# Patient Record
Sex: MALE | Race: WHITE | Employment: OTHER | URBAN - METROPOLITAN AREA
[De-identification: names, ages, dates, MRNs, and addresses within clinical notes are randomized per-mention and may not be internally consistent; named-entity substitution may affect disease eponyms.]

---

## 2017-04-26 ENCOUNTER — ALLIED HEALTH/NURSE VISIT (OUTPATIENT)
Dept: CARDIOLOGY | Facility: CLINIC | Age: 77
End: 2017-04-26
Payer: COMMERCIAL

## 2017-04-26 DIAGNOSIS — Z95.0 CARDIAC PACEMAKER IN SITU: ICD-10-CM

## 2017-04-26 PROCEDURE — 93294 REM INTERROG EVL PM/LDLS PM: CPT | Performed by: INTERNAL MEDICINE

## 2017-04-26 PROCEDURE — 93296 REM INTERROG EVL PM/IDS: CPT | Performed by: INTERNAL MEDICINE

## 2017-04-26 NOTE — PROGRESS NOTES
Medtronic Adapta (D) Remote PPM Device Check  AP: 25 % : <1 %  Mode: DDDR        Presenting Rhythm: AS/VS, rates 68-74bpm  Heart Rate: Adequate rates per histogram  Sensing: Stable    Pacing Threshold: Stable    Impedance: Stable  Battery Status: 10-14 years  Atrial Arrhythmia: None  Ventricular Arrhythmia: None     Care Plan: F/u PPM Carelink q 3 months. Gave pt results over the phone. Porfirio,CVT

## 2017-04-26 NOTE — MR AVS SNAPSHOT
After Visit Summary   4/26/2017    Charles Nogueira    MRN: [id number]           Patient Information     Date Of Birth          1940        Visit Information        Provider Department      4/26/2017 11:15 AM ADAM OCHOA [facility]        Today's Diagnoses     Cardiac pacemaker in situ           Follow-ups after your visit        Your next 10 appointments already scheduled     Apr 26, 2017 11:15 AM CDT   Remote PPM Check with MEDINA TECH1   [facility] ([facility])    [address]  [facility] [facility] [address] [phone]           This appointment is for a remote check of your pacemaker.  This is not an appointment at the office.            May 19, 2017  9:15 AM CDT   Return Visit with Michele Witt MD   [facility] ([facility])    [address]  [place] [address] [phone]              Who to contact     If you have questions or need follow up information about today's clinic visit or your schedule please contact [facility] directly at [phone].  Normal or non-critical lab and imaging results will be communicated to you by [employer], letter or phone within 4 business days after the clinic has received the results. If you do not hear from us within 7 days, please contact the clinic through [employer] or phone. If you have a critical or abnormal lab result, we will notify you by phone as soon as possible.  Submit refill requests through [employer] or call your pharmacy and they will forward the refill request to us. Please allow 3 business days for your refill to be completed.          Additional Information About Your Visit        MyChart Information     [employer] gives you secure access to your electronic health record. If you see a primary care provider, you can also send messages to  your care team and make appointments. If you have questions, please call your primary care clinic.  If you do not have a primary care provider, please call [phone] and they will assist you.        Care EveryWhere ID     This is your Care EveryWhere ID. This could be used by other organizations to access your [place] medical records  [id number]         Blood Pressure from Last 3 Encounters:   11/11/16 108/60   05/03/16 126/70   11/20/15 106/58    Weight from Last 3 Encounters:   11/11/16 70.8 kg (156 lb)   05/03/16 69.7 kg (153 lb 9.6 oz)   11/10/15 66.7 kg (147 lb)              We Performed the Following     Follow-Up with Device Clinic     INTERROGATION DEVICE EVAL REMOTE, PACER/ICD (79859)     PM DEVICE INTERROGATE REMOTE (06723)        Primary Care Provider Office Phone # Fax #    Stanley Vikas Saldaña [phone]       [address] [address]        Thank you!     Thank you for choosing [facility] [place]  for your care. Our goal is always to provide you with excellent care. Hearing back from our patients is one way we can continue to improve our services. Please take a few minutes to complete the written survey that you may receive in the mail after your visit with us. Thank you!             Your Updated Medication List - Protect others around you: Learn how to safely use, store and throw away your medicines at www.disposemymeds.org.          This list is accurate as of: 4/26/17 10:54 AM.  Always use your most recent med list.                   Brand Name Dispense Instructions for use    losartan 50 MG tablet    COZAAR    90 tablet    Take 1 tablet (50 mg) by mouth daily       metoprolol 50 MG 24 hr tablet    TOPROL-XL    90 tablet    Take 1 tablet (50 mg) by mouth daily       vitamin D 1000 UNITS capsule      Take 1,000 Units by mouth daily

## 2017-05-17 ENCOUNTER — PRE VISIT (OUTPATIENT)
Dept: CARDIOLOGY | Facility: CLINIC | Age: 77
End: 2017-05-17

## 2017-05-19 ENCOUNTER — OFFICE VISIT (OUTPATIENT)
Dept: CARDIOLOGY | Facility: CLINIC | Age: 77
End: 2017-05-19
Payer: COMMERCIAL

## 2017-05-19 VITALS
WEIGHT: 154.8 LBS | HEART RATE: 62 BPM | SYSTOLIC BLOOD PRESSURE: 132 MMHG | BODY MASS INDEX: 23.46 KG/M2 | HEIGHT: 68 IN | DIASTOLIC BLOOD PRESSURE: 70 MMHG

## 2017-05-19 DIAGNOSIS — I10 ESSENTIAL HYPERTENSION: ICD-10-CM

## 2017-05-19 PROCEDURE — 99214 OFFICE O/P EST MOD 30 MIN: CPT | Performed by: NURSE PRACTITIONER

## 2017-05-19 RX ORDER — METOPROLOL SUCCINATE 50 MG/1
50 TABLET, EXTENDED RELEASE ORAL DAILY
Qty: 90 TABLET | Refills: 3 | Status: SHIPPED | OUTPATIENT
Start: 2017-05-19 | End: 2018-06-19

## 2017-05-19 RX ORDER — LOSARTAN POTASSIUM 50 MG/1
50 TABLET ORAL DAILY
Qty: 90 TABLET | Refills: 3 | Status: SHIPPED | OUTPATIENT
Start: 2017-05-19 | End: 2018-06-19

## 2017-05-19 NOTE — PROGRESS NOTES
HPI and Plan:   See dictation  [id number]    Orders Placed This Encounter   Procedures     Follow-Up with Cardiologist       Orders Placed This Encounter   Medications     losartan (COZAAR) 50 MG tablet     Sig: Take 1 tablet (50 mg) by mouth daily     Dispense:  90 tablet     Refill:  3     metoprolol (TOPROL-XL) 50 MG 24 hr tablet     Sig: Take 1 tablet (50 mg) by mouth daily     Dispense:  90 tablet     Refill:  3       Medications Discontinued During This Encounter   Medication Reason     Cholecalciferol (VITAMIN D) 1000 UNITS capsule Stopped by Patient     losartan (COZAAR) 50 MG tablet Reorder     metoprolol (TOPROL-XL) 50 MG 24 hr tablet Reorder         Encounter Diagnosis   Name Primary?     Essential hypertension        CURRENT MEDICATIONS:  Current Outpatient Prescriptions   Medication Sig Dispense Refill     losartan (COZAAR) 50 MG tablet Take 1 tablet (50 mg) by mouth daily 90 tablet 3     metoprolol (TOPROL-XL) 50 MG 24 hr tablet Take 1 tablet (50 mg) by mouth daily 90 tablet 3     [DISCONTINUED] losartan (COZAAR) 50 MG tablet Take 1 tablet (50 mg) by mouth daily 90 tablet 3     [DISCONTINUED] metoprolol (TOPROL-XL) 50 MG 24 hr tablet Take 1 tablet (50 mg) by mouth daily 90 tablet 3       ALLERGIES     Allergies   Allergen Reactions     Sulfa Drugs      rash       PAST MEDICAL HISTORY:  Past Medical History:   Diagnosis Date     Allergic rhinitis, cause unspecified      HTN (hypertension)      Melanoma (H)      Mitral valve disease      Other forms of migraine, without mention of intractable migraine without mention of status migrainosus      Other premature beats      Pacemaker 7/2004     Premature ventricular contraction      Squamous cell carcinoma (H)      Syncope        PAST SURGICAL HISTORY:  Past Surgical History:   Procedure Laterality Date     APPENDECTOMY OPEN       BIOPSY OF SKIN LESION       COLONOSCOPY N/A 11/20/2015    Procedure: COLONOSCOPY;  Surgeon: David Us MD;  Location:  GI  "    IMPLANT PACEMAKER  7/2004     MOHS MICROGRAPHIC PROCEDURE         FAMILY HISTORY:  Family History   Problem Relation Age of Onset     C.A.D. Brother      Just had angioplasty in 2006     HEART DISEASE Mother      MI     HEART DISEASE Father      MI     Colon Cancer No family hx of        SOCIAL HISTORY:  Social History     Social History     Marital status:      Spouse name: Paula     Number of children: 3     Years of education: N/A     Occupational History      Retired     Social History Main Topics     Smoking status: Former Smoker     Packs/day: 1.00     Years: 4.00     Types: Cigarettes     Quit date: 1/1/1967     Smokeless tobacco: Never Used     Alcohol use No     Drug use: No     Sexual activity: Yes     Partners: Female     Other Topics Concern      Service Yes     Blood Transfusions No     Caffeine Concern Yes     decaff coffee     Occupational Exposure No     Hobby Hazards No     Sleep Concern No     Stress Concern No     Weight Concern No     Special Diet No     Back Care No     Exercise Yes     Excercises about 4-5 days per week     Bike Helmet Yes     Seat Belt Yes     Self-Exams No     Social History Narrative       Review of Systems:  Skin:  Positive for   thin skin, bruise easily   Eyes:  Positive for glasses    ENT:  Negative      Respiratory:  Negative       Cardiovascular:  Negative for;palpitations;chest pain;lightheadedness;dizziness;fatigue;edema      Gastroenterology: Negative      Genitourinary:  Negative      Musculoskeletal:  Positive for arthritis finger and toes pain, shoulders, neck stiffness  Neurologic:  Positive for migraine headaches;numbness or tingling of hands    Psychiatric:  Negative      Heme/Lymph/Imm:  Positive for allergies seasonal allergies  Endocrine:  Negative        Physical Exam:  Vitals: /70 (BP Location: Right arm, Patient Position: Chair, Cuff Size: Adult Regular)  Pulse 62  Ht 1.727 m (5' 8\")  Wt 70.2 kg (154 lb 12.8 oz)  BMI 23.54 " kg/m2    Constitutional:  cooperative, alert and oriented, well developed, well nourished, in no acute distress        Skin:  warm and dry to the touch, no apparent skin lesions or masses noted surgical scars well-healed      Head:  normocephalic, no masses or lesions        Eyes:  pupils equal and round, conjunctivae and lids unremarkable, sclera white, no xanthalasma, EOMS intact, no nystagmus        ENT:  no pallor or cyanosis, dentition good        Neck:  carotid pulses are full and equal bilaterally, JVP normal, no carotid bruit, no thyromegaly        Chest:  normal breath sounds, clear to auscultation, normal A-P diameter, normal symmetry, normal respiratory excursion, no use of accessory muscles          Cardiac: normal S1 and S2;no S3 or S4;apical impulse not displaced;regular rhythm       holosystolic murmur;LLSB;radiation to the axilla          Abdomen:  abdomen soft, non-tender, BS normoactive, no mass, no HSM, no bruits        Vascular: pulses full and equal, no bruits auscultated                                        Extremities and Back:  no deformities, clubbing, cyanosis, erythema observed;no edema              Neurological:  affect appropriate, oriented to time, person and place;no gross motor deficits              CC  No referring provider defined for this encounter.

## 2017-05-19 NOTE — LETTER
5/19/2017    Stanley Saldaña MD  [address] [address]    RE: Charles Salazaranson       Dear Colleague,    I had the pleasure of seeing Charles Nogueira in the [facility].    Charles Nogueira is a 77-year-old gentleman followed here by Dr. Michele Witt.  There was a mixup in scheduling today, and he is seeing me in lieu of his visit with Dr. Witt.  He has a history of mitral regurgitation which appeared moderate by echocardiogram in August of last year.  Recommendation is for echo again this year in August.      He has hypertension with PVCs.  He had a pacemaker implanted at [facility] in 2004 for bradycardia with asystole.  His battery life is intact.  He has had no significant mode switches on his last few checks and continues to have multiple PVCs which are asymptomatic in the setting of preserved LV function and no evidence of ischemic disease.  He has mild left atrial enlargement.  On transesophageal echocardiogram previously, the jet was directed anteriorly consistent with posterior leaflet pathology.      He has no progression of shortness of breath.  He has no edema.  His activity tolerance remains intact.  In fact, he walks 40 miles a week with his wife.  He lives in [place], for 4 months out of the year.      He has no cardiac symptoms today.  I did refill his medications.  His blood pressure is reasonably controlled.  He had lipids done at Dr. Matthew Saldaña's office in November showing an LDL of 116, HDL 33.  Triglycerides were normal.     Outpatient Encounter Prescriptions as of 5/19/2017   Medication Sig Dispense Refill     losartan (COZAAR) 50 MG tablet Take 1 tablet (50 mg) by mouth daily 90 tablet 3     metoprolol (TOPROL-XL) 50 MG 24 hr tablet Take 1 tablet (50 mg) by mouth daily 90 tablet 3     [DISCONTINUED] losartan (COZAAR) 50 MG tablet Take 1 tablet (50 mg) by mouth daily 90 tablet 3     [DISCONTINUED] metoprolol (TOPROL-XL) 50  MG 24 hr tablet Take 1 tablet (50 mg) by mouth daily 90 tablet 3     [DISCONTINUED] Cholecalciferol (VITAMIN D) 1000 UNITS capsule Take 1,000 Units by mouth daily       No facility-administered encounter medications on file as of 5/19/2017.       IMPRESSION AND PLAN:   1.  Sick sinus syndrome with pacemaker in place.  All settings are stable.   2.  Multiple PVCs with intact ejection fraction and no ischemic disease.  These are asymptomatic.   3.  Mitral regurgitation which is moderate.  This appears to be stable.  He appears to have prolapse of his posterior leaflet.  He will have an echo in August which will be reviewed by Dr. Witt.  We have offered him an office visit at that time, which he has accepted and we will do a full interrogation of his pacemaker at the same time.      It has been a pleasure meeting him today.     Sincerely,    CORBIN Heller CNP     [facility]

## 2017-05-19 NOTE — MR AVS SNAPSHOT
After Visit Summary   5/19/2017    Charles Nogueira    MRN: [id number]           Patient Information     Date Of Birth          1940        Visit Information        Provider Department      5/19/2017 9:30 AM Geraldo Burger APRN CNP [facility] AT [place]        Today's Diagnoses     Essential hypertension          Care Instructions    Echocardiogram and pacer check in August    The echo will be reviewed and if stable, see Eduar in one year        Follow-ups after your visit        Additional Services     Follow-Up with Cardiologist                 Your next 10 appointments already scheduled     Aug 02, 2017 11:00 AM CDT   Ech Complete with [email or web address]   [facility] ([facility])    [address]  [place] [address] [phone]           1.  Please bring or wear a comfortable two-piece outfit. 2.  You may eat, drink and take your normal medicines. 3.  For any questions that cannot be answered, please contact the ordering physician ***Please check-in at the [place] Registration Office located in [address] in the [facility] building. When you are finished registering, please go to [address] and have a seat. The technician will call your name for the test.            Aug 02, 2017  1:30 PM CDT   Remote PPM Check with MEDINA TECH1   [facility])    [address] [place] [address] [address]  [facility] [address] [phone]           This appointment is for a remote check of your pacemaker.  This is not an appointment at the office.            Aug 02, 2017  2:15 PM CDT   Return Visit with Michele Witt MD   [facility] AT [place] ([facility])    [address]  [facility] [address] [phone]              Future tests that were ordered for you today     Open Future Orders   "      Priority Expected Expires Ordered    Follow-Up with Cardiologist Routine 5/19/2018 10/1/2018 5/19/2017            Who to contact     If you have questions or need follow up information about today's clinic visit or your schedule please contact [facility] AT [place] directly at [phone].  Normal or non-critical lab and imaging results will be communicated to you by MyChart, letter or phone within 4 business days after the clinic has received the results. If you do not hear from us within 7 days, please contact the clinic through [employer] or phone. If you have a critical or abnormal lab result, we will notify you by phone as soon as possible.  Submit refill requests through [employer] or call your pharmacy and they will forward the refill request to us. Please allow 3 business days for your refill to be completed.          Additional Information About Your Visit        MyChart Information     [employer] gives you secure access to your electronic health record. If you see a primary care provider, you can also send messages to your care team and make appointments. If you have questions, please call your primary care clinic.  If you do not have a primary care provider, please call [phone] and they will assist you.        Care EveryWhere ID     This is your Care EveryWhere ID. This could be used by other organizations to access your [place] medical records  [id number]        Your Vitals Were     Pulse Height BMI (Body Mass Index)             62 1.727 m (5' 8\") 23.54 kg/m2          Blood Pressure from Last 3 Encounters:   05/19/17 132/70   11/11/16 108/60   05/03/16 126/70    Weight from Last 3 Encounters:   05/19/17 70.2 kg (154 lb 12.8 oz)   11/11/16 70.8 kg (156 lb)   05/03/16 69.7 kg (153 lb 9.6 oz)                 Where to get your medicines      These medications were sent to [facility] - [place], [place] - [address]  [address], [place] " [place] [address]     Phone:  [phone]     losartan 50 MG tablet    metoprolol 50 MG 24 hr tablet          Primary Care Provider Office Phone # Fax #    Stanley Saldaña [phone]       [address] [address]        Thank you!     Thank you for choosing [facility] PHYSICI[facility] HEART AT [place]  for your care. Our goal is always to provide you with excellent care. Hearing back from our patients is one way we can continue to improve our services. Please take a few minutes to complete the written survey that you may receive in the mail after your visit with us. Thank you!             Your Updated Medication List - Protect others around you: Learn how to safely use, store and throw away your medicines at www.disposemymeds.org.          This list is accurate as of: 5/19/17 10:09 AM.  Always use your most recent med list.                   Brand Name Dispense Instructions for use    losartan 50 MG tablet    COZAAR    90 tablet    Take 1 tablet (50 mg) by mouth daily       metoprolol 50 MG 24 hr tablet    TOPROL-XL    90 tablet    Take 1 tablet (50 mg) by mouth daily

## 2017-05-19 NOTE — PATIENT INSTRUCTIONS
Echocardiogram and pacer check in August    The echo will be reviewed and if stable, see Eduar in one year

## 2017-05-20 NOTE — PROGRESS NOTES
HISTORY OF PRESENT ILLNESS:  Charles Noguiera is a 77-year-old gentleman followed here by Dr. Michele Witt.  There was a mixup in scheduling today, and he is seeing me in lieu of his visit with Dr. Witt.  He has a history of mitral regurgitation which appeared moderate by echocardiogram in August of last year.  Recommendation is for echo again this year in August.      He has hypertension with PVCs.  He had a pacemaker implanted at [facility] in 2004 for bradycardia with asystole.  His battery life is intact.  He has had no significant mode switches on his last few checks and continues to have multiple PVCs which are asymptomatic in the setting of preserved LV function and no evidence of ischemic disease.  He has mild left atrial enlargement.  On transesophageal echocardiogram previously, the jet was directed anteriorly consistent with posterior leaflet pathology.      He has no progression of shortness of breath.  He has no edema.  His activity tolerance remains intact.  In fact, he walks 40 miles a week with his wife.  He lives in [place], for 4 months out of the year.      He has no cardiac symptoms today.  I did refill his medications.  His blood pressure is reasonably controlled.  He had lipids done at Dr. Matthew Saldaña's office in November showing an LDL of 116, HDL 33.  Triglycerides were normal.      IMPRESSION AND PLAN:   1.  Sick sinus syndrome with pacemaker in place.  All settings are stable.   2.  Multiple PVCs with intact ejection fraction and no ischemic disease.  These are asymptomatic.   3.  Mitral regurgitation which is moderate.  This appears to be stable.  He appears to have prolapse of his posterior leaflet.  He will have an echo in August which will be reviewed by Dr. Witt.  We have offered him an office visit at that time, which he has accepted and we will do a full interrogation of his pacemaker at the same time.      It has been a pleasure meeting him today.         YUDELKA  SUSHILA HERR NP             D: 2017 10:16   T: 2017 20:50   MT: RODRICK      Name:     WALI CHAMORRO   MRN:      [id number]        Account:      [id number]   :      1940           Service Date: 2017      Document: [id number]

## 2017-06-27 ENCOUNTER — TELEPHONE (OUTPATIENT)
Dept: FAMILY MEDICINE | Facility: CLINIC | Age: 77
End: 2017-06-27

## 2017-08-02 ENCOUNTER — HOSPITAL ENCOUNTER (OUTPATIENT)
Dept: CARDIOLOGY | Facility: CLINIC | Age: 77
Discharge: HOME | End: 2017-08-02
Attending: INTERNAL MEDICINE | Admitting: INTERNAL MEDICINE
Payer: MEDICARE

## 2017-08-02 ENCOUNTER — ALLIED HEALTH/NURSE VISIT (OUTPATIENT)
Dept: CARDIOLOGY | Facility: CLINIC | Age: 77
End: 2017-08-02
Payer: COMMERCIAL

## 2017-08-02 DIAGNOSIS — I05.9 MITRAL VALVE DISEASE: ICD-10-CM

## 2017-08-02 DIAGNOSIS — Z95.0 CARDIAC PACEMAKER IN SITU: Primary | ICD-10-CM

## 2017-08-02 PROCEDURE — 93294 REM INTERROG EVL PM/LDLS PM: CPT | Performed by: INTERNAL MEDICINE

## 2017-08-02 PROCEDURE — 93306 TTE W/DOPPLER COMPLETE: CPT

## 2017-08-02 PROCEDURE — 93306 TTE W/DOPPLER COMPLETE: CPT | Mod: 26 | Performed by: INTERNAL MEDICINE

## 2017-08-02 PROCEDURE — 93296 REM INTERROG EVL PM/IDS: CPT | Performed by: INTERNAL MEDICINE

## 2017-08-02 NOTE — PROGRESS NOTES
Medtronic Adapta ADDRL1 (D) Remote PPM Device Check  AP: 25% : 0%  Mode: DDDR        Presenting Rhythm: AP/VS  Heart Rate: adequate heart rates per histogram  Sensing: stable    Pacing Threshold: stable    Impedance: stable  Battery Status: 10 - 14 years remaining  Atrial Arrhythmia: 1 brief mode switch episode, no EGM available  Ventricular Arrhythmia: 1 vent high rate. EGM shows a 1 sec burst of PAT. Reviewed finding with Linda YADAV.      Care Plan: Order placed for annual threshold to be scheduled in November. No answer, left voice message with result. Kristian WITT

## 2017-08-02 NOTE — MR AVS SNAPSHOT
After Visit Summary   8/2/2017    Charles Nogueira    MRN: [id number]           Patient Information     Date Of Birth          1940        Visit Information        Provider Department      8/2/2017 1:30 PM ADAM OCHOA [facility]        Today's Diagnoses     Cardiac pacemaker in situ    -  1       Follow-ups after your visit        Additional Services     Follow-Up with Device Clinic                 Your next 10 appointments already scheduled     Aug 02, 2017  1:30 PM CDT   Remote PPM Check with ADAM OCHOA   [facility] ([facility])    [address]  [facility] [id number] [phone]           This appointment is for a remote check of your pacemaker.  This is not an appointment at the office.            Aug 14, 2017  7:45 AM CDT   Return Visit with Michele Witt MD   [facility] ([facility])    [address]  [facility] [id number] [phone]              Future tests that were ordered for you today     Open Future Orders        Priority Expected Expires Ordered    Follow-Up with Device Clinic Routine 11/2/2017 9/6/2018 8/2/2017            Who to contact     If you have questions or need follow up information about today's clinic visit or your schedule please contact [facility] directly at [phone].  Normal or non-critical lab and imaging results will be communicated to you by MyChart, letter or phone within 4 business days after the clinic has received the results. If you do not hear from us within 7 days, please contact the clinic through MyChart or phone. If you have a critical or abnormal lab result, we will notify you by phone as soon as possible.  Submit refill requests through [employer] or call your pharmacy and they will forward the refill request to us. Please allow 3  business days for your refill to be completed.          Additional Information About Your Visit        MyChart Information     [employer] gives you secure access to your electronic health record. If you see a primary care provider, you can also send messages to your care team and make appointments. If you have questions, please call your primary care clinic.  If you do not have a primary care provider, please call [phone] and they will assist you.        Care EveryWhere ID     This is your Care EveryWhere ID. This could be used by other organizations to access your [facility] medical records  [id number]         Blood Pressure from Last 3 Encounters:   05/19/17 132/70   11/11/16 108/60   05/03/16 126/70    Weight from Last 3 Encounters:   05/19/17 70.2 kg (154 lb 12.8 oz)   11/11/16 70.8 kg (156 lb)   05/03/16 69.7 kg (153 lb 9.6 oz)              We Performed the Following     INTERROGATION DEVICE EVAL REMOTE, PACER/ICD (60334)     PM DEVICE INTERROGATE REMOTE (86172)        Primary Care Provider Office Phone # Fax #    Stanley Vikas Saldaña [phone]       [address] [address]        Equal Access to Services     ROLANDO MATHIAS : Hadii aad ku hadasho Soomaali, waaxda luqadaha, qaybta kaalmada adeegyada, waxay idiin haypiercen ruben velazco. So [facility] [phone].    ATENCIÓN: Si habla español, tiene a dowling disposición servicios gratuitos de asistencia lingüística. Llame al [phone].    We comply with applicable federal civil rights laws and [place] laws. We do not discriminate on the basis of race, color, national origin, age, disability sex, sexual orientation or gender identity.            Thank you!     Thank you for choosing [facility] [place]  for your care. Our goal is always to provide you with excellent care. Hearing back from our patients is one way we can continue to improve our services. Please  take a few minutes to complete the written survey that you may receive in the mail after your visit with us. Thank you!             Your Updated Medication List - Protect others around you: Learn how to safely use, store and throw away your medicines at www.disposemymeds.org.          This list is accurate as of: 8/2/17  1:11 PM.  Always use your most recent med list.                   Brand Name Dispense Instructions for use Diagnosis    losartan 50 MG tablet    COZAAR    90 tablet    Take 1 tablet (50 mg) by mouth daily    Essential hypertension       metoprolol 50 MG 24 hr tablet    TOPROL-XL    90 tablet    Take 1 tablet (50 mg) by mouth daily    Essential hypertension

## 2017-08-14 ENCOUNTER — OFFICE VISIT (OUTPATIENT)
Dept: CARDIOLOGY | Facility: CLINIC | Age: 77
End: 2017-08-14
Attending: INTERNAL MEDICINE
Payer: COMMERCIAL

## 2017-08-14 VITALS
SYSTOLIC BLOOD PRESSURE: 132 MMHG | HEIGHT: 68 IN | WEIGHT: 152 LBS | DIASTOLIC BLOOD PRESSURE: 74 MMHG | BODY MASS INDEX: 23.04 KG/M2 | HEART RATE: 66 BPM

## 2017-08-14 DIAGNOSIS — I10 ESSENTIAL HYPERTENSION: ICD-10-CM

## 2017-08-14 DIAGNOSIS — I49.3 PVC (PREMATURE VENTRICULAR CONTRACTION): ICD-10-CM

## 2017-08-14 DIAGNOSIS — I49.5 SICK SINUS SYNDROME (H): ICD-10-CM

## 2017-08-14 DIAGNOSIS — I05.9 MITRAL VALVE DISEASE: Primary | ICD-10-CM

## 2017-08-14 DIAGNOSIS — I45.9 STOKES-ADAMS SYNCOPE: ICD-10-CM

## 2017-08-14 DIAGNOSIS — Z95.0 CARDIAC PACEMAKER IN SITU: ICD-10-CM

## 2017-08-14 PROCEDURE — 99214 OFFICE O/P EST MOD 30 MIN: CPT | Performed by: INTERNAL MEDICINE

## 2017-08-14 NOTE — LETTER
8/14/2017    Stanley Saldaña MD  [address] [address] [facility] [address]    RE: Charles Nogueira       Dear Colleague,    PRIMARY CARE PHYSICIAN:  Stanley Saldaña MD      I again had the pleasure of seeing your patient, Charels Nogueira, at [facility] for evaluation of mitral regurgitation, hypertension and PVCs.  The patient is status post dual chamber pacemaker implantation at [facility] 07/07/2004 for bradycardia and asystole.  He has not had any further episodes of syncope.  Pacemaker interrogation on 08/02 shows atrial pacing of 25% and no ventricular pacing.  He has 10-14 years of battery life left.  He had 1 brief mode switch and 1-second burst of PAT.  He has a history of significant premature ventricular contractions which have been less and less over the last few years.  He is able to exercise by walking generally 10,000 steps per day including up to 45 minutes to 2 hours of walking free of chest discomfort or shortness of breath.  We are following his echocardiography for moderate mitral regurgitation and ejection fraction.  This was performed on 08/02 and demonstrated mild left atrial enlargement, ejection fraction of 55%-60%, possibly due to his paced rhythm.  Left ventricular end systolic and end-diastolic dimensions are normal.  He has evidence of a torn P1/P2 cord with prolapse of the mitral valve and the annulus has been dilated in the past at 38 mm x 39 mm.  The mitral regurgitation jet on OTTO was anteriorly directed consistent with posterior leaflet pathology.  We have discussed the need for mitral valve repair should his left ventricular show an increase in size or decrease in function.  Pulmonary pressures have been normal.  He denies fevers, chills, palpitations, syncope or presyncope.  He has not been hospitalized or had any surgery since I last saw him.      PHYSICAL EXAMINATION:  As listed below.     Outpatient Encounter Prescriptions as of 8/14/2017    Medication Sig Dispense Refill     losartan (COZAAR) 50 MG tablet Take 1 tablet (50 mg) by mouth daily 90 tablet 3     metoprolol (TOPROL-XL) 50 MG 24 hr tablet Take 1 tablet (50 mg) by mouth daily 90 tablet 3     No facility-administered encounter medications on file as of 8/14/2017.       ASSESSMENT:   1.  Charles Nogueira is a delightful 77-year-old male with sick sinus syndrome and episodes of severe bradycardia and syncope.  His dual chamber pacemaker implanted in 2004 and replaced 07/02/2014 is functioning normally.  We will continue to monitor his rhythms including PVCs through the device clinic.   2.  Mitral valve prolapse and mitral regurgitation.  This has been stable and we will repeat an echocardiogram in 1 year.   3.  We are using losartan along with metoprolol for PVCs and blood pressure.  He seems to be stable at this time.   4.  The patient's HDL cholesterol is low at 33 and LDL possibly normal at 116 last November.  I have offered to perform a CT calcium score on this patient given a family history of heart disease as well as the patient's low HDL.  He and his wife have been informed of the purpose of this test, how it is done, the low-dose radiation and what the results would indicate.  He will decide whether he wishes to proceed sometime in the near future.  Should this be abnormal, a statin would be added and an LDL cholesterol level below 100 would be suggested.      It is my pleasure to assist in the care of Charles Nogueira.  All his questions were answered to his satisfaction.     Sincerely,    Michele Witt MD     [facility]

## 2017-08-14 NOTE — PROGRESS NOTES
PRIMARY CARE PHYSICIAN:  Stanley Saldaña MD      HISTORY OF PRESENT ILLNESS:  I again had the pleasure of seeing your patient, Charles Nogueira, at [facility] for evaluation of mitral regurgitation, hypertension and PVCs.  The patient is status post dual chamber pacemaker implantation at [facility] 07/07/2004 for bradycardia and asystole.  He has not had any further episodes of syncope.  Pacemaker interrogation on 08/02 shows atrial pacing of 25% and no ventricular pacing.  He has 10-14 years of battery life left.  He had 1 brief mode switch and 1-second burst of PAT.  He has a history of significant premature ventricular contractions which have been less and less over the last few years.  He is able to exercise by walking generally 10,000 steps per day including up to 45 minutes to 2 hours of walking free of chest discomfort or shortness of breath.  We are following his echocardiography for moderate mitral regurgitation and ejection fraction.  This was performed on 08/02 and demonstrated mild left atrial enlargement, ejection fraction of 55%-60%, possibly due to his paced rhythm.  Left ventricular end systolic and end-diastolic dimensions are normal.  He has evidence of a torn P1/P2 cord with prolapse of the mitral valve and the annulus has been dilated in the past at 38 mm x 39 mm.  The mitral regurgitation jet on OTTO was anteriorly directed consistent with posterior leaflet pathology.  We have discussed the need for mitral valve repair should his left ventricular show an increase in size or decrease in function.  Pulmonary pressures have been normal.  He denies fevers, chills, palpitations, syncope or presyncope.  He has not been hospitalized or had any surgery since I last saw him.      PHYSICAL EXAMINATION:  As listed below.      ASSESSMENT:   1.  Charles Nogueira is a delightful 77-year-old male with sick sinus syndrome and episodes of severe bradycardia and syncope.  His dual  chamber pacemaker implanted in  and replaced 2014 is functioning normally.  We will continue to monitor his rhythms including PVCs through the device clinic.   2.  Mitral valve prolapse and mitral regurgitation.  This has been stable and we will repeat an echocardiogram in 1 year.   3.  We are using losartan along with metoprolol for PVCs and blood pressure.  He seems to be stable at this time.   4.  The patient's HDL cholesterol is low at 33 and LDL possibly normal at 116 last November.  I have offered to perform a CT calcium score on this patient given a family history of heart disease as well as the patient's low HDL.  He and his wife have been informed of the purpose of this test, how it is done, the low-dose radiation and what the results would indicate.  He will decide whether he wishes to proceed sometime in the near future.  Should this be abnormal, a statin would be added and an LDL cholesterol level below 100 would be suggested.      It is my pleasure to assist in the care of Charles Nogueira.  All his questions were answered to his satisfaction.      Anette Oliver MD      cc:   Stanley Saldaña MD   [facility]   [id number] [place] [address]         ANETTE OLIVER MD, [facility]             D: 2017 08:25   T: 2017 10:00   MT: al      Name:     CHARLES NOGUEIRA   MRN:      [id number]        Account:      [id number]   :      1940           Service Date: 2017      Document: [id number]

## 2017-08-14 NOTE — PROGRESS NOTES
HPI and Plan:   See dictation:[id number]    Orders Placed This Encounter   Procedures     Follow-Up with Cardiologist     Echocardiogram       No orders of the defined types were placed in this encounter.      There are no discontinued medications.      Encounter Diagnoses   Name Primary?     Mitral valve disease      Damico-Anne syncope      Pacemaker      PVC (premature ventricular contraction)      Essential hypertension        CURRENT MEDICATIONS:  Current Outpatient Prescriptions   Medication Sig Dispense Refill     losartan (COZAAR) 50 MG tablet Take 1 tablet (50 mg) by mouth daily 90 tablet 3     metoprolol (TOPROL-XL) 50 MG 24 hr tablet Take 1 tablet (50 mg) by mouth daily 90 tablet 3       ALLERGIES     Allergies   Allergen Reactions     Sulfa Drugs      rash       PAST MEDICAL HISTORY:  Past Medical History:   Diagnosis Date     Allergic rhinitis, cause unspecified      HTN (hypertension)      Melanoma (H)      Mitral valve disease      Other forms of migraine, without mention of intractable migraine without mention of status migrainosus      Other premature beats      Pacemaker 7/2004     Premature ventricular contraction      Squamous cell carcinoma      Syncope        PAST SURGICAL HISTORY:  Past Surgical History:   Procedure Laterality Date     APPENDECTOMY OPEN       BIOPSY OF SKIN LESION       COLONOSCOPY N/A 11/20/2015    Procedure: COLONOSCOPY;  Surgeon: David Us MD;  Location: [facility]     IMPLANT PACEMAKER  7/2004     MOHS MICROGRAPHIC PROCEDURE         FAMILY HISTORY:  Family History   Problem Relation Age of Onset     HEART DISEASE Mother      MI     HEART DISEASE Father      MI     C.A.D. Brother      Just had angioplasty in 2006     Colon Cancer No family hx of        SOCIAL HISTORY:  Social History     Social History     Marital status:      Spouse name: Paula     Number of children: 3     Years of education: N/A     Occupational History      Retired     Social History Main  "Topics     Smoking status: Former Smoker     Packs/day: 1.00     Years: 4.00     Types: Cigarettes     Quit date: 1/1/1967     Smokeless tobacco: Never Used     Alcohol use No     Drug use: No     Sexual activity: Yes     Partners: Female     Other Topics Concern      Service Yes     Blood Transfusions No     Caffeine Concern Yes     decaff coffee     Occupational Exposure No     Hobby Hazards No     Sleep Concern No     Stress Concern No     Weight Concern No     Special Diet No     Back Care No     Exercise Yes     Excercises about 4-5 days per week     Bike Helmet Yes     Seat Belt Yes     Self-Exams No     Social History Narrative       Review of Systems:  Skin:  Positive for bruising thin skin, bruise easily   Eyes:  Positive for glasses    ENT:  Positive for   runny nose - maily when eating   Respiratory:  Negative       Cardiovascular:  Negative      Gastroenterology: Negative      Genitourinary:  Negative      Musculoskeletal:  Positive for arthritis;joint pain finger and toes pain, shoulders, neck stiffness  Neurologic:  Positive for migraine headaches;numbness or tingling of hands ocular migraines - no ache - affects speech   Psychiatric:  Negative      Heme/Lymph/Imm:  Positive for allergies seasonal allergies, Sulfa drugs   Endocrine:  Negative        Physical Exam:  Vitals: /74 (BP Location: Right arm, Patient Position: Sitting, Cuff Size: Adult Regular)  Pulse 66  Ht 1.727 m (5' 8\")  Wt 68.9 kg (152 lb)  BMI 23.11 kg/m2    Constitutional:  cooperative, alert and oriented, well developed, well nourished, in no acute distress        Skin:  warm and dry to the touch, no apparent skin lesions or masses noted surgical scars well-healed      Head:  normocephalic, no masses or lesions        Eyes:  pupils equal and round, conjunctivae and lids unremarkable, sclera white, no xanthalasma, EOMS intact, no nystagmus        ENT:  no pallor or cyanosis, dentition good        Neck:  carotid pulses " are full and equal bilaterally, JVP normal, no carotid bruit, no thyromegaly        Chest:  normal breath sounds, clear to auscultation, normal A-P diameter, normal symmetry, normal respiratory excursion, no use of accessory muscles          Cardiac: normal S1 and S2;no S3 or S4;apical impulse not displaced;regular rhythm       holosystolic murmur;LLSB;radiation to the axilla          Abdomen:  abdomen soft, non-tender, BS normoactive, no mass, no HSM, no bruits        Vascular: pulses full and equal, no bruits auscultated                                        Extremities and Back:  no deformities, clubbing, cyanosis, erythema observed;no edema              Neurological:  affect appropriate, oriented to time, person and place;no gross motor deficits              CC  Michele Witt MD  [address]  [place] [id number]

## 2017-08-14 NOTE — MR AVS SNAPSHOT
After Visit Summary   8/14/2017    Charles Nogueira    MRN: [id number]           Patient Information     Date Of Birth          1940        Visit Information        Provider Department      8/14/2017 7:45 AM Michele Witt MD [facility] [place]        Today's Diagnoses     Mitral valve disease        Damico-Anne syncope        Pacemaker        PVC (premature ventricular contraction)        Essential hypertension           Follow-ups after your visit        Additional Services     Follow-Up with Cardiologist                 Future tests that were ordered for you today     Open Future Orders        Priority Expected Expires Ordered    Follow-Up with Cardiologist Routine 8/14/2018 8/15/2018 8/14/2017    Echocardiogram Routine 8/14/2018 8/15/2018 8/14/2017            Who to contact     If you have questions or need follow up information about today's clinic visit or your schedule please contact [facility] directly at [phone].  Normal or non-critical lab and imaging results will be communicated to you by [employer], letter or phone within 4 business days after the clinic has received the results. If you do not hear from us within 7 days, please contact the clinic through [employer] or phone. If you have a critical or abnormal lab result, we will notify you by phone as soon as possible.  Submit refill requests through [employer] or call your pharmacy and they will forward the refill request to us. Please allow 3 business days for your refill to be completed.          Additional Information About Your Visit        MyChart Information     [employer] gives you secure access to your electronic health record. If you see a primary care provider, you can also send messages to your care team and make appointments. If you have questions, please call your primary care clinic.  If you do not have a primary care provider, please call  "[phone] and they will assist you.        Care EveryWhere ID     This is your Care EveryWhere ID. This could be used by other organizations to access your [facility] medical records  [id number]        Your Vitals Were     Pulse Height BMI (Body Mass Index)             66 1.727 m (5' 8\") 23.11 kg/m2          Blood Pressure from Last 3 Encounters:   08/14/17 132/74   05/19/17 132/70   11/11/16 108/60    Weight from Last 3 Encounters:   08/14/17 68.9 kg (152 lb)   05/19/17 70.2 kg (154 lb 12.8 oz)   11/11/16 70.8 kg (156 lb)              We Performed the Following     Follow-Up with Cardiologist        Primary Care Provider Office Phone # Fax #    Stanley Saldaña [phone] [address] [facility] [address]        Equal Access to Services     ROLANDO MATHIAS : Hadii aad ku hadasho Soomaali, waaxda luqadaha, qaybta kaalmada adeegyada, jodi barron . So [facility] [phone].    ATENCIÓN: Si jamilahla espdiego, tiene a dowling disposición servicios gratuitos de asistencia lingüística. Llame al [phone].    We comply with applicable federal civil rights laws and [place] laws. We do not discriminate on the basis of race, color, national origin, age, disability sex, sexual orientation or gender identity.            Thank you!     Thank you for choosing [facility] [place]  for your care. Our goal is always to provide you with excellent care. Hearing back from our patients is one way we can continue to improve our services. Please take a few minutes to complete the written survey that you may receive in the mail after your visit with us. Thank you!             Your Updated Medication List - Protect others around you: Learn how to safely use, store and throw away your medicines at www.disposemymeds.org.          This list is accurate as of: 8/14/17  8:16 AM.  Always use your most recent med list.                   Brand Name Dispense " Instructions for use Diagnosis    losartan 50 MG tablet    COZAAR    90 tablet    Take 1 tablet (50 mg) by mouth daily    Essential hypertension       metoprolol 50 MG 24 hr tablet    TOPROL-XL    90 tablet    Take 1 tablet (50 mg) by mouth daily    Essential hypertension

## 2017-11-13 ENCOUNTER — RADIANT APPOINTMENT (OUTPATIENT)
Dept: GENERAL RADIOLOGY | Facility: CLINIC | Age: 77
End: 2017-11-13
Attending: FAMILY MEDICINE
Payer: COMMERCIAL

## 2017-11-13 ENCOUNTER — OFFICE VISIT (OUTPATIENT)
Dept: FAMILY MEDICINE | Facility: CLINIC | Age: 77
End: 2017-11-13
Payer: COMMERCIAL

## 2017-11-13 VITALS
WEIGHT: 152.9 LBS | DIASTOLIC BLOOD PRESSURE: 84 MMHG | RESPIRATION RATE: 14 BRPM | BODY MASS INDEX: 23.17 KG/M2 | TEMPERATURE: 97.9 F | SYSTOLIC BLOOD PRESSURE: 142 MMHG | HEIGHT: 68 IN | HEART RATE: 63 BPM | OXYGEN SATURATION: 99 %

## 2017-11-13 DIAGNOSIS — I10 ESSENTIAL HYPERTENSION, BENIGN: ICD-10-CM

## 2017-11-13 DIAGNOSIS — Z23 NEED FOR PNEUMOCOCCAL VACCINE: ICD-10-CM

## 2017-11-13 DIAGNOSIS — Z95.0 CARDIAC PACEMAKER IN SITU: ICD-10-CM

## 2017-11-13 DIAGNOSIS — I05.9 MITRAL VALVE DISEASE: ICD-10-CM

## 2017-11-13 DIAGNOSIS — Z23 NEED FOR PROPHYLACTIC VACCINATION AND INOCULATION AGAINST INFLUENZA: ICD-10-CM

## 2017-11-13 DIAGNOSIS — Z00.00 ENCOUNTER FOR MEDICARE ANNUAL WELLNESS EXAM: Primary | ICD-10-CM

## 2017-11-13 DIAGNOSIS — M65.90 SYNOVITIS AND TENOSYNOVITIS: ICD-10-CM

## 2017-11-13 DIAGNOSIS — Z12.5 SCREENING FOR PROSTATE CANCER: ICD-10-CM

## 2017-11-13 DIAGNOSIS — M25.562 ACUTE PAIN OF LEFT KNEE: ICD-10-CM

## 2017-11-13 DIAGNOSIS — I24.9 ACUTE ISCHEMIC HEART DISEASE (H): ICD-10-CM

## 2017-11-13 PROCEDURE — 36415 COLL VENOUS BLD VENIPUNCTURE: CPT | Performed by: FAMILY MEDICINE

## 2017-11-13 PROCEDURE — 84550 ASSAY OF BLOOD/URIC ACID: CPT | Performed by: FAMILY MEDICINE

## 2017-11-13 PROCEDURE — G0103 PSA SCREENING: HCPCS | Performed by: FAMILY MEDICINE

## 2017-11-13 PROCEDURE — 90662 IIV NO PRSV INCREASED AG IM: CPT | Performed by: FAMILY MEDICINE

## 2017-11-13 PROCEDURE — 80061 LIPID PANEL: CPT | Performed by: FAMILY MEDICINE

## 2017-11-13 PROCEDURE — G0008 ADMIN INFLUENZA VIRUS VAC: HCPCS | Performed by: FAMILY MEDICINE

## 2017-11-13 PROCEDURE — 80053 COMPREHEN METABOLIC PANEL: CPT | Performed by: FAMILY MEDICINE

## 2017-11-13 PROCEDURE — 73560 X-RAY EXAM OF KNEE 1 OR 2: CPT | Mod: LT

## 2017-11-13 PROCEDURE — 99397 PER PM REEVAL EST PAT 65+ YR: CPT | Mod: 25 | Performed by: FAMILY MEDICINE

## 2017-11-13 PROCEDURE — 90670 PCV13 VACCINE IM: CPT | Performed by: FAMILY MEDICINE

## 2017-11-13 PROCEDURE — 99213 OFFICE O/P EST LOW 20 MIN: CPT | Mod: 25 | Performed by: FAMILY MEDICINE

## 2017-11-13 PROCEDURE — G0009 ADMIN PNEUMOCOCCAL VACCINE: HCPCS | Performed by: FAMILY MEDICINE

## 2017-11-13 NOTE — PROGRESS NOTES

## 2017-11-13 NOTE — PROGRESS NOTES
SUBJECTIVE:   Charles Nogueira is a 77 year old male who presents for Preventive Visit.    Are you in the first 12 months of your Medicare coverage?      Physical   Annual:     Getting at least 3 servings of Calcium per day::  Yes    Bi-annual eye exam::  Yes    Dental care twice a year::  Yes    Sleep apnea or symptoms of sleep apnea::  None    Diet::  Regular (no restrictions)    Frequency of exercise::  4-5 days/week    Duration of exercise::  45-60 minutes    Taking medications regularly::  Yes    Medication side effects::  None    Additional concerns today::  YES      COGNITIVE SCREEN  1) Repeat 3 items (Banana, Sunrise, Chair)    2) Clock draw: NORMAL  3) 3 item recall: Recalls 2 objects   Results: NORMAL clock, 1-2 items recalled: COGNITIVE IMPAIRMENT LESS LIKELY    Mini-CogTM Copyright S Little. Licensed by the author for use in [facility] [employer]; reprinted with permission (zaid@[facility]). All rights reserved.      Goes to [place] for the winter     Reviewed and updated as needed this visit by clinical staff       Reviewed and updated as needed this visit by Provider        Social History   Substance Use Topics     Smoking status: Former Smoker     Packs/day: 1.00     Years: 4.00     Types: Cigarettes     Quit date: 1/1/1967     Smokeless tobacco: Never Used     Alcohol use No                     Today's PHQ-2 Score: PHQ-2 ( 1999 Pfizer) 11/13/2017   Q1: Little interest or pleasure in doing things 0   Q2: Feeling down, depressed or hopeless 0   PHQ-2 Score 0   Q1: Little interest or pleasure in doing things Not at all   Q2: Feeling down, depressed or hopeless Not at all   PHQ-2 Score 0       Do you feel safe in your environment - Yes    Do you have a Health Care Directive?: No: Advance care planning was reviewed with patient; patient declined at this time.      Current providers sharing in care for this patient include: Patient Care Team:  Stanley Saldaña MD as PCP - General      [facility]  impairment: No    Ability to successfully perform activities of daily living: Yes, no assistance needed     Fall risk:  Fallen 2 or more times in the past year?: No  Any fall with injury in the past year?: No      Home safety:  none identified      The following health maintenance items are reviewed in Epic and correct as of today:Health Maintenance   Topic Date Due     MIGRAINE ACTION PLAN  02/05/1959     PNEUMOCOCCAL (2 of 2 - PCV13) 10/01/2007     ADVANCE DIRECTIVE PLANNING Q5 YRS  08/04/2016     FALL RISK ASSESSMENT  11/10/2016     INFLUENZA VACCINE (SYSTEM ASSIGNED)  09/01/2017     CREATININE Q1 YEAR  11/11/2017     TETANUS IMMUNIZATION (SYSTEM ASSIGNED)  09/30/2019     LIPID SCREEN Q5 YR MALE (SYSTEM ASSIGNED)  11/11/2021     COLONOSCOPY Q10 YR  11/20/2025     BP Readings from Last 3 Encounters:   11/13/17 142/84   08/14/17 132/74   05/19/17 132/70    Wt Readings from Last 3 Encounters:   11/13/17 152 lb 14.4 oz (69.4 kg)   08/14/17 152 lb (68.9 kg)   05/19/17 154 lb 12.8 oz (70.2 kg)             Knee Subjective:     acute knee pain.    Pain is in the left knee(s), and began 2 week(s) prior to presentation, and is unchanged since onset.  Pain is characterized as aching, and at worst is a 4 on a scale of 1-10.      Pain location: patellar.  Associated sx: able to bear weight and swelling: onset 2 weeks.  Mechanism of injury: none.  Pain exacerbated by: walking.    Meds used for pain relief: Tylenol 325 mg 1-3 tabs po every 4 hours as needed for pain or fever.  Patient denies prior hx of knee pain/injury.       Focused knee examination: full range of motion, no effusion, no laxity with Lachman's, varus or valgus stress, no joint line tenderness and negative Miranda's.    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    Diagnosisi is patellofemoral pain  and Orthopedic evaluation is offered with alternatives including expectant managmenrt           Pneumonia Vaccine:Adults age 65+ who received  "Pneumovax (PPSV23) at 65 years or older: Should be given PCV13 > 1 year after their most recent PPSV23  Review of Systems  Constitutional, HEENT, cardiovascular, pulmonary, GI, , musculoskeletal, neuro, skin, endocrine and psych systems are negative, except as otherwise noted.      OBJECTIVE:   There were no vitals taken for this visit. Estimated body mass index is 23.11 kg/(m^2) as calculated from the following:    Height as of 8/14/17: 5' 8\" (1.727 m).    Weight as of 8/14/17: 152 lb (68.9 kg).  Physical Exam  GENERAL: healthy, alert and no distress  EYES: Eyes grossly normal to inspection, PERRL and conjunctivae and sclerae normal  HENT: ear canals and TM's normal, nose and mouth without ulcers or lesions  NECK: no adenopathy, no asymmetry, masses, or scars and thyroid normal to palpation  RESP: lungs clear to auscultation - no rales, rhonchi or wheezes  CV: regular rate and rhythm, normal S1 S2, no S3 or S4, no murmur, click or rub, no peripheral edema and peripheral pulses strong  ABDOMEN: soft, nontender, no hepatosplenomegaly, no masses and bowel sounds normal   (male): normal male genitalia without lesions or urethral discharge, no hernia  MS: no gross musculoskeletal defects noted, no edema  SKIN: no suspicious lesions or rashes  NEURO: Normal strength and tone, mentation intact and speech normal  PSYCH: mentation appears normal, affect normal/bright    ASSESSMENT / PLAN:   (Z00.00) Encounter for Medicare annual wellness exam  (primary encounter diagnosis)  Comment:   Plan:     (I05.9) Mitral valve disease  Comment:   Plan: Lipid panel reflex to direct LDL Fasting,         Comprehensive metabolic panel (BMP + Alb, Alk         Phos, ALT, AST, Total. Bili, TP)            (M25.562) Acute pain of left knee  Comment:   Plan: patello femoral, xray minimal arthropathy     (Z95.0) Cardiac pacemaker in situ  Comment:   Plan:     (I10) Essential hypertension, benign  Comment:   Plan: Comprehensive metabolic panel " "(BMP + Alb, Alk         Phos, ALT, AST, Total. Bili, TP)            (Z12.5) Screening for prostate cancer  Comment:   Plan: PSA, screen                (Z23) Need for pneumococcal vaccine  Comment:   Plan: PNEUMOCOCCAL CONJ VACCINE 13 VALENT IM            (Z23) Need for prophylactic vaccination and inoculation against influenza  Comment:   Plan: FLU VACCINE, INCREASED ANTIGEN, PRESV FREE, AGE        65+ [73003], Vaccine Administration, Initial         [94069]                End of Life Planning:  Patient currently has an advanced directive: Yes.  Practitioner is supportive of decision.    COUNSELING:  Reviewed preventive health counseling, as reflected in patient instructions       Regular exercise       Healthy diet/nutrition       Vision screening        Estimated body mass index is 23.11 kg/(m^2) as calculated from the following:    Height as of 8/14/17: 5' 8\" (1.727 m).    Weight as of 8/14/17: 152 lb (68.9 kg).     reports that he quit smoking about 50 years ago. His smoking use included Cigarettes. He has a 4.00 pack-year smoking history. He has never used smokeless tobacco.        Appropriate preventive services were discussed with this patient, including applicable screening as appropriate for cardiovascular disease, diabetes, osteopenia/osteoporosis, and glaucoma.  As appropriate for age/gender, discussed screening for colorectal cancer, prostate cancer, breast cancer, and cervical cancer. Checklist reviewing preventive services available has been given to the patient.    Reviewed patients plan of care and provided an AVS. The Basic Care Plan (routine screening as documented in Health Maintenance) for Charles meets the Care Plan requirement. This Care Plan has been established and reviewed with the Patient.    Counseling Resources:  ATP IV Guidelines  Pooled Cohorts Equation Calculator  Breast Cancer Risk Calculator  FRAX Risk Assessment  ICSI Preventive Guidelines  Dietary Guidelines for Americans, " 2010  [employer]'s MyPlate  ASA Prophylaxis  Lung CA Screening    Stanley Saldaña MD  [facility] for HPI/ROS submitted by the patient on 11/13/2017   PHQ-2 Score: 0

## 2017-11-13 NOTE — MR AVS SNAPSHOT
After Visit Summary   11/13/2017    Charles Nogueira    MRN: [id number]           Patient Information     Date Of Birth          1940        Visit Information        Provider Department      11/13/2017 10:00 AM Stanley Saldaña MD [facility]        Today's Diagnoses     Encounter for Medicare annual wellness exam    -  1    Mitral valve disease        Acute pain of left knee        Cardiac pacemaker in situ        Essential hypertension, benign        Screening for prostate cancer        Acute ischemic heart disease (H)         Synovitis and tenosynovitis           Follow-ups after your visit        Your next 10 appointments already scheduled     Nov 15, 2017  8:50 AM CST   Pacemaker Check with RU DCR2   [facility] ([facility])    [address]  [place] [address] [phone]              Who to contact     If you have questions or need follow up information about today's clinic visit or your schedule please contact [facility] directly at [phone].  Normal or non-critical lab and imaging results will be communicated to you by SiSafhart, letter or phone within 4 business days after the clinic has received the results. If you do not hear from us within 7 days, please contact the clinic through enavut or phone. If you have a critical or abnormal lab result, we will notify you by phone as soon as possible.  Submit refill requests through [employer] or call your pharmacy and they will forward the refill request to us. Please allow 3 business days for your refill to be completed.          Additional Information About Your Visit        MyChart Information     [employer] gives you secure access to your electronic health record. If you see a primary care provider, you can also send messages to your care team and make appointments. If you have questions, please call your primary care clinic.   "If you do not have a primary care provider, please call [phone] and they will assist you.        Care EveryWhere ID     This is your Care EveryWhere ID. This could be used by other organizations to access your [facility] medical records  [id number]        Your Vitals Were     Pulse Temperature Respirations Height Pulse Oximetry BMI (Body Mass Index)    63 97.9  F (36.6  C) (Oral) 14 5' 7.5\" (1.715 m) 99% 23.59 kg/m2       Blood Pressure from Last 3 Encounters:   11/13/17 142/84   08/14/17 132/74   05/19/17 132/70    Weight from Last 3 Encounters:   11/13/17 152 lb 14.4 oz (69.4 kg)   08/14/17 152 lb (68.9 kg)   05/19/17 154 lb 12.8 oz (70.2 kg)              We Performed the Following     Comprehensive metabolic panel (BMP + Alb, Alk Phos, ALT, AST, Total. Bili, TP)     Lipid panel reflex to direct LDL Fasting     PSA, screen     Uric acid     XR Knee Left 1/2 Views        Primary Care Provider Office Phone # Fax #    Stanley Vikas Saldaña [phone] [address] [address]        Equal Access to Services     ROLANDO MATHIAS : Hadii aad ku hadasho Soomaali, waaxda luqadaha, qaybta kaalmada adeegyada, waxay idiin haypiercen ruben huitron lamonique velazco. So [facility] [phone].    ATENCIÓN: Si habla español, tiene a dowling disposición servicios gratuitos de asistencia lingüística. Llame al [phone].    We comply with applicable federal civil rights laws and [place] laws. We do not discriminate on the basis of race, color, national origin, age, disability, sex, sexual orientation, or gender identity.            Thank you!     Thank you for choosing [facility]  for your care. Our goal is always to provide you with excellent care. Hearing back from our patients is one way we can continue to improve our services. Please take a few minutes to complete the written survey that you may receive in the mail after your visit with us. Thank you!             Your Updated Medication " List - Protect others around you: Learn how to safely use, store and throw away your medicines at www.disposemymeds.org.          This list is accurate as of: 11/13/17 11:26 AM.  Always use your most recent med list.                   Brand Name Dispense Instructions for use Diagnosis    losartan 50 MG tablet    COZAAR    90 tablet    Take 1 tablet (50 mg) by mouth daily    Essential hypertension       metoprolol 50 MG 24 hr tablet    TOPROL-XL    90 tablet    Take 1 tablet (50 mg) by mouth daily    Essential hypertension

## 2017-11-14 LAB
ALBUMIN SERPL-MCNC: 4 G/DL (ref 3.4–5)
ALP SERPL-CCNC: 101 U/L (ref 40–150)
ALT SERPL W P-5'-P-CCNC: 25 U/L (ref 0–70)
ANION GAP SERPL CALCULATED.3IONS-SCNC: 7 MMOL/L (ref 3–14)
AST SERPL W P-5'-P-CCNC: 25 U/L (ref 0–45)
BILIRUB SERPL-MCNC: 0.9 MG/DL (ref 0.2–1.3)
BUN SERPL-MCNC: 19 MG/DL (ref 7–30)
CALCIUM SERPL-MCNC: 9.3 MG/DL (ref 8.5–10.1)
CHLORIDE SERPL-SCNC: 105 MMOL/L (ref 94–109)
CHOLEST SERPL-MCNC: 154 MG/DL
CO2 SERPL-SCNC: 28 MMOL/L (ref 20–32)
CREAT SERPL-MCNC: 1 MG/DL (ref 0.66–1.25)
GFR SERPL CREATININE-BSD FRML MDRD: 73 ML/MIN/1.7M2
GLUCOSE SERPL-MCNC: 97 MG/DL (ref 70–99)
HDLC SERPL-MCNC: 40 MG/DL
LDLC SERPL CALC-MCNC: 93 MG/DL
NONHDLC SERPL-MCNC: 114 MG/DL
POTASSIUM SERPL-SCNC: 4.4 MMOL/L (ref 3.4–5.3)
PROT SERPL-MCNC: 7.6 G/DL (ref 6.8–8.8)
PSA SERPL-ACNC: 2.63 UG/L (ref 0–4)
SODIUM SERPL-SCNC: 140 MMOL/L (ref 133–144)
TRIGL SERPL-MCNC: 103 MG/DL
URATE SERPL-MCNC: 4.7 MG/DL (ref 3.5–7.2)

## 2017-11-15 ENCOUNTER — ALLIED HEALTH/NURSE VISIT (OUTPATIENT)
Dept: CARDIOLOGY | Facility: CLINIC | Age: 77
End: 2017-11-15
Payer: COMMERCIAL

## 2017-11-15 DIAGNOSIS — Z95.0 CARDIAC PACEMAKER IN SITU: ICD-10-CM

## 2017-11-15 PROCEDURE — 93280 PM DEVICE PROGR EVAL DUAL: CPT | Performed by: INTERNAL MEDICINE

## 2017-11-15 NOTE — MR AVS SNAPSHOT
After Visit Summary   11/15/2017    Charles Nogueira    MRN: [id number]           Patient Information     Date Of Birth          1940        Visit Information        Provider Department      11/15/2017 8:50 AM RU DCR2 [facility]        Today's Diagnoses     Cardiac pacemaker in situ           Follow-ups after your visit        Additional Services     Follow-Up with Device Clinic                 Future tests that were ordered for you today     Open Future Orders        Priority Expected Expires Ordered    Follow-Up with Device Clinic Routine 4/15/2018 12/20/2018 11/15/2017            Who to contact     If you have questions or need follow up information about today's clinic visit or your schedule please contact [facility] directly at [phone].  Normal or non-critical lab and imaging results will be communicated to you by ZEEF.comhart, letter or phone within 4 business days after the clinic has received the results. If you do not hear from us within 7 days, please contact the clinic through [email or web address] or phone. If you have a critical or abnormal lab result, we will notify you by phone as soon as possible.  Submit refill requests through [employer] or call your pharmacy and they will forward the refill request to us. Please allow 3 business days for your refill to be completed.          Additional Information About Your Visit        MyChart Information     [employer] gives you secure access to your electronic health record. If you see a primary care provider, you can also send messages to your care team and make appointments. If you have questions, please call your primary care clinic.  If you do not have a primary care provider, please call [phone] and they will assist you.        Care EveryWhere ID     This is your Care EveryWhere ID. This could be used by other organizations to access your [facility]  records  [id number]         Blood Pressure from Last 3 Encounters:   11/13/17 142/84   08/14/17 132/74   05/19/17 132/70    Weight from Last 3 Encounters:   11/13/17 69.4 kg (152 lb 14.4 oz)   08/14/17 68.9 kg (152 lb)   05/19/17 70.2 kg (154 lb 12.8 oz)              We Performed the Following     Follow-Up with Device Clinic     PM DEVICE PROGRAMMING EVAL, DUAL LEAD PACER ([id number])        Primary Care Provider Office Phone # Fax #    Stanley Vikas Saldaña [phone] [address] [address]        Equal Access to Services     ROLANDO MATHIAS : Hadii yecenia fostero Natali, waaxda luqadaha, qaybta kaalmada adeegyada, jodi velazco. So [facility] [phone].    ATENCIÓN: Si habla español, tiene a dowling disposición servicios gratuitos de asistencia lingüística. Ll[facility] [phone].    We comply with applicable federal civil rights laws and [place] laws. We do not discriminate on the basis of race, color, national origin, age, disability, sex, sexual orientation, or gender identity.            Thank you!     Thank you for choosing [facility]  for your care. Our goal is always to provide you with excellent care. Hearing back from our patients is one way we can continue to improve our services. Please take a few minutes to complete the written survey that you may receive in the mail after your visit with us. Thank you!             Your Updated Medication List - Protect others around you: Learn how to safely use, store and throw away your medicines at www.disposemymeds.org.          This list is accurate as of: 11/15/17  9:32 AM.  Always use your most recent med list.                   Brand Name Dispense Instructions for use Diagnosis    losartan 50 MG tablet    COZAAR    90 tablet    Take 1 tablet (50 mg) by mouth daily    Essential hypertension       metoprolol 50 MG 24 hr tablet    TOPROL-XL    90 tablet    Take 1  tablet (50 mg) by mouth daily    Essential hypertension

## 2017-11-15 NOTE — PROGRESS NOTES
Medtronic Adapta Pacemaker Device Check  AP: 27 % : 0 %  Mode: DDDR - 60        Underlying Rhythm: SR  Heart Rate: Adequate variation per histogram  Sensing: stable    Pacing Threshold: stable   Impedance: stable  Battery Status: 12 years  Atrial Arrhythmia: none  Ventricular Arrhythmia: none  Setting Change: none     Care Plan: f/u remote PPM check in April. He will be in [place] from 12/17 - 4/18. He will call to schedule remote. Carlene

## 2018-04-11 ENCOUNTER — ALLIED HEALTH/NURSE VISIT (OUTPATIENT)
Dept: CARDIOLOGY | Facility: CLINIC | Age: 78
End: 2018-04-11
Payer: COMMERCIAL

## 2018-04-11 ENCOUNTER — TELEPHONE (OUTPATIENT)
Dept: CARDIOLOGY | Facility: CLINIC | Age: 78
End: 2018-04-11

## 2018-04-11 DIAGNOSIS — Z95.0 CARDIAC PACEMAKER IN SITU: ICD-10-CM

## 2018-04-11 PROCEDURE — 93294 REM INTERROG EVL PM/LDLS PM: CPT | Performed by: INTERNAL MEDICINE

## 2018-04-11 PROCEDURE — 93296 REM INTERROG EVL PM/IDS: CPT | Performed by: INTERNAL MEDICINE

## 2018-04-11 NOTE — TELEPHONE ENCOUNTER
Dr. Witt,    Your patient had an episode of Atrial Fibrillation on today's remote transmission. Episode lasted 2 hours 34 minutes, ventricular rates 80-200bpm. Not currently taking AC. Any changes?    Medtronic Adapta (D) Remote PPM Device Check  AP: 42 % : <1 %  Mode: DDDR        Presenting Rhythm: AP/VS  Heart Rate: Adequate rates per histogram  Sensing: Stable    Pacing Threshold: Stable    Impedance: Stable  Battery Status: 9.5-13.5 years  Atrial Arrhythmia: 3 mode switch episodes comprising <1% of the time. 1 EGM available shows PAF. Longest episode lasted 2 hours 34 minutes, ventricular rates 80-200bpm during mode switch. Not currently taking AC. Will message Dr. Witt with findings.   Ventricular Arrhythmia: None   Care Plan: F/u PPM Carelink q 3 months. Gave results over the phone.  MIRYAM Monroy

## 2018-04-11 NOTE — MR AVS SNAPSHOT
After Visit Summary   4/11/2018    Charles Nogueira    MRN: [id number]           Patient Information     Date Of Birth          1940        Visit Information        Provider Department      4/11/2018 11:00 AM MEDINA TECH1 [facility]   [facility]        Today's Diagnoses     Cardiac pacemaker in situ           Follow-ups after your visit        Who to contact     If you have questions or need follow up information about today's clinic visit or your schedule please contact [facility]   [facility] directly at [phone].  Normal or non-critical lab and imaging results will be communicated to you by XenoOnehart, letter or phone within 4 business days after the clinic has received the results. If you do not hear from us within 7 days, please contact the clinic through SCREEMOt or phone. If you have a critical or abnormal lab result, we will notify you by phone as soon as possible.  Submit refill requests through SaludFÃCIL or call your pharmacy and they will forward the refill request to us. Please allow 3 business days for your refill to be completed.          Additional Information About Your Visit        MyChart Information     Sal[employer]FÃ[employer] gives you secure access to your electronic health record. If you see a primary care provider, you can also send messages to your care team and make appointments. If you have questions, please call your primary care clinic.  If you do not have a primary care provider, please call [phone] and they will assist you.        Care EveryWhere ID     This is your Care EveryWhere ID. This could be used by other organizations to access your [place] medical records  [id number]         Blood Pressure from Last 3 Encounters:   11/13/17 142/84   08/14/17 132/74   05/19/17 132/70    Weight from Last 3 Encounters:   11/13/17 69.4 kg (152 lb 14.4 oz)   08/14/17 68.9 kg (152 lb)   05/19/17 70.2 kg (154 lb 12.8 oz)              We Performed  the Following     Follow-Up with Device Clinic     INTERROGATION DEVICE EVAL REMOTE, PACER/ICD (78458)     PM DEVICE INTERROGATE REMOTE (13280)        Primary Care Provider Office Phone # Fax #    Stanley Vikas Saldaña [phone] [address] [address]        Equal Access to Services     ÁNGEL [facility] HospitalWILIAN : Hadii aad ku hadasho Soomaali, waaxda luqadaha, qaybta kaalmada adeegyada, waxay idiin hayaan adeeg kharash la'aan . So [facility] [phone].    ATENCIÓN: Si habla español, tiene a dowling disposición servicios gratuitos de asistencia lingüística. Llame al [phone].    We comply with applicable federal civil rights laws and [place] laws. We do not discriminate on the basis of race, color, national origin, age, disability, sex, sexual orientation, or gender identity.            Thank you!     Thank you for choosing [facility]  for your care. Our goal is always to provide you with excellent care. Hearing back from our patients is one way we can continue to improve our services. Please take a few minutes to complete the written survey that you may receive in the mail after your visit with us. Thank you!             Your Updated Medication List - Protect others around you: Learn how to safely use, store and throw away your medicines at www.disposemymeds.org.          This list is accurate as of 4/11/18  1:33 PM.  Always use your most recent med list.                   Brand Name Dispense Instructions for use Diagnosis    losartan 50 MG tablet    COZAAR    90 tablet    Take 1 tablet (50 mg) by mouth daily    Essential hypertension       metoprolol succinate 50 MG 24 hr tablet    TOPROL-XL    90 tablet    Take 1 tablet (50 mg) by mouth daily    Essential hypertension

## 2018-04-12 NOTE — TELEPHONE ENCOUNTER
Spoke to patients wife regarding starting warfarin as the device check showed PAF. Pts wife very worried about patient bleeding as she feels he already bleeds easily. Pts wife would like patient to come in to discuss with Dr. Witt prior to starting Warfarin. OV made for 4/16/18. Aware of [place] location.

## 2018-04-12 NOTE — TELEPHONE ENCOUNTER
Paroxysmal atrial fibrillation with [id number] of >2.  Patient asymptomatic.  Suggest initiating warfarin on this patient with INR 2-2.5.  Michele Witt MD, FACC  April 12, 2018 12:26 AM

## 2018-04-16 ENCOUNTER — OFFICE VISIT (OUTPATIENT)
Dept: CARDIOLOGY | Facility: CLINIC | Age: 78
End: 2018-04-16
Payer: COMMERCIAL

## 2018-04-16 VITALS
SYSTOLIC BLOOD PRESSURE: 130 MMHG | DIASTOLIC BLOOD PRESSURE: 82 MMHG | HEART RATE: 64 BPM | BODY MASS INDEX: 22.73 KG/M2 | WEIGHT: 150 LBS | HEIGHT: 68 IN

## 2018-04-16 DIAGNOSIS — I45.9 STOKES-ADAMS SYNCOPE: ICD-10-CM

## 2018-04-16 DIAGNOSIS — I10 ESSENTIAL HYPERTENSION: ICD-10-CM

## 2018-04-16 DIAGNOSIS — I49.3 PVC (PREMATURE VENTRICULAR CONTRACTION): ICD-10-CM

## 2018-04-16 DIAGNOSIS — Z95.0 CARDIAC PACEMAKER IN SITU: ICD-10-CM

## 2018-04-16 DIAGNOSIS — I05.9 MITRAL VALVE DISEASE: Primary | ICD-10-CM

## 2018-04-16 DIAGNOSIS — I49.5 SICK SINUS SYNDROME (H): ICD-10-CM

## 2018-04-16 PROCEDURE — 99214 OFFICE O/P EST MOD 30 MIN: CPT | Performed by: INTERNAL MEDICINE

## 2018-04-16 NOTE — MR AVS SNAPSHOT
After Visit Summary   4/16/2018    Charles Nogueira    MRN: [id number]           Patient Information     Date Of Birth          1940        Visit Information        Provider Department      4/16/2018 3:45 PM Michele Witt MD [facility]        Today's Diagnoses     Mitral valve disease    -  1    Sick sinus syndrome (H)        Damico-Anne syncope        PVC (premature ventricular contraction)        Cardiac pacemaker in situ        Essential hypertension           Follow-ups after your visit        Additional Services     Follow-Up with Cardiologist                 Your next 10 appointments already scheduled     Jul 18, 2018  1:45 PM CDT   Remote PPM Check with [employer] TECH1   [facility] ([facility])    [facility]  [facility] [address] [phone] OPT 2           This appointment is for a remote check of your pacemaker.  This is not an appointment at the office.              Future tests that were ordered for you today     Open Future Orders        Priority Expected Expires Ordered    Echocardiogram Routine 8/14/2018 4/16/2019 4/16/2018    Follow-Up with Cardiologist Routine 8/14/2018 4/16/2019 4/16/2018            Who to contact     If you have questions or need follow up information about today's clinic visit or your schedule please contact [facility] directly at [phone].  Normal or non-critical lab and imaging results will be communicated to you by MyChart, letter or phone within 4 business days after the clinic has received the results. If you do not hear from us within 7 days, please contact the clinic through MyChart or phone. If you have a critical or abnormal lab result, we will notify you by phone as soon as possible.  Submit refill requests through [employer] or call your pharmacy and they will forward the refill request to us. Please allow  "3 business days for your refill to be completed.          Additional Information About Your Visit        MyChart Information     [employer] gives you secure access to your electronic health record. If you see a primary care provider, you can also send messages to your care team and make appointments. If you have questions, please call your primary care clinic.  If you do not have a primary care provider, please call [phone] and they will assist you.        Care EveryWhere ID     This is your Care EveryWhere ID. This could be used by other organizations to access your [facility] medical records  [id number]        Your Vitals Were     Pulse Height BMI (Body Mass Index)             64 1.715 m (5' 7.5\") 23.15 kg/m2          Blood Pressure from Last 3 Encounters:   04/16/18 130/82   11/13/17 142/84   08/14/17 132/74    Weight from Last 3 Encounters:   04/16/18 68 kg (150 lb)   11/13/17 69.4 kg (152 lb 14.4 oz)   08/14/17 68.9 kg (152 lb)               Primary Care Provider Office Phone # Fax #    Stanley Vikas Saldaña [phone] [address] [facility] [address]        Equal Access to Services     ROLANDO MATHIAS AH: Hadii aad ku hadasho Soomaali, waaxda luqadaha, qaybta kaalmada adeegyada, jodi dormanin haypiercen ruben velazco. So [facility] [phone].    ATENCIÓN: Si habla español, tiene a dowling disposición servicios gratuitos de asistencia lingüística. Llame al [phone].    We comply with applicable federal civil rights laws and [place] laws. We do not discriminate on the basis of race, color, national origin, age, disability, sex, sexual orientation, or gender identity.            Thank you!     Thank you for choosing [facility]  for your care. Our goal is always to provide you with excellent care. Hearing back from our patients is one way we can continue to improve our services. Please take a few minutes to complete the written survey that you may " receive in the mail after your visit with us. Thank you!             Your Updated Medication List - Protect others around you: Learn how to safely use, store and throw away your medicines at www.disposemymeds.org.          This list is accurate as of 4/16/18  4:26 PM.  Always use your most recent med list.                   Brand Name Dispense Instructions for use Diagnosis    losartan 50 MG tablet    COZAAR    90 tablet    Take 1 tablet (50 mg) by mouth daily    Essential hypertension       metoprolol succinate 50 MG 24 hr tablet    TOPROL-XL    90 tablet    Take 1 tablet (50 mg) by mouth daily    Essential hypertension

## 2018-04-16 NOTE — PROGRESS NOTES
HPI and Plan:   See dictation:[id number]    Orders Placed This Encounter   Procedures     Follow-Up with Cardiologist     Echocardiogram       No orders of the defined types were placed in this encounter.      There are no discontinued medications.      Encounter Diagnoses   Name Primary?     Mitral valve disease Yes     Sick sinus syndrome (H)      Admico-Anne syncope      PVC (premature ventricular contraction)      Cardiac pacemaker in situ      Essential hypertension        CURRENT MEDICATIONS:  Current Outpatient Prescriptions   Medication Sig Dispense Refill     losartan (COZAAR) 50 MG tablet Take 1 tablet (50 mg) by mouth daily 90 tablet 3     metoprolol (TOPROL-XL) 50 MG 24 hr tablet Take 1 tablet (50 mg) by mouth daily 90 tablet 3       ALLERGIES     Allergies   Allergen Reactions     Sulfa Drugs      rash       PAST MEDICAL HISTORY:  Past Medical History:   Diagnosis Date     Allergic rhinitis, cause unspecified      HTN (hypertension)      Melanoma (H)      Mitral valve disease      Other forms of migraine, without mention of intractable migraine without mention of status migrainosus      Other premature beats      Pacemaker 7/2004     Premature ventricular contraction      Squamous cell carcinoma      Syncope        PAST SURGICAL HISTORY:  Past Surgical History:   Procedure Laterality Date     APPENDECTOMY OPEN       BIOPSY OF SKIN LESION       COLONOSCOPY N/A 11/20/2015    Procedure: COLONOSCOPY;  Surgeon: David Us MD;  Location: [facility]     IMPLANT PACEMAKER  7/2004     MOHS MICROGRAPHIC PROCEDURE         FAMILY HISTORY:  Family History   Problem Relation Age of Onset     HEART DISEASE Mother      MI     HEART DISEASE Father      MI     C.A.D. Brother      Just had angioplasty in 2006     Colon Cancer No family hx of        SOCIAL HISTORY:  Social History     Social History     Marital status:      Spouse name: Paula     Number of children: 3     Years of education: N/A     Occupational  "History      Retired     Social History Main Topics     Smoking status: Former Smoker     Packs/day: 1.00     Years: 4.00     Types: Cigarettes     Quit date: 1/1/1967     Smokeless tobacco: Never Used     Alcohol use No     Drug use: No     Sexual activity: Yes     Partners: Female     Other Topics Concern      Service Yes     Blood Transfusions No     Caffeine Concern Yes     decaff coffee     Occupational Exposure No     Hobby Hazards No     Sleep Concern No     Stress Concern No     Weight Concern No     Special Diet No     Back Care No     Exercise Yes     Excercises about 4-5 days per week     Bike Helmet Yes     Seat Belt Yes     Self-Exams No     Social History Narrative       Review of Systems:  Skin:  Positive for bruising thin skin, bruise easily   Eyes:  Positive for glasses    ENT:  Negative      Respiratory:  Negative       Cardiovascular:  Negative      Gastroenterology: Negative      Genitourinary:  not assessed      Musculoskeletal:  Positive for arthritis;joint pain    Neurologic:  Negative      Psychiatric:  Negative      Heme/Lymph/Imm:  Positive for allergies    Endocrine:  Negative        Physical Exam:  Vitals: /82  Pulse 64  Ht 1.715 m (5' 7.5\")  Wt 68 kg (150 lb)  BMI 23.15 kg/m2    Constitutional:  cooperative, alert and oriented, well developed, well nourished, in no acute distress        Skin:  warm and dry to the touch, no apparent skin lesions or masses noted surgical scars well-healed        Head:  normocephalic, no masses or lesions        Eyes:  pupils equal and round, conjunctivae and lids unremarkable, sclera white, no xanthalasma, EOMS intact, no nystagmus        Lymph:      ENT:  no pallor or cyanosis, dentition good        Neck:  carotid pulses are full and equal bilaterally, JVP normal, no carotid bruit        Respiratory:  normal breath sounds, clear to auscultation, normal A-P diameter, normal symmetry, normal respiratory excursion, no use of accessory " muscles         Cardiac: normal S1 and S2;no S3 or S4;apical impulse not displaced;regular rhythm       holosystolic murmur;LLSB;radiation to the axilla        pulses full and equal, no bruits auscultated                                        GI:  abdomen soft, non-tender, BS normoactive, no mass, no HSM, no bruits        Extremities and Muscular Skeletal:  no deformities, clubbing, cyanosis, erythema observed;no edema              Neurological:  no gross motor deficits;affect appropriate        Psych:  Alert and Oriented x 3        CC  No referring provider defined for this encounter.

## 2018-04-16 NOTE — LETTER
4/16/2018      Stanley Saldaña MD  [address] [facility] [address]      RE: Charles Nogueira       Dear Colleague,    I had the pleasure of seeing Charles Nogueira in the [facility].    Service Date: 04/16/2018      PRIMARY CARE PHYSICIAN:  Dr. Stanley Saldaña.      HISTORY OF PRESENT ILLNESS:  I again had the pleasure of seeing your patient, Charles Nogueira, at [facility] for evaluation of mitral regurgitation, hypertension, PVCs and paroxysmal atrial fibrillation.  The patient is status post dual-chamber pacemaker implantation at [facility] 07/07/2004 for bradycardia and asystole.  He has not had any further episodes of syncope.  Pacemaker interrogation on 04/11 showed 42% atrial pacing, less than 1% ventricular pacing.  Three mode switches comprising less than 1% of the time and demonstrating paroxysmal atrial fibrillation.  The longest was 2 hours and 34 minutes at heart rates of  beats per minute.  Ten to 14 years of battery life were left.  Patient was asymptomatic with this.  He has a history of premature ventricular contractions.  The patient is exercising, walking 4-6 miles per week over the last 4 months while in [place].  We are following his echocardiograms for moderate mitral regurgitation and normal ejection fraction.  He has had normal heart size and evidence of a torn P1/P2 cord with prolapse of the mitral valve and the annulus and annular dilatation.  The mitral regurgitation jet on OTTO was anteriorly directed consistent with posterior leaflet pathology.  We have discussed the need for mitral valve repair should his left ventricle show an increase in size or decrease in function.  Pulmonary pressures have been normal.  The patient notes that he has not been hospitalized or had any surgery since I saw him last August.      PHYSICAL EXAMINATION:  As listed below.      ASSESSMENT:   1.  Charles Nogueira is a delightful  78-year-old male with sick sinus syndrome and episodes of severe bradycardia and syncope.  His dual-chamber pacemaker implanted in  and replaced 2014 is functioning normally.  We will continue to monitor his rhythms including PVCs through the [facility].   2.  Paroxysmal atrial fibrillation probably on the basis of mitral regurgitation.  I suggested this patient start anticoagulation but he would prefer to take his pulse every day and call for any atrial fibrillation lasting 12 hours or longer.  If his pacemaker shows increasing frequency of mode switches anticoagulation will again be suggested.  The patient notes that he has not tolerated platelet inhibitors in the past due to bruising.   3.  Mitral valve prolapse and mitral regurgitation.  We will assess with an echocardiogram in August.   4.  We are using losartan and metoprolol for PVCs and blood pressure.  He appears stable at this time.   5.  Lipids are stable and reasonably well controlled.      It is my pleasure to assist in the care of Charles Nogueira.  All his questions were answered to his satisfaction.      Anette Oliver MD       cc:   Stanley [place]ña [place] [address]         ANETTE OLIVER MD, [facility]             D: 2018   T: 2018   MT: RODRICK      Name:     CHARLES NOGUEIRA   MRN:      [id number]        Account:      [id number]   :      1940           Service Date: 2018      Document: [id number]         Outpatient Encounter Prescriptions as of 2018   Medication Sig Dispense Refill     losartan (COZAAR) 50 MG tablet Take 1 tablet (50 mg) by mouth daily 90 tablet 3     metoprolol (TOPROL-XL) 50 MG 24 hr tablet Take 1 tablet (50 mg) by mouth daily 90 tablet 3     No facility-administered encounter medications on file as of 2018.      Again, thank you for allowing me to participate in the care of your patient.      Sincerely,    Anette BERNABE  MD Eduar     [facility]

## 2018-04-16 NOTE — LETTER
4/16/2018    Stanley Saldaña MD  [address]  [facility] [address]    RE: Charles Nogueira       Dear Colleague,    I had the pleasure of seeing Charles Nogueira in the [facility].    HPI and Plan:   See dictation:[id number]    Orders Placed This Encounter   Procedures     Follow-Up with Cardiologist     Echocardiogram       No orders of the defined types were placed in this encounter.      There are no discontinued medications.      Encounter Diagnoses   Name Primary?     Mitral valve disease Yes     Sick sinus syndrome (H)      Damico-Anne syncope      PVC (premature ventricular contraction)      Cardiac pacemaker in situ      Essential hypertension        CURRENT MEDICATIONS:  Current Outpatient Prescriptions   Medication Sig Dispense Refill     losartan (COZAAR) 50 MG tablet Take 1 tablet (50 mg) by mouth daily 90 tablet 3     metoprolol (TOPROL-XL) 50 MG 24 hr tablet Take 1 tablet (50 mg) by mouth daily 90 tablet 3       ALLERGIES     Allergies   Allergen Reactions     Sulfa Drugs      rash       PAST MEDICAL HISTORY:  Past Medical History:   Diagnosis Date     Allergic rhinitis, cause unspecified      HTN (hypertension)      Melanoma (H)      Mitral valve disease      Other forms of migraine, without mention of intractable migraine without mention of status migrainosus      Other premature beats      Pacemaker 7/2004     Premature ventricular contraction      Squamous cell carcinoma      Syncope        PAST SURGICAL HISTORY:  Past Surgical History:   Procedure Laterality Date     APPENDECTOMY OPEN       BIOPSY OF SKIN LESION       COLONOSCOPY N/A 11/20/2015    Procedure: COLONOSCOPY;  Surgeon: David Us MD;  Location: [facility]     IMPLANT PACEMAKER  7/2004     MOHS MICROGRAPHIC PROCEDURE         FAMILY HISTORY:  Family History   Problem Relation Age of Onset     HEART DISEASE Mother      MI     HEART DISEASE Father      MI     C.A.D. Brother      Just had angioplasty  "in 2006     Colon Cancer No family hx of        SOCIAL HISTORY:  Social History     Social History     Marital status:      Spouse name: Paula     Number of children: 3     Years of education: N/A     Occupational History      Retired     Social History Main Topics     Smoking status: Former Smoker     Packs/day: 1.00     Years: 4.00     Types: Cigarettes     Quit date: 1/1/1967     Smokeless tobacco: Never Used     Alcohol use No     Drug use: No     Sexual activity: Yes     Partners: Female     Other Topics Concern      Service Yes     Blood Transfusions No     Caffeine Concern Yes     decaff coffee     Occupational Exposure No     Hobby Hazards No     Sleep Concern No     Stress Concern No     Weight Concern No     Special Diet No     Back Care No     Exercise Yes     Excercises about 4-5 days per week     Bike Helmet Yes     Seat Belt Yes     Self-Exams No     Social History Narrative       Review of Systems:  Skin:  Positive for bruising thin skin, bruise easily   Eyes:  Positive for glasses    ENT:  Negative      Respiratory:  Negative       Cardiovascular:  Negative      Gastroenterology: Negative      Genitourinary:  not assessed      Musculoskeletal:  Positive for arthritis;joint pain    Neurologic:  Negative      Psychiatric:  Negative      Heme/Lymph/Imm:  Positive for allergies    Endocrine:  Negative        Physical Exam:  Vitals: /82  Pulse 64  Ht 1.715 m (5' 7.5\")  Wt 68 kg (150 lb)  BMI 23.15 kg/m2    Constitutional:  cooperative, alert and oriented, well developed, well nourished, in no acute distress        Skin:  warm and dry to the touch, no apparent skin lesions or masses noted surgical scars well-healed        Head:  normocephalic, no masses or lesions        Eyes:  pupils equal and round, conjunctivae and lids unremarkable, sclera white, no xanthalasma, EOMS intact, no nystagmus        Lymph:      ENT:  no pallor or cyanosis, dentition good        Neck:  carotid " pulses are full and equal bilaterally, JVP normal, no carotid bruit        Respiratory:  normal breath sounds, clear to auscultation, normal A-P diameter, normal symmetry, normal respiratory excursion, no use of accessory muscles         Cardiac: normal S1 and S2;no S3 or S4;apical impulse not displaced;regular rhythm       holosystolic murmur;LLSB;radiation to the axilla        pulses full and equal, no bruits auscultated                                        GI:  abdomen soft, non-tender, BS normoactive, no mass, no HSM, no bruits        Extremities and Muscular Skeletal:  no deformities, clubbing, cyanosis, erythema observed;no edema              Neurological:  no gross motor deficits;affect appropriate        Psych:  Alert and Oriented x 3        CC  No referring provider defined for this encounter.                Thank you for allowing me to participate in the care of your patient.      Sincerely,     Michele Witt MD     [facility]    cc:   No referring provider defined for this encounter.

## 2018-04-17 NOTE — PROGRESS NOTES
Service Date: 04/16/2018      PRIMARY CARE PHYSICIAN:  Dr. Stanley Saldaña.      HISTORY OF PRESENT ILLNESS:  I again had the pleasure of seeing your patient, Charles Nogueira, at [facility] for evaluation of mitral regurgitation, hypertension, PVCs and paroxysmal atrial fibrillation.  The patient is status post dual-chamber pacemaker implantation at [facility] 07/07/2004 for bradycardia and asystole.  He has not had any further episodes of syncope.  Pacemaker interrogation on 04/11 showed 42% atrial pacing, less than 1% ventricular pacing.  Three mode switches comprising less than 1% of the time and demonstrating paroxysmal atrial fibrillation.  The longest was 2 hours and 34 minutes at heart rates of  beats per minute.  Ten to 14 years of battery life were left.  Patient was asymptomatic with this.  He has a history of premature ventricular contractions.  The patient is exercising, walking 4-6 miles per week over the last 4 months while in [place].  We are following his echocardiograms for moderate mitral regurgitation and normal ejection fraction.  He has had normal heart size and evidence of a torn P1/P2 cord with prolapse of the mitral valve and the annulus and annular dilatation.  The mitral regurgitation jet on OTTO was anteriorly directed consistent with posterior leaflet pathology.  We have discussed the need for mitral valve repair should his left ventricle show an increase in size or decrease in function.  Pulmonary pressures have been normal.  The patient notes that he has not been hospitalized or had any surgery since I saw him last August.      PHYSICAL EXAMINATION:  As listed below.      ASSESSMENT:   1.  Charles Nogueira is a delightful 78-year-old male with sick sinus syndrome and episodes of severe bradycardia and syncope.  His dual-chamber pacemaker implanted in 2004 and replaced 07/02/2014 is functioning normally.  We will continue to monitor his rhythms  including PVCs through the [facility].   2.  Paroxysmal atrial fibrillation probably on the basis of mitral regurgitation.  I suggested this patient start anticoagulation but he would prefer to take his pulse every day and call for any atrial fibrillation lasting 12 hours or longer.  If his pacemaker shows increasing frequency of mode switches anticoagulation will again be suggested.  The patient notes that he has not tolerated platelet inhibitors in the past due to bruising.   3.  Mitral valve prolapse and mitral regurgitation.  We will assess with an echocardiogram in August.   4.  We are using losartan and metoprolol for PVCs and blood pressure.  He appears stable at this time.   5.  Lipids are stable and reasonably well controlled.      It is my pleasure to assist in the care of Charles Nogueira.  All his questions were answered to his satisfaction.      Anette Oliver MD       cc:   Stanley [place] [address]         [facility]IVER MD, [facility]             D: 2018   T: 2018   MT: RODRICK      Name:     CHARLES NOGUEIRA   MRN:      [id number]        Account:      [id number]   :      1940           Service Date: 2018      Document: [id number]

## 2018-04-26 ENCOUNTER — RADIANT APPOINTMENT (OUTPATIENT)
Dept: GENERAL RADIOLOGY | Facility: CLINIC | Age: 78
End: 2018-04-26
Attending: FAMILY MEDICINE
Payer: COMMERCIAL

## 2018-04-26 ENCOUNTER — OFFICE VISIT (OUTPATIENT)
Dept: FAMILY MEDICINE | Facility: CLINIC | Age: 78
End: 2018-04-26
Payer: COMMERCIAL

## 2018-04-26 VITALS
HEART RATE: 67 BPM | SYSTOLIC BLOOD PRESSURE: 144 MMHG | RESPIRATION RATE: 14 BRPM | DIASTOLIC BLOOD PRESSURE: 88 MMHG | TEMPERATURE: 97.6 F | OXYGEN SATURATION: 99 % | WEIGHT: 155.1 LBS | BODY MASS INDEX: 23.93 KG/M2

## 2018-04-26 DIAGNOSIS — R49.0 HOARSENESS: ICD-10-CM

## 2018-04-26 DIAGNOSIS — R05.3 CHRONIC COUGH: Primary | ICD-10-CM

## 2018-04-26 PROCEDURE — 71046 X-RAY EXAM CHEST 2 VIEWS: CPT

## 2018-04-26 PROCEDURE — 99213 OFFICE O/P EST LOW 20 MIN: CPT | Performed by: FAMILY MEDICINE

## 2018-04-26 NOTE — MR AVS SNAPSHOT
After Visit Summary   4/26/2018    Charles Nogueira    MRN: [id number]           Patient Information     Date Of Birth          1940        Visit Information        Provider Department      4/26/2018 10:45 AM Stanley Saldaña MD [facility]        Today's Diagnoses     Chronic cough    -  1    Hoarseness           Follow-ups after your visit        Additional Services     OTOLARYNGOLOGY REFERRAL       Your provider has referred you to: N: [facility] [phone]   [email or web address]    Please be aware that coverage of these services is subject to the terms and limitations of your health insurance plan.  Call member services at your health plan with any benefit or coverage questions.      Please bring the following with you to your appointment:    (1) Any X-Rays, CTs or MRIs which have been performed.  Contact the facility where they were done to arrange for  prior to your scheduled appointment.   (2) List of current medications  (3) This referral request   (4) Any documents/labs given to you for this referral                  Your next 10 appointments already scheduled     Jul 18, 2018  1:45 PM CDT   Remote PPM Check with MEDINA TECH1   [facility] ([facility])    [address]  [place] [facility] [id number] [phone] OPT 2           This appointment is for a remote check of your pacemaker.  This is not an appointment at the office.            Aug 08, 2018  8:30 AM CDT   Ech Complete with RSCCECHO1   [facility] ([facility])    [address]  [place] [address] [phone]           1. Please bring or wear a comfortable two-piece outfit. 2. You may eat, drink and take your normal medicines. 3. For any questions that cannot be answered, please contact the ordering physician ***Please check-in  at the [place] Registration Office located in [address] in the [facility] building. When you are finished registering, please go to [address] and have a seat. The technician will call your name for the test.            Aug 09, 2018  9:45 AM CDT   Return Visit with Michele Witt MD   [facility] ([facility])    [address]  [facility] [address] [phone]              Who to contact     If you have questions or need follow up information about today's clinic visit or your schedule please contact [facility] directly at [phone].  Normal or non-critical lab and imaging results will be communicated to you by MyChart, letter or phone within 4 business days after the clinic has received the results. If you do not hear from us within 7 days, please contact the clinic through Amic[employer] or phone. If you have a critical or abnormal lab result, we will notify you by phone as soon as possible.  Submit refill requests through Intense or call your pharmacy and they will forward the refill request to us. Please allow 3 business days for your refill to be completed.          Additional Information About Your Visit        MyChart Information     Intense gives you secure access to your electronic health record. If you see a primary care provider, you can also send messages to your care team and make appointments. If you have questions, please call your primary care clinic.  If you do not have a primary care provider, please call [phone] and they will assist you.        Care EveryWhere ID     This is your Care EveryWhere ID. This could be used by other organizations to access your [place] medical records  [id number]        Your Vitals Were     Pulse Temperature Respirations Pulse Oximetry BMI (Body Mass Index)       67 97.6  F (36.4  C) (Oral) 14 99% 23.93 kg/m2        Blood Pressure from Last 3 Encounters:    04/26/18 144/88   04/16/18 130/82   11/13/17 142/84    Weight from Last 3 Encounters:   04/26/18 155 lb 1.6 oz (70.4 kg)   04/16/18 150 lb (68 kg)   11/13/17 152 lb 14.4 oz (69.4 kg)              We Performed the Following     OTOLARYNGOLOGY REFERRAL     XR Chest 2 Views        Primary Care Provider Office Phone # Fax #    Stanley Vikas Saldaña [phone] [address] [address] [address]        Equal Access to Services     [facility]: Hadii aad ku hadasho Soomaali, waaxda luqadaha, qaybta kaalmada adeshashayada, jodi barron . So [facility] [phone].    ATENCIÓN: Si habla español, tiene a dowling disposición servicios gratuitos de asistencia lingüística. [facility] [phone].    We comply with applicable federal civil rights laws and [place] laws. We do not discriminate on the basis of race, color, national origin, age, disability, sex, sexual orientation, or gender identity.            Thank you!     Thank you for choosing [facility]  for your care. Our goal is always to provide you with excellent care. Hearing back from our patients is one way we can continue to improve our services. Please take a few minutes to complete the written survey that you may receive in the mail after your visit with us. Thank you!             Your Updated Medication List - Protect others around you: Learn how to safely use, store and throw away your medicines at www.disposemymeds.org.          This list is accurate as of 4/26/18 11:06 AM.  Always use your most recent med list.                   Brand Name Dispense Instructions for use Diagnosis    losartan 50 MG tablet    COZAAR    90 tablet    Take 1 tablet (50 mg) by mouth daily    Essential hypertension       metoprolol succinate 50 MG 24 hr tablet    TOPROL-XL    90 tablet    Take 1 tablet (50 mg) by mouth daily    Essential hypertension

## 2018-04-26 NOTE — PROGRESS NOTES
SUBJECTIVE:   Charles Nogueira is a 78 year old male who presents to clinic today for the following health issues:      RESPIRATORY SYMPTOMS  /88 (BP Location: Left arm, Patient Position: Chair, Cuff Size: Adult Regular)  Pulse 67  Temp 97.6  F (36.4  C) (Oral)  Resp 14  Wt 155 lb 1.6 oz (70.4 kg)  SpO2 99%  BMI 23.93 kg/m2      Duration: 6 months to 1 year    Description  cough    Severity: none    Accompanying signs and symptoms: None    History (predisposing factors):  none    Precipitating or alleviating factors: None    Therapies tried and outcome:  Cough drops  Patient complains of congestion with sore throat, nasal congestion and sinus pain. Some mucopurulant discharge but no upper dental pain and no sustained fever. Duration less than months of paroxysms.  Has history of airborn allergy or asthma.   No chest pain, diaphoresis, or sob.  nonproductive cough.  TMs dull not *red, sinus tenderness none   lungs clear, s1s2,soft abd,no distress, cyanosis or stridor.  A:URI with paroxysms of cough, no issue with phelgm, fever, dyspnea or sinus pain   P:fluids, antipyretics,rest and meds as directed.  Recheck PRN worsening or non-improvement over 5 days.  Discussed signs of systemic or constutional illness.      Get ENT eval, he's worried about persistent tickle cough was wintering in [place] when it started

## 2018-05-10 ENCOUNTER — TRANSFERRED RECORDS (OUTPATIENT)
Dept: HEALTH INFORMATION MANAGEMENT | Facility: CLINIC | Age: 78
End: 2018-05-10

## 2018-06-14 ENCOUNTER — HOSPITAL ENCOUNTER (OUTPATIENT)
Dept: GENERAL RADIOLOGY | Facility: CLINIC | Age: 78
Discharge: HOME | End: 2018-06-14
Attending: OTOLARYNGOLOGY | Admitting: OTOLARYNGOLOGY
Payer: MEDICARE

## 2018-06-14 ENCOUNTER — HOSPITAL ENCOUNTER (OUTPATIENT)
Dept: SPEECH THERAPY | Facility: CLINIC | Age: 78
End: 2018-06-14
Attending: OTOLARYNGOLOGY
Payer: MEDICARE

## 2018-06-14 DIAGNOSIS — R13.10 DYSPHAGIA: ICD-10-CM

## 2018-06-14 PROCEDURE — 92610 EVALUATE SWALLOWING FUNCTION: CPT | Mod: GN

## 2018-06-14 PROCEDURE — 92526 ORAL FUNCTION THERAPY: CPT | Mod: GN

## 2018-06-14 PROCEDURE — 74230 X-RAY XM SWLNG FUNCJ C+: CPT

## 2018-06-14 PROCEDURE — 40000211 ZZHC STATISTIC SLP  DEPARTMENT VISIT

## 2018-06-14 PROCEDURE — 92611 MOTION FLUOROSCOPY/SWALLOW: CPT | Mod: GN

## 2018-06-14 PROCEDURE — G8997 SWALLOW GOAL STATUS: HCPCS | Mod: GN,CJ

## 2018-06-14 PROCEDURE — G8996 SWALLOW CURRENT STATUS: HCPCS | Mod: GN,CJ

## 2018-06-14 RX ORDER — BARIUM SULFATE 400 MG/ML
SUSPENSION ORAL ONCE
Status: COMPLETED | OUTPATIENT
Start: 2018-06-14 | End: 2018-06-14

## 2018-06-14 RX ADMIN — BARIUM SULFATE: 400 SUSPENSION ORAL at 14:46

## 2018-06-14 NOTE — PROGRESS NOTES
" [facility] OP video swallow study  06/14/18 1400       Present No   General Information   Type Of Visit Initial   Start Of Care Date 06/14/18   Referring Physician Ruy Ryan MD   Orders Evaluate And Treat   Medical Diagnosis dysphagia   Onset Of Illness/injury Or Date Of Surgery 05/10/18  (per MD order date)   Precautions/limitations No Known Precautions/limitations   Hearing WFL   Pertinent History of Current Problem/OT: Additional Occupational Profile Info Pt is a 78 year old male with c/o dysphagia. Pt states for the past 1-2 years he has noticed increased choking episodes on a weekly basis. Pt states he sometimes notices this occur more with spicy foods, otherwise it can be \"completely random.\" Pt reports this as a \"tickle\" sensation in his throat prior to the coughing events. Pt denies any hx of GERD or frequent PNA. Pt recently went to ENT and reports \"everything was normal\" when they scoped him. Pt currently eats regular textures and thin liquids. Video swallow study completed per MD orders to further assess oropharyngeal swallow function.    Respiratory Status Room air   Prior Level Of Function Swallowing   Prior Level Of Function Comment Pt currently eats regular textures and thin liquids   Patient Role/employment History Retired   [facility]   Patient/family Goals Pt would like to know why he gets a tickle in his throat   Pain Assessment   Pain Reported No   Fall Risk Screen   Fall screen comments Please see radiology report for further details   Clinical Swallow Evaluation   Oral Musculature generally intact   Structural Abnormalities none present   Dentition present and adequate   Mucosal Quality adequate   Mandibular Strength and Mobility intact   Oral Labial Strength and Mobility WFL   Lingual Strength and Mobility WFL   Velar Elevation intact   Buccal Strength and Mobility intact   Laryngeal Function Cough;Throat clear;Swallow;Voicing initiated   VFSS " Evaluation   VFSS Additional Documentation Yes   VFSS Eval: Radiology   Radiologist Dr. Monge   Views Taken left lateral   Physical Location of Procedure [facility]   VFSS Eval: Thin Liquid Texture Trial   Mode of Presentation, Thin Liquid cup;self-fed   Order of Presentation 1, 2, 3, 5, 7, 8   Preparatory Phase Poor bolus control   Oral Phase, Thin Liquid Premature pharyngeal entry   Pharyngeal Phase, Thin Liquid Delayed swallow reflex;Residue in valleculae;Residue in pyriform sinus   Rosenbek's Penetration Aspiration Scale: Thin Liquid Trial Results 4 - contrast contacts vocal cords, no residue remains (penetration)   Diagnostic Statement Thin liquids via cup resulted in deep penetration to the chords x1, however pt was able to effectively expell contrast. No other penetration or aspiration. Mild residuals in valleculae and pyriforms which pt was able to clear with second swallow   VFSS Eval: Puree Solid Texture Trial   Mode of Presentation, Puree spoon;self-fed   Order of Presentation 4   Preparatory Phase Poor bolus control   Oral Phase, Puree Premature pharyngeal entry   Pharyngeal Phase, Puree Delayed swallow reflex;Residue in valleculae;Residue in pyriform sinus   Rosenbek's Penetration Aspiration Scale: Puree Food Trial Results 1 - no aspiration, contrast does not enter airway   Diagnostic Statement No aspiration or penetration. Mild pharyngeal residuals in valleculae and pyriforms which pt was able to partially clear with double swallow and liquid wash   VFSS Eval: Solid Food Texture Trial   Mode of Presentation, Solid spoon;self-fed   Order of Presentation 6   Preparatory Phase Poor bolus control   Oral Phase, Solid Premature pharyngeal entry   Pharyngeal Phase, Solid Delayed swallow reflex;Residue in valleculae;Residue in pyriform sinus   Rosenbek's Penetration Aspiration Scale: Solid Food Trial Results 1 - no aspiration, contrast does not enter airway   Diagnostic Statement No aspiration or penetration. Pt  with mild-moderate pharyngeal residuals in valleculae and pyriforms which pt was unable to fully clear despite double swallows and liquid wash   FEES Evaluation   Additional Documentation No   Swallow Compensations   Swallow Compensations Alternate viscosity of consistencies;Effortful swallow;Pacing;Reduce amounts;Multiple swallow   Results Other (see comments)  (deep penetration x1)   Educational Assessment   Barriers to Learning No barriers   Preferred Learning Style Listening;Demonstration;Pictures/video;Reading   Esophageal Phase of Swallow   Patient reports or presents with symptoms of esophageal dysphagia No   General Therapy Interventions   Planned Therapy Interventions Dysphagia Treatment   Dysphagia treatment Modified diet education;Oropharyngeal exercise training;Instruction of safe swallow strategies;Compensatory strategies for swallowing   Swallow Eval: Clinical Impressions   Skilled Criteria for Therapy Intervention Skilled criteria met.  Treatment indicated.   Dysphagia Outcome Severity Scale (DARIEN) Level 5 - DARIEN   Treatment Diagnosis Mild oropharyngeal dysphagia   Diet texture recommendations Regular diet;Thin liquids  (avoid dry textures)   Recommended Feeding/Eating Techniques alternate between small bites and sips of food/liquid;check mouth frequently for oral residue/pocketing;hard swallow w/ each bite or sip;maintain upright posture during/after eating for 30 mins;small sips/bites   Rehab Potential good, to achieve stated therapy goals   Demonstrates Need for Referral to Another Service other (see comments)  (OP ST)   Therapy Frequency other (see comments)  (x1 week for x3 weeks)   Predicted Duration of Therapy Intervention (days/wks) x1/week for 3 weeks   (modify per treating therapist discretion )   Anticipated Discharge Disposition home w/ outpatient services   Risks and Benefits of Treatment have been explained. Yes   Patient, family and/or staff in agreement with Plan of Care Yes   Clinical  Impression Comments SLP: Video swallow study completed per MD orders. Pt presents with mild oropharyngeal dysphagia. Pts swallow function charaterized by premature pharyngeal entry, reduced BOT retraction, and reduced hyolaryngeal elevation. Thin liquids via cup resulted in deep penetration to the chords x1 on large bolus, however pt was able to expell contrast effectively. No aspiration or additional penetration noted. All PO trials resulted in pharyngeal residuals in valleculae and pyriforms. However, pt with increased residuals following pureed and regular solid trials which pt was unable to fully clear despite double swallows and liquid wash. Recommend pt continue regular (avoid dry textures) and thin liquids. Pt should be fully upright for all PO, take small single sips/bites, slow pacing, double swallow, and alternate between consistencies. Recommend 1-2 ST follow up sessions to assess diet tolerance and complete oropharyngeal exercises. Educated pt and spouse on safe swallow strategies and provided instruciton and handout re: oropharyngeal strengthening exercises. Pt and spouse demonstrated good understanding of recommendations and rationale.    Swallow Goals   SLP Swallow Goals 1   Swallow Goal 1   Goal Identifier LTG   Goal Description Pt will verbalize understanding and agreement with VFSS results and recommendations   Target Date 08/14/18   Swallow Goal 2   Goal Identifier LTG   Goal Description Pt will complete 5-10 reps of oropharyngeal exercises with 90% accuracy and minimal clinician cues   Target Date 08/14/18   Total Session Time   Total Session Time 45   Total Evaluation Time 25   Therapy Certification   Certification date from 06/14/18   Certification date to 08/14/18   Medical Diagnosis mild oropharyngeal dysphagia   Certification I certify the need for these services furnished under this plan of treatment and while under my care.  (Physician co-signature of this document indicates review and  certification of the therapy plan).

## 2018-06-14 NOTE — PROGRESS NOTES
[facility]          OUTPATIENT SWALLOW  EVALUATION  PLAN OF TREATMENT FOR OUTPATIENT REHABILITATION  (COMPLETE FOR INITIAL CLAIMS ONLY)  Patient's Last Name, First Name, M.I.  YOB: 1940  Charles Nogueira     Provider's Name   [facility]   Medical Record No.  [id number]     Start of Care Date:  06/14/18   Onset Date:  05/10/18 (per MD order date)   Type:     ___PT   ____OT  ___X_SLP Medical Diagnosis:  mild oropharyngeal dysphagia     Treatment Diagnosis:  Mild oropharyngeal dysphagia Visits from SOC:  1     _________________________________________________________________________________  Plan of Treatment/Functional Goals:  Planned Therapy Interventions: Dysphagia Treatment  Dysphagia treatment: Modified diet education, Oropharyngeal exercise training, Instruction of safe swallow strategies, Compensatory strategies for swallowing                     Goals   1. Goal Identifier: LTG       Goal Description: Pt will verbalize understanding and agreement with VFSS results and recommendations       Target Date: 08/14/18           2. Goal Identifier: LTG       Goal Description: Pt will complete 5-10 reps of oropharyngeal exercises with 90% accuracy and minimal clinician cues       Target Date: 08/14/18                                            Therapy Frequency: other (see comments) (x1 week for x3 weeks)  Predicted Duration of Therapy Intervention (days/wks): x1/week for 3 weeks  (modify per treating therapist discretion )    Allison Otero, SLP       I CERTIFY THE NEED FOR THESE SERVICES FURNISHED UNDER        THIS PLAN OF TREATMENT AND WHILE UNDER MY CARE     (Physician co-signature of this document indicates review and certification of the therapy plan).                  Certification date from: 06/14/18 Certification date to: 08/14/18          Referring Physician: Ruy Ryan MD    Initial  Assessment        See Epic Evaluation Start Of Care Date: 06/14/18

## 2018-06-19 DIAGNOSIS — I10 ESSENTIAL HYPERTENSION: ICD-10-CM

## 2018-06-19 RX ORDER — LOSARTAN POTASSIUM 50 MG/1
50 TABLET ORAL DAILY
Qty: 90 TABLET | Refills: 3 | Status: SHIPPED | OUTPATIENT
Start: 2018-06-19 | End: 2018-10-01

## 2018-06-19 RX ORDER — METOPROLOL SUCCINATE 50 MG/1
50 TABLET, EXTENDED RELEASE ORAL DAILY
Qty: 90 TABLET | Refills: 3 | Status: SHIPPED | OUTPATIENT
Start: 2018-06-19 | End: 2019-07-09

## 2018-07-18 ENCOUNTER — ALLIED HEALTH/NURSE VISIT (OUTPATIENT)
Dept: CARDIOLOGY | Facility: CLINIC | Age: 78
End: 2018-07-18
Payer: COMMERCIAL

## 2018-07-18 DIAGNOSIS — Z95.0 CARDIAC PACEMAKER IN SITU: Primary | ICD-10-CM

## 2018-07-18 PROCEDURE — 93294 REM INTERROG EVL PM/LDLS PM: CPT | Performed by: INTERNAL MEDICINE

## 2018-07-18 PROCEDURE — 93296 REM INTERROG EVL PM/IDS: CPT | Performed by: INTERNAL MEDICINE

## 2018-07-18 NOTE — MR AVS SNAPSHOT
After Visit Summary   7/18/2018    Charles Nogueira    MRN: [id number]           Patient Information     Date Of Birth          1940        Visit Information        Provider Department      7/18/2018 1:45 PM MEDINA TECH1 [facility]   [place]        Today's Diagnoses     Cardiac pacemaker in situ    -  1       Follow-ups after your visit        Your next 10 appointments already scheduled     Aug 08, 2018  8:30 AM CDT   Ech Complete with RSCCECHO1   [facility] ([facility])    [address]  [place] [id number] [phone]           1.  Please bring or wear a comfortable two-piece outfit. 2.  You may eat, drink and take your normal medicines. 3.  For any questions that cannot be answered, please contact the ordering physician 4.  Please do not wear perfumes or scented lotions on the day of your exam. ***Please check-in at the [place] Registration Office located in [address] in the [facility] building. When you are finished registering, please go to [address] and have a seat. The technician will call your name for the test.            Aug 09, 2018  9:45 AM CDT   Return Visit with Michele Witt MD   [facility] ([facility])    [address]  [place] [address] [phone]            Oct 24, 2018  9:30 AM CDT   Pacemaker Check with MIA DCR2   [facility] ([facility])    [address] [place] [address] [address]  [facility] [address] [phone]              Who to contact     If you have questions or need follow up information about today's clinic visit or your schedule please contact [facility] directly at [phone].  Normal or non-critical lab and imaging results will be communicated to you by MyChart, letter or phone within 4 business  days after the clinic has received the results. If you do not hear from us within 7 days, please contact the clinic through [employer] or phone. If you have a critical or abnormal lab result, we will notify you by phone as soon as possible.  Submit refill requests through [employer] or call your pharmacy and they will forward the refill request to us. Please allow 3 business days for your refill to be completed.          Additional Information About Your Visit        Care EveryWhere ID     This is your Care EveryWhere ID. This could be used by other organizations to access your [place] medical records  [id number]         Blood Pressure from Last 3 Encounters:   04/26/18 144/88   04/16/18 130/82   11/13/17 142/84    Weight from Last 3 Encounters:   04/26/18 70.4 kg (155 lb 1.6 oz)   04/16/18 68 kg (150 lb)   11/13/17 69.4 kg (152 lb 14.4 oz)              We Performed the Following     INTERROGATION DEVICE EVAL REMOTE, PACER/ICD (97763)     PM DEVICE INTERROGATE REMOTE (98789)        Primary Care Provider Office Phone # Fax #    Stanley Vikas Saldaña [phone] [address] [address] [address]        Equal Access to Services     [facility]: Hadii aad ku hadasho Soomaali, waaxda luqadaha, qaybta kaalmada adeegyada, waxay idiin haypiercen ruben velazco. So [facility] [phone].    ATENCIÓN: Si habla español, tiene a dowilng disposición servicios gratuitos de asistencia lingüística. Llame al [phone].    We comply with applicable federal civil rights laws and [place] laws. We do not discriminate on the basis of race, color, national origin, age, disability, sex, sexual orientation, or gender identity.            Thank you!     Thank you for choosing [facility]  for your care. Our goal is always to provide you with excellent care. Hearing back from our patients is one way we can continue to improve our services. Please take a few minutes to complete  the written survey that you may receive in the mail after your visit with us. Thank you!             Your Updated Medication List - Protect others around you: Learn how to safely use, store and throw away your medicines at www.disposemymeds.org.          This list is accurate as of 7/18/18  4:07 PM.  Always use your most recent med list.                   Brand Name Dispense Instructions for use Diagnosis    losartan 50 MG tablet    COZAAR    90 tablet    Take 1 tablet (50 mg) by mouth daily    Essential hypertension       metoprolol succinate 50 MG 24 hr tablet    TOPROL-XL    90 tablet    Take 1 tablet (50 mg) by mouth daily    Essential hypertension

## 2018-07-18 NOTE — PROGRESS NOTES
Medtronic Adapta (D) Remote PPM Device Check  AP: 35 % : <1 %  Mode: DDDR        Presenting Rhythm: AS/VS, rates 67-70bpm  Heart Rate: Adequate rates per histogram  Sensing: Stable    Pacing Threshold: Stable    Impedance: Stable  Battery Status: 10-14 years  Atrial Arrhythmia: None  Ventricular Arrhythmia: 1 ventricular high rate. Marker only EGM shows slightly irregular VS events lasting 6 beats, rates 180-210bpm. EF 55-60% (2017).      Care Plan: F/u annual threshold in 3 months. Gave patient results over the phone. Porfirio,CVT

## 2018-08-08 ENCOUNTER — HOSPITAL ENCOUNTER (OUTPATIENT)
Dept: CARDIOLOGY | Facility: CLINIC | Age: 78
Discharge: HOME | End: 2018-08-08
Attending: INTERNAL MEDICINE | Admitting: INTERNAL MEDICINE
Payer: MEDICARE

## 2018-08-08 DIAGNOSIS — I05.9 MITRAL VALVE DISEASE: ICD-10-CM

## 2018-08-08 PROCEDURE — 93306 TTE W/DOPPLER COMPLETE: CPT | Mod: 26 | Performed by: INTERNAL MEDICINE

## 2018-08-08 PROCEDURE — 93306 TTE W/DOPPLER COMPLETE: CPT

## 2018-08-09 ENCOUNTER — OFFICE VISIT (OUTPATIENT)
Dept: CARDIOLOGY | Facility: CLINIC | Age: 78
End: 2018-08-09
Attending: INTERNAL MEDICINE
Payer: COMMERCIAL

## 2018-08-09 VITALS
WEIGHT: 153.1 LBS | DIASTOLIC BLOOD PRESSURE: 70 MMHG | BODY MASS INDEX: 23.2 KG/M2 | SYSTOLIC BLOOD PRESSURE: 122 MMHG | HEIGHT: 68 IN | HEART RATE: 68 BPM

## 2018-08-09 DIAGNOSIS — I49.5 SICK SINUS SYNDROME (H): ICD-10-CM

## 2018-08-09 DIAGNOSIS — I05.9 MITRAL VALVE DISEASE: Primary | ICD-10-CM

## 2018-08-09 DIAGNOSIS — I45.9 STOKES-ADAMS SYNCOPE: ICD-10-CM

## 2018-08-09 DIAGNOSIS — I34.0 NON-RHEUMATIC MITRAL REGURGITATION: ICD-10-CM

## 2018-08-09 DIAGNOSIS — I10 ESSENTIAL HYPERTENSION: ICD-10-CM

## 2018-08-09 DIAGNOSIS — I49.3 PVC (PREMATURE VENTRICULAR CONTRACTION): ICD-10-CM

## 2018-08-09 DIAGNOSIS — Z95.0 CARDIAC PACEMAKER IN SITU: ICD-10-CM

## 2018-08-09 PROCEDURE — 99214 OFFICE O/P EST MOD 30 MIN: CPT | Performed by: INTERNAL MEDICINE

## 2018-08-09 NOTE — PROGRESS NOTES
HPI and Plan:   See dictation:[id number]    Orders Placed This Encounter   Procedures     Follow-Up with Cardiologist     Echocardiogram       No orders of the defined types were placed in this encounter.      There are no discontinued medications.      Encounter Diagnoses   Name Primary?     Mitral valve disease      Non-rheumatic mitral regurgitation Yes     Sick sinus syndrome (H)      Damico-Anne syncope      PVC (premature ventricular contraction)      Cardiac pacemaker in situ      Essential hypertension        CURRENT MEDICATIONS:  Current Outpatient Prescriptions   Medication Sig Dispense Refill     losartan (COZAAR) 50 MG tablet Take 1 tablet (50 mg) by mouth daily 90 tablet 3     metoprolol succinate (TOPROL-XL) 50 MG 24 hr tablet Take 1 tablet (50 mg) by mouth daily 90 tablet 3       ALLERGIES     Allergies   Allergen Reactions     Sulfa Drugs      rash       PAST MEDICAL HISTORY:  Past Medical History:   Diagnosis Date     Allergic rhinitis, cause unspecified      HTN (hypertension)      Melanoma (H)      Mitral valve disease      Other forms of migraine, without mention of intractable migraine without mention of status migrainosus      Other premature beats      Pacemaker 7/2004     Premature ventricular contraction      Squamous cell carcinoma      Syncope        PAST SURGICAL HISTORY:  Past Surgical History:   Procedure Laterality Date     APPENDECTOMY OPEN       BIOPSY OF SKIN LESION       COLONOSCOPY N/A 11/20/2015    Procedure: COLONOSCOPY;  Surgeon: David Us MD;  Location: [facility]     IMPLANT PACEMAKER  7/2004     MOHS MICROGRAPHIC PROCEDURE         FAMILY HISTORY:  Family History   Problem Relation Age of Onset     HEART DISEASE Mother      MI     HEART DISEASE Father      MI     C.A.D. Brother      Just had angioplasty in 2006     Colon Cancer No family hx of        SOCIAL HISTORY:  Social History     Social History     Marital status:      Spouse name: Paula     Number of children:  "3     Years of education: N/A     Occupational History      Retired     Social History Main Topics     Smoking status: Former Smoker     Packs/day: 1.00     Years: 4.00     Types: Cigarettes     Quit date: 1/1/1967     Smokeless tobacco: Never Used     Alcohol use No     Drug use: No     Sexual activity: Yes     Partners: Female     Other Topics Concern      Service Yes     Blood Transfusions No     Caffeine Concern Yes     decaff coffee     Occupational Exposure No     Hobby Hazards No     Sleep Concern No     Stress Concern No     Weight Concern No     Special Diet No     Back Care No     Exercise Yes     Excercises about 4-5 days per week     Bike Helmet Yes     Seat Belt Yes     Self-Exams No     Social History Narrative       Review of Systems:  Skin:  Positive for bruising thin skin, bruise easily   Eyes:  Positive for glasses    ENT:  Negative      Respiratory:  Negative       Cardiovascular:  Negative      Gastroenterology: Negative      Genitourinary:  not assessed      Musculoskeletal:  Positive for arthritis;joint pain    Neurologic:  Negative      Psychiatric:  Negative      Heme/Lymph/Imm:  Positive for allergies seasonal allergies, Sulfa drugs   Endocrine:  Negative        Physical Exam:  Vitals: /70 (BP Location: Right arm, Patient Position: Sitting, Cuff Size: Adult Large)  Pulse 68  Ht 1.715 m (5' 7.5\")  Wt 69.4 kg (153 lb 1.6 oz)  BMI 23.62 kg/m2    Constitutional:  cooperative, alert and oriented, well developed, well nourished, in no acute distress        Skin:  warm and dry to the touch, no apparent skin lesions or masses noted surgical scars well-healed        Head:  normocephalic, no masses or lesions        Eyes:  pupils equal and round;conjunctivae and lids unremarkable;sclera white;no xanthalasma;EOMS intact        Lymph:      ENT:  no pallor or cyanosis, dentition good        Neck:  carotid pulses are full and equal bilaterally, JVP normal, no carotid bruit    "     Respiratory:  normal breath sounds, clear to auscultation, normal A-P diameter, normal symmetry, normal respiratory excursion, no use of accessory muscles         Cardiac: normal S1 and S2;no S3 or S4;apical impulse not displaced;regular rhythm       holosystolic murmur;LLSB;radiation to the axilla        pulses full and equal, no bruits auscultated                                        GI:  abdomen soft, non-tender, BS normoactive, no mass, no HSM, no bruits        Extremities and Muscular Skeletal:  no deformities, clubbing, cyanosis, erythema observed;no edema              Neurological:  no gross motor deficits;affect appropriate        Psych:  Alert and Oriented x 3        CC  Michele Witt MD  [address]  [place] [id number]

## 2018-08-09 NOTE — MR AVS SNAPSHOT
After Visit Summary   8/9/2018    Charles Nogueira    MRN: [id number]           Patient Information     Date Of Birth          1940        Visit Information        Provider Department      8/9/2018 9:45 AM Michele Witt MD [facility]        Today's Diagnoses     Non-rheumatic mitral regurgitation    -  1    Mitral valve disease        Sick sinus syndrome (H)        Damico-Anne syncope        PVC (premature ventricular contraction)        Cardiac pacemaker in situ        Essential hypertension           Follow-ups after your visit        Additional Services     Follow-Up with Cardiologist                 Your next 10 appointments already scheduled     Oct 24, 2018  9:30 AM CDT   Pacemaker Check with RU DCR2   [facility] ([facility])    [address]  [facility] [address] [phone]              Future tests that were ordered for you today     Open Future Orders        Priority Expected Expires Ordered    Follow-Up with Cardiologist Routine 8/9/2019 8/10/2019 8/9/2018    Echocardiogram Routine 8/9/2019 8/10/2019 8/9/2018            Who to contact     If you have questions or need follow up information about today's clinic visit or your schedule please contact [facility] directly at [phone].  Normal or non-critical lab and imaging results will be communicated to you by MyChart, letter or phone within 4 business days after the clinic has received the results. If you do not hear from us within 7 days, please contact the clinic through MyChart or phone. If you have a critical or abnormal lab result, we will notify you by phone as soon as possible.  Submit refill requests through [facility] or call your pharmacy and they will forward the refill request to us. Please allow 3 business days for your refill to be completed.           "Additional Information About Your Visit        Care EveryWhere ID     This is your Care EveryWhere ID. This could be used by other organizations to access your [place] medical records  [id number]        Your Vitals Were     Pulse Height BMI (Body Mass Index)             68 1.715 m (5' 7.5\") 23.62 kg/m2          Blood Pressure from Last 3 Encounters:   08/09/18 122/70   04/26/18 144/88   04/16/18 130/82    Weight from Last 3 Encounters:   08/09/18 69.4 kg (153 lb 1.6 oz)   04/26/18 70.4 kg (155 lb 1.6 oz)   04/16/18 68 kg (150 lb)              We Performed the Following     Follow-Up with Cardiologist        Primary Care Provider Office Phone # Fax #    Stanley Saldaña [phone] [address] [address]        Equal Access to Services     ÁNGEL [facility] : Hadii aad ku hadasho Sonatyali, waaxda luqadaha, qaybta kaalmada adeegyada, waxay lakiain haypiercen ruben barron . So [facility] [phone].    ATENCIÓN: Si habla español, tiene a dowling disposición servicios gratuitos de asistencia lingüística. Sandra al [phone].    We comply with applicable federal civil rights laws and [place] laws. We do not discriminate on the basis of race, color, national origin, age, disability, sex, sexual orientation, or gender identity.            Thank you!     Thank you for choosing [facility]  for your care. Our goal is always to provide you with excellent care. Hearing back from our patients is one way we can continue to improve our services. Please take a few minutes to complete the written survey that you may receive in the mail after your visit with us. Thank you!             Your Updated Medication List - Protect others around you: Learn how to safely use, store and throw away your medicines at www.disposemymeds.org.          This list is accurate as of 8/9/18 10:24 AM.  Always use your most recent med list.                   Brand Name " Dispense Instructions for use Diagnosis    losartan 50 MG tablet    COZAAR    90 tablet    Take 1 tablet (50 mg) by mouth daily    Essential hypertension       metoprolol succinate 50 MG 24 hr tablet    TOPROL-XL    90 tablet    Take 1 tablet (50 mg) by mouth daily    Essential hypertension

## 2018-08-09 NOTE — LETTER
8/9/2018      Stanley Saldaña MD  [address] [address] [facility] [address]      RE: Charles Nogueira       Dear Colleague,    I had the pleasure of seeing Charles Nogueira in the [facility].    Service Date: 08/09/2018      PRIMARY CARE PHYSICIAN:  Dr. Stanley Saldaña.      HISTORY OF PRESENT ILLNESS:  I again had the pleasure of seeing your patient, Charles Nogueira, at [facility] for evaluation of mitral regurgitation, hypertension, PVCs and paroxysmal atrial fibrillation.  The patient is status post dual-chamber pacemaker implantation at [facility] 07/07/2004 for bradycardia and asystole.  He has not had any further episodes of syncope.  His last pacemaker interrogation on 07/18 demonstrated 35% atrial pacing and less than 1% ventricular pacing.  No further atrial fibrillation was seen.  One ventricular heart rate was seen lasting 6 beats.  He has 10-14 years of battery longevity.  The patient is exercising by walking 4 times a week for 45 minutes and then 10-15 minutes using a universal gym.  While wintering in [place] he actually walks 46 miles per week.  We are following his echocardiograms for moderate mitral regurgitation with a normal ejection fraction.  An echocardiogram performed yesterday continued to show mild to moderate mitral regurgitation and ejection fraction of 55%-60%.  Left ventricular chamber size is increased compared to last year but relatively unchanged from 2 years ago.  He has evidence of a torn P1/P2 cord with prolapse of the mitral valve and the annulus with annular dilatation.  The mitral jet on TE was anteriorly directed consistent with posterior leaflet pathology.  We again discussed the need for mitral valve repair should his left ventricle show an increase in size or decrease in function.  Pulmonary pressures remain normal.  He has moderate left atrial enlargement.  The patient has not been hospitalized or had any  surgery since I saw him 1 year ago.  He is concerned about his decreasing energy yet he is able to exercise as above.      PHYSICAL EXAMINATION:  As listed below.      ASSESSMENT:   1.  Charles Nogueira is a delightful 78-year-old male with sick sinus syndrome and episodes of severe bradycardia and syncope.  His dual-chamber pacemaker implanted in  and replaced 2014 is functioning normally.  We continue to monitor his rhythms including PVCs through the [facility].   2.  Paroxysmal atrial fibrillation probably on the basis of mitral regurgitation.  In the past I had suggested that this patient start anticoagulation but he prefers to take his pulse every day and call for any atrial fibrillation lasting 12 hours or longer.  If his pacemaker shows increased frequency of mode switches anticoagulation will again be suggested.  The patient notes that he has not tolerated platelet inhibitors in the past due to bruising.   3.  Mitral valve prolapse and mitral regurgitation.  This is relatively stable and we will repeat an echocardiogram in 1 year.   4.  We are using losartan and metoprolol for PVCs and blood pressure control.  He appears stable at this time.   5.  The patient's lipids are actually now normal and well controlled.      It is my pleasure to assist in the care of Charles Nogueira.  All his questions were answered to his satisfaction.  His wife was in attendance today.      Anette Oliver MD      cc:   [place] [address]         ANETTE OLIVER MD, [facility]             D: 2018   T: 2018   MT: RODRICK      Name:     CHARLES NOGUEIRA   MRN:      [id number]        Account:      [id number]   :      1940           Service Date: 2018      Document: [id number]         Outpatient Encounter Prescriptions as of 2018   Medication Sig Dispense Refill     losartan (COZAAR) 50 MG tablet Take 1 tablet (50 mg) by  mouth daily 90 tablet 3     metoprolol succinate (TOPROL-XL) 50 MG 24 hr tablet Take 1 tablet (50 mg) by mouth daily 90 tablet 3     No facility-administered encounter medications on file as of 8/9/2018.          Again, thank you for allowing me to participate in the care of your patient.      Sincerely,    Michele Witt MD     [facility]

## 2018-08-09 NOTE — PROGRESS NOTES
Service Date: 08/09/2018      PRIMARY CARE PHYSICIAN:  Dr. Stanley Saldaña.      HISTORY OF PRESENT ILLNESS:  I again had the pleasure of seeing your patient, Charles Nogueira, at [facility] for evaluation of mitral regurgitation, hypertension, PVCs and paroxysmal atrial fibrillation.  The patient is status post dual-chamber pacemaker implantation at [facility] 07/07/2004 for bradycardia and asystole.  He has not had any further episodes of syncope.  His last pacemaker interrogation on 07/18 demonstrated 35% atrial pacing and less than 1% ventricular pacing.  No further atrial fibrillation was seen.  One ventricular heart rate was seen lasting 6 beats.  He has 10-14 years of battery longevity.  The patient is exercising by walking 4 times a week for 45 minutes and then 10-15 minutes using a universal gym.  While wintering in [place] he actually walks 46 miles per week.  We are following his echocardiograms for moderate mitral regurgitation with a normal ejection fraction.  An echocardiogram performed yesterday continued to show mild to moderate mitral regurgitation and ejection fraction of 55%-60%.  Left ventricular chamber size is increased compared to last year but relatively unchanged from 2 years ago.  He has evidence of a torn P1/P2 cord with prolapse of the mitral valve and the annulus with annular dilatation.  The mitral jet on TE was anteriorly directed consistent with posterior leaflet pathology.  We again discussed the need for mitral valve repair should his left ventricle show an increase in size or decrease in function.  Pulmonary pressures remain normal.  He has moderate left atrial enlargement.  The patient has not been hospitalized or had any surgery since I saw him 1 year ago.  He is concerned about his decreasing energy yet he is able to exercise as above.      PHYSICAL EXAMINATION:  As listed below.      ASSESSMENT:   1.  Charles Nogueira is a delightful 78-year-old  male with sick sinus syndrome and episodes of severe bradycardia and syncope.  His dual-chamber pacemaker implanted in  and replaced 2014 is functioning normally.  We continue to monitor his rhythms including PVCs through the [facility].   2.  Paroxysmal atrial fibrillation probably on the basis of mitral regurgitation.  In the past I had suggested that this patient start anticoagulation but he prefers to take his pulse every day and call for any atrial fibrillation lasting 12 hours or longer.  If his pacemaker shows increased frequency of mode switches anticoagulation will again be suggested.  The patient notes that he has not tolerated platelet inhibitors in the past due to bruising.   3.  Mitral valve prolapse and mitral regurgitation.  This is relatively stable and we will repeat an echocardiogram in 1 year.   4.  We are using losartan and metoprolol for PVCs and blood pressure control.  He appears stable at this time.   5.  The patient's lipids are actually now normal and well controlled.      It is my pleasure to assist in the care of Charles Nogueira.  All his questions were answered to his satisfaction.  His wife was in attendance today.      Anette Oliver MD      cc:   Stanley [place] [address]         ANETTE OLIVER MD, FACC             D: 2018   T: 2018   MT: RODRICK      Name:     CHARLES NOGUEIRA   MRN:      [id number]        Account:      [id number]   :      1940           Service Date: 2018      Document: [id number]

## 2018-08-09 NOTE — LETTER
8/9/2018    Stanley Saldaña [facility]  [address] [facility] [facility] [address]    RE: Charles Nogueira       Dear Colleague,    I had the pleasure of seeing Charles Nogueira in the [facility].    HPI and Plan:   See dictation:[id number]    Orders Placed This Encounter   Procedures     Follow-Up with Cardiologist     Echocardiogram       No orders of the defined types were placed in this encounter.      There are no discontinued medications.      Encounter Diagnoses   Name Primary?     Mitral valve disease      Non-rheumatic mitral regurgitation Yes     Sick sinus syndrome (H)      Damico-Anne syncope      PVC (premature ventricular contraction)      Cardiac pacemaker in situ      Essential hypertension        CURRENT MEDICATIONS:  Current Outpatient Prescriptions   Medication Sig Dispense Refill     losartan (COZAAR) 50 MG tablet Take 1 tablet (50 mg) by mouth daily 90 tablet 3     metoprolol succinate (TOPROL-XL) 50 MG 24 hr tablet Take 1 tablet (50 mg) by mouth daily 90 tablet 3       ALLERGIES     Allergies   Allergen Reactions     Sulfa Drugs      rash       PAST MEDICAL HISTORY:  Past Medical History:   Diagnosis Date     Allergic rhinitis, cause unspecified      HTN (hypertension)      Melanoma (H)      Mitral valve disease      Other forms of migraine, without mention of intractable migraine without mention of status migrainosus      Other premature beats      Pacemaker 7/2004     Premature ventricular contraction      Squamous cell carcinoma      Syncope        PAST SURGICAL HISTORY:  Past Surgical History:   Procedure Laterality Date     APPENDECTOMY OPEN       BIOPSY OF SKIN LESION       COLONOSCOPY N/A 11/20/2015    Procedure: COLONOSCOPY;  Surgeon: David Us MD;  Location: [facility]     IMPLANT PACEMAKER  7/2004     MOHS MICROGRAPHIC PROCEDURE         FAMILY HISTORY:  Family History   Problem Relation Age of Onset     HEART DISEASE Mother      MI     HEART DISEASE Father      " ROSA ELENA BLAKE. Brother      Just had angioplasty in 2006     Colon Cancer No family hx of        SOCIAL HISTORY:  Social History     Social History     Marital status:      Spouse name: Paula     Number of children: 3     Years of education: N/A     Occupational History      Retired     Social History Main Topics     Smoking status: Former Smoker     Packs/day: 1.00     Years: 4.00     Types: Cigarettes     Quit date: 1/1/1967     Smokeless tobacco: Never Used     Alcohol use No     Drug use: No     Sexual activity: Yes     Partners: Female     Other Topics Concern      Service Yes     Blood Transfusions No     Caffeine Concern Yes     decaff coffee     Occupational Exposure No     Hobby Hazards No     Sleep Concern No     Stress Concern No     Weight Concern No     Special Diet No     Back Care No     Exercise Yes     Excercises about 4-5 days per week     Bike Helmet Yes     Seat Belt Yes     Self-Exams No     Social History Narrative       Review of Systems:  Skin:  Positive for bruising thin skin, bruise easily   Eyes:  Positive for glasses    ENT:  Negative      Respiratory:  Negative       Cardiovascular:  Negative      Gastroenterology: Negative      Genitourinary:  not assessed      Musculoskeletal:  Positive for arthritis;joint pain    Neurologic:  Negative      Psychiatric:  Negative      Heme/Lymph/Imm:  Positive for allergies seasonal allergies, Sulfa drugs   Endocrine:  Negative        Physical Exam:  Vitals: /70 (BP Location: Right arm, Patient Position: Sitting, Cuff Size: Adult Large)  Pulse 68  Ht 1.715 m (5' 7.5\")  Wt 69.4 kg (153 lb 1.6 oz)  BMI 23.62 kg/m2    Constitutional:  cooperative, alert and oriented, well developed, well nourished, in no acute distress        Skin:  warm and dry to the touch, no apparent skin lesions or masses noted surgical scars well-healed        Head:  normocephalic, no masses or lesions        Eyes:  pupils equal and round;conjunctivae and " lids unremarkable;sclera white;no xanthalasma;EOMS intact        Lymph:      ENT:  no pallor or cyanosis, dentition good        Neck:  carotid pulses are full and equal bilaterally, JVP normal, no carotid bruit        Respiratory:  normal breath sounds, clear to auscultation, normal A-P diameter, normal symmetry, normal respiratory excursion, no use of accessory muscles         Cardiac: normal S1 and S2;no S3 or S4;apical impulse not displaced;regular rhythm       holosystolic murmur;LLSB;radiation to the axilla        pulses full and equal, no bruits auscultated                                        GI:  abdomen soft, non-tender, BS normoactive, no mass, no HSM, no bruits        Extremities and Muscular Skeletal:  no deformities, clubbing, cyanosis, erythema observed;no edema              Neurological:  no gross motor deficits;affect appropriate        Psych:  Alert and Oriented x 3        CC  Michele Witt MD  [address]  [place] [address]                Thank you for allowing me to participate in the care of your patient.      Sincerely,     Michele Witt MD     [facility]    cc:   Michele Witt MD  [address]  [place] [address]

## 2018-08-24 PROBLEM — I24.9 ACUTE ISCHEMIC HEART DISEASE (H): Status: ACTIVE | Noted: 2018-08-24

## 2018-09-10 ENCOUNTER — ALLIED HEALTH/NURSE VISIT (OUTPATIENT)
Dept: NURSING | Facility: CLINIC | Age: 78
End: 2018-09-10
Payer: COMMERCIAL

## 2018-09-10 DIAGNOSIS — Z23 NEED FOR PROPHYLACTIC VACCINATION AND INOCULATION AGAINST INFLUENZA: Primary | ICD-10-CM

## 2018-09-10 PROCEDURE — 90662 IIV NO PRSV INCREASED AG IM: CPT

## 2018-09-10 PROCEDURE — G0008 ADMIN INFLUENZA VIRUS VAC: HCPCS

## 2018-09-10 NOTE — PROGRESS NOTES

## 2018-09-10 NOTE — MR AVS SNAPSHOT
After Visit Summary   9/10/2018    Charles Nogueira    MRN: [id number]           Patient Information     Date Of Birth          1940        Visit Information        Provider Department      9/10/2018 10:30 AM ROD RUSSO/LPN [facility]        Today's Diagnoses     Need for prophylactic vaccination and inoculation against influenza    -  1       Follow-ups after your visit        Your next 10 appointments already scheduled     Oct 24, 2018  9:30 AM CDT   Pacemaker Check with RU DCR2   [facility] ([facility])    [address]  [facility] [address] [phone]              Who to contact     If you have questions or need follow up information about today's clinic visit or your schedule please contact [facility] directly at [phone].  Normal or non-critical lab and imaging results will be communicated to you by MyChart, letter or phone within 4 business days after the clinic has received the results. If you do not hear from us within 7 days, please contact the clinic through MyChart or phone. If you have a critical or abnormal lab result, we will notify you by phone as soon as possible.  Submit refill requests through FeedBurner or call your pharmacy and they will forward the refill request to us. Please allow 3 business days for your refill to be completed.          Additional Information About Your Visit        Care EveryWhere ID     This is your Care EveryWhere ID. This could be used by other organizations to access your [place] medical records  [id number]         Blood Pressure from Last 3 Encounters:   08/09/18 122/70   04/26/18 144/88   04/16/18 130/82    Weight from Last 3 Encounters:   08/09/18 153 lb 1.6 oz (69.4 kg)   04/26/18 155 lb 1.6 oz (70.4 kg)   04/16/18 150 lb (68 kg)              We Performed the Following     FLU VACCINE, INCREASED ANTIGEN, PRESV FREE, AGE 65+ [74205]      Vaccine Administration, Initial [[id number]]        Primary Care Provider Office Phone # Fax #    Stanley Saldaña [phone] [address]  [facility] [address]        Equal Access to Services     ROLANDO MATHIAS : Hadii yecenia ku hadchristoo Soomaali, waaxda luqadaha, qaybta kaalmada adeegyada, waxjonathan bassettn adeshasha huitron laGarlanditz velazco. So [facility] [phone].    ATENCIÓN: Si habla español, tiene a dowling disposición servicios gratuitos de asistencia lingüística. Llame al [phone].    We comply with applicable federal civil rights laws and [place] laws. We do not discriminate on the basis of race, color, national origin, age, disability, sex, sexual orientation, or gender identity.            Thank you!     Thank you for choosing [facility]  for your care. Our goal is always to provide you with excellent care. Hearing back from our patients is one way we can continue to improve our services. Please take a few minutes to complete the written survey that you may receive in the mail after your visit with us. Thank you!             Your Updated Medication List - Protect others around you: Learn how to safely use, store and throw away your medicines at www.disposemymeds.org.          This list is accurate as of 9/10/18 10:46 AM.  Always use your most recent med list.                   Brand Name Dispense Instructions for use Diagnosis    losartan 50 MG tablet    COZAAR    90 tablet    Take 1 tablet (50 mg) by mouth daily    Essential hypertension       metoprolol succinate 50 MG 24 hr tablet    TOPROL-XL    90 tablet    Take 1 tablet (50 mg) by mouth daily    Essential hypertension

## 2018-09-21 ENCOUNTER — HOSPITAL ENCOUNTER (EMERGENCY)
Facility: CLINIC | Age: 78
Discharge: HOME | End: 2018-09-21
Attending: EMERGENCY MEDICINE | Admitting: EMERGENCY MEDICINE
Payer: MEDICARE

## 2018-09-21 ENCOUNTER — ALLIED HEALTH/NURSE VISIT (OUTPATIENT)
Dept: CARDIOLOGY | Facility: CLINIC | Age: 78
End: 2018-09-21
Payer: COMMERCIAL

## 2018-09-21 ENCOUNTER — DOCUMENTATION ONLY (OUTPATIENT)
Dept: CARDIOLOGY | Facility: CLINIC | Age: 78
End: 2018-09-21

## 2018-09-21 VITALS
DIASTOLIC BLOOD PRESSURE: 80 MMHG | BODY MASS INDEX: 22.99 KG/M2 | RESPIRATION RATE: 18 BRPM | OXYGEN SATURATION: 100 % | HEART RATE: 140 BPM | WEIGHT: 149 LBS | TEMPERATURE: 97.7 F | SYSTOLIC BLOOD PRESSURE: 105 MMHG

## 2018-09-21 DIAGNOSIS — Z95.0 CARDIAC PACEMAKER IN SITU: Primary | ICD-10-CM

## 2018-09-21 DIAGNOSIS — I48.0 PAROXYSMAL ATRIAL FIBRILLATION (H): ICD-10-CM

## 2018-09-21 LAB
ANION GAP SERPL CALCULATED.3IONS-SCNC: 6 MMOL/L (ref 3–14)
BASOPHILS # BLD AUTO: 0 10E9/L (ref 0–0.2)
BASOPHILS NFR BLD AUTO: 0.4 %
BUN SERPL-MCNC: 24 MG/DL (ref 7–30)
CALCIUM SERPL-MCNC: 8.7 MG/DL (ref 8.5–10.1)
CHLORIDE SERPL-SCNC: 108 MMOL/L (ref 94–109)
CO2 SERPL-SCNC: 28 MMOL/L (ref 20–32)
CREAT SERPL-MCNC: 1.12 MG/DL (ref 0.66–1.25)
DIFFERENTIAL METHOD BLD: NORMAL
EOSINOPHIL # BLD AUTO: 0.3 10E9/L (ref 0–0.7)
EOSINOPHIL NFR BLD AUTO: 3 %
ERYTHROCYTE [DISTWIDTH] IN BLOOD BY AUTOMATED COUNT: 13.5 % (ref 10–15)
GFR SERPL CREATININE-BSD FRML MDRD: 63 ML/MIN/1.7M2
GLUCOSE SERPL-MCNC: 110 MG/DL (ref 70–99)
HCT VFR BLD AUTO: 47.7 % (ref 40–53)
HGB BLD-MCNC: 15.4 G/DL (ref 13.3–17.7)
IMM GRANULOCYTES # BLD: 0 10E9/L (ref 0–0.4)
IMM GRANULOCYTES NFR BLD: 0.3 %
INTERPRETATION ECG - MUSE: NORMAL
INTERPRETATION ECG - MUSE: NORMAL
LYMPHOCYTES # BLD AUTO: 2.1 10E9/L (ref 0.8–5.3)
LYMPHOCYTES NFR BLD AUTO: 21.5 %
MCH RBC QN AUTO: 29.7 PG (ref 26.5–33)
MCHC RBC AUTO-ENTMCNC: 32.3 G/DL (ref 31.5–36.5)
MCV RBC AUTO: 92 FL (ref 78–100)
MONOCYTES # BLD AUTO: 1.3 10E9/L (ref 0–1.3)
MONOCYTES NFR BLD AUTO: 13.2 %
NEUTROPHILS # BLD AUTO: 6 10E9/L (ref 1.6–8.3)
NEUTROPHILS NFR BLD AUTO: 61.6 %
NRBC # BLD AUTO: 0 10*3/UL
NRBC BLD AUTO-RTO: 0 /100
PLATELET # BLD AUTO: 193 10E9/L (ref 150–450)
POTASSIUM SERPL-SCNC: 4 MMOL/L (ref 3.4–5.3)
RBC # BLD AUTO: 5.19 10E12/L (ref 4.4–5.9)
SODIUM SERPL-SCNC: 142 MMOL/L (ref 133–144)
WBC # BLD AUTO: 9.7 10E9/L (ref 4–11)

## 2018-09-21 PROCEDURE — 80048 BASIC METABOLIC PNL TOTAL CA: CPT | Performed by: EMERGENCY MEDICINE

## 2018-09-21 PROCEDURE — 85025 COMPLETE CBC W/AUTO DIFF WBC: CPT | Performed by: EMERGENCY MEDICINE

## 2018-09-21 PROCEDURE — 93005 ELECTROCARDIOGRAM TRACING: CPT

## 2018-09-21 PROCEDURE — 99285 EMERGENCY DEPT VISIT HI MDM: CPT

## 2018-09-21 PROCEDURE — 93005 ELECTROCARDIOGRAM TRACING: CPT | Mod: 76

## 2018-09-21 RX ORDER — SODIUM PHOSPHATE,MONO-DIBASIC 19G-7G/118
1 ENEMA (ML) RECTAL DAILY
COMMUNITY
End: 2019-12-06

## 2018-09-21 ASSESSMENT — ENCOUNTER SYMPTOMS
SHORTNESS OF BREATH: 0
PALPITATIONS: 1

## 2018-09-21 NOTE — PROGRESS NOTES
Mindi Vaughn DR Remote PPM Device Check/ transmission following ED visit earlier today  AP: 35.6 % : 0.3 %  Mode: DDDR        Presenting Rhythm: NSR  Heart Rate: histogram is stable  Sensing: Stable    Pacing Threshold: NA    Impedance: WNL  Battery Status: 9.5-13.5 years estimated longevity  Atrial Arrhythmia: episode of AF this AM from 7:42 to 9:45, approximately 3 hours of AF. -180 bpm.   Ventricular Arrhythmia: NONE     Care Plan: Pt is scheduled for a remote transmission on 10-24-18. Dr Witt has discussed anticoagulation with pt in the past and pt  was not interested  .  Will route this note to Dr Witt. Josefina

## 2018-09-21 NOTE — ED TRIAGE NOTES
Pt states he walked at [facility] today and after vacuuming an hour ago felt heart racing. Denies chest pain. ABC's intact, alert and oriented X3. Has pacer.

## 2018-09-21 NOTE — ED PROVIDER NOTES
History     Chief Complaint:  Tachycardia    HPI   Charles Nogueira is a 78 year old male with a pacemaker and a history of premature ventricular contraction and mitral valve disease who presents with his wife to the Emergency Department today for evaluation of tachycardia. The patient reports that he walked for 50 to 55 minutes at the [facility] this morning. He came back home and felt normal until he started vacuuming when he reported he felt weak and tired. He checked his pulse and noticed it was fast and irregular. He checked his blood pressure and it was lower than normal. He denies feeling any pain. No shortness of breath. He is in a-fib. The patient reports his cardiologist told him that he had an a-fib attack in winter that lasted two hours though he reports he had no idea he was in a-fib at this time. He is not on blood thinners.     Allergies:  Sulfa drugs: rash     Medications:    Glucosamine-chondroitin  Cozaar  Toprol-XL     Past Medical History:    Allergic rhinitis  HTN  Melanoma  Mitral valve disease  Migraine  Premature beats  Pacemaker  Premature ventricular contraction  Squamous cell carcinoma  Syncope    Past Surgical History:    Appendectomy  Biopsy of skin lesion  Implant pacemaker  MOHS procedure    Family History:    Myocardial infarction  CAD    Social History:  Smoking status: Former smoker quit date 1/1/1967  Alcohol use: No  Marital Status:   [2]     Review of Systems   Respiratory: Negative for shortness of breath.    Cardiovascular: Positive for palpitations. Negative for chest pain.   All other systems reviewed and are negative.      Physical Exam     Patient Vitals for the past 24 hrs:   BP Temp Temp src Pulse Heart Rate Resp SpO2 Weight   09/21/18 1042 105/80 97.7  F (36.5  C) Oral 140 140 18 100 % 67.6 kg (149 lb)       Physical Exam  Nursing note and vitals reviewed.  Constitutional: Cooperative.   HENT:   Mouth/Throat: Moist mucous membranes.   Eyes: EOMI, nonicteric  sclera  Cardiovascular: tachycardic, irregularly irregular rhythm, no murmurs, rubs, or gallops  Pulmonary/Chest: Effort normal and breath sounds normal. No respiratory distress. No wheezes. No rales.   Abdominal: Soft. Nontender, nondistended, no guarding or rigidity. BS present.   Musculoskeletal: Normal range of motion.   Neurological: Alert. Moves all extremities spontaneously.   Skin: Skin is warm and dry. No rash noted.   Psychiatric: Normal mood and affect.       Emergency Department Course     ECG 1 (10:40:21):  Rate 137 bpm. WI interval *. QRS duration 136. QT/QTc 336/507. P-R-T axes * 135 39. Atrial fibrillation with rapid ventricular response. Right bundle branch block. Left posterior fascicular block. Bifascicular block. Abnormal ECG. A-fib replaced paced rhythm from 6/23/14.  Interpreted at 1052 by Saravanan Olea MD.    ECG 2 (11:05:41):  Rate 94 bpm. WI interval 188. QRS duration 124. QT/QTc 366/457. P-R-T axes 75 110 41. Normal sinus rhythm. Right bundle branch block. Abnormal ECG. Normal sinus rhythm has replaced A-fib. Interpreted at 1139 by Saravanan Olea MD.    Laboratory:  CBC: WNL (WBC 9.7, HGB 15.4, )  BMP: Glucose 110 (H) o/w WNL (Creatinine 1.12)    Emergency Department Course:  Past medical records, nursing notes, and vitals reviewed.  1055: I performed an exam of the patient and obtained history, as documented above.    IV inserted and blood drawn.    1105: I rechecked the patient.     1235: I rechecked the patient. Explained findings to the patient and his wife.    Patient discharged home with instructions regarding supportive care, medications, and reasons to return. The importance of close follow-up was reviewed.     Impression & Plan      Medical Decision Making:  Patient presents with palpitations, and is found to be in atrial fibrillation with RVR.  Patient has history of this.  He also has a pacemaker.  Patient typically does not know when he is in A. fib with  RVR, therefore we attempted to interrogate his pacemaker to find out when he went into the rhythm since he is not on blood thinners to determine if we would be able to cardiovert him.  Soon after this conversation, patient spontaneously converted.  We did attempt to interrogate pacemaker, however technical difficulties were occurring making it not possible for us.  Fortunately, given his conversion, this information does not change our disposition.  Discussed with patient to return to emergency department if he goes back into A. fib and to follow-up with his cardiologist.  He is discharged in stable condition.  He and his wife had all their questions answered and they are in agreement with the plan.    Diagnosis:    ICD-10-CM   1. Paroxysmal atrial fibrillation (H) I48.0     Disposition:  discharged to home with wife    Jeny Jean  9/21/2018   [facility] EMERGENCY DEPARTMENT  Scribe Disclosure:  ANDI Jeny Miranda, am serving as a scribe at 10:55 AM on 9/21/2018 to document services personally performed by Saravanan Olea MD based on my observations and the provider's statements to me.        Saravanan Olea MD  09/22/18 2028

## 2018-09-21 NOTE — ED AVS SNAPSHOT
[facility] Emergency Department    [address]    [facility] [address]    Phone:  [phone]    Fax:  [phone]                                       Charles Nogueira   MRN: [id number]    Department:  [facility] Emergency Department   Date of Visit:  9/21/2018           Patient Information     Date Of Birth          1940        Your diagnoses for this visit were:     Paroxysmal atrial fibrillation (H)        You were seen by Saravanan Olea MD.      Follow-up Information     Follow up with Michele Witt MD. Call today.    Specialty:  Cardiology    Why:  for interrogation and to schedule follow-up     Contact information:    [address]  [place] [address] [phone]          Follow up with [facility] Emergency Department.    Specialty:  EMERGENCY MEDICINE    Why:  As needed, If symptoms worsen    Contact information:    [address] [facility] [address] [phone]        Discharge Instructions         Start baby aspirin (81mg daily) and call cardiology clinic for interrogation and follow-up. Return for return of afib.     Discharge Instructions for Atrial Fibrillation  You have been diagnosed with an abnormal heart rhythm called atrial fibrillation. With this condition, your heart s 2 upper chambers quiver rather than squeeze the blood out in a normal pattern. This leads to an irregular and sometimes rapid heartbeat. Some people will develop associated symptoms such as a flip-flopping heartbeat, chest pain, lightheadedness, or shortness of breath. Other people may have no symptoms at all. Atrial fibrillation is serious because it affects the heart s ability to fill with blood as it should. Blood clots may form. This increases the risk for stroke. Untreated atrial fibrillation can also lead to heart failure. Atrial fibrillation can be controlled. With treatment, most people with atrial fibrillation lead  normal lives.  Treatment options  Recommended treatment for atrial fibrillation depends on your age, symptoms, how long you have had atrial fibrillation, and other factors. You will have a complete evaluation to find out if you have any abnormalities that caused your heart to go into atrial fibrillation. This might be blocked heart arteries or a thyroid problem. Your doctor will assess your particular case and discuss choices with you.  Treatment choices may include:    Treating an underlying disorder that puts you at risk for atrial fibrillation. For example, correcting an abnormal thyroid or electrolyte problem, or treating a blocked heart artery.    Restoring a normal heart rhythm with an electrical shock (cardioversion) or with an antiarrhythmic medicine (chemical cardioversion)    Using medicine to control your heart rate in atrial fibrillation.    Preventing the risk for blood clot and stroke using blood-thinning medicines. Your doctor will tell you what he or she recommends. Choices may include aspirin, clopidogrel, warfarin, dabigatran, rivaroxaban, apixaban, and edoxaban.    Doing catheter ablation or a surgical maze procedure. These use different methods to destroy certain areas of heart tissue. This interrupts the electrical signals causing atrial fibrillation. One of these procedures may be a choice when medicines do not work, or as an alternative to long-term medicine.    Other treatment choices may be recommended for you by your doctor.  Managing risk factors for stroke and preventing heart failure are important parts of any treatment plan for atrial fibrillation.  Home care    Take your medicines exactly as directed. Don t skip doses.    Work with your doctor to find the right medicines and doses for you.    Learn to take your own pulse. Keep a record of your results. Ask your doctor which pulse rates mean that you need medical attention. Slowing your pulse is often the goal of treatment. Ask your  doctor if it s OK for you to use an automatic machine to check your pulse at home. Sometimes these machines don t count the pulse correctly when you have atrial fibrillation.    Limit your intake of coffee, tea, cola, and other beverages with caffeine. Talk with your doctor about whether you should eliminate caffeine.    Avoid over-the-counter medicines that have caffeine in them.    Let your doctor know what medicines you take, including prescription and over-the-counter medicines, as well as any supplements. They interfere with some medicines given for atrial fibrillation.    Ask your doctor about whether you can drink alcohol. Some people need to avoid alcohol to better treat atrial fibrillation. If you are taking blood-thinner medicines, alcohol may interfere with them by increasing their effect.    Never take stimulants such as amphetamines or cocaine. These drugs can speed up your heart rate and trigger atrial fibrillation.  Follow-up care  Follow up with your doctor, or as advised.     When should I call my healthcare provider  Call your healthcare provider right away if you have any of the following:    Weakness    Dizziness    Fainting    Fatigue    Shortness of breath    Chest pain with increased activity    A change in the usual regularity of your heartbeat, or an unusually fast heartbeat   Date Last Reviewed: 4/23/2016 2000-2017 [employer]. [address] [place] [address]. All rights reserved. This information is not intended as a substitute for professional medical care. Always follow your healthcare professional's instructions.          Your next 10 appointments already scheduled     Oct 24, 2018  9:30 AM CDT   Pacemaker Check with RU DCR2   [facility] ([facility])    [address] [address] [address] [phone]              24 Hour Appointment Hotline       To make an appointment at any [place]  clinic, call [phone] ([phone]). If you don't have a family doctor or clinic, we will help you find one. [place] clinics are conveniently located to serve the needs of you and your family.             Review of your medicines      Our records show that you are taking the medicines listed below. If these are incorrect, please call your family doctor or clinic.        Dose / Directions Last dose taken    glucosamine-chondroitin 500-400 MG Caps per capsule   Dose:  1 capsule        Take 1 capsule by mouth daily   Refills:  0        losartan 50 MG tablet   Commonly known as:  COZAAR   Dose:  50 mg   Quantity:  90 tablet        Take 1 tablet (50 mg) by mouth daily   Refills:  3        metoprolol succinate 50 MG 24 hr tablet   Commonly known as:  TOPROL-XL   Dose:  50 mg   Quantity:  90 tablet        Take 1 tablet (50 mg) by mouth daily   Refills:  3                Procedures and tests performed during your visit     Procedure/Test Number of Times Performed    Basic metabolic panel 1    CBC with platelets differential 1    EKG 12 lead 2      Orders Needing Specimen Collection     None      Pending Results     Date and Time Order Name Status Description    9/21/2018 1109 EKG 12 lead Preliminary     9/21/2018 1058 EKG 12 lead Preliminary             Pending Culture Results     No orders found from 9/19/2018 to 9/22/2018.            Pending Results Instructions     If you had any lab results that were not finalized at the time of your Discharge, you can call the ED Lab Result RN at [phone]. You will be contacted by this team for any positive Lab results or changes in treatment. The nurses are available 7 days a week from 10A to 6:30P.  You can leave a message 24 hours per day and they will return your call.        Test Results From Your Hospital Stay        9/21/2018 11:41 AM      Component Results     Component Value Ref Range & Units Status    WBC 9.7 4.0 - 11.0 10e9/L Final    RBC Count 5.19 4.4 - 5.9  10e12/L Final    Hemoglobin 15.4 13.3 - 17.7 g/dL Final    Hematocrit 47.7 40.0 - 53.0 % Final    MCV 92 78 - 100 fl Final    MCH 29.7 26.5 - 33.0 pg Final    MCHC 32.3 31.5 - 36.5 g/dL Final    RDW 13.5 10.0 - 15.0 % Final    Platelet Count 193 150 - 450 10e9/L Final    Diff Method Automated Method  Final    % Neutrophils 61.6 % Final    % Lymphocytes 21.5 % Final    % Monocytes 13.2 % Final    % Eosinophils 3.0 % Final    % Basophils 0.4 % Final    % Immature Granulocytes 0.3 % Final    Nucleated RBCs 0 0 /100 Final    Absolute Neutrophil 6.0 1.6 - 8.3 10e9/L Final    Absolute Lymphocytes 2.1 0.8 - 5.3 10e9/L Final    Absolute Monocytes 1.3 0.0 - 1.3 10e9/L Final    Absolute Eosinophils 0.3 0.0 - 0.7 10e9/L Final    Absolute Basophils 0.0 0.0 - 0.2 10e9/L Final    Abs Immature Granulocytes 0.0 0 - 0.4 10e9/L Final    Absolute Nucleated RBC 0.0  Final         9/21/2018 12:04 PM      Component Results     Component Value Ref Range & Units Status    Sodium 142 133 - 144 mmol/L Final    Potassium 4.0 3.4 - 5.3 mmol/L Final    Chloride 108 94 - 109 mmol/L Final    Carbon Dioxide 28 20 - 32 mmol/L Final    Anion Gap 6 3 - 14 mmol/L Final    Glucose 110 (H) 70 - 99 mg/dL Final    Urea Nitrogen 24 7 - 30 mg/dL Final    Creatinine 1.12 0.66 - 1.25 mg/dL Final    GFR Estimate 63 >60 mL/min/1.7m2 Final    Non  GFR Calc    GFR Estimate If Black 77 >60 mL/min/1.7m2 Final    African American GFR Calc    Calcium 8.7 8.5 - 10.1 mg/dL Final                Clinical Quality Measure: Blood Pressure Screening     Your blood pressure was checked while you were in the emergency department today. The last reading we obtained was  BP: 105/80 . Please read the guidelines below about what these numbers mean and what you should do about them.  If your systolic blood pressure (the top number) is less than 120 and your diastolic blood pressure (the bottom number) is less than 80, then your blood pressure is normal. There is  "nothing more that you need to do about it.  If your systolic blood pressure (the top number) is 120-139 or your diastolic blood pressure (the bottom number) is 80-89, your blood pressure may be higher than it should be. You should have your blood pressure rechecked within a year by a primary care provider.  If your systolic blood pressure (the top number) is 140 or greater or your diastolic blood pressure (the bottom number) is 90 or greater, you may have high blood pressure. High blood pressure is treatable, but if left untreated over time it can put you at risk for heart attack, stroke, or kidney failure. You should have your blood pressure rechecked by a primary care provider within the next 4 weeks.  If your provider in the emergency department today gave you specific instructions to follow-up with your doctor or provider even sooner than that, you should follow that instruction and not wait for up to 4 weeks for your follow-up visit.        Thank you for choosing [facility]       Thank you for choosing [facility] for your care. Our goal is always to provide you with excellent care. Hearing back from our patients is one way we can continue to improve our services. Please take a few minutes to complete the written survey that you may receive in the mail after you visit with us. Thank you!        [employer] Information     [employer] lets you send messages to your doctor, view your test results, renew your prescriptions, schedule appointments and more. To sign up, go to [email or web address] . Click on \"Log in\" on the left side of the screen, which will take you to the Welcome page. Then click on \"Sign up Now\" on the right side of the page.     You will be asked to enter the access code listed below, as well as some personal information. Please follow the directions to create your username and password.     Your access code is: [email or web address]  Expires: 2018  1:00 PM     Your access code will  in 90 days. If you " need help or a new code, please call your [facility] clinic or [phone].        Care EveryWhere ID     This is your Care EveryWhere ID. This could be used by other organizations to access your [facility] medical records  [id number]        Equal Access to Services     ROLANDO MATHIAS : Kd Hurst, ty mason, carlo kaalmichoacano niño, jodi velazco. So [facility] [phone].    ATENCIÓN: Si habla español, tiene a dowling disposición servicios gratuitos de asistencia lingüística. Llame al [phone].    We comply with applicable federal civil rights laws and [place] laws. We do not discriminate on the basis of race, color, national origin, age, disability, sex, sexual orientation, or gender identity.            After Visit Summary       This is your record. Keep this with you and show to your community pharmacist(s) and doctor(s) at your next visit.

## 2018-09-21 NOTE — DISCHARGE INSTRUCTIONS
Start baby aspirin (81mg daily) and call cardiology clinic for interrogation and follow-up. Return for return of afib.     Discharge Instructions for Atrial Fibrillation  You have been diagnosed with an abnormal heart rhythm called atrial fibrillation. With this condition, your heart s 2 upper chambers quiver rather than squeeze the blood out in a normal pattern. This leads to an irregular and sometimes rapid heartbeat. Some people will develop associated symptoms such as a flip-flopping heartbeat, chest pain, lightheadedness, or shortness of breath. Other people may have no symptoms at all. Atrial fibrillation is serious because it affects the heart s ability to fill with blood as it should. Blood clots may form. This increases the risk for stroke. Untreated atrial fibrillation can also lead to heart failure. Atrial fibrillation can be controlled. With treatment, most people with atrial fibrillation lead normal lives.  Treatment options  Recommended treatment for atrial fibrillation depends on your age, symptoms, how long you have had atrial fibrillation, and other factors. You will have a complete evaluation to find out if you have any abnormalities that caused your heart to go into atrial fibrillation. This might be blocked heart arteries or a thyroid problem. Your doctor will assess your particular case and discuss choices with you.  Treatment choices may include:    Treating an underlying disorder that puts you at risk for atrial fibrillation. For example, correcting an abnormal thyroid or electrolyte problem, or treating a blocked heart artery.    Restoring a normal heart rhythm with an electrical shock (cardioversion) or with an antiarrhythmic medicine (chemical cardioversion)    Using medicine to control your heart rate in atrial fibrillation.    Preventing the risk for blood clot and stroke using blood-thinning medicines. Your doctor will tell you what he or she recommends. Choices may include aspirin,  clopidogrel, warfarin, dabigatran, rivaroxaban, apixaban, and edoxaban.    Doing catheter ablation or a surgical maze procedure. These use different methods to destroy certain areas of heart tissue. This interrupts the electrical signals causing atrial fibrillation. One of these procedures may be a choice when medicines do not work, or as an alternative to long-term medicine.    Other treatment choices may be recommended for you by your doctor.  Managing risk factors for stroke and preventing heart failure are important parts of any treatment plan for atrial fibrillation.  Home care    Take your medicines exactly as directed. Don t skip doses.    Work with your doctor to find the right medicines and doses for you.    Learn to take your own pulse. Keep a record of your results. Ask your doctor which pulse rates mean that you need medical attention. Slowing your pulse is often the goal of treatment. Ask your doctor if it s OK for you to use an automatic machine to check your pulse at home. Sometimes these machines don t count the pulse correctly when you have atrial fibrillation.    Limit your intake of coffee, tea, cola, and other beverages with caffeine. Talk with your doctor about whether you should eliminate caffeine.    Avoid over-the-counter medicines that have caffeine in them.    Let your doctor know what medicines you take, including prescription and over-the-counter medicines, as well as any supplements. They interfere with some medicines given for atrial fibrillation.    Ask your doctor about whether you can drink alcohol. Some people need to avoid alcohol to better treat atrial fibrillation. If you are taking blood-thinner medicines, alcohol may interfere with them by increasing their effect.    Never take stimulants such as amphetamines or cocaine. These drugs can speed up your heart rate and trigger atrial fibrillation.  Follow-up care  Follow up with your doctor, or as advised.     When should I call my  healthcare provider  Call your healthcare provider right away if you have any of the following:    Weakness    Dizziness    Fainting    Fatigue    Shortness of breath    Chest pain with increased activity    A change in the usual regularity of your heartbeat, or an unusually fast heartbeat   Date Last Reviewed: 4/23/2016 2000-2017 [employer]. [address] [place] [id number]. All rights reserved. This information is not intended as a substitute for professional medical care. Always follow your healthcare professional's instructions.

## 2018-09-21 NOTE — TELEPHONE ENCOUNTER
Nothing new with paroxysmal atrial fibrillation.  Patient has preferred to avoid anticoagulation.  If he is willing, I would suggest initiating anticoagulation with NOAC or warfarin.  If not, we can continue to observe as we have.  Michele Witt MD, FACC  September 21, 2018 5:39 PM

## 2018-09-21 NOTE — ED AVS SNAPSHOT
[facility] Emergency Department    [address]    [place] [address]    Phone:  [phone]    Fax:  [phone]                                       Charles Nogueira   MRN: [id number]    Department:  [facility] Emergency Department   Date of Visit:  9/21/2018           After Visit Summary Signature Page     I have received my discharge instructions, and my questions have been answered. I have discussed any challenges I see with this plan with the nurse or doctor.    ..........................................................................................................................................  Patient/Patient Representative Signature      ..........................................................................................................................................  Patient Representative Print Name and Relationship to Patient    ..................................................               ................................................  Date                                   Time    ..........................................................................................................................................  Reviewed by Signature/Title    ...................................................              ..............................................  Date                                               Time          22EPIC Rev 08/18

## 2018-09-24 NOTE — PROGRESS NOTES
RN called patient and left VM advising patient to call clinic to discuss Dr. Witt's recommendations below. RN left direct phone number for patient to call back.       Nothing new with paroxysmal atrial fibrillation.  Patient has preferred to avoid anticoagulation.  If he is willing, I would suggest initiating anticoagulation with NOAC or warfarin.  If not, we can continue to observe as we have.  Michele Witt MD, FACSeptember 21, 2018 5:39 PM

## 2018-09-27 ENCOUNTER — TELEPHONE (OUTPATIENT)
Dept: CARDIOLOGY | Facility: CLINIC | Age: 78
End: 2018-09-27

## 2018-09-27 NOTE — TELEPHONE ENCOUNTER
Pt called stating that on 9/21/18 started feeling what he thought may be A fib so he went to the ED and was told he was in A fib with RVR. Pt converted spontaneously into SR and was told to start ASA 81 mg once a day and follow up with cardiology. Pt has noticed that his BP is occasionally elevated as well. Pt stated that today his BP got to 158 but then rechecked it had it was 147 systolic. Pt stated that he is feeling well. Denies palpitations but believes he may need a medication adjustment for his BP.     Pt scheduled to see Gisell BENITEZ 10/1/18.

## 2018-10-01 ENCOUNTER — OFFICE VISIT (OUTPATIENT)
Dept: CARDIOLOGY | Facility: CLINIC | Age: 78
End: 2018-10-01
Payer: COMMERCIAL

## 2018-10-01 VITALS
DIASTOLIC BLOOD PRESSURE: 80 MMHG | WEIGHT: 156 LBS | BODY MASS INDEX: 23.64 KG/M2 | HEIGHT: 68 IN | HEART RATE: 63 BPM | SYSTOLIC BLOOD PRESSURE: 144 MMHG

## 2018-10-01 DIAGNOSIS — I48.0 PAROXYSMAL ATRIAL FIBRILLATION (H): ICD-10-CM

## 2018-10-01 DIAGNOSIS — I34.0 NON-RHEUMATIC MITRAL REGURGITATION: ICD-10-CM

## 2018-10-01 DIAGNOSIS — I49.3 PVC (PREMATURE VENTRICULAR CONTRACTION): Primary | ICD-10-CM

## 2018-10-01 DIAGNOSIS — I10 ESSENTIAL HYPERTENSION, BENIGN: ICD-10-CM

## 2018-10-01 DIAGNOSIS — I24.9 ACUTE ISCHEMIC HEART DISEASE (H): ICD-10-CM

## 2018-10-01 DIAGNOSIS — I10 ESSENTIAL HYPERTENSION: ICD-10-CM

## 2018-10-01 PROCEDURE — 93000 ELECTROCARDIOGRAM COMPLETE: CPT | Performed by: NURSE PRACTITIONER

## 2018-10-01 PROCEDURE — 99214 OFFICE O/P EST MOD 30 MIN: CPT | Performed by: NURSE PRACTITIONER

## 2018-10-01 RX ORDER — LOSARTAN POTASSIUM 100 MG/1
100 TABLET ORAL DAILY
Qty: 90 TABLET | Refills: 3 | Status: SHIPPED | OUTPATIENT
Start: 2018-10-01 | End: 2019-09-12

## 2018-10-01 NOTE — PROGRESS NOTES
"Cardiology Clinic Progress Note  Charles Nogueira MRN# [id number]   YOB: 1940 Age: 78 year old     Reason for visit: Follow up emergency department           Assessment and Plan:     1. Paroxysmal atrial fibrillation with RVR    Likely on the basis of mitral regurgitation    Previously declined oral anticoagulation as well as Coumadin due to being a \"bleeder\"    Recently evaluated at the ED and was found to be in atrial fibrillation with RVR and confirmed on device check    He is now agreeable to remain on 81 mg of aspirin daily    Remains in sinus rhythm on EKG today without any symptomatic recurrence of atrial fibrillation    Continue metoprolol XL 50 mg daily    2. Hypertension    Increase losartan to 100 mg daily    Continue home blood pressure monitoring and bring cuff to next clinic visit for calibration    Return to clinic in 4-6 weeks with repeat basic metabolic panel (patient prefers [place] location)    3. Mitral regurgitation and mitral valve prolapse    Repeat echocardiogram in August 2019    4. Bradycardia with sick sinus syndrome    Status post dual-chamber permanent pacemaker in 2004    Due for device check on 10/24/2018         History of Presenting Illness:    Charles Nogueira is a very pleasant 78 year old patient of Dr. Witt who presents today for a follow up from an emergency department visit. He has a past medical history mitral regurgitation, hypertension, PVCs and paroxysmal atrial fibrillation. He also has a history of bradycardia and sick sinus syndrome status post dual chamber permanent pacemaker at [facility] in 2004.     His most recent echocardiogram revealed moderate mitral regurgitation with normal LVEF estimated at 55-60%.  He did have an increase in his LV chamber size from 2 years prior.  There is also evidence of a torn P1/P2 cord with prolapse of the mitral valve and the annulus with annular dilatation with a anteriorly directed mitral jet consistent with " "posterior leaflet pathology.     On September 21, 2018 he presented to the emergency department with fatigue. He was noted to be in atrial fibrillation with RVR at 140 bpm. A device interrogation showed in was in atrial fibrillation for approximately 2 hours with a ventricular rate 150-180 bpm. While in the ED he spontaneously converted to normal sinus rhythm.  Since his discharge he has not had recurrence of his symptoms.  He previously declined any coagulation because he is a \"bleeder\" but he did agree to started 81 mg aspirin daily.    Today in clinic he presents with his wife.  He states that since his discharge from the emergency department his home blood pressure readings have been between 150-170/70-80.  His blood pressure today upon my recheck was 138/78.  He denies any chest discomfort, shortness of breath on exertion, PND, orthopnea, lower extremity edema.  His EKG today confirms he remained in normal sinus rhythm.  They will be leaving to winter in [place] on December 15th               Review of Systems:   Review of Systems:  Skin:  Positive for     Eyes:  Positive for glasses  ENT:  Negative    Respiratory:  Negative    Cardiovascular:  Negative    Gastroenterology: Negative    Genitourinary:  not assessed    Musculoskeletal:  Positive for arthritis;joint pain  Neurologic:  Negative    Psychiatric:  Negative    Heme/Lymph/Imm:  Positive for    Endocrine:  Negative                Physical Exam:     Vitals: /80  Pulse 63  Ht 1.715 m (5' 7.5\")  Wt 70.8 kg (156 lb)  BMI 24.07 kg/m2  Constitutional:  cooperative, alert and oriented, well developed, well nourished, in no acute distress        Skin:  warm and dry to the touch, no apparent skin lesions or masses noted        Head:  normocephalic, no masses or lesions        Eyes:  pupils equal and round;conjunctivae and lids unremarkable;sclera white;no xanthalasma;EOMS intact        ENT:  no pallor or cyanosis, dentition good        Neck:  carotid " pulses are full and equal bilaterally, JVP normal, no carotid bruit        Chest:  normal breath sounds, clear to auscultation, normal A-P diameter, normal symmetry, normal respiratory excursion, no use of accessory muscles        Cardiac: normal S1 and S2;no S3 or S4;apical impulse not displaced;regular rhythm       holosystolic murmur;LLSB;radiation to the axilla          Abdomen:  abdomen soft, non-tender, BS normoactive, no mass, no HSM, no bruits        Vascular: pulses full and equal, no bruits auscultated                                      Extremities and Back:  no deformities, clubbing, cyanosis, erythema observed;no edema        Neurological:  no gross motor deficits;affect appropriate                 Medications:     Current Outpatient Prescriptions   Medication Sig Dispense Refill     ASPIRIN EC PO Take 81 mg by mouth daily       glucosamine-chondroitin 500-400 MG CAPS per capsule Take 1 capsule by mouth daily       losartan (COZAAR) 100 MG tablet Take 1 tablet (100 mg) by mouth daily 90 tablet 3     metoprolol succinate (TOPROL-XL) 50 MG 24 hr tablet Take 1 tablet (50 mg) by mouth daily 90 tablet 3     [DISCONTINUED] losartan (COZAAR) 50 MG tablet Take 1 tablet (50 mg) by mouth daily 90 tablet 3           Family History   Problem Relation Age of Onset     HEART DISEASE Mother      MI     HEART DISEASE Father      MI     C.A.D. Brother      Just had angioplasty in 2006     Colon Cancer No family hx of        Social History     Social History     Marital status:      Spouse name: Paula     Number of children: 3     Years of education: N/A     Occupational History      Retired     Social History Main Topics     Smoking status: Former Smoker     Packs/day: 1.00     Years: 4.00     Types: Cigarettes     Quit date: 1/1/1967     Smokeless tobacco: Never Used     Alcohol use No     Drug use: No     Sexual activity: Yes     Partners: Female     Other Topics Concern      Service Yes     Blood  Transfusions No     Caffeine Concern Yes     decaff coffee     Occupational Exposure No     Hobby Hazards No     Sleep Concern No     Stress Concern No     Weight Concern No     Special Diet No     Back Care No     Exercise Yes     Excercises about 4-5 days per week     Bike Helmet Yes     Seat Belt Yes     Self-Exams No     Social History Narrative            Past Medical History:     Past Medical History:   Diagnosis Date     Allergic rhinitis, cause unspecified      HTN (hypertension)      Melanoma (H)      Mitral valve disease      Other forms of migraine, without mention of intractable migraine without mention of status migrainosus      Other premature beats      Pacemaker 7/2004     Premature ventricular contraction      Squamous cell carcinoma      Syncope               Past Surgical History:     Past Surgical History:   Procedure Laterality Date     APPENDECTOMY OPEN       BIOPSY OF SKIN LESION       COLONOSCOPY N/A 11/20/2015    Procedure: COLONOSCOPY;  Surgeon: David Us MD;  Location:  GI     IMPLANT PACEMAKER  7/2004     MOHS MICROGRAPHIC PROCEDURE                Allergies:   Sulfa drugs       Data:   All laboratory data reviewed:    LAST CHOLESTEROL:  Lab Results   Component Value Date    CHOL 154 11/13/2017     Lab Results   Component Value Date    HDL 40 11/13/2017     Lab Results   Component Value Date    LDL 93 11/13/2017     Lab Results   Component Value Date    TRIG 103 11/13/2017     Lab Results   Component Value Date    CHOLHDLRATIO 4.5 11/10/2015       LAST BMP:  Lab Results   Component Value Date     09/21/2018      Lab Results   Component Value Date    POTASSIUM 4.0 09/21/2018     Lab Results   Component Value Date    CHLORIDE 108 09/21/2018     Lab Results   Component Value Date    MANUELA 8.7 09/21/2018     Lab Results   Component Value Date    CO2 28 09/21/2018     Lab Results   Component Value Date    BUN 24 09/21/2018     Lab Results   Component Value Date    CR 1.12  09/21/2018     Lab Results   Component Value Date     09/21/2018       LAST CBC:  Lab Results   Component Value Date    WBC 9.7 09/21/2018     Lab Results   Component Value Date    RBC 5.19 09/21/2018     Lab Results   Component Value Date    HGB 15.4 09/21/2018     Lab Results   Component Value Date    HCT 47.7 09/21/2018     Lab Results   Component Value Date    MCV 92 09/21/2018     Lab Results   Component Value Date    MCH 29.7 09/21/2018     Lab Results   Component Value Date    MCHC 32.3 09/21/2018     Lab Results   Component Value Date    RDW 13.5 09/21/2018     Lab Results   Component Value Date     09/21/2018         VICKI HARRISON, APRN, CNP

## 2018-10-01 NOTE — LETTER
"10/1/2018    Stanley Saldaña MD  [address] [facility] [address]    RE: Charles Nogueira       Dear Colleague,    I had the pleasure of seeing Charles Nogueira in the [facility].    Cardiology Clinic Progress Note  Charles Nogueira MRN# [id number]   YOB: 1940 Age: 78 year old     Reason for visit: Follow up emergency department           Assessment and Plan:     1. Paroxysmal atrial fibrillation with RVR    Likely on the basis of mitral regurgitation    Previously declined oral anticoagulation as well as Coumadin due to being a \"bleeder\"    Recently evaluated at the ED and was found to be in atrial fibrillation with RVR and confirmed on device check    He is now agreeable to remain on 81 mg of aspirin daily    Remains in sinus rhythm on EKG today without any symptomatic recurrence of atrial fibrillation    Continue metoprolol XL 50 mg daily    2. Hypertension    Increase losartan 200 mg daily    Continue home blood pressure monitoring and bring cuff to next clinic visit for calibration    Return to clinic in 4-6 weeks with repeat basic metabolic panel (patient prefers [place] location)    3. Mitral regurgitation and mitral valve prolapse    Repeat echocardiogram in August 2019    4. Bradycardia with sick sinus syndrome    Status post dual-chamber permanent pacemaker in 2004    Due for device check on 10/24/2018         History of Presenting Illness:    Charles Nogueira is a very pleasant 78 year old patient of Dr. Witt who presents today for a follow up from an emergency department visit. He has a past medical history mitral regurgitation, hypertension, PVCs and paroxysmal atrial fibrillation. He also has a history of bradycardia and sick sinus syndrome status post dual chamber permanent pacemaker at [facility] in 2004.     His most recent echocardiogram revealed moderate mitral regurgitation with normal LVEF estimated at 55-60%.  He did have an " "increase in his LV chamber size from 2 years prior.  There is also evidence of a torn P1/P2 cord with prolapse of the mitral valve and the annulus with annular dilatation with a anteriorly directed mitral jet consistent with posterior leaflet pathology.     On September 21, 2018 he presented to the emergency department with fatigue. He was noted to be in atrial fibrillation with RVR at 140 bpm. A device interrogation showed in was in atrial fibrillation for approximately 2 hours with a ventricular rate 150-180 bpm. While in the ED he spontaneously converted to normal sinus rhythm.  Since his discharge he has not had recurrence of his symptoms.  He previously declined any coagulation because he is a \"bleeder\" but he did agree to started 81 mg aspirin daily.    Today in clinic he presents with his wife.  He states that since his discharge from the emergency department his home blood pressure readings have been between 150-170/70-80.  His blood pressure today upon my recheck was 138/78.  He denies any chest discomfort, shortness of breath on exertion, PND, orthopnea, lower extremity edema.  His EKG today confirms he remained in normal sinus rhythm.  They will be leaving to winter in [place] on December 15th               Review of Systems:   Review of Systems:  Skin:  Positive for     Eyes:  Positive for glasses  ENT:  Negative    Respiratory:  Negative    Cardiovascular:  Negative    Gastroenterology: Negative    Genitourinary:  not assessed    Musculoskeletal:  Positive for arthritis;joint pain  Neurologic:  Negative    Psychiatric:  Negative    Heme/Lymph/Imm:  Positive for    Endocrine:  Negative                Physical Exam:     Vitals: /80  Pulse 63  Ht 1.715 m (5' 7.5\")  Wt 70.8 kg (156 lb)  BMI 24.07 kg/m2  Constitutional:  cooperative, alert and oriented, well developed, well nourished, in no acute distress        Skin:  warm and dry to the touch, no apparent skin lesions or masses noted        Head:  " normocephalic, no masses or lesions        Eyes:  pupils equal and round;conjunctivae and lids unremarkable;sclera white;no xanthalasma;EOMS intact        ENT:  no pallor or cyanosis, dentition good        Neck:  carotid pulses are full and equal bilaterally, JVP normal, no carotid bruit        Chest:  normal breath sounds, clear to auscultation, normal A-P diameter, normal symmetry, normal respiratory excursion, no use of accessory muscles        Cardiac: normal S1 and S2;no S3 or S4;apical impulse not displaced;regular rhythm       holosystolic murmur;LLSB;radiation to the axilla          Abdomen:  abdomen soft, non-tender, BS normoactive, no mass, no HSM, no bruits        Vascular: pulses full and equal, no bruits auscultated                                      Extremities and Back:  no deformities, clubbing, cyanosis, erythema observed;no edema        Neurological:  no gross motor deficits;affect appropriate                 Medications:     Current Outpatient Prescriptions   Medication Sig Dispense Refill     ASPIRIN EC PO Take 81 mg by mouth daily       glucosamine-chondroitin 500-400 MG CAPS per capsule Take 1 capsule by mouth daily       losartan (COZAAR) 100 MG tablet Take 1 tablet (100 mg) by mouth daily 90 tablet 3     metoprolol succinate (TOPROL-XL) 50 MG 24 hr tablet Take 1 tablet (50 mg) by mouth daily 90 tablet 3     [DISCONTINUED] losartan (COZAAR) 50 MG tablet Take 1 tablet (50 mg) by mouth daily 90 tablet 3           Family History   Problem Relation Age of Onset     HEART DISEASE Mother      MI     HEART DISEASE Father      MI     C.A.D. Brother      Just had angioplasty in 2006     Colon Cancer No family hx of        Social History     Social History     Marital status:      Spouse name: Paula     Number of children: 3     Years of education: N/A     Occupational History      Retired     Social History Main Topics     Smoking status: Former Smoker     Packs/day: 1.00     Years: 4.00      Types: Cigarettes     Quit date: 1/1/1967     Smokeless tobacco: Never Used     Alcohol use No     Drug use: No     Sexual activity: Yes     Partners: Female     Other Topics Concern      Service Yes     Blood Transfusions No     Caffeine Concern Yes     decaff coffee     Occupational Exposure No     Hobby Hazards No     Sleep Concern No     Stress Concern No     Weight Concern No     Special Diet No     Back Care No     Exercise Yes     Excercises about 4-5 days per week     Bike Helmet Yes     Seat Belt Yes     Self-Exams No     Social History Narrative            Past Medical History:     Past Medical History:   Diagnosis Date     Allergic rhinitis, cause unspecified      HTN (hypertension)      Melanoma (H)      Mitral valve disease      Other forms of migraine, without mention of intractable migraine without mention of status migrainosus      Other premature beats      Pacemaker 7/2004     Premature ventricular contraction      Squamous cell carcinoma      Syncope               Past Surgical History:     Past Surgical History:   Procedure Laterality Date     APPENDECTOMY OPEN       BIOPSY OF SKIN LESION       COLONOSCOPY N/A 11/20/2015    Procedure: COLONOSCOPY;  Surgeon: David Us MD;  Location:  GI     IMPLANT PACEMAKER  7/2004     MOHS MICROGRAPHIC PROCEDURE                Allergies:   Sulfa drugs       Data:   All laboratory data reviewed:    LAST CHOLESTEROL:  Lab Results   Component Value Date    CHOL 154 11/13/2017     Lab Results   Component Value Date    HDL 40 11/13/2017     Lab Results   Component Value Date    LDL 93 11/13/2017     Lab Results   Component Value Date    TRIG 103 11/13/2017     Lab Results   Component Value Date    CHOLHDLRATIO 4.5 11/10/2015       LAST BMP:  Lab Results   Component Value Date     09/21/2018      Lab Results   Component Value Date    POTASSIUM 4.0 09/21/2018     Lab Results   Component Value Date    CHLORIDE 108 09/21/2018     Lab Results    Component Value Date    MANUELA 8.7 09/21/2018     Lab Results   Component Value Date    CO2 28 09/21/2018     Lab Results   Component Value Date    BUN 24 09/21/2018     Lab Results   Component Value Date    CR 1.12 09/21/2018     Lab Results   Component Value Date     09/21/2018       LAST CBC:  Lab Results   Component Value Date    WBC 9.7 09/21/2018     Lab Results   Component Value Date    RBC 5.19 09/21/2018     Lab Results   Component Value Date    HGB 15.4 09/21/2018     Lab Results   Component Value Date    HCT 47.7 09/21/2018     Lab Results   Component Value Date    MCV 92 09/21/2018     Lab Results   Component Value Date    MCH 29.7 09/21/2018     Lab Results   Component Value Date    MCHC 32.3 09/21/2018     Lab Results   Component Value Date    RDW 13.5 09/21/2018     Lab Results   Component Value Date     09/21/2018         CORBIN MARINO, CNP    Thank you for allowing me to participate in the care of your patient.      Sincerely,     CORBIN MARINO CNP     [facility]    cc:   No referring provider defined for this encounter.

## 2018-10-01 NOTE — MR AVS SNAPSHOT
After Visit Summary   10/1/2018    Charles Nogueira    MRN: [id number]           Patient Information     Date Of Birth          1940        Visit Information        Provider Department      10/1/2018 8:00 AM Gisell Knight APRN CNP [facility]        Today's Diagnoses     PVC (premature ventricular contraction)    -  1    Essential hypertension, benign        Acute ischemic heart disease (H)        Non-rheumatic mitral regurgitation        Essential hypertension        Paroxysmal atrial fibrillation (H)          Care Instructions    Today's Recommendations    1. Increase Losartan to 100 mg daily  2. Continue monitor BP  3. Bring cuff to next visit  4. Continue all other medications without changes.  5. Please follow up in [place] in 4-6 weeks.    Please send a [facility] message or call [phone] with questions or concerns.     Scheduling number [phone].            Follow-ups after your visit        Additional Services     Follow-Up with Cardiologist                 Your next 10 appointments already scheduled     Oct 24, 2018  9:30 AM CDT   Pacemaker Check with RU DCR2   [facility] ([facility])    [address]  [facility] [place] [address] [phone]              Future tests that were ordered for you today     Open Future Orders        Priority Expected Expires Ordered    Basic metabolic panel Routine 10/31/2018 10/1/2019 10/1/2018    Follow-Up with Cardiologist Routine 10/31/2018 10/1/2019 10/1/2018            Who to contact     If you have questions or need follow up information about today's clinic visit or your schedule please contact [facility] directly at [phone].  Normal or non-critical lab and imaging results will be communicated to you by MyChart, letter or phone within 4 business days after the clinic has received the results. If  "you do not hear from us within 7 days, please contact the clinic through [employer] or phone. If you have a critical or abnormal lab result, we will notify you by phone as soon as possible.  Submit refill requests through [employer] or call your pharmacy and they will forward the refill request to us. Please allow 3 business days for your refill to be completed.          Additional Information About Your Visit        [email or web address] Information     [employer] lets you send messages to your doctor, view your test results, renew your prescriptions, schedule appointments and more. To sign up, go to [email or web address] . Click on \"Log in\" on the left side of the screen, which will take you to the Welcome page. Then click on \"Sign up Now\" on the right side of the page.     You will be asked to enter the access code listed below, as well as some personal information. Please follow the directions to create your username and password.     Your access code is: [email or web address]  Expires: 2018  1:00 PM     Your access code will  in 90 days. If you need help or a new code, please call your [place] clinic or [phone].        Care EveryWhere ID     This is your Care EveryWhere ID. This could be used by other organizations to access your [place] medical records  [id number]        Your Vitals Were     Pulse Height BMI (Body Mass Index)             63 1.715 m (5' 7.5\") 24.07 kg/m2          Blood Pressure from Last 3 Encounters:   10/01/18 144/80   18 105/80   18 122/70    Weight from Last 3 Encounters:   10/01/18 70.8 kg (156 lb)   18 67.6 kg (149 lb)   18 69.4 kg (153 lb 1.6 oz)              We Performed the Following     EKG 12-lead complete w/read - Clinics (performed today)          Today's Medication Changes          These changes are accurate as of 10/1/18  8:11 AM.  If you have any questions, ask your nurse or doctor.               These medicines have changed or have updated prescriptions.        " Dose/Directions    losartan 100 MG tablet   Commonly known as:  COZAAR   This may have changed:    - medication strength  - how much to take   Used for:  Essential hypertension   Changed by:  Gisell Knight APRN CNP        Dose:  100 mg   Take 1 tablet (100 mg) by mouth daily   Quantity:  90 tablet   Refills:  3            Where to get your medicines      These medications were sent to [facility] - [place], [place] - [address]  [address], [facility] [address]     Phone:  [phone]     losartan 100 MG tablet                Primary Care Provider Office Phone # Fax #    Stanley Vikas Saldaña [phone] [address] [address]        Equal Access to Services     ÁNGEL [facility] CenterWILIAN : Hadii yecenia chandler hadasho Somaggi, waaxda luqadaha, qaybta kaalmada adeshashayada, jodi barron . So [facility] [phone].    ATENCIÓN: Si habla español, tiene a dowling disposición servicios gratuitos de asistencia lingüística. [facility] [phone].    We comply with applicable federal civil rights laws and [place] laws. We do not discriminate on the basis of race, color, national origin, age, disability, sex, sexual orientation, or gender identity.            Thank you!     Thank you for choosing [facility]  for your care. Our goal is always to provide you with excellent care. Hearing back from our patients is one way we can continue to improve our services. Please take a few minutes to complete the written survey that you may receive in the mail after your visit with us. Thank you!             Your Updated Medication List - Protect others around you: Learn how to safely use, store and throw away your medicines at www.disposemymeds.org.          This list is accurate as of 10/1/18  8:11 AM.  Always use your most recent med list.                   Brand Name Dispense Instructions for use Diagnosis    ASPIRIN EC PO       Take 81 mg by mouth daily        glucosamine-chondroitin 500-400 MG Caps per capsule      Take 1 capsule by mouth daily        losartan 100 MG tablet    COZAAR    90 tablet    Take 1 tablet (100 mg) by mouth daily    Essential hypertension       metoprolol succinate 50 MG 24 hr tablet    TOPROL-XL    90 tablet    Take 1 tablet (50 mg) by mouth daily    Essential hypertension

## 2018-10-01 NOTE — LETTER
"10/1/2018    Stanley Saldaña MD  [address] [facility] [address]    RE: Charles Nogueira       Dear Colleague,    I had the pleasure of seeing Charles Nogueira in the [facility].    Cardiology Clinic Progress Note  Charles Nogueira MRN# [id number]   YOB: 1940 Age: 78 year old     Reason for visit: Follow up emergency department           Assessment and Plan:     1. Paroxysmal atrial fibrillation with RVR    Likely on the basis of mitral regurgitation    Previously declined oral anticoagulation as well as Coumadin due to being a \"bleeder\"    Recently evaluated at the ED and was found to be in atrial fibrillation with RVR and confirmed on device check    He is now agreeable to remain on 81 mg of aspirin daily    Remains in sinus rhythm on EKG today without any symptomatic recurrence of atrial fibrillation    Continue metoprolol XL 50 mg daily    2. Hypertension    Increase losartan 200 mg daily    Continue home blood pressure monitoring and bring cuff to next clinic visit for calibration    Return to clinic in 4-6 weeks with repeat basic metabolic panel (patient prefers [place] location)    3. Mitral regurgitation and mitral valve prolapse    Repeat echocardiogram in August 2019    4. Bradycardia with sick sinus syndrome    Status post dual-chamber permanent pacemaker in 2004    Due for device check on 10/24/2018         History of Presenting Illness:    Charles Nogueira is a very pleasant 78 year old patient of Dr. Witt who presents today for a follow up from an emergency department visit. He has a past medical history mitral regurgitation, hypertension, PVCs and paroxysmal atrial fibrillation. He also has a history of bradycardia and sick sinus syndrome status post dual chamber permanent pacemaker at [facility] in 2004.     His most recent echocardiogram revealed moderate mitral regurgitation with normal LVEF estimated at 55-60%.  He did have an " "increase in his LV chamber size from 2 years prior.  There is also evidence of a torn P1/P2 cord with prolapse of the mitral valve and the annulus with annular dilatation with a anteriorly directed mitral jet consistent with posterior leaflet pathology.     On September 21, 2018 he presented to the emergency department with fatigue. He was noted to be in atrial fibrillation with RVR at 140 bpm. A device interrogation showed in was in atrial fibrillation for approximately 2 hours with a ventricular rate 150-180 bpm. While in the ED he spontaneously converted to normal sinus rhythm.  Since his discharge he has not had recurrence of his symptoms.  He previously declined any coagulation because he is a \"bleeder\" but he did agree to started 81 mg aspirin daily.    Today in clinic he presents with his wife.  He states that since his discharge from the emergency department his home blood pressure readings have been between 150-170/70-80.  His blood pressure today upon my recheck was 138/78.  He denies any chest discomfort, shortness of breath on exertion, PND, orthopnea, lower extremity edema.  His EKG today confirms he remained in normal sinus rhythm.  They will be leaving to winter in [place] on December 15th               Review of Systems:   Review of Systems:  Skin:  Positive for     Eyes:  Positive for glasses  ENT:  Negative    Respiratory:  Negative    Cardiovascular:  Negative    Gastroenterology: Negative    Genitourinary:  not assessed    Musculoskeletal:  Positive for arthritis;joint pain  Neurologic:  Negative    Psychiatric:  Negative    Heme/Lymph/Imm:  Positive for    Endocrine:  Negative                Physical Exam:     Vitals: /80  Pulse 63  Ht 1.715 m (5' 7.5\")  Wt 70.8 kg (156 lb)  BMI 24.07 kg/m2  Constitutional:  cooperative, alert and oriented, well developed, well nourished, in no acute distress        Skin:  warm and dry to the touch, no apparent skin lesions or masses noted        Head:  " normocephalic, no masses or lesions        Eyes:  pupils equal and round;conjunctivae and lids unremarkable;sclera white;no xanthalasma;EOMS intact        ENT:  no pallor or cyanosis, dentition good        Neck:  carotid pulses are full and equal bilaterally, JVP normal, no carotid bruit        Chest:  normal breath sounds, clear to auscultation, normal A-P diameter, normal symmetry, normal respiratory excursion, no use of accessory muscles        Cardiac: normal S1 and S2;no S3 or S4;apical impulse not displaced;regular rhythm       holosystolic murmur;LLSB;radiation to the axilla          Abdomen:  abdomen soft, non-tender, BS normoactive, no mass, no HSM, no bruits        Vascular: pulses full and equal, no bruits auscultated                                      Extremities and Back:  no deformities, clubbing, cyanosis, erythema observed;no edema        Neurological:  no gross motor deficits;affect appropriate                 Medications:     Current Outpatient Prescriptions   Medication Sig Dispense Refill     ASPIRIN EC PO Take 81 mg by mouth daily       glucosamine-chondroitin 500-400 MG CAPS per capsule Take 1 capsule by mouth daily       losartan (COZAAR) 100 MG tablet Take 1 tablet (100 mg) by mouth daily 90 tablet 3     metoprolol succinate (TOPROL-XL) 50 MG 24 hr tablet Take 1 tablet (50 mg) by mouth daily 90 tablet 3     [DISCONTINUED] losartan (COZAAR) 50 MG tablet Take 1 tablet (50 mg) by mouth daily 90 tablet 3           Family History   Problem Relation Age of Onset     HEART DISEASE Mother      MI     HEART DISEASE Father      MI     C.A.D. Brother      Just had angioplasty in 2006     Colon Cancer No family hx of        Social History     Social History     Marital status:      Spouse name: Paula     Number of children: 3     Years of education: N/A     Occupational History      Retired     Social History Main Topics     Smoking status: Former Smoker     Packs/day: 1.00     Years: 4.00      Types: Cigarettes     Quit date: 1/1/1967     Smokeless tobacco: Never Used     Alcohol use No     Drug use: No     Sexual activity: Yes     Partners: Female     Other Topics Concern      Service Yes     Blood Transfusions No     Caffeine Concern Yes     decaff coffee     Occupational Exposure No     Hobby Hazards No     Sleep Concern No     Stress Concern No     Weight Concern No     Special Diet No     Back Care No     Exercise Yes     Excercises about 4-5 days per week     Bike Helmet Yes     Seat Belt Yes     Self-Exams No     Social History Narrative            Past Medical History:     Past Medical History:   Diagnosis Date     Allergic rhinitis, cause unspecified      HTN (hypertension)      Melanoma (H)      Mitral valve disease      Other forms of migraine, without mention of intractable migraine without mention of status migrainosus      Other premature beats      Pacemaker 7/2004     Premature ventricular contraction      Squamous cell carcinoma      Syncope               Past Surgical History:     Past Surgical History:   Procedure Laterality Date     APPENDECTOMY OPEN       BIOPSY OF SKIN LESION       COLONOSCOPY N/A 11/20/2015    Procedure: COLONOSCOPY;  Surgeon: David Us MD;  Location:  GI     IMPLANT PACEMAKER  7/2004     MOHS MICROGRAPHIC PROCEDURE                Allergies:   Sulfa drugs       Data:   All laboratory data reviewed:    LAST CHOLESTEROL:  Lab Results   Component Value Date    CHOL 154 11/13/2017     Lab Results   Component Value Date    HDL 40 11/13/2017     Lab Results   Component Value Date    LDL 93 11/13/2017     Lab Results   Component Value Date    TRIG 103 11/13/2017     Lab Results   Component Value Date    CHOLHDLRATIO 4.5 11/10/2015       LAST BMP:  Lab Results   Component Value Date     09/21/2018      Lab Results   Component Value Date    POTASSIUM 4.0 09/21/2018     Lab Results   Component Value Date    CHLORIDE 108 09/21/2018     Lab Results    Component Value Date    MANUELA 8.7 09/21/2018     Lab Results   Component Value Date    CO2 28 09/21/2018     Lab Results   Component Value Date    BUN 24 09/21/2018     Lab Results   Component Value Date    CR 1.12 09/21/2018     Lab Results   Component Value Date     09/21/2018       LAST CBC:  Lab Results   Component Value Date    WBC 9.7 09/21/2018     Lab Results   Component Value Date    RBC 5.19 09/21/2018     Lab Results   Component Value Date    HGB 15.4 09/21/2018     Lab Results   Component Value Date    HCT 47.7 09/21/2018     Lab Results   Component Value Date    MCV 92 09/21/2018     Lab Results   Component Value Date    MCH 29.7 09/21/2018     Lab Results   Component Value Date    MCHC 32.3 09/21/2018     Lab Results   Component Value Date    RDW 13.5 09/21/2018     Lab Results   Component Value Date     09/21/2018       Thank you for allowing me to participate in the care of your patient.    Sincerely,     CORBIN MARINO [facility]

## 2018-10-01 NOTE — PATIENT INSTRUCTIONS
Today's Recommendations    1. Increase Losartan to 100 mg daily  2. Continue monitor BP  3. Bring cuff to next visit  4. Continue all other medications without changes.  5. Please follow up in [place] in 4-6 weeks.    Please send a [employer] message or call [phone] with questions or concerns.     Scheduling number [phone].

## 2018-10-24 ENCOUNTER — ALLIED HEALTH/NURSE VISIT (OUTPATIENT)
Dept: CARDIOLOGY | Facility: CLINIC | Age: 78
End: 2018-10-24
Payer: COMMERCIAL

## 2018-10-24 DIAGNOSIS — Z95.0 CARDIAC PACEMAKER IN SITU: Primary | ICD-10-CM

## 2018-10-24 PROCEDURE — 93280 PM DEVICE PROGR EVAL DUAL: CPT | Performed by: INTERNAL MEDICINE

## 2018-10-24 NOTE — MR AVS SNAPSHOT
After Visit Summary   10/24/2018    Charles Nogueira    MRN: [id number]           Patient Information     Date Of Birth          1940        Visit Information        Provider Department      10/24/2018 9:30 AM RU DCR2 [facility]        Today's Diagnoses     Cardiac pacemaker in situ    -  1       Follow-ups after your visit        Your next 10 appointments already scheduled     Nov 02, 2018  8:30 AM CDT   LAB with RU LAB   [facility] ([facility])    [address] [place] [id number] [phone]           Please do not eat 10-12 hours before your appointment if you are coming in fasting for labs on lipids, cholesterol, or glucose (sugar). This does not apply to pregnant women. Water, hot tea and black coffee (with nothing added) are okay. Do not drink other fluids, diet soda or chew gum.            Nov 02, 2018  9:30 AM CDT   Return Visit with CORBIN Heller CNP   [facility] ([facility])    [address] [place] [id number]   [phone]              Who to contact     If you have questions or need follow up information about today's clinic visit or your schedule please contact [facility] directly at [phone].  Normal or non-critical lab and imaging results will be communicated to you by MyChart, letter or phone within 4 business days after the clinic has received the results. If you do not hear from us within 7 days, please contact the clinic through MyChart or phone. If you have a critical or abnormal lab result, we will notify you by phone as soon as possible.  Submit refill requests through [email or web address] or call your pharmacy and they will forward the refill request to us. Please allow 3 business days for your refill to be completed.          Additional  Information About Your Visit        Care EveryWhere ID     This is your Care EveryWhere ID. This could be used by other organizations to access your [facility] medical records  [id number]         Blood Pressure from Last 3 Encounters:   10/01/18 144/80   09/21/18 105/80   08/09/18 122/70    Weight from Last 3 Encounters:   10/01/18 70.8 kg (156 lb)   09/21/18 67.6 kg (149 lb)   08/09/18 69.4 kg (153 lb 1.6 oz)              We Performed the Following     PM DEVICE PROGRAMMING EVAL, DUAL LEAD PACER ([id number])        Primary Care Provider Office Phone # Fax #    Stanley Vikas Saldaña [phone] [address] [facility] [address]        Equal Access to Services     ROLANDO MATHIAS : Hadii aad ku hadasho Soomaali, waaxda luqadaha, qaybta kaalmada adeegyada, waxay anel hayaan ruben barron . So [facility] [phone].    ATENCIÓN: Si habla español, tiene a dowling disposición servicios gratuitos de asistencia lingüística. Llame al [phone].    We comply with applicable federal civil rights laws and [place] laws. We do not discriminate on the basis of race, color, national origin, age, disability, sex, sexual orientation, or gender identity.            Thank you!     Thank you for choosing [facility]  for your care. Our goal is always to provide you with excellent care. Hearing back from our patients is one way we can continue to improve our services. Please take a few minutes to complete the written survey that you may receive in the mail after your visit with us. Thank you!             Your Updated Medication List - Protect others around you: Learn how to safely use, store and throw away your medicines at www.disposemymeds.org.          This list is accurate as of 10/24/18 10:00 AM.  Always use your most recent med list.                   Brand Name Dispense Instructions for use Diagnosis    ASPIRIN EC PO      Take 81 mg by mouth daily         glucosamine-chondroitin 500-400 MG Caps per capsule      Take 1 capsule by mouth daily        losartan 100 MG tablet    COZAAR    90 tablet    Take 1 tablet (100 mg) by mouth daily    Essential hypertension       metoprolol succinate 50 MG 24 hr tablet    TOPROL-XL    90 tablet    Take 1 tablet (50 mg) by mouth daily    Essential hypertension

## 2018-10-24 NOTE — PROGRESS NOTES
Medtronic Adapta L Pacemaker Device Check  AP: 35 % : 0.2 %  Mode: DDDR  bpm        Underlying Rhythm: NSR in the 60's  Heart Rate: Stable with good variability.  Sensing: WNL    Pacing Threshold: WNL   Impedance: WNL  Battery Status: Approximately 11.5 years longevity.  Device Site: Intact  Atrial Arrhythmia: 0  Ventricular Arrhythmia: 0  Setting Change: 0    Care Plan: Follow up with remote when patient returns from [place].

## 2018-11-02 ENCOUNTER — OFFICE VISIT (OUTPATIENT)
Dept: CARDIOLOGY | Facility: CLINIC | Age: 78
End: 2018-11-02
Payer: COMMERCIAL

## 2018-11-02 VITALS
HEART RATE: 60 BPM | BODY MASS INDEX: 23.49 KG/M2 | HEIGHT: 68 IN | WEIGHT: 155 LBS | SYSTOLIC BLOOD PRESSURE: 146 MMHG | DIASTOLIC BLOOD PRESSURE: 74 MMHG

## 2018-11-02 DIAGNOSIS — I48.0 PAROXYSMAL ATRIAL FIBRILLATION (H): ICD-10-CM

## 2018-11-02 DIAGNOSIS — I10 ESSENTIAL HYPERTENSION, BENIGN: ICD-10-CM

## 2018-11-02 DIAGNOSIS — I10 ESSENTIAL HYPERTENSION, BENIGN: Primary | ICD-10-CM

## 2018-11-02 LAB
ANION GAP SERPL CALCULATED.3IONS-SCNC: 4 MMOL/L (ref 3–14)
BUN SERPL-MCNC: 16 MG/DL (ref 7–30)
CALCIUM SERPL-MCNC: 8.8 MG/DL (ref 8.5–10.1)
CHLORIDE SERPL-SCNC: 108 MMOL/L (ref 94–109)
CO2 SERPL-SCNC: 29 MMOL/L (ref 20–32)
CREAT SERPL-MCNC: 1.07 MG/DL (ref 0.66–1.25)
GFR SERPL CREATININE-BSD FRML MDRD: 67 ML/MIN/1.7M2
GLUCOSE SERPL-MCNC: 130 MG/DL (ref 70–99)
POTASSIUM SERPL-SCNC: 3.9 MMOL/L (ref 3.4–5.3)
SODIUM SERPL-SCNC: 141 MMOL/L (ref 133–144)

## 2018-11-02 PROCEDURE — 80048 BASIC METABOLIC PNL TOTAL CA: CPT | Performed by: INTERNAL MEDICINE

## 2018-11-02 PROCEDURE — 99214 OFFICE O/P EST MOD 30 MIN: CPT | Performed by: NURSE PRACTITIONER

## 2018-11-02 PROCEDURE — 36415 COLL VENOUS BLD VENIPUNCTURE: CPT | Performed by: INTERNAL MEDICINE

## 2018-11-02 RX ORDER — AMLODIPINE BESYLATE 2.5 MG/1
2.5 TABLET ORAL DAILY
Qty: 90 TABLET | Refills: 3 | Status: SHIPPED | OUTPATIENT
Start: 2018-11-02 | End: 2018-11-21

## 2018-11-02 NOTE — PROGRESS NOTES
History of Present Illness:    Charles Nogueira is a 78 year old male followed here by Dr. Witt.  He recently saw Gisell Ceron CNP on the heels of an emergency room visit for paroxysmal atrial fibrillation.  Even though his [id number] score is 3 (2 for age, 1 for hypertension) he does not want high level anticoagulation but was agreeable to aspirin therapy.    He has a history of a dual-chamber pacemaker inserted at [facility] in 2004 for bradycardia with asystole.  He has had previous paroxysmal atrial fibrillation on his pacemaker interrogations in the past but infrequently.  He has frequent PVCs.  He spends 4 months of the year in [place].    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  His mitral jet has an anterior direction consistent with his posterior leaflet pathology.  His left ventricular dimension at the last echo was 5.4 cm, previously 4.7 but LV function is stable.  If LV continues to dilate or function drops he may need to be considered for surgical intervention    At the last visit his blood pressure was uncontrolled and his losartan was increased to 100 mg a day.  He remains on metoprolol XL 50 mg a day.  [place]  His home blood pressure cuff was checked today and is fairly accurate, if anything it runs a bit high.  His blood pressures at home have often been in the 140s-150s even with the losartan increase. His basic metabolic panel is normal.  His blood pressure today here as high as well    He is agreeable to adding antihypertensives and I will begin with amlodipine.  He prefers low-dose of 2.5 mg a day.  We will have him come back in 2-3 weeks and meet with the nurse.  If his blood pressure has not improved I would increase to 5 mg/day.    Weight is stable lungs are clear      Impression/Plan:     1.  History of premature ventricular contractions with preserved ejection fraction no ischemic  disease  -continue metoprolol    2. HTN-uncontrolled  Add amlodipine 2.5mg daily  -Continue losartan 100 mg daily  -Continue metoprolol XL 50 mg a day   -See nurse in 2-3 weeks for blood pressure check and bring his home numbers; message this information to me and I will determine if amlodipine needs to be increased.  -We talked about adding a diuretic, however this would require basic metabolic panels and he is gone to [place] for months at a time. He already has urgency and nocturia from prostate issues so we opted to go with amlodipine instead    3.  Moderate mitral regurgitation with posterior leaflet prolapse  -LV mildly dilated comparatively but still within normal limits  -Ejection fraction stable and preserved  -He will have an echocardiogram in August 2019  -He has no changes in his exercise capacity and no shortness of breath    4.  Lipid panel in Dr. Latif's office in November 2017 revealed an LDL of 93 HDL 40 glycerides normal  - acceptable levels with no proven history of vascular or coronary artery disease.    5.  Paroxysmal atrial fibrillation  -He has rapid ventricular response when he is in this rhythm  -This is likely related to his mitral valve disease  -He is agreeable to being on aspirin now.  [id number] score is 3; he is not interested in higher level anticoagulation  -His recent pacemaker checks show no breakthrough arrhythmia  -Continue metoprolol    6.  History of bradycardia with asystole  -Pacemaker implantation in 2004 continue to follow in the device clinic  -11.5 years of battery life remains as of 10-    Greater than 50% of this 30 minute visit was spent in counseling    It has been a pleasure seeing Charles Nogueira in follow up today.  I will await his repeat blood pressure numbers and dose adjust amlodipine as necessary.    Geraldo Burger, MSN, APRN-BC, CNP  Cardiology    No orders of the defined types were placed in this encounter.    No orders of the defined types  were placed in this encounter.    There are no discontinued medications.      Encounter Diagnoses   Name Primary?     Essential hypertension, benign      Paroxysmal atrial fibrillation (H)        CURRENT MEDICATIONS:  Current Outpatient Prescriptions   Medication Sig Dispense Refill     glucosamine-chondroitin 500-400 MG CAPS per capsule Take 1 capsule by mouth daily       losartan (COZAAR) 100 MG tablet Take 1 tablet (100 mg) by mouth daily 90 tablet 3     metoprolol succinate (TOPROL-XL) 50 MG 24 hr tablet Take 1 tablet (50 mg) by mouth daily 90 tablet 3     ASPIRIN EC PO Take 81 mg by mouth daily         ALLERGIES     Allergies   Allergen Reactions     Sulfa Drugs      rash       PAST MEDICAL HISTORY:  Past Medical History:   Diagnosis Date     Acute ischemic heart disease (H) 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 To use swallowing techniques     Essential hypertension, benign 11/11/2016     Heart murmur 5/10/2011     Melanoma (H)      Migraine equivalent 11/10/2015     Non-rheumatic mitral regurgitation 8/9/2018     Other forms of migraine, without mention of intractable migraine without mention of status migrainosus      Premature ventricular contraction      PVC (premature ventricular contraction) 5/10/2011     Sick sinus syndrome (H) 05/03/2016    s/p PPM implant 7/2/14     Squamous cell carcinoma      Syncope        PAST SURGICAL HISTORY:  Past Surgical History:   Procedure Laterality Date     APPENDECTOMY OPEN       BIOPSY OF SKIN LESION       COLONOSCOPY N/A 11/20/2015    Procedure: COLONOSCOPY;  Surgeon: David Us MD;  Location: [facility]     IMPLANT PACEMAKER  7/2004     MOHS MICROGRAPHIC PROCEDURE         FAMILY HISTORY:  Family History   Problem Relation Age of Onset     HEART DISEASE Mother      MI     HEART DISEASE Father      MI     C.A.D. Brother      Just had  "angioplasty in 2006     Colon Cancer No family hx of        SOCIAL HISTORY:  Social History     Social History     Marital status:      Spouse name: Paula     Number of children: 3     Years of education: N/A     Occupational History      Retired     Social History Main Topics     Smoking status: Former Smoker     Packs/day: 1.00     Years: 4.00     Types: Cigarettes     Quit date: 1/1/1967     Smokeless tobacco: Never Used     Alcohol use No     Drug use: No     Sexual activity: Yes     Partners: Female     Other Topics Concern      Service Yes     Blood Transfusions No     Caffeine Concern Yes     decaff coffee     Occupational Exposure No     Hobby Hazards No     Sleep Concern No     Stress Concern No     Weight Concern No     Special Diet No     Back Care No     Exercise Yes     Excercises about 4-5 days per week     Bike Helmet Yes     Seat Belt Yes     Self-Exams No     Social History Narrative       Review of Systems:  Skin:  Positive for bruising     Eyes:  Positive for glasses    ENT:  Negative for      Respiratory:  Positive for cough     Cardiovascular:  Negative      Gastroenterology: Negative for      Genitourinary:  not assessed      Musculoskeletal:  Positive for arthritis    Neurologic:  Negative for      Psychiatric:  Negative for      Heme/Lymph/Imm:  Positive for allergies    Endocrine:  Negative        Physical Exam:  Vitals: /74  Pulse 60  Ht 1.715 m (5' 7.5\")  Wt 70.3 kg (155 lb)  BMI 23.92 kg/m2    Constitutional:  cooperative, alert and oriented, well developed, well nourished, in no acute distress        Skin:  warm and dry to the touch, no apparent skin lesions or masses noted surgical scars well-healed        Head:  normocephalic, no masses or lesions        Eyes:  pupils equal and round;conjunctivae and lids unremarkable;sclera white;no xanthalasma;EOMS intact        Lymph:      ENT:  no pallor or cyanosis, dentition good        Neck:  carotid pulses are full and " equal bilaterally, JVP normal, no carotid bruit        Respiratory:  normal breath sounds, clear to auscultation, normal A-P diameter, normal symmetry, normal respiratory excursion, no use of accessory muscles         Cardiac: normal S1 and S2;no S3 or S4;apical impulse not displaced;regular rhythm       holosystolic murmur;LLSB;radiation to the axilla        pulses full and equal, no bruits auscultated                                        GI:  abdomen soft, non-tender, BS normoactive, no mass, no HSM, no bruits        Extremities and Muscular Skeletal:  no deformities, clubbing, cyanosis, erythema observed;no edema              Neurological:  no gross motor deficits;affect appropriate        Psych:  Alert and Oriented x 3      Recent Lab Results:  LIPID RESULTS:  Lab Results   Component Value Date    CHOL 154 11/13/2017    HDL 40 11/13/2017    LDL 93 11/13/2017    TRIG 103 11/13/2017    CHOLHDLRATIO 4.5 11/10/2015       LIVER ENZYME RESULTS:  Lab Results   Component Value Date    AST 25 11/13/2017    ALT 25 11/13/2017       CBC RESULTS:  Lab Results   Component Value Date    WBC 9.7 09/21/2018    RBC 5.19 09/21/2018    HGB 15.4 09/21/2018    HCT 47.7 09/21/2018    MCV 92 09/21/2018    MCH 29.7 09/21/2018    MCHC 32.3 09/21/2018    RDW 13.5 09/21/2018     09/21/2018       BMP RESULTS:  Lab Results   Component Value Date     11/02/2018    POTASSIUM 3.9 11/02/2018    CHLORIDE 108 11/02/2018    CO2 29 11/02/2018    ANIONGAP 4 11/02/2018     (H) 11/02/2018    BUN 16 11/02/2018    CR 1.07 11/02/2018    GFRESTIMATED 67 11/02/2018    GFRESTBLACK 81 11/02/2018    MANUELA 8.8 11/02/2018        A1C RESULTS:  No results found for: A1C    INR RESULTS:  No results found for: INR        CC  Gisell Knight, APRN CNP  [facility]  [address]  [place] [id number]

## 2018-11-02 NOTE — MR AVS SNAPSHOT
After Visit Summary   11/2/2018    Charles Nogueira    MRN: [id number]           Patient Information     Date Of Birth          1940        Visit Information        Provider Department      11/2/2018 9:30 AM Geraldo Burger APRN CNP [facility]        Today's Diagnoses     Essential hypertension, benign        Paroxysmal atrial fibrillation (H)          Care Instructions    Start Amlodipine 2.5mg daily    BP check here with nurse in 2-3 weeks and we will adjust dose if need be          Follow-ups after your visit        Additional Services     Follow-Up with Nurse                 Your next 10 appointments already scheduled     Nov 21, 2018  9:00 AM CST   Nurse Only with RU [facility] HRT NURSE   [facility] ([facility])    [address]  [facility] [address] [phone]              Future tests that were ordered for you today     Open Future Orders        Priority Expected Expires Ordered    Follow-Up with Nurse Routine 11/21/2018 11/1/2020 11/2/2018            Who to contact     If you have questions or need follow up information about today's clinic visit or your schedule please contact [facility] directly at [phone].  Normal or non-critical lab and imaging results will be communicated to you by MyChart, letter or phone within 4 business days after the clinic has received the results. If you do not hear from us within 7 days, please contact the clinic through MyChart or phone. If you have a critical or abnormal lab result, we will notify you by phone as soon as possible.  Submit refill requests through [employer] or call your pharmacy and they will forward the refill request to us. Please allow 3 business days for your refill to be completed.          Additional Information About Your Visit        Care EveryWhere ID     This is your  "Care EveryWhere ID. This could be used by other organizations to access your [facility] medical records  [id number]        Your Vitals Were     Pulse Height BMI (Body Mass Index)             60 1.715 m (5' 7.5\") 23.92 kg/m2          Blood Pressure from Last 3 Encounters:   11/02/18 146/74   10/01/18 144/80   09/21/18 105/80    Weight from Last 3 Encounters:   11/02/18 70.3 kg (155 lb)   10/01/18 70.8 kg (156 lb)   09/21/18 67.6 kg (149 lb)              We Performed the Following     Follow-Up with Cardiologist          Today's Medication Changes          These changes are accurate as of 11/2/18 10:21 AM.  If you have any questions, ask your nurse or doctor.               Start taking these medicines.        Dose/Directions    amLODIPine 2.5 MG tablet   Commonly known as:  NORVASC   Used for:  Essential hypertension, benign   Started by:  Geraldo Burger, APRN CNP        Dose:  2.5 mg   Take 1 tablet (2.5 mg) by mouth daily   Quantity:  90 tablet   Refills:  3            Where to get your medicines      These medications were sent to [facility] - [facility] [address]  [address] [address]     Phone:  [phone]     amLODIPine 2.5 MG tablet                Primary Care Provider Office Phone # Fax #    Stanley Vikas Saldaña [phone] [address] [facility] [address]        Equal Access to Services     ROLANDO MATHIAS AH: Hadii yecenia chandler hadasho Soomaali, waaxda luqadaha, qaybta kaalmada minervaegyabridget, waxjonathan anel velazco. So [facility] [phone].    ATENCIÓN: Si habla jovanniañol, tiene a dowling disposición servicios gratuitos de asistencia lingüística. Llame al [phone].    We comply with applicable federal civil rights laws and [place] laws. We do not discriminate on the basis of race, color, national origin, age, disability, sex, sexual orientation, or gender identity.            Thank you!     Thank you for choosing " [facility]  for your care. Our goal is always to provide you with excellent care. Hearing back from our patients is one way we can continue to improve our services. Please take a few minutes to complete the written survey that you may receive in the mail after your visit with us. Thank you!             Your Updated Medication List - Protect others around you: Learn how to safely use, store and throw away your medicines at www.disposemymeds.org.          This list is accurate as of 11/2/18 10:21 AM.  Always use your most recent med list.                   Brand Name Dispense Instructions for use Diagnosis    amLODIPine 2.5 MG tablet    NORVASC    90 tablet    Take 1 tablet (2.5 mg) by mouth daily    Essential hypertension, benign       ASPIRIN EC PO      Take 81 mg by mouth daily        glucosamine-chondroitin 500-400 MG Caps per capsule      Take 1 capsule by mouth daily        losartan 100 MG tablet    COZAAR    90 tablet    Take 1 tablet (100 mg) by mouth daily    Essential hypertension       metoprolol succinate 50 MG 24 hr tablet    TOPROL-XL    90 tablet    Take 1 tablet (50 mg) by mouth daily    Essential hypertension

## 2018-11-02 NOTE — PATIENT INSTRUCTIONS
Start Amlodipine 2.5mg daily    BP check here with nurse in 2-3 weeks and we will adjust dose if need be

## 2018-11-02 NOTE — LETTER
11/2/2018    Stanley Saldaña MD  [address]  [facility] [address]    RE: Charles Nogueira       Dear Colleague,    I had the pleasure of seeing Charles Nogueira in the [facility].    History of Present Illness:    Charles Nogueira is a 78 year old male followed here by Dr. Witt.  He recently saw Giesll Ceron CNP on the heels of an emergency room visit for paroxysmal atrial fibrillation.  Even though his LRS1MS7-airm score is 3 (2 for age, 1 for hypertension) he does not want high level anticoagulation but was agreeable to aspirin therapy.    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  He has had previous paroxysmal atrial fibrillation on his pacemaker interrogations in the past but infrequently.  He has frequent PVCs.  He spends 4 months of the year in [place].    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  His mitral jet has an anterior direction consistent with his posterior leaflet pathology.  His left ventricular dimension at the last echo was 5.4 cm, previously 4.7 but LV function is stable.  If LV continues to dilate or function drops he may need to be considered for surgical intervention    At the last visit his blood pressure was uncontrolled and his losartan was increased to 100 mg a day.  He remains on metoprolol XL 50 mg a day.  He leaves for [place] in a few weeks.  His home blood pressure cuff was checked today and is fairly accurate, if anything it runs a bit high.  His blood pressures at home have often been in the 140s-150s even with the losartan increase. His basic metabolic panel is normal.  His blood pressure today here as high as well    He is agreeable to adding antihypertensives and I will begin with amlodipine.  He prefers low-dose of 2.5 mg a day.  We will have him come back in 2-3 weeks and meet with the nurse.  If his  blood pressure has not improved I would increase to 5 mg/day.    Weight is stable lungs are clear      Impression/Plan:     1.  History of premature ventricular contractions with preserved ejection fraction no ischemic disease  -continue metoprolol    2. HTN-uncontrolled  Add amlodipine 2.5mg daily  -Continue losartan 100 mg daily  -Continue metoprolol XL 50 mg a day   -See nurse in 2-3 weeks for blood pressure check and bring his home numbers; message this information to me and I will determine if amlodipine needs to be increased.  -We talked about adding a diuretic, however this would require basic metabolic panels and he is gone to [place] for months at a time. He already has urgency and nocturia from prostate issues so we opted to go with amlodipine instead    3.  Moderate mitral regurgitation with posterior leaflet prolapse  -LV mildly dilated comparatively but still within normal limits  -Ejection fraction stable and preserved  -He will have an echocardiogram in August 2019  -He has no changes in his exercise capacity and no shortness of breath    4.  Lipid panel in Dr. Latif's office in November 2017 revealed an LDL of 93 HDL 40 glycerides normal  - acceptable levels with no proven history of vascular or coronary artery disease.    5.  Paroxysmal atrial fibrillation  -He has rapid ventricular response when he is in this rhythm  -This is likely related to his mitral valve disease  -He is agreeable to being on aspirin now.  [id number]-wclw score is 3; he is not interested in higher level anticoagulation  -His recent pacemaker checks show no breakthrough arrhythmia  -Continue metoprolol    6.  History of bradycardia with asystole  -Pacemaker implantation in 2004 continue to follow in the device clinic  -11.5 years of battery life remains as of 10-    Greater than 50% of this 30 minute visit was spent in counseling    It has been a pleasure seeing Charles Nogueira in follow up today.  I will await his repeat  blood pressure numbers and dose adjust amlodipine as necessary.    Geraldo Burger, MSN, APRN-BC, CNP  Cardiology    No orders of the defined types were placed in this encounter.    No orders of the defined types were placed in this encounter.    There are no discontinued medications.      Encounter Diagnoses   Name Primary?     Essential hypertension, benign      Paroxysmal atrial fibrillation (H)        CURRENT MEDICATIONS:  Current Outpatient Prescriptions   Medication Sig Dispense Refill     glucosamine-chondroitin 500-400 MG CAPS per capsule Take 1 capsule by mouth daily       losartan (COZAAR) 100 MG tablet Take 1 tablet (100 mg) by mouth daily 90 tablet 3     metoprolol succinate (TOPROL-XL) 50 MG 24 hr tablet Take 1 tablet (50 mg) by mouth daily 90 tablet 3     ASPIRIN EC PO Take 81 mg by mouth daily         ALLERGIES     Allergies   Allergen Reactions     Sulfa Drugs      rash       PAST MEDICAL HISTORY:  Past Medical History:   Diagnosis Date     Acute ischemic heart disease (H) 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 To use swallowing techniques     Essential hypertension, benign 11/11/2016     Heart murmur 5/10/2011     Melanoma (H)      Migraine equivalent 11/10/2015     Non-rheumatic mitral regurgitation 8/9/2018     Other forms of migraine, without mention of intractable migraine without mention of status migrainosus      Premature ventricular contraction      PVC (premature ventricular contraction) 5/10/2011     Sick sinus syndrome (H) 05/03/2016    s/p PPM implant 7/2/14     Squamous cell carcinoma      Syncope        PAST SURGICAL HISTORY:  Past Surgical History:   Procedure Laterality Date     APPENDECTOMY OPEN       BIOPSY OF SKIN LESION       COLONOSCOPY N/A 11/20/2015    Procedure: COLONOSCOPY;  Surgeon: David Us MD;  Location:  GI     IMPLANT  "PACEMAKER  7/2004     MOHS MICROGRAPHIC PROCEDURE         FAMILY HISTORY:  Family History   Problem Relation Age of Onset     HEART DISEASE Mother      MI     HEART DISEASE Father      MI     C.A.D. Brother      Just had angioplasty in 2006     Colon Cancer No family hx of        SOCIAL HISTORY:  Social History     Social History     Marital status:      Spouse name: Paula     Number of children: 3     Years of education: N/A     Occupational History      Retired     Social History Main Topics     Smoking status: Former Smoker     Packs/day: 1.00     Years: 4.00     Types: Cigarettes     Quit date: 1/1/1967     Smokeless tobacco: Never Used     Alcohol use No     Drug use: No     Sexual activity: Yes     Partners: Female     Other Topics Concern      Service Yes     Blood Transfusions No     Caffeine Concern Yes     decaff coffee     Occupational Exposure No     Hobby Hazards No     Sleep Concern No     Stress Concern No     Weight Concern No     Special Diet No     Back Care No     Exercise Yes     Excercises about 4-5 days per week     Bike Helmet Yes     Seat Belt Yes     Self-Exams No     Social History Narrative       Review of Systems:  Skin:  Positive for bruising     Eyes:  Positive for glasses    ENT:  Negative for      Respiratory:  Positive for cough     Cardiovascular:  Negative      Gastroenterology: Negative for      Genitourinary:  not assessed      Musculoskeletal:  Positive for arthritis    Neurologic:  Negative for      Psychiatric:  Negative for      Heme/Lymph/Imm:  Positive for allergies    Endocrine:  Negative        Physical Exam:  Vitals: /74  Pulse 60  Ht 1.715 m (5' 7.5\")  Wt 70.3 kg (155 lb)  BMI 23.92 kg/m2    Constitutional:  cooperative, alert and oriented, well developed, well nourished, in no acute distress        Skin:  warm and dry to the touch, no apparent skin lesions or masses noted surgical scars well-healed        Head:  normocephalic, no masses or " lesions        Eyes:  pupils equal and round;conjunctivae and lids unremarkable;sclera white;no xanthalasma;EOMS intact        Lymph:      ENT:  no pallor or cyanosis, dentition good        Neck:  carotid pulses are full and equal bilaterally, JVP normal, no carotid bruit        Respiratory:  normal breath sounds, clear to auscultation, normal A-P diameter, normal symmetry, normal respiratory excursion, no use of accessory muscles         Cardiac: normal S1 and S2;no S3 or S4;apical impulse not displaced;regular rhythm       holosystolic murmur;LLSB;radiation to the axilla        pulses full and equal, no bruits auscultated                                        GI:  abdomen soft, non-tender, BS normoactive, no mass, no HSM, no bruits        Extremities and Muscular Skeletal:  no deformities, clubbing, cyanosis, erythema observed;no edema              Neurological:  no gross motor deficits;affect appropriate        Psych:  Alert and Oriented x 3      Recent Lab Results:  LIPID RESULTS:  Lab Results   Component Value Date    CHOL 154 11/13/2017    HDL 40 11/13/2017    LDL 93 11/13/2017    TRIG 103 11/13/2017    CHOLHDLRATIO 4.5 11/10/2015       LIVER ENZYME RESULTS:  Lab Results   Component Value Date    AST 25 11/13/2017    ALT 25 11/13/2017       CBC RESULTS:  Lab Results   Component Value Date    WBC 9.7 09/21/2018    RBC 5.19 09/21/2018    HGB 15.4 09/21/2018    HCT 47.7 09/21/2018    MCV 92 09/21/2018    MCH 29.7 09/21/2018    MCHC 32.3 09/21/2018    RDW 13.5 09/21/2018     09/21/2018       BMP RESULTS:  Lab Results   Component Value Date     11/02/2018    POTASSIUM 3.9 11/02/2018    CHLORIDE 108 11/02/2018    CO2 29 11/02/2018    ANIONGAP 4 11/02/2018     (H) 11/02/2018    BUN 16 11/02/2018    CR 1.07 11/02/2018    GFRESTIMATED 67 11/02/2018    GFRESTBLACK 81 11/02/2018    MANUELA 8.8 11/02/2018        A1C RESULTS:  No results found for: A1C    INR RESULTS:  No results found for:  INR        Thank you for allowing me to participate in the care of your patient.    Sincerely,     CORBIN Heller CNP     [facility]

## 2018-11-21 ENCOUNTER — ALLIED HEALTH/NURSE VISIT (OUTPATIENT)
Dept: CARDIOLOGY | Facility: CLINIC | Age: 78
End: 2018-11-21
Attending: NURSE PRACTITIONER
Payer: COMMERCIAL

## 2018-11-21 ENCOUNTER — TELEPHONE (OUTPATIENT)
Dept: CARDIOLOGY | Facility: CLINIC | Age: 78
End: 2018-11-21

## 2018-11-21 VITALS — HEART RATE: 66 BPM | DIASTOLIC BLOOD PRESSURE: 80 MMHG | SYSTOLIC BLOOD PRESSURE: 138 MMHG

## 2018-11-21 DIAGNOSIS — I10 ESSENTIAL HYPERTENSION, BENIGN: ICD-10-CM

## 2018-11-21 PROCEDURE — 99207 ZZC NO CHARGE LOS: CPT | Performed by: NURSE PRACTITIONER

## 2018-11-21 RX ORDER — AMLODIPINE BESYLATE 5 MG/1
5 TABLET ORAL DAILY
Qty: 90 TABLET | Refills: 3 | Status: SHIPPED | OUTPATIENT
Start: 2018-11-21 | End: 2019-07-09

## 2018-11-21 RX ORDER — AMLODIPINE BESYLATE 5 MG/1
5 TABLET ORAL DAILY
Qty: 90 TABLET | Refills: 3 | COMMUNITY
Start: 2018-11-21 | End: 2018-11-21

## 2018-11-21 NOTE — TELEPHONE ENCOUNTER
I would have him increase amlodipine to 5mg daily-use two of the 2.5mg pills together once daily       I put a 5mg per day tablet on his med list but did not send it    Please check when you call him where to send it    He leaves for [place] soon    Have him follow bp on his cuff while gone

## 2018-11-21 NOTE — MR AVS SNAPSHOT
After Visit Summary   11/21/2018    Charles Nogueira    MRN: [id number]           Patient Information     Date Of Birth          1940        Visit Information        Provider Department      11/21/2018 9:00 AM [facility] HRT NURSE [facility]        Today's Diagnoses     Essential hypertension, benign          Care Instructions    See phone encounter for BP check.           Follow-ups after your visit        Who to contact     If you have questions or need follow up information about today's clinic visit or your schedule please contact [facility] directly at [phone].  Normal or non-critical lab and imaging results will be communicated to you by MyChart, letter or phone within 4 business days after the clinic has received the results. If you do not hear from us within 7 days, please contact the clinic through MyChart or phone. If you have a critical or abnormal lab result, we will notify you by phone as soon as possible.  Submit refill requests through [employer] or call your pharmacy and they will forward the refill request to us. Please allow 3 business days for your refill to be completed.          Additional Information About Your Visit        Care EveryWhere ID     This is your Care EveryWhere ID. This could be used by other organizations to access your [facility] medical records  [id number]        Your Vitals Were     Pulse                   66            Blood Pressure from Last 3 Encounters:   11/21/18 138/80   11/02/18 146/74   10/01/18 144/80    Weight from Last 3 Encounters:   11/02/18 70.3 kg (155 lb)   10/01/18 70.8 kg (156 lb)   09/21/18 67.6 kg (149 lb)              We Performed the Following     Follow-Up with Nurse          Today's Medication Changes          These changes are accurate as of 11/21/18 11:59 PM.  If you have any questions, ask your nurse or doctor.               Start taking these  medicines.        Dose/Directions    amLODIPine 5 MG tablet   Commonly known as:  NORVASC   Used for:  Essential hypertension, benign   Started by:  Iraida Degroot RN        Dose:  5 mg   Take 1 tablet (5 mg) by mouth daily   Quantity:  90 tablet   Refills:  3            Where to get your medicines      These medications were sent to [facility] - [place], [place] - [address]  [address], [facility] [address]     Phone:  [phone]     amLODIPine 5 MG tablet                Primary Care Provider Office Phone # Fax #    Stanley Saldaña [phone] [address] [facility] [address]        Equal Access to Services     [facility] Center[facility]ILIAN : Hadii yecenia chandler hadasho Somaggi, waaxda luqadaha, qaybta kaalmada adeegyada, jodi barron . So [facility] [phone].    ATENCIÓN: Si habla español, tiene a dowling disposición servicios gratuitos de asistencia lingüística. Llame al [phone].    We comply with applicable federal civil rights laws and [place] laws. We do not discriminate on the basis of race, color, national origin, age, disability, sex, sexual orientation, or gender identity.            Thank you!     Thank you for choosing [facility]  for your care. Our goal is always to provide you with excellent care. Hearing back from our patients is one way we can continue to improve our services. Please take a few minutes to complete the written survey that you may receive in the mail after your visit with us. Thank you!             Your Updated Medication List - Protect others around you: Learn how to safely use, store and throw away your medicines at www.disposemymeds.org.          This list is accurate as of 11/21/18 11:59 PM.  Always use your most recent med list.                   Brand Name Dispense Instructions for use Diagnosis    amLODIPine 5 MG tablet    NORVASC    90 tablet    Take 1  tablet (5 mg) by mouth daily    Essential hypertension, benign       ASPIRIN EC PO      Take 81 mg by mouth daily        glucosamine-chondroitin 500-400 MG Caps per capsule      Take 1 capsule by mouth daily        losartan 100 MG tablet    COZAAR    90 tablet    Take 1 tablet (100 mg) by mouth daily    Essential hypertension       metoprolol succinate 50 MG 24 hr tablet    TOPROL-XL    90 tablet    Take 1 tablet (50 mg) by mouth daily    Essential hypertension

## 2018-11-21 NOTE — TELEPHONE ENCOUNTER
[facility] NURSE VISIT    On 11/2/18, Patient's blood pressure was 146/74, pulse was 60. Amlodipine 2.5 mg was started.     Patient is here today for a blood pressure check. Patient took Amlodipine 2.5, Losartan 100 mg and Toprol XL 50 mg at 5:45am. Blood pressure today was taken at 9:10am.     Office blood pressure and pulse  Site: LA  Position: sitting   Cuff size: adult reg  BP: 132/80    Pulse: 66 bpm    2nd BP: 138/80     Home BP cuff:   142/72 HR 65    Pt brought in home BPs. Ranging from 131-163 sys / 72-80 diastolic.  States he accidentally took Losartan 50 mg for 1 week (he thinks it was just 1 wk but not sure) because he was trying to use up old tablets. This was about 1 week ago. BP could have been running higher d/t accidental Losartan dose.       Tolerance: good. asymptomatic.     Visit ordered by: Geraldo Ochoa CNP   Provider in clinic today: Dr. Peck  Patient's cardiologist: Dr.Ketroser René YADAV  [facility]

## 2018-11-21 NOTE — TELEPHONE ENCOUNTER
Called and spoke with pt and pt's wife, Paula.  Reviewed ELI Mijares's recommendation to increase amlodipine dose to 5mg daily and advised he take 2, 2.5mg tabs daily until current prescription is out and will also send updated amlodipine 5mg Rx.  They requested updated amlodipine Rx be sent to [facility] mail order pharmacy, Rx e-sent as requested. Also advised that pt continue to check BP on home cuff and let us know if BP continues to be >130/80.  Pt and Paula verbalized understanding and agrees with this plan.    VICENTA Guzman 10:51 AM 11/21/2018

## 2018-12-04 ENCOUNTER — OFFICE VISIT (OUTPATIENT)
Dept: FAMILY MEDICINE | Facility: CLINIC | Age: 78
End: 2018-12-04
Payer: COMMERCIAL

## 2018-12-04 VITALS
HEIGHT: 67 IN | WEIGHT: 154.3 LBS | BODY MASS INDEX: 24.22 KG/M2 | RESPIRATION RATE: 12 BRPM | OXYGEN SATURATION: 99 % | SYSTOLIC BLOOD PRESSURE: 136 MMHG | TEMPERATURE: 97.6 F | HEART RATE: 59 BPM | DIASTOLIC BLOOD PRESSURE: 63 MMHG

## 2018-12-04 DIAGNOSIS — C43.59 MALIGNANT MELANOMA OF SKIN OF TRUNK, EXCEPT SCROTUM (H): ICD-10-CM

## 2018-12-04 DIAGNOSIS — Z12.5 SCREENING FOR PROSTATE CANCER: ICD-10-CM

## 2018-12-04 DIAGNOSIS — I48.0 PAROXYSMAL ATRIAL FIBRILLATION (H): ICD-10-CM

## 2018-12-04 DIAGNOSIS — Z00.00 MEDICARE ANNUAL WELLNESS VISIT, SUBSEQUENT: Primary | ICD-10-CM

## 2018-12-04 PROCEDURE — 36415 COLL VENOUS BLD VENIPUNCTURE: CPT | Performed by: FAMILY MEDICINE

## 2018-12-04 PROCEDURE — 80053 COMPREHEN METABOLIC PANEL: CPT | Performed by: FAMILY MEDICINE

## 2018-12-04 PROCEDURE — 80061 LIPID PANEL: CPT | Performed by: FAMILY MEDICINE

## 2018-12-04 PROCEDURE — 99397 PER PM REEVAL EST PAT 65+ YR: CPT | Performed by: FAMILY MEDICINE

## 2018-12-04 PROCEDURE — G0103 PSA SCREENING: HCPCS | Performed by: FAMILY MEDICINE

## 2018-12-04 ASSESSMENT — ENCOUNTER SYMPTOMS
ABDOMINAL PAIN: 0
CHILLS: 0
HEMATURIA: 0
CONSTIPATION: 0
COUGH: 0
DIARRHEA: 0
HEMATOCHEZIA: 0
EYE PAIN: 0
DIZZINESS: 0

## 2018-12-04 ASSESSMENT — ACTIVITIES OF DAILY LIVING (ADL): CURRENT_FUNCTION: NO ASSISTANCE NEEDED

## 2018-12-04 NOTE — MR AVS SNAPSHOT
After Visit Summary   12/4/2018    Charles Nogueira    MRN: [id number]           Patient Information     Date Of Birth          1940        Visit Information        Provider Department      12/4/2018 10:00 AM Stanley Saldaña MD [facility]        Today's Diagnoses     Medicare annual wellness visit, subsequent    -  1    Paroxysmal atrial fibrillation (H)        Screening for prostate cancer          Care Instructions      Preventive Health Recommendations:     See your health care provider every year to    Review health changes.     Discuss preventive care.      Review your medicines if your doctor has prescribed any.      Talk with your health care provider about whether you should have a test to screen for prostate cancer (PSA).    Every 3 years, have a diabetes test (fasting glucose). If you are at risk for diabetes, you should have this test more often.    Every 5 years, have a cholesterol test. Have this test more often if you are at risk for high cholesterol or heart disease.     Every 10 years, have a colonoscopy. Or, have a yearly FIT test (stool test). These exams will check for colon cancer.    Talk to with your health care provider about screening for Abdominal Aortic Aneurysm if you have a family history of AAA or have a history of smoking.    Shots:     Get a flu shot each year.     Get a tetanus shot every 10 years.     Talk to your doctor about your pneumonia vaccines. There are now two you should receive - Pneumovax (PPSV 23) and Prevnar (PCV 13).     Talk to your pharmacist about a shingles vaccine.     Talk to your doctor about the hepatitis B vaccine.  Nutrition:     Eat at least 5 servings of fruits and vegetables each day.     Eat whole-grain bread, whole-wheat pasta and brown rice instead of white grains and rice.     Get adequate Calcium and Vitamin D.   Lifestyle    Exercise for at least 150 minutes a week (30 minutes a day, 5 days a week). This  will help you control your weight and prevent disease.     Limit alcohol to one drink per day.     No smoking.     Wear sunscreen to prevent skin cancer.    See your dentist every six months for an exam and cleaning.    See your eye doctor every 1 to 2 years to screen for conditions such as glaucoma, macular degeneration, cataracts, etc.    Personalized Prevention Plan  You are due for the preventive services outlined below.  Your care team is available to assist you in scheduling these services.  If you have already completed any of these items, please share that information with your care team to update in your medical record.  Health Maintenance Due   Topic Date Due     Depression Assessment 2 - yearly  11/13/2018     FALL RISK ASSESSMENT  11/13/2018             Follow-ups after your visit        Who to contact     If you have questions or need follow up information about today's clinic visit or your schedule please contact [facility] directly at [phone].  Normal or non-critical lab and imaging results will be communicated to you by MyChart, letter or phone within 4 business days after the clinic has received the results. If you do not hear from us within 7 days, please contact the clinic through [employer] or phone. If you have a critical or abnormal lab result, we will notify you by phone as soon as possible.  Submit refill requests through [employer] or call your pharmacy and they will forward the refill request to us. Please allow 3 business days for your refill to be completed.          Additional Information About Your Visit        MyChart Information     [employer] gives you secure access to your electronic health record. If you see a primary care provider, you can also send messages to your care team and make appointments. If you have questions, please call your primary care clinic.  If you do not have a primary care provider, please call [phone] and they will assist you.        Care  "EveryWhere ID     This is your Care EveryWhere ID. This could be used by other organizations to access your [facility] medical records  [id number]        Your Vitals Were     Pulse Temperature Respirations Height Pulse Oximetry BMI (Body Mass Index)    59 97.6  F (36.4  C) (Oral) 12 5' 7.25\" (1.708 m) 99% 23.99 kg/m2       Blood Pressure from Last 3 Encounters:   12/04/18 136/63   11/21/18 138/80   11/02/18 146/74    Weight from Last 3 Encounters:   12/04/18 154 lb 4.8 oz (70 kg)   11/02/18 155 lb (70.3 kg)   10/01/18 156 lb (70.8 kg)              We Performed the Following     Comprehensive metabolic panel     Lipid panel reflex to direct LDL Fasting     PSA, screen        Primary Care Provider Office Phone # Fax #    Stanley Vikas Saldaña [phone] [address] [facility] [address]        Equal Access to Services     [facility]IL[facility] : Hadii aad ku hadasho Soomaali, waaxda luqadaha, qaybta kaalmada adeegyada, waxay idiin hayaan ruben barron . So [facility] [phone].    ATENCIÓN: Si habla español, tiene a dowling disposición servicios gratuitos de asistencia lingüística. Llame al [phone].    We comply with applicable federal civil rights laws and [place] laws. We do not discriminate on the basis of race, color, national origin, age, disability, sex, sexual orientation, or gender identity.            Thank you!     Thank you for choosing [facility]  for your care. Our goal is always to provide you with excellent care. Hearing back from our patients is one way we can continue to improve our services. Please take a few minutes to complete the written survey that you may receive in the mail after your visit with us. Thank you!             Your Updated Medication List - Protect others around you: Learn how to safely use, store and throw away your medicines at www.disposemymeds.org.          This list is accurate as of 12/4/18 10:44 AM.  Always use your most recent " med list.                   Brand Name Dispense Instructions for use Diagnosis    amLODIPine 5 MG tablet    NORVASC    90 tablet    Take 1 tablet (5 mg) by mouth daily    Essential hypertension, benign       ASPIRIN EC PO      Take 81 mg by mouth daily        glucosamine-chondroitin 500-400 MG Caps per capsule      Take 1 capsule by mouth daily        losartan 100 MG tablet    COZAAR    90 tablet    Take 1 tablet (100 mg) by mouth daily    Essential hypertension       metoprolol succinate ER 50 MG 24 hr tablet    TOPROL-XL    90 tablet    Take 1 tablet (50 mg) by mouth daily    Essential hypertension

## 2018-12-04 NOTE — PROGRESS NOTES
"SUBJECTIVE:   Charles Nogueira is a 78 year old male who presents for Preventive Visit.      Are you in the first 12 months of your Medicare coverage?  No    Annual Wellness Visit     In general, how would you rate your overall health?  Good    Frequency of exercise:  4-5 days/week    Do you usually eat at least 4 servings of fruit and vegetables a day, include whole grains    & fiber and avoid regularly eating high fat or \"junk\" foods?  Yes    Taking medications regularly:  Yes    Medication side effects:  None    Ability to successfully perform activities of daily living:  No assistance needed    Home Safety:  No safety concerns identified    Hearing Impairment:  Find that men's voices are easier to understand than woman's    In the past 6 months, have you been bothered by leaking of urine?  No    In general, how would you rate your overall mental or emotional health?  Good    PHQ-2 Total Score: 0    Do you feel safe in your environment? Yes    Do you have a Health Care Directive?       Fall risk       Cognitive Screening   1) Repeat 3 items (Leader, Season, Table)    2) Clock draw: NORMAL  3) 3 item recall: Recalls 2 objects   Results: NORMAL clock, 1-2 items recalled: COGNITIVE IMPAIRMENT LESS LIKELY    Mini-CogTM Copyright S Little. Licensed by the author for use in [facility]; reprinted with permission (sosybli@.[facility]). All rights reserved.      Do you have sleep apnea, excessive snoring or daytime drowsiness?: no    Reviewed and updated as needed this visit by clinical staff         Reviewed and updated as needed this visit by Provider        Social History   Substance Use Topics     Smoking status: Former Smoker     Packs/day: 1.00     Years: 4.00     Types: Cigarettes     Quit date: 1/1/1967     Smokeless tobacco: Never Used     Alcohol use No       Alcohol Use 12/4/2018   If you drink alcohol do you typically have greater than 3 drinks per day OR greater than 7 drinks per week? Not Applicable " "  busy and active, walks for exercise every day of the week, in [place] in the winter           Current providers sharing in care for this patient include:   Patient Care Team:  Stanley Saldaña MD as PCP - General    The following health maintenance items are reviewed in Epic and correct as of today:  Health Maintenance   Topic Date Due     PHQ-2 Q1 YR  11/13/2018     FALL RISK ASSESSMENT  11/13/2018     TETANUS IMMUNIZATION (SYSTEM ASSIGNED)  09/30/2019     CREATININE Q1 YEAR  11/02/2019     LIPID SCREEN Q5 YR MALE (SYSTEM ASSIGNED)  11/13/2022     ADVANCE DIRECTIVE PLANNING Q5 YRS  11/13/2022     COLONOSCOPY Q10 YR  11/20/2025     MIGRAINE ACTION PLAN  Completed     PNEUMOCOCCAL  Completed     INFLUENZA VACCINE  Completed     BP Readings from Last 3 Encounters:   12/04/18 136/63   11/21/18 138/80   11/02/18 146/74    Wt Readings from Last 3 Encounters:   12/04/18 154 lb 4.8 oz (70 kg)   11/02/18 155 lb (70.3 kg)   10/01/18 156 lb (70.8 kg)                  Pneumonia Vaccine:Adults age 65+ who received Pneumovax (PPSV23) at 65 years or older: Should be given PCV13 > 1 year after their most recent PPSV23    Review of Systems   Constitutional: Negative for chills.   HENT: Negative for congestion and ear pain.    Eyes: Negative for pain.   Respiratory: Negative for cough.    Cardiovascular: Negative for chest pain.   Gastrointestinal: Negative for abdominal pain, constipation, diarrhea and hematochezia.   Genitourinary: Negative for hematuria.   Neurological: Negative for dizziness.     Constitutional, HEENT, cardiovascular, pulmonary, GI, , musculoskeletal, neuro, skin, endocrine and psych systems are negative, except as otherwise noted.    OBJECTIVE:   Physical Exam  GENERAL: healthy, alert and no distress  /63 (BP Location: Left arm, Patient Position: Chair, Cuff Size: Adult Regular)  Pulse 59  Temp 97.6  F (36.4  C) (Oral)  Resp 12  Ht 5' 7.25\" (1.708 m)  Wt 154 lb 4.8 oz (70 kg)  SpO2 99%  BMI " "23.99 kg/m2    EYES: Eyes grossly normal to inspection, PERRL and conjunctivae and sclerae normal  HENT: ear canals and TM's normal, nose and mouth without ulcers or lesions  NECK: no adenopathy, no asymmetry, masses, or scars and thyroid normal to palpation  RESP: lungs clear to auscultation - no rales, rhonchi or wheezes  CV: regular rate and rhythm, normal S1 S2, no S3 or S4, no murmur, click or rub, no peripheral edema and peripheral pulses strong  ABDOMEN: soft, nontender, no hepatosplenomegaly, no masses and bowel sounds normal   (male): normal male genitalia without lesions or urethral discharge, no hernia  MS: no gross musculoskeletal defects noted, no edema  SKIN: no suspicious lesions or rashes  NEURO: Normal strength and tone, mentation intact and speech normal  PSYCH: mentation appears normal, affect normal/bright    Sees Dr. Witt in Cardiology    ASSESSMENT / PLAN:   (Z00.00) Medicare annual wellness visit, subsequent  (primary encounter diagnosis)  Comment:   Plan:     (I48.0) Paroxysmal atrial fibrillation (H)  Comment:   Plan:       End of Life Planning:  Patient currently has an advanced directive: Yes.  Practitioner is supportive of decision.    COUNSELING:      BP Readings from Last 1 Encounters:   11/21/18 138/80     Estimated body mass index is 23.92 kg/(m^2) as calculated from the following:    Height as of 11/2/18: 5' 7.5\" (1.715 m).    Weight as of 11/2/18: 155 lb (70.3 kg).           reports that he quit smoking about 51 years ago. His smoking use included Cigarettes. He has a 4.00 pack-year smoking history. He has never used smokeless tobacco.      Appropriate preventive services were discussed with this patient, including applicable screening as appropriate for cardiovascular disease, diabetes, osteopenia/osteoporosis, and glaucoma.  As appropriate for age/gender, discussed screening for colorectal cancer, prostate cancer, breast cancer, and cervical cancer. Checklist reviewing " preventive services available has been given to the patient.    Reviewed patients plan of care and provided an AVS. The Basic Care Plan (routine screening as documented in Health Maintenance) for Charles meets the Care Plan requirement. This Care Plan has been established and reviewed with the Patient.  (Z00.00) Medicare annual wellness visit, subsequent  (primary encounter diagnosis)  Comment:   Plan: doing well    (I48.0) Paroxysmal atrial fibrillation (H)  Comment: new dx, had mitral valve issues   Plan: Lipid panel reflex to direct LDL Fasting,         Comprehensive metabolic panel            (Z12.5) Screening for prostate cancer  Comment:   Plan: PSA, screen        Discussed potential futility, he'll discuss with his wife    (C43.59) Malignant melanoma of skin of trunk, except scrotum (H)  Comment:   Plan: healed well, Derm follow up       Counseling Resources:  ATP IV Guidelines  Pooled Cohorts Equation Calculator  Breast Cancer Risk Calculator  FRAX Risk Assessment  ICSI Preventive Guidelines  Dietary Guidelines for Americans, 2010  [employer]'s MyPlate  ASA Prophylaxis  Lung CA Screening    Stanley Saldaña MD  [facility]

## 2018-12-04 NOTE — PATIENT INSTRUCTIONS
Preventive Health Recommendations:     See your health care provider every year to    Review health changes.     Discuss preventive care.      Review your medicines if your doctor has prescribed any.      Talk with your health care provider about whether you should have a test to screen for prostate cancer (PSA).    Every 3 years, have a diabetes test (fasting glucose). If you are at risk for diabetes, you should have this test more often.    Every 5 years, have a cholesterol test. Have this test more often if you are at risk for high cholesterol or heart disease.     Every 10 years, have a colonoscopy. Or, have a yearly FIT test (stool test). These exams will check for colon cancer.    Talk to with your health care provider about screening for Abdominal Aortic Aneurysm if you have a family history of AAA or have a history of smoking.    Shots:     Get a flu shot each year.     Get a tetanus shot every 10 years.     Talk to your doctor about your pneumonia vaccines. There are now two you should receive - Pneumovax (PPSV 23) and Prevnar (PCV 13).     Talk to your pharmacist about a shingles vaccine.     Talk to your doctor about the hepatitis B vaccine.  Nutrition:     Eat at least 5 servings of fruits and vegetables each day.     Eat whole-grain bread, whole-wheat pasta and brown rice instead of white grains and rice.     Get adequate Calcium and Vitamin D.   Lifestyle    Exercise for at least 150 minutes a week (30 minutes a day, 5 days a week). This will help you control your weight and prevent disease.     Limit alcohol to one drink per day.     No smoking.     Wear sunscreen to prevent skin cancer.    See your dentist every six months for an exam and cleaning.    See your eye doctor every 1 to 2 years to screen for conditions such as glaucoma, macular degeneration, cataracts, etc.    Personalized Prevention Plan  You are due for the preventive services outlined below.  Your care team is available to assist you  in scheduling these services.  If you have already completed any of these items, please share that information with your care team to update in your medical record.  Health Maintenance Due   Topic Date Due     Depression Assessment 2 - yearly  11/13/2018     FALL RISK ASSESSMENT  11/13/2018

## 2018-12-05 LAB
ALBUMIN SERPL-MCNC: 3.9 G/DL (ref 3.4–5)
ALP SERPL-CCNC: 90 U/L (ref 40–150)
ALT SERPL W P-5'-P-CCNC: 31 U/L (ref 0–70)
ANION GAP SERPL CALCULATED.3IONS-SCNC: 6 MMOL/L (ref 3–14)
AST SERPL W P-5'-P-CCNC: 27 U/L (ref 0–45)
BILIRUB SERPL-MCNC: 0.8 MG/DL (ref 0.2–1.3)
BUN SERPL-MCNC: 23 MG/DL (ref 7–30)
CALCIUM SERPL-MCNC: 9.4 MG/DL (ref 8.5–10.1)
CHLORIDE SERPL-SCNC: 106 MMOL/L (ref 94–109)
CHOLEST SERPL-MCNC: 159 MG/DL
CO2 SERPL-SCNC: 27 MMOL/L (ref 20–32)
CREAT SERPL-MCNC: 1.05 MG/DL (ref 0.66–1.25)
GFR SERPL CREATININE-BSD FRML MDRD: 68 ML/MIN/1.7M2
GLUCOSE SERPL-MCNC: 94 MG/DL (ref 70–99)
HDLC SERPL-MCNC: 40 MG/DL
LDLC SERPL CALC-MCNC: 98 MG/DL
NONHDLC SERPL-MCNC: 119 MG/DL
POTASSIUM SERPL-SCNC: 4.2 MMOL/L (ref 3.4–5.3)
PROT SERPL-MCNC: 7.5 G/DL (ref 6.8–8.8)
PSA SERPL-ACNC: 2.81 UG/L (ref 0–4)
SODIUM SERPL-SCNC: 139 MMOL/L (ref 133–144)
TRIGL SERPL-MCNC: 103 MG/DL

## 2019-02-20 DIAGNOSIS — Z95.0 CARDIAC PACEMAKER IN SITU: Primary | ICD-10-CM

## 2019-04-15 ENCOUNTER — TELEPHONE (OUTPATIENT)
Dept: CARDIOLOGY | Facility: CLINIC | Age: 79
End: 2019-04-15

## 2019-04-15 NOTE — LETTER
[facility]  [address]  [place] [address]  Phone: [phone]  Fax: [phone]       4/18/19    [employer] [facility] [employer] [place] [employer]  [address] [address]  [place] [address]         Re: Charles Nogueira  Policy Number: [id number]  YOB: 1940         To whom it may concern:       I am writing to request you reconsider your denial of tier exception coverage for Toprol XL (metoprolol succinate), which Charles Nogueira has been on since October 2006, and I continue to prescribe for him.     Listed below are the patient s diagnosis, medical history, and treatment plan, which confirm the medical necessity and appropriateness of Toprol XL.    -History of premature ventricular contractions with preserved ejection fraction no ischemic disease  -HTN-uncontrolled  -Paroxysmal atrial fibrillation with rapid ventricular response when he is in this rhythm    As you know, Toprol XL is a recommended treatment option for hypertension in patients with history of arrhythmia (ACC/AHA [Seven 2017]). I hope you will agree Toprol XL (metoprolol succinate) 50mg daily is appropriate and medically necessary for Charles Nogueira and provide the requested tier exception.     Thank you in advance for your consideration. Please contact me at [phone] for any additional information you may require regarding this appeal.     Sincerely,      Geraldo Burger CNP       Ref:   1. Seven PK, Michelle RM, Tyrese WS, et al. 2017 ACC/AHA/AAPA/ABC/ACPM/AGS/APhA/HUSAM/ASPC/NMA/PCNA guideline for the prevention, detection, evaluation, and management of high blood pressure in adults: a report of the American College of Cardiology/American Heart Association Task Force on Clinical Practice Guidelines [published online ahead of print on November 13, 2017]. Hypertension. doi: 10.1161/HYP.0138270042535908. [PubMed [id number]]

## 2019-04-15 NOTE — TELEPHONE ENCOUNTER
Central Prior Authorization Team   Phone: [phone]    PA Initiation    Medication: metoprolol succinate (TOPROL-XL) 50 MG 24 hr tablet  Insurance Company: [employer] - Phone [phone] Fax [phone]  Pharmacy Filling the Rx: [facility] - [facility] [address]  Filling Pharmacy Phone: [phone]  Filling Pharmacy Fax:    Start Date: 4/15/2019    Received prior authorization form from [employer]. Form filled out and faxed back to insurance at [phone].

## 2019-04-17 NOTE — TELEPHONE ENCOUNTER
Central Prior Authorization Team   Phone: [phone]    PRIOR AUTHORIZATION DENIED    Medication: metoprolol succinate (TOPROL-XL) 50 MG 24 hr tablet-Denied-    Denial Date: 4/17/2019    Denial Rational: Patient must try and fail one additional lower tier drug for the patient's condition (atenolol) that is covered by their plan. Patient may not have to try this medication if the prescriber states that they cannot take it for health reasons, including allergies or side effects. Based on our records you do not meet this criteria. Note, the requested drug is covered by your plan on the second tier.            Appeal Information: If you would like to appeal this decision we would need a letter of medical necessity in order to begin the appeal process. The sooner the letter is written, and we are notified the letter is in the patient's chart, the sooner we can get the appeal initiated. Some appeals can take up to 30 days to be completed.

## 2019-04-18 ENCOUNTER — ANCILLARY PROCEDURE (OUTPATIENT)
Dept: CARDIOLOGY | Facility: CLINIC | Age: 79
End: 2019-04-18
Attending: INTERNAL MEDICINE
Payer: COMMERCIAL

## 2019-04-18 DIAGNOSIS — Z95.0 CARDIAC PACEMAKER IN SITU: ICD-10-CM

## 2019-04-18 PROCEDURE — 93294 REM INTERROG EVL PM/LDLS PM: CPT | Performed by: INTERNAL MEDICINE

## 2019-04-18 PROCEDURE — 93296 REM INTERROG EVL PM/IDS: CPT | Performed by: INTERNAL MEDICINE

## 2019-04-18 NOTE — TELEPHONE ENCOUNTER
Central Prior Authorization Team   Phone: [phone]    Medication Appeal Initiation    We have initiated an appeal for the requested medication:  Medication: metoprolol succinate (TOPROL-XL) 50 MG 24 hr tablet-Denied-Appeal Initiated-  Appeal Start Date:  4/18/2019  Insurance Company: [employer] - Phone [phone] Fax [phone]  Comments:       Faxed appeal request to [employer] at [phone]

## 2019-04-18 NOTE — TELEPHONE ENCOUNTER
Called and spoke with pt's wife, Paula.  Discussed that notification received from prior auth team that tier exception for Toprol XL was denied.  Discussed that Toprol XL is covered on second tier; however, could try and write a letter of appeal to see if tier exception denial could be overturned.  Paula request that letter of appeal be submitted but confirms that if appeal is denied they will be able to afford Toprol XL cost on second tier.     Letter of appeal written and approved by PIERCE Giang    Routed to PA Team to send letter of appeal for tier exception.    VICENTA Guzman 2:52 PM 4/18/2019

## 2019-04-26 LAB
MDC_IDC_LEAD_IMPLANT_DT: NORMAL
MDC_IDC_LEAD_IMPLANT_DT: NORMAL
MDC_IDC_LEAD_LOCATION: NORMAL
MDC_IDC_LEAD_LOCATION: NORMAL
MDC_IDC_LEAD_MFG: NORMAL
MDC_IDC_LEAD_MFG: NORMAL
MDC_IDC_LEAD_MODEL: NORMAL
MDC_IDC_LEAD_MODEL: NORMAL
MDC_IDC_LEAD_POLARITY_TYPE: NORMAL
MDC_IDC_LEAD_POLARITY_TYPE: NORMAL
MDC_IDC_LEAD_SERIAL: NORMAL
MDC_IDC_LEAD_SERIAL: NORMAL
MDC_IDC_MSMT_BATTERY_DTM: NORMAL
MDC_IDC_MSMT_BATTERY_IMPEDANCE: 277 OHM
MDC_IDC_MSMT_BATTERY_REMAINING_LONGEVITY: 134 MO
MDC_IDC_MSMT_BATTERY_STATUS: NORMAL
MDC_IDC_MSMT_BATTERY_VOLTAGE: 2.79 V
MDC_IDC_MSMT_LEADCHNL_RA_IMPEDANCE_VALUE: 530 OHM
MDC_IDC_MSMT_LEADCHNL_RA_PACING_THRESHOLD_AMPLITUDE: 0.5 V
MDC_IDC_MSMT_LEADCHNL_RA_PACING_THRESHOLD_PULSEWIDTH: 0.4 MS
MDC_IDC_MSMT_LEADCHNL_RV_IMPEDANCE_VALUE: 610 OHM
MDC_IDC_MSMT_LEADCHNL_RV_PACING_THRESHOLD_AMPLITUDE: 0.5 V
MDC_IDC_MSMT_LEADCHNL_RV_PACING_THRESHOLD_PULSEWIDTH: 0.4 MS
MDC_IDC_PG_IMPLANT_DTM: NORMAL
MDC_IDC_PG_MFG: NORMAL
MDC_IDC_PG_MODEL: NORMAL
MDC_IDC_PG_SERIAL: NORMAL
MDC_IDC_PG_TYPE: NORMAL
MDC_IDC_SESS_CLINIC_NAME: NORMAL
MDC_IDC_SESS_DTM: NORMAL
MDC_IDC_SESS_TYPE: NORMAL
MDC_IDC_SET_BRADY_AT_MODE_SWITCH_MODE: NORMAL
MDC_IDC_SET_BRADY_AT_MODE_SWITCH_RATE: 175 {BEATS}/MIN
MDC_IDC_SET_BRADY_LOWRATE: 60 {BEATS}/MIN
MDC_IDC_SET_BRADY_MAX_SENSOR_RATE: 130 {BEATS}/MIN
MDC_IDC_SET_BRADY_MAX_TRACKING_RATE: 130 {BEATS}/MIN
MDC_IDC_SET_BRADY_MODE: NORMAL
MDC_IDC_SET_BRADY_PAV_DELAY_LOW: 350 MS
MDC_IDC_SET_BRADY_SAV_DELAY_LOW: 350 MS
MDC_IDC_SET_LEADCHNL_RA_PACING_AMPLITUDE: 1.5 V
MDC_IDC_SET_LEADCHNL_RA_PACING_ANODE_ELECTRODE_1: NORMAL
MDC_IDC_SET_LEADCHNL_RA_PACING_ANODE_LOCATION_1: NORMAL
MDC_IDC_SET_LEADCHNL_RA_PACING_CAPTURE_MODE: NORMAL
MDC_IDC_SET_LEADCHNL_RA_PACING_CATHODE_ELECTRODE_1: NORMAL
MDC_IDC_SET_LEADCHNL_RA_PACING_CATHODE_LOCATION_1: NORMAL
MDC_IDC_SET_LEADCHNL_RA_PACING_POLARITY: NORMAL
MDC_IDC_SET_LEADCHNL_RA_PACING_PULSEWIDTH: 0.4 MS
MDC_IDC_SET_LEADCHNL_RA_SENSING_ANODE_ELECTRODE_1: NORMAL
MDC_IDC_SET_LEADCHNL_RA_SENSING_ANODE_LOCATION_1: NORMAL
MDC_IDC_SET_LEADCHNL_RA_SENSING_CATHODE_ELECTRODE_1: NORMAL
MDC_IDC_SET_LEADCHNL_RA_SENSING_CATHODE_LOCATION_1: NORMAL
MDC_IDC_SET_LEADCHNL_RA_SENSING_POLARITY: NORMAL
MDC_IDC_SET_LEADCHNL_RA_SENSING_SENSITIVITY: 0.5 MV
MDC_IDC_SET_LEADCHNL_RV_PACING_AMPLITUDE: 2 V
MDC_IDC_SET_LEADCHNL_RV_PACING_ANODE_ELECTRODE_1: NORMAL
MDC_IDC_SET_LEADCHNL_RV_PACING_ANODE_LOCATION_1: NORMAL
MDC_IDC_SET_LEADCHNL_RV_PACING_CAPTURE_MODE: NORMAL
MDC_IDC_SET_LEADCHNL_RV_PACING_CATHODE_ELECTRODE_1: NORMAL
MDC_IDC_SET_LEADCHNL_RV_PACING_CATHODE_LOCATION_1: NORMAL
MDC_IDC_SET_LEADCHNL_RV_PACING_POLARITY: NORMAL
MDC_IDC_SET_LEADCHNL_RV_PACING_PULSEWIDTH: 0.46 MS
MDC_IDC_SET_LEADCHNL_RV_SENSING_ANODE_ELECTRODE_1: NORMAL
MDC_IDC_SET_LEADCHNL_RV_SENSING_ANODE_LOCATION_1: NORMAL
MDC_IDC_SET_LEADCHNL_RV_SENSING_CATHODE_ELECTRODE_1: NORMAL
MDC_IDC_SET_LEADCHNL_RV_SENSING_CATHODE_LOCATION_1: NORMAL
MDC_IDC_SET_LEADCHNL_RV_SENSING_POLARITY: NORMAL
MDC_IDC_SET_LEADCHNL_RV_SENSING_SENSITIVITY: 2.8 MV
MDC_IDC_SET_ZONE_DETECTION_INTERVAL: 333.33 MS
MDC_IDC_SET_ZONE_DETECTION_INTERVAL: 342.86 MS
MDC_IDC_SET_ZONE_TYPE: NORMAL
MDC_IDC_SET_ZONE_TYPE: NORMAL
MDC_IDC_STAT_AT_BURDEN_PERCENT: 0 %
MDC_IDC_STAT_AT_DTM_END: NORMAL
MDC_IDC_STAT_AT_DTM_START: NORMAL
MDC_IDC_STAT_AT_MODE_SW_COUNT: 1
MDC_IDC_STAT_BRADY_AP_VP_PERCENT: 0 %
MDC_IDC_STAT_BRADY_AP_VS_PERCENT: 34 %
MDC_IDC_STAT_BRADY_AS_VP_PERCENT: 0 %
MDC_IDC_STAT_BRADY_AS_VS_PERCENT: 66 %
MDC_IDC_STAT_BRADY_DTM_END: NORMAL
MDC_IDC_STAT_BRADY_DTM_START: NORMAL
MDC_IDC_STAT_EPISODE_RECENT_COUNT: 0
MDC_IDC_STAT_EPISODE_RECENT_COUNT: 0
MDC_IDC_STAT_EPISODE_RECENT_COUNT_DTM_END: NORMAL
MDC_IDC_STAT_EPISODE_RECENT_COUNT_DTM_END: NORMAL
MDC_IDC_STAT_EPISODE_RECENT_COUNT_DTM_START: NORMAL
MDC_IDC_STAT_EPISODE_RECENT_COUNT_DTM_START: NORMAL
MDC_IDC_STAT_EPISODE_TYPE: NORMAL
MDC_IDC_STAT_EPISODE_TYPE: NORMAL

## 2019-04-30 NOTE — TELEPHONE ENCOUNTER
Central Prior Authorization Team   Phone: [phone]          MEDICATION APPEAL APPROVED    Medication: metoprolol succinate (TOPROL-XL) 50 MG 24 hr tablet-Denied-Appeal Initiated-Approved-  Authorization Effective Date: 1/19/2019  Authorization Expiration Date: 4/19/2020  Approved Dose/Quantity:   Reference #:     Insurance Company: [employer] - Phone [phone] Fax [phone]  Expected CoPay:       CoPay Card Available:      Foundation Assistance Needed:    Which Pharmacy is filling the prescription (Not needed for infusion/clinic administered): [facility] - [place], [place] - [address]

## 2019-07-09 DIAGNOSIS — I10 ESSENTIAL HYPERTENSION, BENIGN: ICD-10-CM

## 2019-07-09 DIAGNOSIS — I10 ESSENTIAL HYPERTENSION: ICD-10-CM

## 2019-07-09 RX ORDER — AMLODIPINE BESYLATE 5 MG/1
5 TABLET ORAL DAILY
Qty: 90 TABLET | Refills: 0 | Status: SHIPPED | OUTPATIENT
Start: 2019-07-09 | End: 2019-09-12

## 2019-07-09 RX ORDER — METOPROLOL SUCCINATE 50 MG/1
50 TABLET, EXTENDED RELEASE ORAL DAILY
Qty: 90 TABLET | Refills: 0 | Status: SHIPPED | OUTPATIENT
Start: 2019-07-09 | End: 2019-09-12

## 2019-07-24 ENCOUNTER — ANCILLARY PROCEDURE (OUTPATIENT)
Dept: CARDIOLOGY | Facility: CLINIC | Age: 79
End: 2019-07-24
Attending: INTERNAL MEDICINE
Payer: COMMERCIAL

## 2019-07-24 ENCOUNTER — TELEPHONE (OUTPATIENT)
Dept: CARDIOLOGY | Facility: CLINIC | Age: 79
End: 2019-07-24

## 2019-07-24 DIAGNOSIS — Z95.0 CARDIAC PACEMAKER IN SITU: ICD-10-CM

## 2019-07-24 DIAGNOSIS — I48.0 PAROXYSMAL ATRIAL FIBRILLATION (H): Primary | ICD-10-CM

## 2019-07-24 PROCEDURE — 93294 REM INTERROG EVL PM/LDLS PM: CPT | Performed by: INTERNAL MEDICINE

## 2019-07-24 PROCEDURE — 93296 REM INTERROG EVL PM/IDS: CPT | Performed by: INTERNAL MEDICINE

## 2019-07-24 NOTE — TELEPHONE ENCOUNTER
Dr. Witt,    Your patient had an episode of NSVT on today's remote transmission. Episode lasted 18 beats, rates starting  at 150 and ramping up to 190bpm before breaking. EF 55-60% (2018). No associated symptoms. Any changes?        Medtronic Adapta (D) Remote PPM Device Check  AP: 33%  : <1%  Mode: DDDR  Presenting Rhythm: AP/VS  Heart Rate: Adequate rates per histogram  Sensing: stable   Pacing Threshold: stable  Impedance: stable  Battery Status: 8.5-13 years  Atrial Arrhythmia: None  Ventricular Arrhythmia: 1 ventricular high rate. Marker only EGM shows VS events for probable NSVT, lasting 18 beats, rates starting at 150 and ramping up to 190bpm before breaking. EF 55-60% (2018). No associated symptoms. Reviewed with VICENTA Rivas. Will message Dr. Witt with findings.

## 2019-07-25 LAB
MDC_IDC_LEAD_IMPLANT_DT: NORMAL
MDC_IDC_LEAD_IMPLANT_DT: NORMAL
MDC_IDC_LEAD_LOCATION: NORMAL
MDC_IDC_LEAD_LOCATION: NORMAL
MDC_IDC_LEAD_MFG: NORMAL
MDC_IDC_LEAD_MFG: NORMAL
MDC_IDC_LEAD_MODEL: NORMAL
MDC_IDC_LEAD_MODEL: NORMAL
MDC_IDC_LEAD_POLARITY_TYPE: NORMAL
MDC_IDC_LEAD_POLARITY_TYPE: NORMAL
MDC_IDC_LEAD_SERIAL: NORMAL
MDC_IDC_LEAD_SERIAL: NORMAL
MDC_IDC_MSMT_BATTERY_DTM: NORMAL
MDC_IDC_MSMT_BATTERY_IMPEDANCE: 302 OHM
MDC_IDC_MSMT_BATTERY_REMAINING_LONGEVITY: 131 MO
MDC_IDC_MSMT_BATTERY_STATUS: NORMAL
MDC_IDC_MSMT_BATTERY_VOLTAGE: 2.79 V
MDC_IDC_MSMT_LEADCHNL_RA_IMPEDANCE_VALUE: 547 OHM
MDC_IDC_MSMT_LEADCHNL_RA_PACING_THRESHOLD_AMPLITUDE: 0.5 V
MDC_IDC_MSMT_LEADCHNL_RA_PACING_THRESHOLD_PULSEWIDTH: 0.4 MS
MDC_IDC_MSMT_LEADCHNL_RV_IMPEDANCE_VALUE: 642 OHM
MDC_IDC_MSMT_LEADCHNL_RV_PACING_THRESHOLD_AMPLITUDE: 0.5 V
MDC_IDC_MSMT_LEADCHNL_RV_PACING_THRESHOLD_PULSEWIDTH: 0.4 MS
MDC_IDC_PG_IMPLANT_DTM: NORMAL
MDC_IDC_PG_MFG: NORMAL
MDC_IDC_PG_MODEL: NORMAL
MDC_IDC_PG_SERIAL: NORMAL
MDC_IDC_PG_TYPE: NORMAL
MDC_IDC_SESS_CLINIC_NAME: NORMAL
MDC_IDC_SESS_DTM: NORMAL
MDC_IDC_SESS_TYPE: NORMAL
MDC_IDC_SET_BRADY_AT_MODE_SWITCH_MODE: NORMAL
MDC_IDC_SET_BRADY_AT_MODE_SWITCH_RATE: 175 {BEATS}/MIN
MDC_IDC_SET_BRADY_LOWRATE: 60 {BEATS}/MIN
MDC_IDC_SET_BRADY_MAX_SENSOR_RATE: 130 {BEATS}/MIN
MDC_IDC_SET_BRADY_MAX_TRACKING_RATE: 130 {BEATS}/MIN
MDC_IDC_SET_BRADY_MODE: NORMAL
MDC_IDC_SET_BRADY_PAV_DELAY_LOW: 350 MS
MDC_IDC_SET_BRADY_SAV_DELAY_LOW: 350 MS
MDC_IDC_SET_LEADCHNL_RA_PACING_AMPLITUDE: 1.5 V
MDC_IDC_SET_LEADCHNL_RA_PACING_ANODE_ELECTRODE_1: NORMAL
MDC_IDC_SET_LEADCHNL_RA_PACING_ANODE_LOCATION_1: NORMAL
MDC_IDC_SET_LEADCHNL_RA_PACING_CAPTURE_MODE: NORMAL
MDC_IDC_SET_LEADCHNL_RA_PACING_CATHODE_ELECTRODE_1: NORMAL
MDC_IDC_SET_LEADCHNL_RA_PACING_CATHODE_LOCATION_1: NORMAL
MDC_IDC_SET_LEADCHNL_RA_PACING_POLARITY: NORMAL
MDC_IDC_SET_LEADCHNL_RA_PACING_PULSEWIDTH: 0.4 MS
MDC_IDC_SET_LEADCHNL_RA_SENSING_ANODE_ELECTRODE_1: NORMAL
MDC_IDC_SET_LEADCHNL_RA_SENSING_ANODE_LOCATION_1: NORMAL
MDC_IDC_SET_LEADCHNL_RA_SENSING_CATHODE_ELECTRODE_1: NORMAL
MDC_IDC_SET_LEADCHNL_RA_SENSING_CATHODE_LOCATION_1: NORMAL
MDC_IDC_SET_LEADCHNL_RA_SENSING_POLARITY: NORMAL
MDC_IDC_SET_LEADCHNL_RA_SENSING_SENSITIVITY: 0.5 MV
MDC_IDC_SET_LEADCHNL_RV_PACING_AMPLITUDE: 2 V
MDC_IDC_SET_LEADCHNL_RV_PACING_ANODE_ELECTRODE_1: NORMAL
MDC_IDC_SET_LEADCHNL_RV_PACING_ANODE_LOCATION_1: NORMAL
MDC_IDC_SET_LEADCHNL_RV_PACING_CAPTURE_MODE: NORMAL
MDC_IDC_SET_LEADCHNL_RV_PACING_CATHODE_ELECTRODE_1: NORMAL
MDC_IDC_SET_LEADCHNL_RV_PACING_CATHODE_LOCATION_1: NORMAL
MDC_IDC_SET_LEADCHNL_RV_PACING_POLARITY: NORMAL
MDC_IDC_SET_LEADCHNL_RV_PACING_PULSEWIDTH: 0.4 MS
MDC_IDC_SET_LEADCHNL_RV_SENSING_ANODE_ELECTRODE_1: NORMAL
MDC_IDC_SET_LEADCHNL_RV_SENSING_ANODE_LOCATION_1: NORMAL
MDC_IDC_SET_LEADCHNL_RV_SENSING_CATHODE_ELECTRODE_1: NORMAL
MDC_IDC_SET_LEADCHNL_RV_SENSING_CATHODE_LOCATION_1: NORMAL
MDC_IDC_SET_LEADCHNL_RV_SENSING_POLARITY: NORMAL
MDC_IDC_SET_LEADCHNL_RV_SENSING_SENSITIVITY: 2.8 MV
MDC_IDC_SET_ZONE_DETECTION_INTERVAL: 333.33 MS
MDC_IDC_SET_ZONE_DETECTION_INTERVAL: 342.86 MS
MDC_IDC_SET_ZONE_TYPE: NORMAL
MDC_IDC_SET_ZONE_TYPE: NORMAL
MDC_IDC_STAT_AT_BURDEN_PERCENT: 0 %
MDC_IDC_STAT_AT_DTM_END: NORMAL
MDC_IDC_STAT_AT_DTM_START: NORMAL
MDC_IDC_STAT_AT_MODE_SW_COUNT: 1
MDC_IDC_STAT_BRADY_AP_VP_PERCENT: 0 %
MDC_IDC_STAT_BRADY_AP_VS_PERCENT: 33 %
MDC_IDC_STAT_BRADY_AS_VP_PERCENT: 0 %
MDC_IDC_STAT_BRADY_AS_VS_PERCENT: 67 %
MDC_IDC_STAT_BRADY_DTM_END: NORMAL
MDC_IDC_STAT_BRADY_DTM_START: NORMAL
MDC_IDC_STAT_EPISODE_RECENT_COUNT: 0
MDC_IDC_STAT_EPISODE_RECENT_COUNT: 1
MDC_IDC_STAT_EPISODE_RECENT_COUNT_DTM_END: NORMAL
MDC_IDC_STAT_EPISODE_RECENT_COUNT_DTM_END: NORMAL
MDC_IDC_STAT_EPISODE_RECENT_COUNT_DTM_START: NORMAL
MDC_IDC_STAT_EPISODE_RECENT_COUNT_DTM_START: NORMAL
MDC_IDC_STAT_EPISODE_TYPE: NORMAL
MDC_IDC_STAT_EPISODE_TYPE: NORMAL

## 2019-07-26 NOTE — TELEPHONE ENCOUNTER
I would check an echocardiogram.  If maintained LVEF, we cannot increase his metoprolol due to borderline low HR.  I would perform a 7 day Zio patch and likely refer to EP if his echo shows worsening LVEF or enlarging LV.  Michele Witt MD, FACC  July 26, 2019 7:51 AM

## 2019-07-26 NOTE — TELEPHONE ENCOUNTER
Patient is already scheduled for an echocardiogram in September 2019 prior to his OV with Dr. Witt. Contacted patient to review Dr. Witt's recommendations. Patient verbalized understanding and agreed with plan of care and is willing to reschedule his echocardiogram to sooner. [place] will call back on Monday with his schedule to reschedule. Provided Team 4 direct number to call back on Monday to help facilitate reschedule of echocardiogram.

## 2019-08-02 ENCOUNTER — HOSPITAL ENCOUNTER (OUTPATIENT)
Dept: CARDIOLOGY | Facility: CLINIC | Age: 79
Discharge: HOME | End: 2019-08-02
Attending: INTERNAL MEDICINE | Admitting: INTERNAL MEDICINE
Payer: COMMERCIAL

## 2019-08-02 DIAGNOSIS — I05.9 MITRAL VALVE DISEASE: ICD-10-CM

## 2019-08-02 DIAGNOSIS — I34.0 NON-RHEUMATIC MITRAL REGURGITATION: ICD-10-CM

## 2019-08-02 PROCEDURE — 93306 TTE W/DOPPLER COMPLETE: CPT | Mod: 26 | Performed by: INTERNAL MEDICINE

## 2019-08-02 PROCEDURE — 93306 TTE W/DOPPLER COMPLETE: CPT

## 2019-08-05 DIAGNOSIS — I34.0 MITRAL VALVE REGURGITATION: Primary | ICD-10-CM

## 2019-09-12 ENCOUNTER — OFFICE VISIT (OUTPATIENT)
Dept: CARDIOLOGY | Facility: CLINIC | Age: 79
End: 2019-09-12
Payer: COMMERCIAL

## 2019-09-12 VITALS
HEIGHT: 68 IN | BODY MASS INDEX: 23.34 KG/M2 | HEART RATE: 60 BPM | SYSTOLIC BLOOD PRESSURE: 128 MMHG | DIASTOLIC BLOOD PRESSURE: 74 MMHG | WEIGHT: 154 LBS

## 2019-09-12 DIAGNOSIS — Z95.0 CARDIAC PACEMAKER IN SITU: ICD-10-CM

## 2019-09-12 DIAGNOSIS — I48.0 PAROXYSMAL ATRIAL FIBRILLATION (H): ICD-10-CM

## 2019-09-12 DIAGNOSIS — I10 ESSENTIAL HYPERTENSION: ICD-10-CM

## 2019-09-12 DIAGNOSIS — I10 ESSENTIAL HYPERTENSION, BENIGN: ICD-10-CM

## 2019-09-12 DIAGNOSIS — I49.3 PVC (PREMATURE VENTRICULAR CONTRACTION): ICD-10-CM

## 2019-09-12 DIAGNOSIS — I49.5 SICK SINUS SYNDROME (H): ICD-10-CM

## 2019-09-12 DIAGNOSIS — I05.9 MITRAL VALVE DISEASE: Primary | ICD-10-CM

## 2019-09-12 DIAGNOSIS — I34.0 NON-RHEUMATIC MITRAL REGURGITATION: ICD-10-CM

## 2019-09-12 PROCEDURE — 99214 OFFICE O/P EST MOD 30 MIN: CPT | Performed by: INTERNAL MEDICINE

## 2019-09-12 RX ORDER — LOSARTAN POTASSIUM 100 MG/1
100 TABLET ORAL DAILY
Qty: 90 TABLET | Refills: 3 | Status: SHIPPED | OUTPATIENT
Start: 2019-09-12 | End: 2020-05-04

## 2019-09-12 RX ORDER — METOPROLOL SUCCINATE 50 MG/1
50 TABLET, EXTENDED RELEASE ORAL DAILY
Qty: 90 TABLET | Refills: 3 | Status: SHIPPED | OUTPATIENT
Start: 2019-09-12 | End: 2020-05-04

## 2019-09-12 RX ORDER — AMLODIPINE BESYLATE 5 MG/1
5 TABLET ORAL DAILY
Qty: 90 TABLET | Refills: 3 | Status: SHIPPED | OUTPATIENT
Start: 2019-09-12 | End: 2020-05-04

## 2019-09-12 ASSESSMENT — MIFFLIN-ST. JEOR: SCORE: 1385.1

## 2019-09-12 NOTE — LETTER
9/12/2019      Stanley Saldaña MD  [address] [facility] [address]      RE: Charles Nogueira       Dear Colleague,    I had the pleasure of seeing Charles Nogueira in the [facility].    Service Date: 09/12/2019      PRIMARY CARE PHYSICIAN:  Dr. Stanley Saldaña.      HISTORY OF PRESENT ILLNESS:  I again had the pleasure of seeing your patient, Charles Nogueira, at [facility] for evaluation of mitral regurgitation, hypertension, PVCs and paroxysmal atrial fibrillation.  The patient is status post dual-chamber pacemaker implantation at [facility] 07/07/2004 for bradycardia and asystole.  He has not had any further episodes of syncope.  His last pacemaker interrogation in 07/2019 showed an 18-beat run of ventricular tachycardia at a rate up to 190 beats per minute before breaking off.  This lasted 18 beats and has not been seen before.  The patient has not had recurrence of any atrial fibrillation.  An echocardiogram was performed to assess his ejection fraction on 08/02 showing ejection fraction of 55%.  There was moderate mitral regurgitation.  This was eccentric anterior-directed mitral regurgitation with some pulmonary vein reversal suggesting possibly higher degree of MR.  The left atrium was mildly dilated.  The right ventricle was normal.  RVSP was 23 mmHg plus right atrial pressure.  The patient was unaware of any arrhythmia and denied presyncope or syncope.  At my last visit with him his blood pressure was less well controlled.  With the medication changes he is currently on his blood pressure has been excellent.  The patient will have his pacemaker rechecked in November before he leaves for [place] for the winter.  We will review this for any arrhythmias.  The patient's last BMP on 12/04/2018 was normal.  The patient denies chest pain.  He gets 10,000 steps per day walking for 30-35 minutes 3 times a week during the summer when [place]  While in [place] he will walk 6-7 times per week.      PHYSICAL EXAMINATION:  As listed below.      ASSESSMENT:   1.  Charles Nogueira is a delightful 79-year-old male with sick sinus syndrome and episodes of severe bradycardia and syncope.  His dual-chamber pacemaker implanted in  and replaced 2014 is functioning normally.  We will monitor his rhythm in November for ventricular arrhythmias or atrial fibrillation.  To date there is nothing to suggest ischemia or cardiomyopathy.   2.  Paroxysmal atrial fibrillation probably on the basis of mitral regurgitation.  The patient is monitoring his pulse every day and is not currently anticoagulated.  If his pacemaker shows increased frequency of mode switches anticoagulation will be suggested.  The patient has not tolerated platelet inhibitors in the past due to bruising and he is off all aspirin products.   3.  Mitral valve prolapse and moderate mitral regurgitation.  This is relatively stable and we will repeat an echocardiogram in 1 year.   4.  We are using losartan and metoprolol for both blood pressure control and rhythm control.  We could certainly increase his beta blocker a bit should he have further ventricular arrhythmias.   5.  The patient's lipids are normal and well controlled and followed by his primary care physician.      It is my pleasure to assist in the care of Charles Nogueira.  All his questions were answered to his satisfaction.  I will plan on seeing him again in 2020 along with an echocardiogram.      Anette Oliver MD        cc:   Stanley [place]ña [place] [address]         ANETTE OLIVER MD, [facility]             D: 2019   T: 2019   MT: RODRICK      Name:     CHARLES NOGUEIRA   MRN:      [id number]        Account:      [id number]   :      1940           Service Date: 2019      Document: [id number]         Outpatient Encounter  Medications as of 9/12/2019   Medication Sig Dispense Refill     amLODIPine (NORVASC) 5 MG tablet Take 1 tablet (5 mg) by mouth daily 90 tablet 3     glucosamine-chondroitin 500-400 MG CAPS per capsule Take 1 capsule by mouth daily       losartan (COZAAR) 100 MG tablet Take 1 tablet (100 mg) by mouth daily 90 tablet 3     metoprolol succinate ER (TOPROL-XL) 50 MG 24 hr tablet Take 1 tablet (50 mg) by mouth daily 90 tablet 3     [DISCONTINUED] amLODIPine (NORVASC) 5 MG tablet Take 1 tablet (5 mg) by mouth daily 90 tablet 0     [DISCONTINUED] ASPIRIN EC PO Take 81 mg by mouth daily       [DISCONTINUED] losartan (COZAAR) 100 MG tablet Take 1 tablet (100 mg) by mouth daily 90 tablet 3     [DISCONTINUED] metoprolol succinate ER (TOPROL-XL) 50 MG 24 hr tablet Take 1 tablet (50 mg) by mouth daily 90 tablet 0     No facility-administered encounter medications on file as of 9/12/2019.        Again, thank you for allowing me to participate in the care of your patient.      Sincerely,    Michele Witt MD     [facility]

## 2019-09-12 NOTE — LETTER
9/12/2019    Stanley Saldaña MD  [address] [facility] [address]    RE: Charles Nogueira       Dear Colleague,    I had the pleasure of seeing Charles Nogueira in the [facility].    HPI and Plan:   See dictation:[id number]    Orders Placed This Encounter   Procedures     Follow-Up with Cardiologist       Orders Placed This Encounter   Medications     metoprolol succinate ER (TOPROL-XL) 50 MG 24 hr tablet     Sig: Take 1 tablet (50 mg) by mouth daily     Dispense:  90 tablet     Refill:  3     losartan (COZAAR) 100 MG tablet     Sig: Take 1 tablet (100 mg) by mouth daily     Dispense:  90 tablet     Refill:  3     amLODIPine (NORVASC) 5 MG tablet     Sig: Take 1 tablet (5 mg) by mouth daily     Dispense:  90 tablet     Refill:  3       Medications Discontinued During This Encounter   Medication Reason     ASPIRIN EC PO      metoprolol succinate ER (TOPROL-XL) 50 MG 24 hr tablet Reorder     losartan (COZAAR) 100 MG tablet Reorder     amLODIPine (NORVASC) 5 MG tablet Reorder         Encounter Diagnoses   Name Primary?     Mitral valve disease Yes     Non-rheumatic mitral regurgitation      Sick sinus syndrome (H)      Paroxysmal atrial fibrillation (H)      PVC (premature ventricular contraction)      Essential hypertension, benign      Cardiac pacemaker in situ      Essential hypertension        CURRENT MEDICATIONS:  Current Outpatient Medications   Medication Sig Dispense Refill     amLODIPine (NORVASC) 5 MG tablet Take 1 tablet (5 mg) by mouth daily 90 tablet 3     glucosamine-chondroitin 500-400 MG CAPS per capsule Take 1 capsule by mouth daily       losartan (COZAAR) 100 MG tablet Take 1 tablet (100 mg) by mouth daily 90 tablet 3     metoprolol succinate ER (TOPROL-XL) 50 MG 24 hr tablet Take 1 tablet (50 mg) by mouth daily 90 tablet 3       ALLERGIES     Allergies   Allergen Reactions     Sulfa Drugs      rash       PAST MEDICAL HISTORY:  Past Medical History:   Diagnosis  Date     Acute ischemic heart disease (H) 2018     Allergic rhinitis, cause unspecified      Cardiac pacemaker in situ 2017     Dysphagia, oropharyngeal phase 2018    Mild per video swallow study. To use swallowing techniques     Essential hypertension, benign 2016     Heart murmur 5/10/2011     Melanoma (H)      Migraine equivalent 11/10/2015     Non-rheumatic mitral regurgitation 2018     Other forms of migraine, without mention of intractable migraine without mention of status migrainosus      Paroxysmal A-fib (H)      Premature ventricular contraction      PVC (premature ventricular contraction) 5/10/2011     Sick sinus syndrome (H) 2016    s/p PPM implant 14     Squamous cell carcinoma      Syncope        PAST SURGICAL HISTORY:  Past Surgical History:   Procedure Laterality Date     APPENDECTOMY OPEN       BIOPSY OF SKIN LESION       COLONOSCOPY N/A 2015    Procedure: COLONOSCOPY;  Surgeon: David Us MD;  Location: [facility]     IMPLANT PACEMAKER  2004     MOHS MICROGRAPHIC PROCEDURE         FAMILY HISTORY:  Family History   Problem Relation Age of Onset     Heart Disease Mother         MI     Heart Disease Father         MI     C.A.D. Brother         Just had angioplasty in      Colon Cancer No family hx of        SOCIAL HISTORY:  Social History     Socioeconomic History     Marital status:      Spouse name: Paula     Number of children: 3     Years of education: None     Highest education level: None   Occupational History     Employer: RETIRED   Social Needs     Financial resource strain: None     Food insecurity:     Worry: None     Inability: None     Transportation needs:     Medical: None     Non-medical: None   Tobacco Use     Smoking status: Former Smoker     Packs/day: 1.00     Years: 4.00     Pack years: 4.00     Types: Cigarettes     Last attempt to quit: 1967     Years since quittin.7     Smokeless tobacco: Never Used   Substance  "and Sexual Activity     Alcohol use: No     Alcohol/week: 0.0 oz     Drug use: No     Sexual activity: Yes     Partners: Female   Lifestyle     Physical activity:     Days per week: None     Minutes per session: None     Stress: None   Relationships     Social connections:     Talks on phone: None     Gets together: None     Attends Sabianism service: None     Active member of club or organization: None     Attends meetings of clubs or organizations: None     Relationship status: None     Intimate partner violence:     Fear of current or ex partner: None     Emotionally abused: None     Physically abused: None     Forced sexual activity: None   Other Topics Concern      Service Yes     Blood Transfusions No     Caffeine Concern Yes     Comment: decaff coffee     Occupational Exposure No     Hobby Hazards No     Sleep Concern No     Stress Concern No     Weight Concern No     Special Diet No     Back Care No     Exercise Yes     Comment: Excercises about 4-5 days per week     Bike Helmet Yes     Seat Belt Yes     Self-Exams No     Parent/sibling w/ CABG, MI or angioplasty before 65F 55M? Not Asked   Social History Narrative     None       Review of Systems:  Skin:  Positive for bruising     Eyes:  Positive for glasses    ENT:  Negative      Respiratory:  Negative       Cardiovascular:  Negative      Gastroenterology: Negative      Genitourinary:  Negative      Musculoskeletal:  Positive for arthritis;neck pain    Neurologic:  Negative      Psychiatric:  Negative      Heme/Lymph/Imm:  Positive for allergies seasonal allergies, Sulfa drugs   Endocrine:  Negative        Physical Exam:  Vitals: /74 (BP Location: Right arm, Patient Position: Sitting, Cuff Size: Adult Large)   Pulse 60   Ht 1.715 m (5' 7.5\")   Wt 69.9 kg (154 lb)   BMI 23.76 kg/m       Constitutional:  cooperative, alert and oriented, well developed, well nourished, in no acute distress        Skin:  warm and dry to the touch, no apparent " skin lesions or masses noted surgical scars well-healed        Head:  normocephalic, no masses or lesions        Eyes:  pupils equal and round;conjunctivae and lids unremarkable;sclera white;no xanthalasma;EOMS intact        Lymph:      ENT:  no pallor or cyanosis, dentition good        Neck:  carotid pulses are full and equal bilaterally, JVP normal, no carotid bruit        Respiratory:  normal breath sounds, clear to auscultation, normal A-P diameter, normal symmetry, normal respiratory excursion, no use of accessory muscles         Cardiac: normal S1 and S2;no S3 or S4;apical impulse not displaced;regular rhythm       holosystolic murmur;LLSB;radiation to the axilla        pulses full and equal, no bruits auscultated                                        GI:  abdomen soft, non-tender, BS normoactive, no mass, no HSM, no bruits        Extremities and Muscular Skeletal:  no deformities, clubbing, cyanosis, erythema observed;no edema              Neurological:  no gross motor deficits;affect appropriate        Psych:  Alert and Oriented x 3        CC  Stanley Saldaña MD  [address] [place] [address]                Thank you for allowing me to participate in the care of your patient.      Sincerely,     Michele Witt MD     [facility]    cc:   Stanley Saldaña MD  [id number] [place] [address]

## 2019-09-12 NOTE — PROGRESS NOTES
Service Date: 09/12/2019      PRIMARY CARE PHYSICIAN:  Dr. Stanley Saldaña.      HISTORY OF PRESENT ILLNESS:  I again had the pleasure of seeing your patient, Charles Nogueira, at [facility] for evaluation of mitral regurgitation, hypertension, PVCs and paroxysmal atrial fibrillation.  The patient is status post dual-chamber pacemaker implantation at [facility] 07/07/2004 for bradycardia and asystole.  He has not had any further episodes of syncope.  His last pacemaker interrogation in 07/2019 showed an 18-beat run of ventricular tachycardia at a rate up to 190 beats per minute before breaking off.  This lasted 18 beats and has not been seen before.  The patient has not had recurrence of any atrial fibrillation.  An echocardiogram was performed to assess his ejection fraction on 08/02 showing ejection fraction of 55%.  There was moderate mitral regurgitation.  This was eccentric anterior-directed mitral regurgitation with some pulmonary vein reversal suggesting possibly higher degree of MR.  The left atrium was mildly dilated.  The right ventricle was normal.  RVSP was 23 mmHg plus right atrial pressure.  The patient was unaware of any arrhythmia and denied presyncope or syncope.  At my last visit with him his blood pressure was less well controlled.  With the medication changes he is currently on his blood pressure has been excellent.  The patient will have his pacemaker rechecked in November before he leaves for [place] for the winter.  We will review this for any arrhythmias.  The patient's last BMP on 12/04/2018 was normal.  The patient denies chest pain.  He gets 10,000 steps per day walking for 30-35 minutes 3 times a week during the summer [place]  While in [place] he will walk 6-7 times per week.      PHYSICAL EXAMINATION:  As listed below.      ASSESSMENT:   1.  Charles Nogueira is a delightful 79-year-old male with sick sinus syndrome and  episodes of severe bradycardia and syncope.  His dual-chamber pacemaker implanted in  and replaced 2014 is functioning normally.  We will monitor his rhythm in November for ventricular arrhythmias or atrial fibrillation.  To date there is nothing to suggest ischemia or cardiomyopathy.   2.  Paroxysmal atrial fibrillation probably on the basis of mitral regurgitation.  The patient is monitoring his pulse every day and is not currently anticoagulated.  If his pacemaker shows increased frequency of mode switches anticoagulation will be suggested.  The patient has not tolerated platelet inhibitors in the past due to bruising and he is off all aspirin products.   3.  Mitral valve prolapse and moderate mitral regurgitation.  This is relatively stable and we will repeat an echocardiogram in 1 year.   4.  We are using losartan and metoprolol for both blood pressure control and rhythm control.  We could certainly increase his beta blocker a bit should he have further ventricular arrhythmias.   5.  The patient's lipids are normal and well controlled and followed by his primary care physician.      It is my pleasure to assist in the care of Charles Nogueira.  All his questions were answered to his satisfaction.  I will plan on seeing him again in 2020 along with an echocardiogram.      Anette Oliver MD        cc:   [place] [address]         [facility] OLIVER MD, [facility]             D: 2019   T: 2019   MT: RODRICK      Name:     CHARLES NOGUEIRA   MRN:      [id number]        Account:      [id number]   :      1940           Service Date: 2019      Document: [id number]

## 2019-09-12 NOTE — PROGRESS NOTES
HPI and Plan:   See dictation:[id number]    Orders Placed This Encounter   Procedures     Follow-Up with Cardiologist       Orders Placed This Encounter   Medications     metoprolol succinate ER (TOPROL-XL) 50 MG 24 hr tablet     Sig: Take 1 tablet (50 mg) by mouth daily     Dispense:  90 tablet     Refill:  3     losartan (COZAAR) 100 MG tablet     Sig: Take 1 tablet (100 mg) by mouth daily     Dispense:  90 tablet     Refill:  3     amLODIPine (NORVASC) 5 MG tablet     Sig: Take 1 tablet (5 mg) by mouth daily     Dispense:  90 tablet     Refill:  3       Medications Discontinued During This Encounter   Medication Reason     ASPIRIN EC PO      metoprolol succinate ER (TOPROL-XL) 50 MG 24 hr tablet Reorder     losartan (COZAAR) 100 MG tablet Reorder     amLODIPine (NORVASC) 5 MG tablet Reorder         Encounter Diagnoses   Name Primary?     Mitral valve disease Yes     Non-rheumatic mitral regurgitation      Sick sinus syndrome (H)      Paroxysmal atrial fibrillation (H)      PVC (premature ventricular contraction)      Essential hypertension, benign      Cardiac pacemaker in situ      Essential hypertension        CURRENT MEDICATIONS:  Current Outpatient Medications   Medication Sig Dispense Refill     amLODIPine (NORVASC) 5 MG tablet Take 1 tablet (5 mg) by mouth daily 90 tablet 3     glucosamine-chondroitin 500-400 MG CAPS per capsule Take 1 capsule by mouth daily       losartan (COZAAR) 100 MG tablet Take 1 tablet (100 mg) by mouth daily 90 tablet 3     metoprolol succinate ER (TOPROL-XL) 50 MG 24 hr tablet Take 1 tablet (50 mg) by mouth daily 90 tablet 3       ALLERGIES     Allergies   Allergen Reactions     Sulfa Drugs      rash       PAST MEDICAL HISTORY:  Past Medical History:   Diagnosis Date     Acute ischemic heart disease (H) 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 To use  swallowing techniques     Essential hypertension, benign 2016     Heart murmur 5/10/2011     Melanoma (H)      Migraine equivalent 11/10/2015     Non-rheumatic mitral regurgitation 2018     Other forms of migraine, without mention of intractable migraine without mention of status migrainosus      Paroxysmal A-fib (H)      Premature ventricular contraction      PVC (premature ventricular contraction) 5/10/2011     Sick sinus syndrome (H) 2016    s/p PPM implant 14     Squamous cell carcinoma      Syncope        PAST SURGICAL HISTORY:  Past Surgical History:   Procedure Laterality Date     APPENDECTOMY OPEN       BIOPSY OF SKIN LESION       COLONOSCOPY N/A 2015    Procedure: COLONOSCOPY;  Surgeon: David Us MD;  Location: [facility]     IMPLANT PACEMAKER  2004     MOHS MICROGRAPHIC PROCEDURE         FAMILY HISTORY:  Family History   Problem Relation Age of Onset     Heart Disease Mother         MI     Heart Disease Father         MI     C.A.D. Brother         Just had angioplasty in      Colon Cancer No family hx of        SOCIAL HISTORY:  Social History     Socioeconomic History     Marital status:      Spouse name: Paula     Number of children: 3     Years of education: None     Highest education level: None   Occupational History     Employer: RETIRED   Social Needs     Financial resource strain: None     Food insecurity:     Worry: None     Inability: None     Transportation needs:     Medical: None     Non-medical: None   Tobacco Use     Smoking status: Former Smoker     Packs/day: 1.00     Years: 4.00     Pack years: 4.00     Types: Cigarettes     Last attempt to quit: 1967     Years since quittin.7     Smokeless tobacco: Never Used   Substance and Sexual Activity     Alcohol use: No     Alcohol/week: 0.0 oz     Drug use: No     Sexual activity: Yes     Partners: Female   Lifestyle     Physical activity:     Days per week: None     Minutes per session: None  "    Stress: None   Relationships     Social connections:     Talks on phone: None     Gets together: None     Attends Episcopalian service: None     Active member of club or organization: None     Attends meetings of clubs or organizations: None     Relationship status: None     Intimate partner violence:     Fear of current or ex partner: None     Emotionally abused: None     Physically abused: None     Forced sexual activity: None   Other Topics Concern      Service Yes     Blood Transfusions No     Caffeine Concern Yes     Comment: decaff coffee     Occupational Exposure No     Hobby Hazards No     Sleep Concern No     Stress Concern No     Weight Concern No     Special Diet No     Back Care No     Exercise Yes     Comment: Excercises about 4-5 days per week     Bike Helmet Yes     Seat Belt Yes     Self-Exams No     Parent/sibling w/ CABG, MI or angioplasty before 65F 55M? Not Asked   Social History Narrative     None       Review of Systems:  Skin:  Positive for bruising     Eyes:  Positive for glasses    ENT:  Negative      Respiratory:  Negative       Cardiovascular:  Negative      Gastroenterology: Negative      Genitourinary:  Negative      Musculoskeletal:  Positive for arthritis;neck pain    Neurologic:  Negative      Psychiatric:  Negative      Heme/Lymph/Imm:  Positive for allergies seasonal allergies, Sulfa drugs   Endocrine:  Negative        Physical Exam:  Vitals: /74 (BP Location: Right arm, Patient Position: Sitting, Cuff Size: Adult Large)   Pulse 60   Ht 1.715 m (5' 7.5\")   Wt 69.9 kg (154 lb)   BMI 23.76 kg/m      Constitutional:  cooperative, alert and oriented, well developed, well nourished, in no acute distress        Skin:  warm and dry to the touch, no apparent skin lesions or masses noted surgical scars well-healed        Head:  normocephalic, no masses or lesions        Eyes:  pupils equal and round;conjunctivae and lids unremarkable;sclera white;no xanthalasma;EOMS intact "        Lymph:      ENT:  no pallor or cyanosis, dentition good        Neck:  carotid pulses are full and equal bilaterally, JVP normal, no carotid bruit        Respiratory:  normal breath sounds, clear to auscultation, normal A-P diameter, normal symmetry, normal respiratory excursion, no use of accessory muscles         Cardiac: normal S1 and S2;no S3 or S4;apical impulse not displaced;regular rhythm       holosystolic murmur;LLSB;radiation to the axilla        pulses full and equal, no bruits auscultated                                        GI:  abdomen soft, non-tender, BS normoactive, no mass, no HSM, no bruits        Extremities and Muscular Skeletal:  no deformities, clubbing, cyanosis, erythema observed;no edema              Neurological:  no gross motor deficits;affect appropriate        Psych:  Alert and Oriented x 3        CC  Stanley Saldaña MD  [address] [place] [address]

## 2019-10-18 ENCOUNTER — ALLIED HEALTH/NURSE VISIT (OUTPATIENT)
Dept: NURSING | Facility: CLINIC | Age: 79
End: 2019-10-18
Payer: COMMERCIAL

## 2019-10-18 DIAGNOSIS — Z23 NEED FOR PROPHYLACTIC VACCINATION AND INOCULATION AGAINST INFLUENZA: Primary | ICD-10-CM

## 2019-10-18 PROCEDURE — G0008 ADMIN INFLUENZA VIRUS VAC: HCPCS

## 2019-10-18 PROCEDURE — 99207 ZZC NO CHARGE NURSE ONLY: CPT

## 2019-10-18 PROCEDURE — 90662 IIV NO PRSV INCREASED AG IM: CPT

## 2019-11-04 ENCOUNTER — TELEPHONE (OUTPATIENT)
Dept: FAMILY MEDICINE | Facility: CLINIC | Age: 79
End: 2019-11-04

## 2019-11-04 NOTE — TELEPHONE ENCOUNTER
Spoke to wife. Due to medicare. Patient will have done at a pharmacy.     Wife expressed understanding and acceptance of the plan and had no further questions at this time.  Advised can call back to clinic at any time with concerns.       Latonia Santana RN Flex

## 2019-11-04 NOTE — TELEPHONE ENCOUNTER
Patient and wife calling in regards to TD immunization that is due.     Advised that TD (adult, 7+) is due.     Question was is Tdap to be given.   Advised that the current recommendation is that one dose of the Tdap vaccine be substituted for one dose of the Td vaccine between the ages of 11 and 64.    Advised to the CDC website as well.     Please advise what immunizations you would like patient to have at this time.     Wife and patient are anxious about the immunizations that are needed.     Latonia Santana RN Flex

## 2019-11-06 ENCOUNTER — HEALTH MAINTENANCE LETTER (OUTPATIENT)
Age: 79
End: 2019-11-06

## 2019-11-09 NOTE — TELEPHONE ENCOUNTER
[employer] calling from [place], confirming Tdap recommended by Stanley Saldaña MD, they will fax note over informing, informed of note below informing wife reporting has to get at pharmacy, agrees, no action needed  Lydia Stout RN, BSN  Message handled by Nurse Triage.

## 2019-11-13 ENCOUNTER — ANCILLARY PROCEDURE (OUTPATIENT)
Dept: CARDIOLOGY | Facility: CLINIC | Age: 79
End: 2019-11-13
Attending: INTERNAL MEDICINE
Payer: COMMERCIAL

## 2019-11-13 DIAGNOSIS — Z95.0 CARDIAC PACEMAKER IN SITU: ICD-10-CM

## 2019-11-13 PROCEDURE — 93280 PM DEVICE PROGR EVAL DUAL: CPT | Performed by: INTERNAL MEDICINE

## 2019-12-06 ASSESSMENT — ENCOUNTER SYMPTOMS
JOINT SWELLING: 0
WEAKNESS: 0
ARTHRALGIAS: 1
HEADACHES: 0
FEVER: 0
HEARTBURN: 0
DYSURIA: 0
MYALGIAS: 0
CONSTIPATION: 0
CHILLS: 0
ABDOMINAL PAIN: 0
FREQUENCY: 0
SORE THROAT: 0
PALPITATIONS: 0
COUGH: 0
SHORTNESS OF BREATH: 0
DIZZINESS: 0
HEMATOCHEZIA: 0
PARESTHESIAS: 0
DIARRHEA: 0
NAUSEA: 0
EYE PAIN: 0
HEMATURIA: 0
NERVOUS/ANXIOUS: 0

## 2019-12-06 ASSESSMENT — ACTIVITIES OF DAILY LIVING (ADL): CURRENT_FUNCTION: NO ASSISTANCE NEEDED

## 2019-12-06 NOTE — PROGRESS NOTES
"Pre-Visit Planning     Future Appointments   Date Time Provider Department Center   12/9/2019  9:05 AM Stanley Saldaña MD [facility]     Arrival Time for this Appointment:  9:05 AM   Appointment Notes for this encounter:   PHY LAST 12/4/18    Questionnaires Reviewed/Assigned  Additional questionnaires assigned     Patient preferred phone number: [phone]    Spoke to patient via phone. Patient does not have additional questions or concerns.        Visit is preventive. Reviewed purpose of preventive visit with patient.    Health Maintenance Due   Topic Date Due     ANNUAL REVIEW OF HM ORDERS  1940     MIGRAINE ACTION PLAN  1940     ZOSTER IMMUNIZATION (1 of 2) 02/05/1991     FALL RISK ASSESSMENT  11/13/2018     PHQ-2  01/01/2019     DTAP/TDAP/TD IMMUNIZATION (2 - Td) 09/30/2019     MEDICARE ANNUAL WELLNESS VISIT  12/04/2019     CREATININE  12/04/2019     Patient is due for:  shingrix  Patient not interested in shingrix vaccine at this time.     TalkSession  Patient is active on TalkSession.    Questionnaire Review   Offered information on completing questionnaires via TalkSession.    Call Summary  \"Thank you for your time today.  If anything comes up before your appointment, please feel free to contact us at [phone].\"  RAO HernandezN, RN, PHN    "

## 2019-12-09 ENCOUNTER — OFFICE VISIT (OUTPATIENT)
Dept: FAMILY MEDICINE | Facility: CLINIC | Age: 79
End: 2019-12-09
Payer: COMMERCIAL

## 2019-12-09 VITALS
TEMPERATURE: 98.1 F | WEIGHT: 155.44 LBS | HEART RATE: 60 BPM | BODY MASS INDEX: 23.99 KG/M2 | DIASTOLIC BLOOD PRESSURE: 70 MMHG | OXYGEN SATURATION: 99 % | SYSTOLIC BLOOD PRESSURE: 124 MMHG

## 2019-12-09 DIAGNOSIS — Z12.5 SCREENING FOR PROSTATE CANCER: ICD-10-CM

## 2019-12-09 DIAGNOSIS — Z00.00 ENCOUNTER FOR MEDICARE ANNUAL WELLNESS EXAM: Primary | ICD-10-CM

## 2019-12-09 DIAGNOSIS — Z95.0 CARDIAC PACEMAKER IN SITU: ICD-10-CM

## 2019-12-09 DIAGNOSIS — Z00.00 MEDICARE ANNUAL WELLNESS VISIT, SUBSEQUENT: ICD-10-CM

## 2019-12-09 DIAGNOSIS — I10 ESSENTIAL HYPERTENSION: ICD-10-CM

## 2019-12-09 LAB
ALBUMIN SERPL-MCNC: 3.9 G/DL (ref 3.4–5)
ALP SERPL-CCNC: 94 U/L (ref 40–150)
ALT SERPL W P-5'-P-CCNC: 40 U/L (ref 0–70)
ANION GAP SERPL CALCULATED.3IONS-SCNC: 6 MMOL/L (ref 3–14)
AST SERPL W P-5'-P-CCNC: 33 U/L (ref 0–45)
BILIRUB SERPL-MCNC: 0.7 MG/DL (ref 0.2–1.3)
BUN SERPL-MCNC: 21 MG/DL (ref 7–30)
CALCIUM SERPL-MCNC: 9.7 MG/DL (ref 8.5–10.1)
CHLORIDE SERPL-SCNC: 105 MMOL/L (ref 94–109)
CHOLEST SERPL-MCNC: 190 MG/DL
CO2 SERPL-SCNC: 29 MMOL/L (ref 20–32)
CREAT SERPL-MCNC: 1.06 MG/DL (ref 0.66–1.25)
GFR SERPL CREATININE-BSD FRML MDRD: 66 ML/MIN/{1.73_M2}
GLUCOSE SERPL-MCNC: 99 MG/DL (ref 70–99)
HDLC SERPL-MCNC: 39 MG/DL
LDLC SERPL CALC-MCNC: 131 MG/DL
NONHDLC SERPL-MCNC: 151 MG/DL
POTASSIUM SERPL-SCNC: 4.4 MMOL/L (ref 3.4–5.3)
PROT SERPL-MCNC: 7.1 G/DL (ref 6.8–8.8)
PSA SERPL-ACNC: 3.37 UG/L (ref 0–4)
SODIUM SERPL-SCNC: 140 MMOL/L (ref 133–144)
TRIGL SERPL-MCNC: 102 MG/DL

## 2019-12-09 PROCEDURE — 80061 LIPID PANEL: CPT | Performed by: FAMILY MEDICINE

## 2019-12-09 PROCEDURE — 80053 COMPREHEN METABOLIC PANEL: CPT | Performed by: FAMILY MEDICINE

## 2019-12-09 PROCEDURE — 36415 COLL VENOUS BLD VENIPUNCTURE: CPT | Performed by: FAMILY MEDICINE

## 2019-12-09 PROCEDURE — 99397 PER PM REEVAL EST PAT 65+ YR: CPT | Performed by: FAMILY MEDICINE

## 2019-12-09 PROCEDURE — G0103 PSA SCREENING: HCPCS | Performed by: FAMILY MEDICINE

## 2019-12-09 ASSESSMENT — ACTIVITIES OF DAILY LIVING (ADL): CURRENT_FUNCTION: NO ASSISTANCE NEEDED

## 2019-12-09 ASSESSMENT — ENCOUNTER SYMPTOMS
PARESTHESIAS: 0
CHILLS: 0
PALPITATIONS: 0
SORE THROAT: 0
FREQUENCY: 0
COUGH: 0
JOINT SWELLING: 0
WEAKNESS: 0
ABDOMINAL PAIN: 0
DYSURIA: 0
DIZZINESS: 0
NAUSEA: 0
SHORTNESS OF BREATH: 0
MYALGIAS: 0
HEMATURIA: 0
NERVOUS/ANXIOUS: 0
CONSTIPATION: 0
DIARRHEA: 0
FEVER: 0
HEMATOCHEZIA: 0
EYE PAIN: 0
ARTHRALGIAS: 1
HEADACHES: 0
HEARTBURN: 0

## 2019-12-09 NOTE — PROGRESS NOTES
"SUBJECTIVE:   CC: Charles Nogueira is an 79 year old male who presents for preventative health visit.     Healthy Habits:     In general, how would you rate your overall health?  Good    Frequency of exercise:  4-5 days/week    Duration of exercise:  30-45 minutes    Do you usually eat at least 4 servings of fruit and vegetables a day, include whole grains    & fiber and avoid regularly eating high fat or \"junk\" foods?  Yes    Taking medications regularly:  Yes    Medication side effects:  None    Ability to successfully perform activities of daily living:  No assistance needed    Home Safety:  No safety concerns identified    Hearing Impairment:  Difficulty understanding soft or whispered speech    In the past 6 months, have you been bothered by leaking of urine?  No    In general, how would you rate your overall mental or emotional health?  Good      PHQ-2 Total Score: 0    Additional concerns today:  No    Ability to successfully perform activities of daily living: Yes, no assistance needed  Home safety:  none identified   Hearing impairment: None     {Outside tests to abstract? :882997}    {additional problems to add (Optional):082498}    Today's PHQ-2 Score:   PHQ-2 (  [employer]) 2019   Q1: Little interest or pleasure in doing things 0   Q2: Feeling down, depressed or hopeless 0   PHQ-2 Score 0   Q1: Little interest or pleasure in doing things Not at all   Q2: Feeling down, depressed or hopeless Not at all   PHQ-2 Score 0       Abuse: Current or Past(Physical, Sexual or Emotional)- No  Do you feel safe in your environment? Yes        Social History     Tobacco Use     Smoking status: Former Smoker     Packs/day: 1.00     Years: 4.00     Pack years: 4.00     Types: Cigarettes     Last attempt to quit: 1967     Years since quittin.9     Smokeless tobacco: Never Used   Substance Use Topics     Alcohol use: No     Alcohol/week: 0.0 standard drinks     {Rooming Staff- Complete this question if " "Prescreen response is not shown below for today's visit. If you drink alcohol do you typically have >3 drinks per day or >7 drinks per week? (Optional):622057}    Alcohol Use 12/6/2019   Prescreen: >3 drinks/day or >7 drinks/week? Not Applicable   Prescreen: >3 drinks/day or >7 drinks/week? -   {add AUDIT responses (Optional) (A score of 7 for adult men is an indication of hazardous drinking; a score of 8 or more is an indication of an alcohol use disorder.  A score of 7 or more for adult women is an indication of hazardous drinking or an alchohol use disorder):723404}    Last PSA:   PSA   Date Value Ref Range Status   12/04/2018 2.81 0 - 4 ug/L Final     Comment:     Assay Method:  Chemiluminescence using Siemens Vista analyzer       Reviewed orders with patient. Reviewed health maintenance and updated orders accordingly - { :695753::\"Yes\"}  {Chronicprobdata (optional):613604}    Reviewed and updated as needed this visit by clinical staff         Reviewed and updated as needed this visit by Provider        {HISTORY OPTIONS (Optional):669726}    Review of Systems   Constitutional: Negative for chills and fever.   HENT: Negative for congestion, ear pain, hearing loss and sore throat.    Eyes: Negative for pain and visual disturbance.   Respiratory: Negative for cough and shortness of breath.    Cardiovascular: Negative for chest pain, palpitations and peripheral edema.   Gastrointestinal: Negative for abdominal pain, constipation, diarrhea, heartburn, hematochezia and nausea.   Genitourinary: Positive for impotence. Negative for discharge, dysuria, frequency, genital sores, hematuria and urgency.   Musculoskeletal: Positive for arthralgias. Negative for joint swelling and myalgias.   Skin: Negative for rash.   Neurological: Negative for dizziness, weakness, headaches and paresthesias.   Psychiatric/Behavioral: Negative for mood changes. The patient is not nervous/anxious.      {MALE ROS " "(Optional):075131::\"CONSTITUTIONAL: NEGATIVE for fever, chills, change in weight\",\"INTEGUMENTARY/SKIN: NEGATIVE for worrisome rashes, moles or lesions\",\"EYES: NEGATIVE for vision changes or irritation\",\"ENT: NEGATIVE for ear, mouth and throat problems\",\"RESP: NEGATIVE for significant cough or SOB\",\"CV: NEGATIVE for chest pain, palpitations or peripheral edema\",\"GI: NEGATIVE for nausea, abdominal pain, heartburn, or change in bowel habits\",\" male: negative for dysuria, hematuria, decreased urinary stream, erectile dysfunction, urethral discharge\",\"MUSCULOSKELETAL: NEGATIVE for significant arthralgias or myalgia\",\"NEURO: NEGATIVE for weakness, dizziness or paresthesias\",\"PSYCHIATRIC: NEGATIVE for changes in mood or affect\"}    OBJECTIVE:   There were no vitals taken for this visit.    Physical Exam  {Exam Choices (Optional):704547}    {Diagnostic Test Results (Optional):168253::\"Diagnostic Test Results:\",\"Labs reviewed in Epic\"}    ASSESSMENT/PLAN:   {Diag Picklist:680682}    COUNSELING:   {MALE COUNSELING MESSAGES:830769::\"Reviewed preventive health counseling, as reflected in patient instructions\"}    Estimated body mass index is 23.76 kg/m  as calculated from the following:    Height as of 9/12/19: 1.715 m (5' 7.5\").    Weight as of 9/12/19: 69.9 kg (154 lb).     {Weight Management Plan (ACO) Complete if BMI is abnormal-  Ages 18-64  BMI >24.9.  Age 65+ with BMI <23 or >30 (Optional):013028}     reports that he quit smoking about 52 years ago. His smoking use included cigarettes. He has a 4.00 pack-year smoking history. He has never used smokeless tobacco.  {Tobacco Cessation -- Complete if patient is a smoker (Optional):152303}    Counseling Resources:  ATP IV Guidelines  Pooled Cohorts Equation Calculator  FRAX Risk Assessment  ICSI Preventive Guidelines  Dietary Guidelines for Americans, 2010  USDA's MyPlate  ASA Prophylaxis  Lung CA Screening    Stanley Saldaña MD  [facility]"

## 2019-12-09 NOTE — PROGRESS NOTES
"SUBJECTIVE:   Charles Nogueira is a 79 year old male who presents for Preventive Visit.      Are you in the first 12 months of your Medicare coverage?  No    Healthy Habits:     In general, how would you rate your overall health?  Good    Frequency of exercise:  4-5 days/week    Duration of exercise:  30-45 minutes    Do you usually eat at least 4 servings of fruit and vegetables a day, include whole grains    & fiber and avoid regularly eating high fat or \"junk\" foods?  Yes    Taking medications regularly:  Yes    Medication side effects:  None    Ability to successfully perform activities of daily living:  No assistance needed    Home Safety:  No safety concerns identified    Hearing Impairment:  Difficulty understanding soft or whispered speech    In the past 6 months, have you been bothered by leaking of urine?  No    In general, how would you rate your overall mental or emotional health?  Good      PHQ-2 Total Score: 0    Additional concerns today:  No    Do you feel safe in your environment? Yes    Have you ever done Advance Care Planning? (For example, a Health Directive, POLST, or a discussion with a medical provider or your loved ones about your wishes): Yes, patient states has an Advance Care Planning document and will bring a copy to the clinic.      Right Ear:      1000 Hz RESPONSE- on Level: 40 db (Conditioning sound)   1000 Hz: RESPONSE- on Level:   20 db    2000 Hz: RESPONSE- on Level:   20 db    4000 Hz: RESPONSE- on Level:   20 db     Left Ear:      4000 Hz: RESPONSE- on Level:   20 db    2000 Hz: RESPONSE- on Level:   20 db    1000 Hz: RESPONSE- on Level:   20 db     500 Hz: RESPONSE- on Level: 25 db    Right Ear:    500 Hz: RESPONSE- on Level: 25 db    Hearing Acuity: Pass    Hearing Assessment: normal  Fall risk  Fallen 2 or more times in the past year?: (P) No  Any fall with injury in the past year?: (P) No  click delete button to remove this line now  Cognitive Screening   1) Repeat 3 items " (Leader, Season, Table)    2) Clock draw: NORMAL  3) 3 item recall: Recalls 3 objects  Results: 3 items recalled: COGNITIVE IMPAIRMENT LESS LIKELY    Mini-CogTM Copyright SOPHIE Fitch. Licensed by the author for use in [facility]; reprinted with permission (zaid@[facility]). All rights reserved.      Do you have sleep apnea, excessive snoring or daytime drowsiness?: no    Reviewed and updated as needed this visit by clinical staff  Tobacco  Allergies  Med Hx  Surg Hx  Fam Hx  Soc Hx        Reviewed and updated as needed this visit by Provider        Social History     Tobacco Use     Smoking status: Former Smoker     Packs/day: 1.00     Years: 4.00     Pack years: 4.00     Types: Cigarettes     Last attempt to quit: 1967     Years since quittin.9     Smokeless tobacco: Never Used   Substance Use Topics     Alcohol use: No     Alcohol/week: 0.0 standard drinks         Alcohol Use 2019   Prescreen: >3 drinks/day or >7 drinks/week? Not Applicable   Prescreen: >3 drinks/day or >7 drinks/week? -               Current providers sharing in care for this patient include: Cardiology,     Patient Care Team:  Stanley Saldaña MD as PCP - General  Stanley Saldaña MD as Assigned PCP    The following health maintenance items are reviewed in Epic and correct as of today:  Health Maintenance   Topic Date Due     ANNUAL REVIEW OF HM ORDERS  1940     MIGRAINE ACTION PLAN  1940     ZOSTER IMMUNIZATION (1 of 2) 1991     DTAP/TDAP/TD IMMUNIZATION (2 - Td) 2019     MEDICARE ANNUAL WELLNESS VISIT  2019     CREATININE  2019     FALL RISK ASSESSMENT  2020     ADVANCE CARE PLANNING  2022     LIPID  2023     COLONOSCOPY  2025     PHQ-2  Completed     INFLUENZA VACCINE  Completed     PNEUMOCOCCAL IMMUNIZATION 65+ HIGH/HIGHEST RISK  Completed     IPV IMMUNIZATION  Aged Out     MENINGITIS IMMUNIZATION  Aged Out     BP Readings from Last 3  "Encounters:   12/09/19 124/70   09/12/19 128/74   12/04/18 136/63    Wt Readings from Last 3 Encounters:   12/09/19 70.5 kg (155 lb 7 oz)   09/12/19 69.9 kg (154 lb)   12/04/18 70 kg (154 lb 4.8 oz)                  Pneumonia Vaccine:Adults age 65+ who received Pneumovax (PPSV23) at 65 years or older: Should be given PCV13 > 1 year after their most recent PPSV23    Review of Systems   Constitutional: Negative for chills and fever.   HENT: Negative for congestion, ear pain, hearing loss and sore throat.    Eyes: Negative for pain and visual disturbance.   Respiratory: Negative for cough and shortness of breath.    Cardiovascular: Negative for chest pain, palpitations and peripheral edema.   Gastrointestinal: Negative for abdominal pain, constipation, diarrhea, heartburn, hematochezia and nausea.   Genitourinary: Positive for impotence. Negative for discharge, dysuria, frequency, genital sores, hematuria and urgency.   Musculoskeletal: Positive for arthralgias. Negative for joint swelling and myalgias.   Skin: Negative for rash.   Neurological: Negative for dizziness, weakness, headaches and paresthesias.   Psychiatric/Behavioral: Negative for mood changes. The patient is not nervous/anxious.      Constitutional, HEENT, cardiovascular, pulmonary, GI, , musculoskeletal, neuro, skin, endocrine and psych systems are negative, except as otherwise noted.    OBJECTIVE:   /70 (BP Location: Right arm, Patient Position: Chair, Cuff Size: Adult Regular)   Pulse 60   Temp 98.1  F (36.7  C) (Oral)   Wt 70.5 kg (155 lb 7 oz)   SpO2 99%   BMI 23.99 kg/m   Estimated body mass index is 23.99 kg/m  as calculated from the following:    Height as of 9/12/19: 1.715 m (5' 7.5\").    Weight as of this encounter: 70.5 kg (155 lb 7 oz).  Physical Exam  GENERAL: healthy, alert and no distress  EYES: Eyes grossly normal to inspection, PERRL and conjunctivae and sclerae normal  HENT: ear canals and TM's normal, nose and mouth " "without ulcers or lesions  NECK: no adenopathy, no asymmetry, masses, or scars and thyroid normal to palpation  RESP: lungs clear to auscultation - no rales, rhonchi or wheezes  CV: regular rate and rhythm, normal S1 S2, no S3 or S4, no murmur, click or rub, no peripheral edema and peripheral pulses strong  ABDOMEN: soft, nontender, no hepatosplenomegaly, no masses and bowel sounds normal   (male): normal male genitalia without lesions or urethral discharge, no hernia  MS: no gross musculoskeletal defects noted, no edema  SKIN: no suspicious lesions or rashes  NEURO: Normal strength and tone, mentation intact and speech normal  PSYCH: mentation appears normal, affect normal/bright    Diagnostic Test Results:  Labs reviewed in Epic    ASSESSMENT / PLAN:   (Z00.00) Encounter for Medicare annual wellness exam  (primary encounter diagnosis)  Comment: doing well, lives independent   Plan: wilkins in     (Z00.00) Medicare annual wellness visit, subsequent  Comment:   Plan:     (I10) Essential hypertension  Comment:   Plan: Lipid panel reflex to direct LDL Fasting,         Comprehensive metabolic panel        Well controlled     (Z95.0) Cardiac pacemaker in situ  Comment:   Plan: sees [facility]           COUNSELING:  Reviewed preventive health counseling, as reflected in patient instructions       Regular exercise       Healthy diet/nutrition       Vision screening       Hearing screening       Dental care       Fall risk prevention    Estimated body mass index is 23.99 kg/m  as calculated from the following:    Height as of 9/12/19: 1.715 m (5' 7.5\").    Weight as of this encounter: 70.5 kg (155 lb 7 oz).    Weight management plan: Discussed healthy diet and exercise guidelines     reports that he quit smoking about 52 years ago. His smoking use included cigarettes. He has a 4.00 pack-year smoking history. He has never used smokeless tobacco.      Appropriate preventive services were discussed with this patient, " including applicable screening as appropriate for cardiovascular disease, diabetes, osteopenia/osteoporosis, and glaucoma.  As appropriate for age/gender, discussed screening for colorectal cancer, prostate cancer, breast cancer, and cervical cancer. Checklist reviewing preventive services available has been given to the patient.    Reviewed patients plan of care and provided an AVS. The Basic Care Plan (routine screening as documented in Health Maintenance) for Charles meets the Care Plan requirement. This Care Plan has been established and reviewed with the Patient.    Counseling Resources:  ATP IV Guidelines  Pooled Cohorts Equation Calculator  Breast Cancer Risk Calculator  FRAX Risk Assessment  ICSI Preventive Guidelines  Dietary Guidelines for Americans, 2010  USDA's MyPlate  ASA Prophylaxis  Lung CA Screening    Stanley Saldaña MD  [facility]    Identified Health Risks:

## 2019-12-09 NOTE — PATIENT INSTRUCTIONS
Patient Education   Personalized Prevention Plan  You are due for the preventive services outlined below.  Your care team is available to assist you in scheduling these services.  If you have already completed any of these items, please share that information with your care team to update in your medical record.  Health Maintenance Due   Topic Date Due     ANNUAL REVIEW OF HM ORDERS  1940     Migraine Action Plan  1940     Zoster (Shingles) Vaccine (1 of 2) 02/05/1991     Diptheria Tetanus Pertussis (DTAP/TDAP/TD) Vaccine (2 - Td) 09/30/2019     Annual Wellness Visit  12/04/2019     Creatinine Lab  12/04/2019

## 2019-12-10 LAB
MDC_IDC_LEAD_IMPLANT_DT: NORMAL
MDC_IDC_LEAD_IMPLANT_DT: NORMAL
MDC_IDC_LEAD_LOCATION: NORMAL
MDC_IDC_LEAD_LOCATION: NORMAL
MDC_IDC_LEAD_MFG: NORMAL
MDC_IDC_LEAD_MFG: NORMAL
MDC_IDC_LEAD_MODEL: NORMAL
MDC_IDC_LEAD_MODEL: NORMAL
MDC_IDC_LEAD_POLARITY_TYPE: NORMAL
MDC_IDC_LEAD_POLARITY_TYPE: NORMAL
MDC_IDC_LEAD_SERIAL: NORMAL
MDC_IDC_LEAD_SERIAL: NORMAL
MDC_IDC_MSMT_BATTERY_DTM: NORMAL
MDC_IDC_MSMT_BATTERY_IMPEDANCE: 326 OHM
MDC_IDC_MSMT_BATTERY_REMAINING_LONGEVITY: 127 MO
MDC_IDC_MSMT_BATTERY_STATUS: NORMAL
MDC_IDC_MSMT_BATTERY_VOLTAGE: 2.79 V
MDC_IDC_MSMT_LEADCHNL_RA_IMPEDANCE_VALUE: 537 OHM
MDC_IDC_MSMT_LEADCHNL_RA_PACING_THRESHOLD_AMPLITUDE: 0.5 V
MDC_IDC_MSMT_LEADCHNL_RA_PACING_THRESHOLD_AMPLITUDE: 0.5 V
MDC_IDC_MSMT_LEADCHNL_RA_PACING_THRESHOLD_PULSEWIDTH: 0.4 MS
MDC_IDC_MSMT_LEADCHNL_RA_PACING_THRESHOLD_PULSEWIDTH: 0.4 MS
MDC_IDC_MSMT_LEADCHNL_RA_SENSING_INTR_AMPL: 4 MV
MDC_IDC_MSMT_LEADCHNL_RV_IMPEDANCE_VALUE: 659 OHM
MDC_IDC_MSMT_LEADCHNL_RV_PACING_THRESHOLD_AMPLITUDE: 0.5 V
MDC_IDC_MSMT_LEADCHNL_RV_PACING_THRESHOLD_AMPLITUDE: 0.5 V
MDC_IDC_MSMT_LEADCHNL_RV_PACING_THRESHOLD_PULSEWIDTH: 0.4 MS
MDC_IDC_MSMT_LEADCHNL_RV_PACING_THRESHOLD_PULSEWIDTH: 0.4 MS
MDC_IDC_MSMT_LEADCHNL_RV_SENSING_INTR_AMPL: 5.6 MV
MDC_IDC_PG_IMPLANT_DTM: NORMAL
MDC_IDC_PG_MFG: NORMAL
MDC_IDC_PG_MODEL: NORMAL
MDC_IDC_PG_SERIAL: NORMAL
MDC_IDC_PG_TYPE: NORMAL
MDC_IDC_SESS_CLINIC_NAME: NORMAL
MDC_IDC_SESS_DTM: NORMAL
MDC_IDC_SESS_TYPE: NORMAL
MDC_IDC_SET_BRADY_AT_MODE_SWITCH_MODE: NORMAL
MDC_IDC_SET_BRADY_AT_MODE_SWITCH_RATE: 175 {BEATS}/MIN
MDC_IDC_SET_BRADY_LOWRATE: 60 {BEATS}/MIN
MDC_IDC_SET_BRADY_MAX_SENSOR_RATE: 130 {BEATS}/MIN
MDC_IDC_SET_BRADY_MAX_TRACKING_RATE: 130 {BEATS}/MIN
MDC_IDC_SET_BRADY_MODE: NORMAL
MDC_IDC_SET_BRADY_PAV_DELAY_LOW: 350 MS
MDC_IDC_SET_BRADY_SAV_DELAY_LOW: 350 MS
MDC_IDC_SET_LEADCHNL_RA_PACING_AMPLITUDE: 1.5 V
MDC_IDC_SET_LEADCHNL_RA_PACING_ANODE_ELECTRODE_1: NORMAL
MDC_IDC_SET_LEADCHNL_RA_PACING_ANODE_LOCATION_1: NORMAL
MDC_IDC_SET_LEADCHNL_RA_PACING_CAPTURE_MODE: NORMAL
MDC_IDC_SET_LEADCHNL_RA_PACING_CATHODE_ELECTRODE_1: NORMAL
MDC_IDC_SET_LEADCHNL_RA_PACING_CATHODE_LOCATION_1: NORMAL
MDC_IDC_SET_LEADCHNL_RA_PACING_POLARITY: NORMAL
MDC_IDC_SET_LEADCHNL_RA_PACING_PULSEWIDTH: 0.4 MS
MDC_IDC_SET_LEADCHNL_RA_SENSING_ANODE_ELECTRODE_1: NORMAL
MDC_IDC_SET_LEADCHNL_RA_SENSING_ANODE_LOCATION_1: NORMAL
MDC_IDC_SET_LEADCHNL_RA_SENSING_CATHODE_ELECTRODE_1: NORMAL
MDC_IDC_SET_LEADCHNL_RA_SENSING_CATHODE_LOCATION_1: NORMAL
MDC_IDC_SET_LEADCHNL_RA_SENSING_POLARITY: NORMAL
MDC_IDC_SET_LEADCHNL_RA_SENSING_SENSITIVITY: 0.5 MV
MDC_IDC_SET_LEADCHNL_RV_PACING_AMPLITUDE: 2 V
MDC_IDC_SET_LEADCHNL_RV_PACING_ANODE_ELECTRODE_1: NORMAL
MDC_IDC_SET_LEADCHNL_RV_PACING_ANODE_LOCATION_1: NORMAL
MDC_IDC_SET_LEADCHNL_RV_PACING_CAPTURE_MODE: NORMAL
MDC_IDC_SET_LEADCHNL_RV_PACING_CATHODE_ELECTRODE_1: NORMAL
MDC_IDC_SET_LEADCHNL_RV_PACING_CATHODE_LOCATION_1: NORMAL
MDC_IDC_SET_LEADCHNL_RV_PACING_POLARITY: NORMAL
MDC_IDC_SET_LEADCHNL_RV_PACING_PULSEWIDTH: 0.4 MS
MDC_IDC_SET_LEADCHNL_RV_SENSING_ANODE_ELECTRODE_1: NORMAL
MDC_IDC_SET_LEADCHNL_RV_SENSING_ANODE_LOCATION_1: NORMAL
MDC_IDC_SET_LEADCHNL_RV_SENSING_CATHODE_ELECTRODE_1: NORMAL
MDC_IDC_SET_LEADCHNL_RV_SENSING_CATHODE_LOCATION_1: NORMAL
MDC_IDC_SET_LEADCHNL_RV_SENSING_POLARITY: NORMAL
MDC_IDC_SET_LEADCHNL_RV_SENSING_SENSITIVITY: 2.8 MV
MDC_IDC_SET_ZONE_DETECTION_INTERVAL: 333.33 MS
MDC_IDC_SET_ZONE_DETECTION_INTERVAL: 342.86 MS
MDC_IDC_SET_ZONE_TYPE: NORMAL
MDC_IDC_SET_ZONE_TYPE: NORMAL
MDC_IDC_STAT_AT_BURDEN_PERCENT: 0 %
MDC_IDC_STAT_AT_DTM_END: NORMAL
MDC_IDC_STAT_AT_DTM_START: NORMAL
MDC_IDC_STAT_AT_MODE_SW_COUNT: 1
MDC_IDC_STAT_BRADY_AP_VP_PERCENT: 0 %
MDC_IDC_STAT_BRADY_AP_VS_PERCENT: 34 %
MDC_IDC_STAT_BRADY_AS_VP_PERCENT: 0 %
MDC_IDC_STAT_BRADY_AS_VS_PERCENT: 65 %
MDC_IDC_STAT_BRADY_DTM_END: NORMAL
MDC_IDC_STAT_BRADY_DTM_START: NORMAL
MDC_IDC_STAT_BRADY_RA_PERCENT_PACED: 34 %
MDC_IDC_STAT_BRADY_RV_PERCENT_PACED: 0 %
MDC_IDC_STAT_EPISODE_RECENT_COUNT: 0
MDC_IDC_STAT_EPISODE_RECENT_COUNT: 1
MDC_IDC_STAT_EPISODE_RECENT_COUNT_DTM_END: NORMAL
MDC_IDC_STAT_EPISODE_RECENT_COUNT_DTM_END: NORMAL
MDC_IDC_STAT_EPISODE_RECENT_COUNT_DTM_START: NORMAL
MDC_IDC_STAT_EPISODE_RECENT_COUNT_DTM_START: NORMAL
MDC_IDC_STAT_EPISODE_TYPE: NORMAL
MDC_IDC_STAT_EPISODE_TYPE: NORMAL

## 2020-04-24 ENCOUNTER — TELEPHONE (OUTPATIENT)
Dept: CARDIOLOGY | Facility: CLINIC | Age: 80
End: 2020-04-24

## 2020-04-24 ENCOUNTER — ANCILLARY PROCEDURE (OUTPATIENT)
Dept: CARDIOLOGY | Facility: CLINIC | Age: 80
End: 2020-04-24
Attending: INTERNAL MEDICINE
Payer: COMMERCIAL

## 2020-04-24 DIAGNOSIS — Z95.0 PACEMAKER: ICD-10-CM

## 2020-04-24 PROCEDURE — 93296 REM INTERROG EVL PM/IDS: CPT | Performed by: INTERNAL MEDICINE

## 2020-04-24 PROCEDURE — 93294 REM INTERROG EVL PM/LDLS PM: CPT | Performed by: INTERNAL MEDICINE

## 2020-04-24 NOTE — TELEPHONE ENCOUNTER
Dr. Witt,    Your patient had an episode of PAF on today's remote transmission.  Episode occurred on 3/14/20 and lasted 1 hour 43 minutes, ventricular rates 60-180bpm. Patient has had history of PAF, not currently on AC. Any changes?    [employer] Adapta (D) Remote PPM Device Check  AP: 43%  : <1%  Mode: DDDR  Presenting Rhythm: AP/VS  Heart Rate: Adequate rates per histogram  Sensing: stable   Pacing Threshold: stable  Impedance: stable  Battery Status: 8-12 years  Atrial Arrhythmia: 2 mode switch episodes. 1 EGM shows As>Vs with atrial rates >400bpm, lasting 1 hour 43 minutes, ventricular rates 60-180bpm. Patient has had history of PAF, not currently on AC. Reviewed with VICENTA Rivas. Will notify Dr. Witt of findings.   Ventricular Arrhythmia: none    Care Plan: F/u PPM [facility] q 3 months. Gave patient results over the phone. ELIOT GlassT              912/19 OV Dr. Witt  ASSESSMENT:   1.  Charles Nogueira is a delightful 79-year-old male with sick sinus syndrome and episodes of severe bradycardia and syncope.  His dual-chamber pacemaker implanted in 2004 and replaced 07/02/2014 is functioning normally.  We will monitor his rhythm in November for ventricular arrhythmias or atrial fibrillation.  To date there is nothing to suggest ischemia or cardiomyopathy.   2.  Paroxysmal atrial fibrillation probably on the basis of mitral regurgitation.  The patient is monitoring his pulse every day and is not currently anticoagulated.  If his pacemaker shows increased frequency of mode switches anticoagulation will be suggested.  The patient has not tolerated platelet inhibitors in the past due to bruising and he is off all aspirin products.   3.  Mitral valve prolapse and moderate mitral regurgitation.  This is relatively stable and we will repeat an echocardiogram in 1 year.   4.  We are using losartan and metoprolol for both blood pressure control and rhythm control.  We could certainly increase his beta blocker a  bit should he have further ventricular arrhythmias.   5.  The patient's lipids are normal and well controlled and followed by his primary care physician.      It is my pleasure to assist in the care of Charles Nogueira.  All his questions were answered to his satisfaction.  I will plan on seeing him again in 08/2020 along with an echocardiogram.      Michele Witt MD

## 2020-04-24 NOTE — TELEPHONE ENCOUNTER
RN called patient and patient's wife and reviewed with them Dr. Peck's recommendations. Patient and patient's wife verbalized understanding and are both in agreement with plan. RN transferred patient and patient's wife to scheduling to arrange video visit. They will call prior to apt with any further questions/concerns.       Dr. Peck's recommendation    NOE video visit to discuss anticoagulation

## 2020-04-27 PROBLEM — I47.29 PAROXYSMAL VENTRICULAR TACHYCARDIA (H): Status: ACTIVE | Noted: 2019-07-24

## 2020-04-27 PROBLEM — I47.20 PAROXYSMAL VENTRICULAR TACHYCARDIA (H): Status: ACTIVE | Noted: 2019-07-24

## 2020-04-27 LAB
MDC_IDC_LEAD_IMPLANT_DT: NORMAL
MDC_IDC_LEAD_IMPLANT_DT: NORMAL
MDC_IDC_LEAD_LOCATION: NORMAL
MDC_IDC_LEAD_LOCATION: NORMAL
MDC_IDC_LEAD_MFG: NORMAL
MDC_IDC_LEAD_MFG: NORMAL
MDC_IDC_LEAD_MODEL: NORMAL
MDC_IDC_LEAD_MODEL: NORMAL
MDC_IDC_LEAD_POLARITY_TYPE: NORMAL
MDC_IDC_LEAD_POLARITY_TYPE: NORMAL
MDC_IDC_LEAD_SERIAL: NORMAL
MDC_IDC_LEAD_SERIAL: NORMAL
MDC_IDC_MSMT_BATTERY_DTM: NORMAL
MDC_IDC_MSMT_BATTERY_IMPEDANCE: 374 OHM
MDC_IDC_MSMT_BATTERY_REMAINING_LONGEVITY: 121 MO
MDC_IDC_MSMT_BATTERY_STATUS: NORMAL
MDC_IDC_MSMT_BATTERY_VOLTAGE: 2.79 V
MDC_IDC_MSMT_LEADCHNL_RA_IMPEDANCE_VALUE: 573 OHM
MDC_IDC_MSMT_LEADCHNL_RA_PACING_THRESHOLD_AMPLITUDE: 0.5 V
MDC_IDC_MSMT_LEADCHNL_RA_PACING_THRESHOLD_PULSEWIDTH: 0.4 MS
MDC_IDC_MSMT_LEADCHNL_RV_IMPEDANCE_VALUE: 625 OHM
MDC_IDC_MSMT_LEADCHNL_RV_PACING_THRESHOLD_AMPLITUDE: 0.5 V
MDC_IDC_MSMT_LEADCHNL_RV_PACING_THRESHOLD_PULSEWIDTH: 0.4 MS
MDC_IDC_PG_IMPLANT_DTM: NORMAL
MDC_IDC_PG_MFG: NORMAL
MDC_IDC_PG_MODEL: NORMAL
MDC_IDC_PG_SERIAL: NORMAL
MDC_IDC_PG_TYPE: NORMAL
MDC_IDC_SESS_CLINIC_NAME: NORMAL
MDC_IDC_SESS_DTM: NORMAL
MDC_IDC_SESS_TYPE: NORMAL
MDC_IDC_SET_BRADY_AT_MODE_SWITCH_MODE: NORMAL
MDC_IDC_SET_BRADY_AT_MODE_SWITCH_RATE: 175 {BEATS}/MIN
MDC_IDC_SET_BRADY_LOWRATE: 60 {BEATS}/MIN
MDC_IDC_SET_BRADY_MAX_SENSOR_RATE: 130 {BEATS}/MIN
MDC_IDC_SET_BRADY_MAX_TRACKING_RATE: 130 {BEATS}/MIN
MDC_IDC_SET_BRADY_MODE: NORMAL
MDC_IDC_SET_BRADY_PAV_DELAY_LOW: 350 MS
MDC_IDC_SET_BRADY_SAV_DELAY_LOW: 350 MS
MDC_IDC_SET_LEADCHNL_RA_PACING_AMPLITUDE: 1.5 V
MDC_IDC_SET_LEADCHNL_RA_PACING_CAPTURE_MODE: NORMAL
MDC_IDC_SET_LEADCHNL_RA_PACING_POLARITY: NORMAL
MDC_IDC_SET_LEADCHNL_RA_PACING_PULSEWIDTH: 0.4 MS
MDC_IDC_SET_LEADCHNL_RA_SENSING_POLARITY: NORMAL
MDC_IDC_SET_LEADCHNL_RA_SENSING_SENSITIVITY: 0.5 MV
MDC_IDC_SET_LEADCHNL_RV_PACING_AMPLITUDE: 2 V
MDC_IDC_SET_LEADCHNL_RV_PACING_CAPTURE_MODE: NORMAL
MDC_IDC_SET_LEADCHNL_RV_PACING_POLARITY: NORMAL
MDC_IDC_SET_LEADCHNL_RV_PACING_PULSEWIDTH: 0.4 MS
MDC_IDC_SET_LEADCHNL_RV_SENSING_POLARITY: NORMAL
MDC_IDC_SET_LEADCHNL_RV_SENSING_SENSITIVITY: 2.8 MV
MDC_IDC_SET_ZONE_DETECTION_INTERVAL: 333.33 MS
MDC_IDC_SET_ZONE_DETECTION_INTERVAL: 342.86 MS
MDC_IDC_SET_ZONE_TYPE: NORMAL
MDC_IDC_SET_ZONE_TYPE: NORMAL
MDC_IDC_STAT_AT_BURDEN_PERCENT: 0 %
MDC_IDC_STAT_AT_DTM_END: NORMAL
MDC_IDC_STAT_AT_DTM_START: NORMAL
MDC_IDC_STAT_AT_MODE_SW_COUNT: 1
MDC_IDC_STAT_BRADY_AP_VP_PERCENT: 0 %
MDC_IDC_STAT_BRADY_AP_VS_PERCENT: 43 %
MDC_IDC_STAT_BRADY_AS_VP_PERCENT: 0 %
MDC_IDC_STAT_BRADY_AS_VS_PERCENT: 57 %
MDC_IDC_STAT_BRADY_DTM_END: NORMAL
MDC_IDC_STAT_BRADY_DTM_START: NORMAL
MDC_IDC_STAT_EPISODE_RECENT_COUNT: 0
MDC_IDC_STAT_EPISODE_RECENT_COUNT: 1
MDC_IDC_STAT_EPISODE_RECENT_COUNT_DTM_END: NORMAL
MDC_IDC_STAT_EPISODE_RECENT_COUNT_DTM_END: NORMAL
MDC_IDC_STAT_EPISODE_RECENT_COUNT_DTM_START: NORMAL
MDC_IDC_STAT_EPISODE_RECENT_COUNT_DTM_START: NORMAL
MDC_IDC_STAT_EPISODE_TYPE: NORMAL
MDC_IDC_STAT_EPISODE_TYPE: NORMAL

## 2020-05-04 ENCOUNTER — VIRTUAL VISIT (OUTPATIENT)
Dept: CARDIOLOGY | Facility: CLINIC | Age: 80
End: 2020-05-04
Payer: COMMERCIAL

## 2020-05-04 VITALS
DIASTOLIC BLOOD PRESSURE: 69 MMHG | SYSTOLIC BLOOD PRESSURE: 122 MMHG | HEIGHT: 70 IN | HEART RATE: 63 BPM | WEIGHT: 149 LBS | BODY MASS INDEX: 21.33 KG/M2

## 2020-05-04 DIAGNOSIS — I10 ESSENTIAL HYPERTENSION, BENIGN: ICD-10-CM

## 2020-05-04 DIAGNOSIS — I34.0 NONRHEUMATIC MITRAL VALVE REGURGITATION: Primary | ICD-10-CM

## 2020-05-04 DIAGNOSIS — I10 ESSENTIAL HYPERTENSION: ICD-10-CM

## 2020-05-04 PROCEDURE — 99214 OFFICE O/P EST MOD 30 MIN: CPT | Mod: 95 | Performed by: NURSE PRACTITIONER

## 2020-05-04 RX ORDER — METOPROLOL SUCCINATE 50 MG/1
50 TABLET, EXTENDED RELEASE ORAL DAILY
Qty: 90 TABLET | Refills: 3 | Status: SHIPPED | OUTPATIENT
Start: 2020-05-04 | End: 2021-07-13

## 2020-05-04 RX ORDER — LOSARTAN POTASSIUM 100 MG/1
100 TABLET ORAL DAILY
Qty: 90 TABLET | Refills: 3 | Status: SHIPPED | OUTPATIENT
Start: 2020-05-04 | End: 2021-07-06

## 2020-05-04 RX ORDER — AMLODIPINE BESYLATE 5 MG/1
5 TABLET ORAL DAILY
Qty: 90 TABLET | Refills: 3 | Status: SHIPPED | OUTPATIENT
Start: 2020-05-04 | End: 2021-07-06

## 2020-05-04 ASSESSMENT — MIFFLIN-ST. JEOR: SCORE: 1389.17

## 2020-05-04 NOTE — PROGRESS NOTES
"Charles Nogueira is a 80 year old male who is being evaluated via a billable video visit.      The patient has been notified of following:     \"This video visit will be conducted via a call between you and your physician/provider. We have found that certain health care needs can be provided without the need for an in-person physical exam.  This service lets us provide the care you need with a video conversation.  If a prescription is necessary we can send it directly to your pharmacy.  If lab work is needed we can place an order for that and you can then stop by our lab to have the test done at a later time.    Video visits are billed at different rates depending on your insurance coverage.  Please reach out to your insurance provider with any questions.    If during the course of the call the physician/provider feels a video visit is not appropriate, you will not be charged for this service.\"    Patient has given verbal consent for Video visit? Yes    How would you like to obtain your AVS? Theresa    Patient would like the video invitation sent by: Text to cell phone: [phone]    Will anyone else be joining your video visit? Paula Wife    Review Of Systems  Skin: NEGATIVE  Eyes:Ears/Nose/Throat: NEGATIVE  Respiratory: NEGATIVE  Cardiovascular:NEGATIVE  Gastrointestinal: NEGATIVE  Genitourinary:NEGATIVE   Musculoskeletal: NEGATIVE  Neurologic: NEGATIVE  Psychiatric: NEGATIVE  Hematologic/Lymphatic/Immunologic: NEGATIVE  Endocrine:  NEGATIVE  Vital signs reported by patient  BP.122/69  P. 63  Wt. 149lb  Ht. 5'9.5\"  Silvia Gamez Lpn      Video-Visit Details    Type of service:  Video Visit    Video Start Time: 10:56  Video End Time: 11:08    Originating Location (pt. Location): Home    Distant Location (provider location):  [facility]     Platform used for Video Visit: Doximity    HPI and Plan:   I had the pleasure of seeing Charles Nogueira today at [facility] for evaluation of paroxysmal atrial firbillation. He is a pleasant 80 year old patient of Dr. Witt.     Mr. Nogueira has a past medical history significant for paroxysmal atrial fibrillation, bradycardia and asystole status post permanent pacemaker, mitral regurgitation, hypertension and PVCs.      Patient had an episode of symptomatic bradycardia followed by asystole in 2004 for which he underwent a dual-chamber permanent pacemaker implantation at [facility].  He has not experience further episodes of syncope.  His last pacemaker interrogation on April 29, 2020 showed a 1 hour and 43-minute run of atrial fibrillation.  The patient denied being symptomatic at the time.  No other arrhythmias noted.    An echocardiogram in August 2, 2019 showed a normal left ventricular systolic function with an ejection fraction of 55%.  There was moderate mitral regurgitation. This was eccentric anterior directed mitral regurgitation with some pulmonary vein reversal suggesting possibly higher degree of MR.  The left atrium was mildly dilated.  The right ventricle was normal.  RVSP was 23 mmHg + right atrial pressure.    Today the patient presents for virtual cardiology visit.  He has been doing well since his last visit with .  He denies chest pain and shortness of breath.  The patient was not aware he was in atrial fibrillation on his last device check.  He remains quite active walking 3 to 4 miles a day 6-7 times a week.  He eats well balanced diet.    ROS:  12-pt ROS is negative except for as noted above.    EXAM:  A limited exam was conducted via video.  Constitutional: Patient is pleasant, alert, in no distress. Normal body habitus, upright.  ENT: Membranes moist, no nasal discharge or bleeding gums. Normal head shape, no evidence of injury or laceration.  EYES: No scleral icterus, normal conjunctivae. EOM's appear intact.   Neck: No evidence of thyromegaly.   Chest/Lungs: Appears to have  normal respiratory effort. No audible wheezing, no cough, equal chest wall expansion.   Cardiovascular: No evidence of elevated JVP. No evidence of pitting edema bilaterally.  Abdomen: No evidence of abdominal distention. No observed jaundice.  Extremities: No edema, erythema, cyanosis noted.  Skin: No rashes or lesions appreciated on visualized skin, normal skin color.  Neurologic: Normal mentation. Normal arm motion bilaterally, no tremors. No evidence of focal defect.  Psychiatric: Appropriate affect. A&Ox3.      Assessment and Plan  1.  Paroxysmal atrial fibrillation likely related to mitral regurgitation.  On recent device check, the patient was in atrial fibrillation for 1 hour and 43 minutes on March 14, 2020.  He denies being symptomatic at that time.  The patient is monitoring his pulse daily and is not currently anticoagulated.  If his pacemaker shows an increased frequency of mode switches or the patient notices irregularities on his pulse checks, anticoagulation would be suggested at that time.  The patient has not tolerated platelet inhibitors in the past due to significant bruising.  We will continue to monitor this.    2.  Sick sinus syndrome with episodes of bradycardia and syncope status post dual-chamber pacemaker implantation in 2004 and replaced in 2014.  Most recent device check in April 2020.  Results above.  Continue routine device checks.    3.  Mitral valve prolapse and moderate mitral regurgitation.  This is relatively stable over the last several years.  We will repeat an echocardiogram in August 2020.    4.  Hypertension.  His blood pressure is well controlled.  Continue current regimen.    Thank you for allowing me to care for Charles PEPE Nogueira today.    Video duration: 12 minutes    This visit is being conducted as a virtual visit due to the emphasis on mitigation of the COVID-19 virus pandemic. The clinician has decided that the risk of an in-office visit outweighs the benefit for this  patient.     CORBIN Marr, CNP  Cardiology    Voice recognition software was used for this note, I have reviewed this note, but errors may have been missed.    No orders of the defined types were placed in this encounter.    Orders Placed This Encounter   Medications     amLODIPine (NORVASC) 5 MG tablet     Sig: Take 1 tablet (5 mg) by mouth daily     Dispense:  90 tablet     Refill:  3     losartan (COZAAR) 100 MG tablet     Sig: Take 1 tablet (100 mg) by mouth daily     Dispense:  90 tablet     Refill:  3     metoprolol succinate ER (TOPROL-XL) 50 MG 24 hr tablet     Sig: Take 1 tablet (50 mg) by mouth daily     Dispense:  90 tablet     Refill:  3     Medications Discontinued During This Encounter   Medication Reason     amLODIPine (NORVASC) 5 MG tablet Reorder     losartan (COZAAR) 100 MG tablet Reorder     metoprolol succinate ER (TOPROL-XL) 50 MG 24 hr tablet Reorder         CURRENT MEDICATIONS:  Current Outpatient Medications   Medication Sig Dispense Refill     amLODIPine (NORVASC) 5 MG tablet Take 1 tablet (5 mg) by mouth daily 90 tablet 3     losartan (COZAAR) 100 MG tablet Take 1 tablet (100 mg) by mouth daily 90 tablet 3     metoprolol succinate ER (TOPROL-XL) 50 MG 24 hr tablet Take 1 tablet (50 mg) by mouth daily 90 tablet 3       ALLERGIES     Allergies   Allergen Reactions     Sulfa Drugs      rash       PAST MEDICAL HISTORY:  Past Medical History:   Diagnosis Date     Acute ischemic heart disease (H) 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 To use swallowing techniques     Essential hypertension, benign 11/11/2016     Heart murmur 5/10/2011     Melanoma (H)      Migraine equivalent 11/10/2015     Non-rheumatic mitral regurgitation 8/9/2018     Other forms of migraine, without mention of intractable migraine without mention of status migrainosus      Paroxysmal A-fib  (H)      Premature ventricular contraction      PVC (premature ventricular contraction) 5/10/2011     Sick sinus syndrome (H) 2016    s/p PPM implant 14     Squamous cell carcinoma      Syncope        PAST SURGICAL HISTORY:  Past Surgical History:   Procedure Laterality Date     APPENDECTOMY OPEN       BIOPSY OF SKIN LESION       COLONOSCOPY N/A 2015    Procedure: COLONOSCOPY;  Surgeon: David Us MD;  Location: [facility]     IMPLANT PACEMAKER  2004     MOHS MICROGRAPHIC PROCEDURE         FAMILY HISTORY:  Family History   Problem Relation Age of Onset     Heart Disease Mother         MI     Heart Disease Father         MI     C.A.D. Brother         Just had angioplasty in      Colon Cancer No family hx of        SOCIAL HISTORY:  Social History     Socioeconomic History     Marital status:      Spouse name: Paula     Number of children: 3     Years of education: None     Highest education level: None   Occupational History     Employer: RETIRED   Social Needs     Financial resource strain: None     Food insecurity     Worry: None     Inability: None     Transportation needs     Medical: None     Non-medical: None   Tobacco Use     Smoking status: Former Smoker     Packs/day: 1.00     Years: 4.00     Pack years: 4.00     Types: Cigarettes     Last attempt to quit: 1967     Years since quittin.3     Smokeless tobacco: Never Used   Substance and Sexual Activity     Alcohol use: No     Alcohol/week: 0.0 standard drinks     Drug use: No     Sexual activity: Yes     Partners: Female   Lifestyle     Physical activity     Days per week: None     Minutes per session: None     Stress: None   Relationships     Social connections     Talks on phone: None     Gets together: None     Attends Latter day service: None     Active member of club or organization: None     Attends meetings of clubs or organizations: None     Relationship status: None     Intimate partner violence     Fear of  current or ex partner: None     Emotionally abused: None     Physically abused: None     Forced sexual activity: None   Other Topics Concern      Service Yes     Blood Transfusions No     Caffeine Concern Yes     Comment: decaff coffee     Occupational Exposure No     Hobby Hazards No     Sleep Concern No     Stress Concern No     Weight Concern No     Special Diet No     Back Care No     Exercise Yes     Comment: Excercises about 4-5 days per week     Bike Helmet Yes     Seat Belt Yes     Self-Exams No     Parent/sibling w/ CABG, MI or angioplasty before 65F 55M? Not Asked   Social History Narrative     None         Recent Lab Results:  LIPID RESULTS:  Lab Results   Component Value Date    CHOL 190 12/09/2019    HDL 39 (L) 12/09/2019     (H) 12/09/2019    TRIG 102 12/09/2019    CHOLHDLRATIO 4.5 11/10/2015       LIVER ENZYME RESULTS:  Lab Results   Component Value Date    AST 33 12/09/2019    ALT 40 12/09/2019       CBC RESULTS:  Lab Results   Component Value Date    WBC 9.7 09/21/2018    RBC 5.19 09/21/2018    HGB 15.4 09/21/2018    HCT 47.7 09/21/2018    MCV 92 09/21/2018    MCH 29.7 09/21/2018    MCHC 32.3 09/21/2018    RDW 13.5 09/21/2018     09/21/2018       BMP RESULTS:  Lab Results   Component Value Date     12/09/2019    POTASSIUM 4.4 12/09/2019    CHLORIDE 105 12/09/2019    CO2 29 12/09/2019    ANIONGAP 6 12/09/2019    GLC 99 12/09/2019    BUN 21 12/09/2019    CR 1.06 12/09/2019    GFRESTIMATED 66 12/09/2019    GFRESTBLACK 77 12/09/2019    MANUELA 9.7 12/09/2019        A1C RESULTS:  No results found for: A1C    INR RESULTS:  No results found for: INR        CC  Stanley Saldaña MD  [address] [place] [address]

## 2020-05-04 NOTE — LETTER
5/4/2020    Stanley Saldaña MD  [id number] [place] [address]    RE: Charles Nogueira  [address] [place] [address]       Dear Colleague,    Thank you for the opportunity to participate in the care of your patient, Charles Nogueira, at the [facility]. Please see a copy of my visit note below.    I had the pleasure of seeing Charles Nogueira today at [facility] for evaluation of paroxysmal atrial firbillation. He is a pleasant 80 year old patient of Dr. Witt.     Mr. Nogueira has a past medical history significant for paroxysmal atrial fibrillation, bradycardia and asystole status post permanent pacemaker, mitral regurgitation, hypertension and PVCs.      Patient had an episode of symptomatic bradycardia followed by asystole in 2004 for which he underwent a dual-chamber permanent pacemaker implantation at [facility].  He has not experience further episodes of syncope.  His last pacemaker interrogation on April 29, 2020 showed a 1 hour and 43-minute run of atrial fibrillation.  The patient denied being symptomatic at the time.  No other arrhythmias noted.    An echocardiogram in August 2, 2019 showed a normal left ventricular systolic function with an ejection fraction of 55%.  There was moderate mitral regurgitation. This was eccentric anterior directed mitral regurgitation with some pulmonary vein reversal suggesting possibly higher degree of MR.  The left atrium was mildly dilated.  The right ventricle was normal.  RVSP was 23 mmHg + right atrial pressure.    Today the patient presents for virtual cardiology visit.  He has been doing well since his last visit with .  He denies chest pain and shortness of breath.  The patient was not aware he was in atrial fibrillation on his last device check.  He remains quite active walking 3 to 4 miles a day 6-7  times a week.  He eats well balanced diet.    ROS:  12-pt ROS is negative except for as noted above.    EXAM:  A limited exam was conducted via video.  Constitutional: Patient is pleasant, alert, in no distress. Normal body habitus, upright.  ENT: Membranes moist, no nasal discharge or bleeding gums. Normal head shape, no evidence of injury or laceration.  EYES: No scleral icterus, normal conjunctivae. EOM's appear intact.   Neck: No evidence of thyromegaly.   Chest/Lungs: Appears to have normal respiratory effort. No audible wheezing, no cough, equal chest wall expansion.   Cardiovascular: No evidence of elevated JVP. No evidence of pitting edema bilaterally.  Abdomen: No evidence of abdominal distention. No observed jaundice.  Extremities: No edema, erythema, cyanosis noted.  Skin: No rashes or lesions appreciated on visualized skin, normal skin color.  Neurologic: Normal mentation. Normal arm motion bilaterally, no tremors. No evidence of focal defect.  Psychiatric: Appropriate affect. A&Ox3.    Assessment and Plan  1.  Paroxysmal atrial fibrillation likely related to mitral regurgitation.  On recent device check, the patient was in atrial fibrillation for 1 hour and 43 minutes on March 14, 2020.  He denies being symptomatic at that time.  The patient is monitoring his pulse daily and is not currently anticoagulated.  If his pacemaker shows an increased frequency of mode switches or the patient notices irregularities on his pulse checks, anticoagulation would be suggested at that time.  The patient has not tolerated platelet inhibitors in the past due to significant bruising.  We will continue to monitor this.    2.  Sick sinus syndrome with episodes of bradycardia and syncope status post dual-chamber pacemaker implantation in 2004 and replaced in 2014.  Most recent device check in April 2020.  Results above.  Continue routine device checks.    3.  Mitral valve prolapse and moderate mitral regurgitation.  This is  relatively stable over the last several years.  We will repeat an echocardiogram in August 2020.    4.  Hypertension.  His blood pressure is well controlled.  Continue current regimen.    Thank you for allowing me to care for Charles Nogueira today.    Video duration: 12 minutes    This visit is being conducted as a virtual visit due to the emphasis on mitigation of the COVID-19 virus pandemic. The clinician has decided that the risk of an in-office visit outweighs the benefit for this patient.     Voice recognition software was used for this note, I have reviewed this note, but errors may have been missed.    Please do not hesitate to contact me if you have any questions/concerns.     Sincerely,     CORBIN Marr CNP

## 2020-07-29 ENCOUNTER — ANCILLARY PROCEDURE (OUTPATIENT)
Dept: CARDIOLOGY | Facility: CLINIC | Age: 80
End: 2020-07-29
Attending: INTERNAL MEDICINE
Payer: COMMERCIAL

## 2020-07-29 DIAGNOSIS — Z95.0 CARDIAC PACEMAKER IN SITU: ICD-10-CM

## 2020-07-29 LAB
MDC_IDC_LEAD_IMPLANT_DT: NORMAL
MDC_IDC_LEAD_IMPLANT_DT: NORMAL
MDC_IDC_LEAD_LOCATION: NORMAL
MDC_IDC_LEAD_LOCATION: NORMAL
MDC_IDC_LEAD_MFG: NORMAL
MDC_IDC_LEAD_MFG: NORMAL
MDC_IDC_LEAD_MODEL: NORMAL
MDC_IDC_LEAD_MODEL: NORMAL
MDC_IDC_LEAD_POLARITY_TYPE: NORMAL
MDC_IDC_LEAD_POLARITY_TYPE: NORMAL
MDC_IDC_LEAD_SERIAL: NORMAL
MDC_IDC_LEAD_SERIAL: NORMAL
MDC_IDC_MSMT_BATTERY_DTM: NORMAL
MDC_IDC_MSMT_BATTERY_IMPEDANCE: 424 OHM
MDC_IDC_MSMT_BATTERY_REMAINING_LONGEVITY: 115 MO
MDC_IDC_MSMT_BATTERY_STATUS: NORMAL
MDC_IDC_MSMT_BATTERY_VOLTAGE: 2.79 V
MDC_IDC_MSMT_LEADCHNL_RA_IMPEDANCE_VALUE: 564 OHM
MDC_IDC_MSMT_LEADCHNL_RA_PACING_THRESHOLD_AMPLITUDE: 0.5 V
MDC_IDC_MSMT_LEADCHNL_RA_PACING_THRESHOLD_PULSEWIDTH: 0.4 MS
MDC_IDC_MSMT_LEADCHNL_RV_IMPEDANCE_VALUE: 621 OHM
MDC_IDC_MSMT_LEADCHNL_RV_PACING_THRESHOLD_AMPLITUDE: 0.62 V
MDC_IDC_MSMT_LEADCHNL_RV_PACING_THRESHOLD_PULSEWIDTH: 0.4 MS
MDC_IDC_PG_IMPLANT_DTM: NORMAL
MDC_IDC_PG_MFG: NORMAL
MDC_IDC_PG_MODEL: NORMAL
MDC_IDC_PG_SERIAL: NORMAL
MDC_IDC_PG_TYPE: NORMAL
MDC_IDC_SESS_CLINIC_NAME: NORMAL
MDC_IDC_SESS_DTM: NORMAL
MDC_IDC_SESS_TYPE: NORMAL
MDC_IDC_SET_BRADY_AT_MODE_SWITCH_MODE: NORMAL
MDC_IDC_SET_BRADY_AT_MODE_SWITCH_RATE: 175 {BEATS}/MIN
MDC_IDC_SET_BRADY_LOWRATE: 60 {BEATS}/MIN
MDC_IDC_SET_BRADY_MAX_SENSOR_RATE: 130 {BEATS}/MIN
MDC_IDC_SET_BRADY_MAX_TRACKING_RATE: 130 {BEATS}/MIN
MDC_IDC_SET_BRADY_MODE: NORMAL
MDC_IDC_SET_BRADY_PAV_DELAY_LOW: 350 MS
MDC_IDC_SET_BRADY_SAV_DELAY_LOW: 350 MS
MDC_IDC_SET_LEADCHNL_RA_PACING_AMPLITUDE: 1.5 V
MDC_IDC_SET_LEADCHNL_RA_PACING_CAPTURE_MODE: NORMAL
MDC_IDC_SET_LEADCHNL_RA_PACING_POLARITY: NORMAL
MDC_IDC_SET_LEADCHNL_RA_PACING_PULSEWIDTH: 0.4 MS
MDC_IDC_SET_LEADCHNL_RA_SENSING_POLARITY: NORMAL
MDC_IDC_SET_LEADCHNL_RA_SENSING_SENSITIVITY: 0.5 MV
MDC_IDC_SET_LEADCHNL_RV_PACING_AMPLITUDE: 2 V
MDC_IDC_SET_LEADCHNL_RV_PACING_CAPTURE_MODE: NORMAL
MDC_IDC_SET_LEADCHNL_RV_PACING_POLARITY: NORMAL
MDC_IDC_SET_LEADCHNL_RV_PACING_PULSEWIDTH: 0.4 MS
MDC_IDC_SET_LEADCHNL_RV_SENSING_POLARITY: NORMAL
MDC_IDC_SET_LEADCHNL_RV_SENSING_SENSITIVITY: 2.8 MV
MDC_IDC_SET_ZONE_DETECTION_INTERVAL: 333.33 MS
MDC_IDC_SET_ZONE_DETECTION_INTERVAL: 342.86 MS
MDC_IDC_SET_ZONE_TYPE: NORMAL
MDC_IDC_SET_ZONE_TYPE: NORMAL
MDC_IDC_STAT_AT_BURDEN_PERCENT: 0 %
MDC_IDC_STAT_AT_DTM_END: NORMAL
MDC_IDC_STAT_AT_DTM_START: NORMAL
MDC_IDC_STAT_AT_MODE_SW_COUNT: 1
MDC_IDC_STAT_BRADY_AP_VP_PERCENT: 0 %
MDC_IDC_STAT_BRADY_AP_VS_PERCENT: 45 %
MDC_IDC_STAT_BRADY_AS_VP_PERCENT: 0 %
MDC_IDC_STAT_BRADY_AS_VS_PERCENT: 55 %
MDC_IDC_STAT_BRADY_DTM_END: NORMAL
MDC_IDC_STAT_BRADY_DTM_START: NORMAL
MDC_IDC_STAT_EPISODE_RECENT_COUNT: 0
MDC_IDC_STAT_EPISODE_RECENT_COUNT: 1
MDC_IDC_STAT_EPISODE_RECENT_COUNT_DTM_END: NORMAL
MDC_IDC_STAT_EPISODE_RECENT_COUNT_DTM_END: NORMAL
MDC_IDC_STAT_EPISODE_RECENT_COUNT_DTM_START: NORMAL
MDC_IDC_STAT_EPISODE_RECENT_COUNT_DTM_START: NORMAL
MDC_IDC_STAT_EPISODE_TYPE: NORMAL
MDC_IDC_STAT_EPISODE_TYPE: NORMAL

## 2020-07-29 PROCEDURE — 93296 REM INTERROG EVL PM/IDS: CPT | Performed by: INTERNAL MEDICINE

## 2020-07-29 PROCEDURE — 93294 REM INTERROG EVL PM/LDLS PM: CPT | Performed by: INTERNAL MEDICINE

## 2020-08-18 ENCOUNTER — HOSPITAL ENCOUNTER (OUTPATIENT)
Dept: CARDIOLOGY | Facility: CLINIC | Age: 80
Discharge: HOME | End: 2020-08-18
Attending: NURSE PRACTITIONER | Admitting: NURSE PRACTITIONER
Payer: COMMERCIAL

## 2020-08-18 DIAGNOSIS — I34.0 NONRHEUMATIC MITRAL VALVE REGURGITATION: ICD-10-CM

## 2020-08-18 DIAGNOSIS — I10 ESSENTIAL HYPERTENSION, BENIGN: ICD-10-CM

## 2020-08-18 PROCEDURE — 93306 TTE W/DOPPLER COMPLETE: CPT | Mod: 26 | Performed by: INTERNAL MEDICINE

## 2020-08-18 PROCEDURE — 93306 TTE W/DOPPLER COMPLETE: CPT

## 2020-08-27 ENCOUNTER — TELEPHONE (OUTPATIENT)
Dept: CARDIOLOGY | Facility: CLINIC | Age: 80
End: 2020-08-27

## 2020-08-27 NOTE — TELEPHONE ENCOUNTER
Patient called and advised RN he had not received his echo results yet and was curious what they were. RN returned patient's call and advised him of the results at a high level, but advised patient that RN would send to [facility] for review to inquire if any further recommendations prior to apt on 9/22/20. RN also released results in [facility] per patient's request, patient verbalized understanding and has no further questions/concerns at this time.       8/18/20 ECHO  1. Normal biventricular size and function. Left ventricular ejection fraction  of 60-65%. No segmental wall motion abnormalities noted.  2. The left atrium is mildly dilated. Right atrial size is normal..  3. There is mild to moderate (1-2+), primary mitral regurgitation. The  severity might be under estimated due to eccentric nature of the jet.  Compared to the previous echocardiogram on 8/2/2019, mitral regurgitation  appears mildly improved. This may be an underestimation due to eccentric  nature of the jet.  Technically adequate study.

## 2020-09-22 ENCOUNTER — OFFICE VISIT (OUTPATIENT)
Dept: CARDIOLOGY | Facility: CLINIC | Age: 80
End: 2020-09-22
Payer: COMMERCIAL

## 2020-09-22 VITALS
HEART RATE: 68 BPM | HEIGHT: 67 IN | WEIGHT: 151 LBS | SYSTOLIC BLOOD PRESSURE: 124 MMHG | DIASTOLIC BLOOD PRESSURE: 74 MMHG | BODY MASS INDEX: 23.7 KG/M2

## 2020-09-22 DIAGNOSIS — I48.0 PAF (PAROXYSMAL ATRIAL FIBRILLATION) (H): Primary | ICD-10-CM

## 2020-09-22 PROCEDURE — 99214 OFFICE O/P EST MOD 30 MIN: CPT | Performed by: NURSE PRACTITIONER

## 2020-09-22 ASSESSMENT — MIFFLIN-ST. JEOR: SCORE: 1362.52

## 2020-09-22 NOTE — LETTER
9/22/2020    Stanley Saldaña MD  [address]  [facility] [address]    RE: Charles Nogueira       Dear Colleague,    I had the pleasure of seeing Charles Nogueira in the [facility].    HPI and Plan:   I had the pleasure of seeing Charles Nogueira today at [facility] for evaluation of paroxysmal atrial firbillation. He is a pleasant 80 year old patient of Dr. Witt.      Mr. Nogueira has a past medical history significant for paroxysmal atrial fibrillation, bradycardia and asystole status post permanent pacemaker, mitral regurgitation, hypertension and PVCs.       Patient had an episode of symptomatic bradycardia followed by asystole in 2004 for which he underwent a dual-chamber permanent pacemaker implantation at [facility].  He has not experience further episodes of syncope.  Echo in August 2019 showed a LV EF of 55%.  There was moderate mitral regurgitation.  This was eccentric anterior directed mitral regurgitation with some pulmonary vein reversal suggesting possibly higher degree of MR.  The LA was mildly dilated.  RVSP was 23 mmHg plus RA pressure.    Pacemaker interrogation on April 29, 2020 showed a 1 hour and 43-minute run of atrial fibrillation.  The patient denied being symptomatic at the time.  No other arrhythmias noted.  He was not started on anticoagulation given the single, isolated atrial fibrillation event.     A repeat echocardiogram in August 2020 showed a normal left ventricular systolic function with an ejection fraction of 60 to 65%.  The LA was mildly dilated.  Mild to moderate MR, primary MR.  The severity might be underestimated due to the concentric nature of the jet.  His most recent device check in July 2020 not show evidence of atrial fibrillation or any ventricular arrhythmias.    Today presents for his annual cardiology visit.  He has been doing well since our last virtual visit in May 2020.  He denies  palpitations, chest pain, shortness of breath.  He remains quite active walking 3 to 4 miles 5-6 times a week.  He eats a well-balanced, cardiac diet.    Physical Exam  Please see Below     Assessment and Plan  1.  Paroxysmal atrial fibrillation likely related to mitral regurgitation.  On recent device check, there was no evidence of atrial fibrillation.  He continues to deny palpitations.   He remains without anticoagulation given a single, isolated run of atrial fibrillation in the spring 2020. If his coming pacemaker check shows an increased frequency of mode switches or the patient notices irregularities on his pulse checks, anticoagulation would be suggested at that time.  The patient has not tolerated platelet inhibitors in the past due to significant bruising.  We will continue to monitor his routine device checks.  Follow-up with a cardiologist in 1 year.  Follow-up sooner with an NOE should he develop atrial fibrillation.     2.  Sick sinus syndrome with episodes of bradycardia and syncope status post dual-chamber pacemaker implantation in 2004 and replaced in 2014.  Most recent device check in July 2020.  Results above.  Continue routine device checks.     3.  Mitral valve prolapse and mild-moderate mitral regurgitation.  This is relatively stable over the last several years.    Continue with routine echocardiograms.     4.  Hypertension.  His blood pressure is well controlled.  Continue current regimen.    Thank you for allowing me to care for Charles Nogueira today.    CORBIN Marr, CNP  Cardiology    Voice recognition software was used for this note, I have reviewed this note, but errors may have been missed.    No orders of the defined types were placed in this encounter.    No orders of the defined types were placed in this encounter.    There are no discontinued medications.      CURRENT MEDICATIONS:  Current Outpatient Medications   Medication Sig Dispense Refill     amLODIPine (NORVASC) 5 MG  tablet Take 1 tablet (5 mg) by mouth daily 90 tablet 3     losartan (COZAAR) 100 MG tablet Take 1 tablet (100 mg) by mouth daily 90 tablet 3     metoprolol succinate ER (TOPROL-XL) 50 MG 24 hr tablet Take 1 tablet (50 mg) by mouth daily 90 tablet 3       ALLERGIES     Allergies   Allergen Reactions     Sulfa Drugs      rash       PAST MEDICAL HISTORY:  Past Medical History:   Diagnosis Date     Acute ischemic heart disease (H) 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 To use swallowing techniques     Essential hypertension, benign 11/11/2016     Heart murmur 5/10/2011     Melanoma (H)      Migraine equivalent 11/10/2015     Mitral valve prolapse      Non-rheumatic mitral regurgitation 8/9/2018     Other forms of migraine, without mention of intractable migraine without mention of status migrainosus      Paroxysmal A-fib (H)      Premature ventricular contraction      PVC (premature ventricular contraction) 5/10/2011     Sick sinus syndrome (H) 05/03/2016    s/p PPM implant 7/2/14     Squamous cell carcinoma      Syncope        PAST SURGICAL HISTORY:  Past Surgical History:   Procedure Laterality Date     APPENDECTOMY OPEN       BIOPSY OF SKIN LESION       COLONOSCOPY N/A 11/20/2015    Procedure: COLONOSCOPY;  Surgeon: David Us MD;  Location: [facility]     IMPLANT PACEMAKER  7/2004     MOHS MICROGRAPHIC PROCEDURE         FAMILY HISTORY:  Family History   Problem Relation Age of Onset     Heart Disease Mother         MI     Heart Disease Father         MI     C.A.D. Brother         Just had angioplasty in 2006     Colon Cancer No family hx of        SOCIAL HISTORY:  Social History     Socioeconomic History     Marital status:      Spouse name: Paula     Number of children: 3     Years of education: None     Highest education level: None   Occupational History     Employer:  RETIRED   Social Needs     Financial resource strain: None     Food insecurity     Worry: None     Inability: None     Transportation needs     Medical: None     Non-medical: None   Tobacco Use     Smoking status: Former Smoker     Packs/day: 1.00     Years: 4.00     Pack years: 4.00     Types: Cigarettes     Last attempt to quit: 1967     Years since quittin.7     Smokeless tobacco: Never Used   Substance and Sexual Activity     Alcohol use: No     Alcohol/week: 0.0 standard drinks     Drug use: No     Sexual activity: Yes     Partners: Female   Lifestyle     Physical activity     Days per week: None     Minutes per session: None     Stress: None   Relationships     Social connections     Talks on phone: None     Gets together: None     Attends Zoroastrianism service: None     Active member of club or organization: None     Attends meetings of clubs or organizations: None     Relationship status: None     Intimate partner violence     Fear of current or ex partner: None     Emotionally abused: None     Physically abused: None     Forced sexual activity: None   Other Topics Concern      Service Yes     Blood Transfusions No     Caffeine Concern Yes     Comment: decaff coffee     Occupational Exposure No     Hobby Hazards No     Sleep Concern No     Stress Concern No     Weight Concern No     Special Diet No     Back Care No     Exercise Yes     Comment: Excercises about 4-5 days per week     Bike Helmet Yes     Seat Belt Yes     Self-Exams No     Parent/sibling w/ CABG, MI or angioplasty before 65F 55M? Not Asked   Social History Narrative     None       Review of Systems:  Skin:  Negative       Eyes:  Positive for glasses;visual blurring    ENT:  Negative      Respiratory:  Positive for cough     Cardiovascular:  Negative      Gastroenterology: Negative      Genitourinary:  not assessed      Musculoskeletal:  Negative      Neurologic:  Negative      Psychiatric:  Negative      Heme/Lymph/Imm:  Negative   "    Endocrine:  Negative        Physical Exam:  Vitals: /74   Pulse 68   Ht 1.708 m (5' 7.25\")   Wt 68.5 kg (151 lb)   BMI 23.47 kg/m      Constitutional:  cooperative, alert and oriented, well developed, well nourished, in no acute distress        Skin:  warm and dry to the touch surgical scars well-healed        Head:  normocephalic        Eyes:  pupils equal and round;sclera white        Lymph:      ENT:  no pallor or cyanosis        Neck:  carotid pulses are full and equal bilaterally, JVP normal, no carotid bruit        Respiratory:  normal breath sounds, clear to auscultation, normal A-P diameter, normal symmetry, normal respiratory excursion, no use of accessory muscles         Cardiac: normal S1 and S2;no S3 or S4;apical impulse not displaced;regular rhythm       holosystolic murmur;LLSB;radiation to the axilla        pulses full and equal     2+;right radial artery             left radial artery;2+                    GI:  abdomen soft;non-tender        Extremities and Muscular Skeletal:  no deformities, clubbing, cyanosis, erythema observed;no edema              Neurological:  no gross motor deficits;affect appropriate        Psych:  Alert and Oriented x 3    No diagnosis found.    Recent Lab Results:  LIPID RESULTS:  Lab Results   Component Value Date    CHOL 190 12/09/2019    HDL 39 (L) 12/09/2019     (H) 12/09/2019    TRIG 102 12/09/2019    CHOLHDLRATIO 4.5 11/10/2015       LIVER ENZYME RESULTS:  Lab Results   Component Value Date    AST 33 12/09/2019    ALT 40 12/09/2019       CBC RESULTS:  Lab Results   Component Value Date    WBC 9.7 09/21/2018    RBC 5.19 09/21/2018    HGB 15.4 09/21/2018    HCT 47.7 09/21/2018    MCV 92 09/21/2018    MCH 29.7 09/21/2018    MCHC 32.3 09/21/2018    RDW 13.5 09/21/2018     09/21/2018       BMP RESULTS:  Lab Results   Component Value Date     12/09/2019    POTASSIUM 4.4 12/09/2019    CHLORIDE 105 12/09/2019    CO2 29 12/09/2019    ANIONGAP " 6 12/09/2019    GLC 99 12/09/2019    BUN 21 12/09/2019    CR 1.06 12/09/2019    GFRESTIMATED 66 12/09/2019    GFRESTBLACK 77 12/09/2019    MANUELA 9.7 12/09/2019        A1C RESULTS:  No results found for: A1C    INR RESULTS:  No results found for: INR      Thank you for allowing me to participate in the care of your patient.    Sincerely,     CORBIN Marr CNP     [facility]

## 2020-09-22 NOTE — PROGRESS NOTES
HPI and Plan:   I had the pleasure of seeing Charles Nogueira today at [facility] for evaluation of paroxysmal atrial firbillation. He is a pleasant 80 year old patient of Dr. Witt.      Mr. Nogueira has a past medical history significant for paroxysmal atrial fibrillation, bradycardia and asystole status post permanent pacemaker, mitral regurgitation, hypertension and PVCs.       Patient had an episode of symptomatic bradycardia followed by asystole in 2004 for which he underwent a dual-chamber permanent pacemaker implantation at [facility] [place].  He has not experience further episodes of syncope.  Echo in August 2019 showed a LV EF of 55%.  There was moderate mitral regurgitation.  This was eccentric anterior directed mitral regurgitation with some pulmonary vein reversal suggesting possibly higher degree of MR.  The LA was mildly dilated.  RVSP was 23 mmHg plus RA pressure.    Pacemaker interrogation on April 29, 2020 showed a 1 hour and 43-minute run of atrial fibrillation.  The patient denied being symptomatic at the time.  No other arrhythmias noted.  He was not started on anticoagulation given the single, isolated atrial fibrillation event.     A repeat echocardiogram in August 2020 showed a normal left ventricular systolic function with an ejection fraction of 60 to 65%.  The LA was mildly dilated.  Mild to moderate MR, primary MR.  The severity might be underestimated due to the concentric nature of the jet.  His most recent device check in July 2020 not show evidence of atrial fibrillation or any ventricular arrhythmias.    Today presents for his annual cardiology visit.  He has been doing well since our last virtual visit in May 2020.  He denies palpitations, chest pain, shortness of breath.  He remains quite active walking 3 to 4 miles 5-6 times a week.  He eats a well-balanced, cardiac diet.    Physical Exam  Please see Below     Assessment and Plan  1.  Paroxysmal  atrial fibrillation likely related to mitral regurgitation.  On recent device check, there was no evidence of atrial fibrillation.  He continues to deny palpitations.   He remains without anticoagulation given a single, isolated run of atrial fibrillation in the spring 2020. If his coming pacemaker check shows an increased frequency of mode switches or the patient notices irregularities on his pulse checks, anticoagulation would be suggested at that time.  The patient has not tolerated platelet inhibitors in the past due to significant bruising.  We will continue to monitor his routine device checks.  Follow-up with a cardiologist in 1 year.  Follow-up sooner with an NOE should he develop atrial fibrillation.     2.  Sick sinus syndrome with episodes of bradycardia and syncope status post dual-chamber pacemaker implantation in 2004 and replaced in 2014.  Most recent device check in July 2020.  Results above.  Continue routine device checks.     3.  Mitral valve prolapse and mild-moderate mitral regurgitation.  This is relatively stable over the last several years.    Continue with routine echocardiograms.     4.  Hypertension.  His blood pressure is well controlled.  Continue current regimen.    Thank you for allowing me to care for Charles PEPE SalazarNogueira today.    CORBIN Marr, CNP  Cardiology    Voice recognition software was used for this note, I have reviewed this note, but errors may have been missed.    No orders of the defined types were placed in this encounter.    No orders of the defined types were placed in this encounter.    There are no discontinued medications.      CURRENT MEDICATIONS:  Current Outpatient Medications   Medication Sig Dispense Refill     amLODIPine (NORVASC) 5 MG tablet Take 1 tablet (5 mg) by mouth daily 90 tablet 3     losartan (COZAAR) 100 MG tablet Take 1 tablet (100 mg) by mouth daily 90 tablet 3     metoprolol succinate ER (TOPROL-XL) 50 MG 24 hr tablet Take 1 tablet (50 mg) by  mouth daily 90 tablet 3       ALLERGIES     Allergies   Allergen Reactions     Sulfa Drugs      rash       PAST MEDICAL HISTORY:  Past Medical History:   Diagnosis Date     Acute ischemic heart disease (H) 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 To use swallowing techniques     Essential hypertension, benign 11/11/2016     Heart murmur 5/10/2011     Melanoma (H)      Migraine equivalent 11/10/2015     Mitral valve prolapse      Non-rheumatic mitral regurgitation 8/9/2018     Other forms of migraine, without mention of intractable migraine without mention of status migrainosus      Paroxysmal A-fib (H)      Premature ventricular contraction      PVC (premature ventricular contraction) 5/10/2011     Sick sinus syndrome (H) 05/03/2016    s/p PPM implant 7/2/14     Squamous cell carcinoma      Syncope        PAST SURGICAL HISTORY:  Past Surgical History:   Procedure Laterality Date     APPENDECTOMY OPEN       BIOPSY OF SKIN LESION       COLONOSCOPY N/A 11/20/2015    Procedure: COLONOSCOPY;  Surgeon: David Us MD;  Location: [facility]     IMPLANT PACEMAKER  7/2004     MOHS MICROGRAPHIC PROCEDURE         FAMILY HISTORY:  Family History   Problem Relation Age of Onset     Heart Disease Mother         MI     Heart Disease Father         MI     C.A.D. Brother         Just had angioplasty in 2006     Colon Cancer No family hx of        SOCIAL HISTORY:  Social History     Socioeconomic History     Marital status:      Spouse name: Paula     Number of children: 3     Years of education: None     Highest education level: None   Occupational History     Employer: RETIRED   Social Needs     Financial resource strain: None     Food insecurity     Worry: None     Inability: None     Transportation needs     Medical: None     Non-medical: None   Tobacco Use     Smoking status: Former Smoker  "    Packs/day: 1.00     Years: 4.00     Pack years: 4.00     Types: Cigarettes     Last attempt to quit: 1967     Years since quittin.7     Smokeless tobacco: Never Used   Substance and Sexual Activity     Alcohol use: No     Alcohol/week: 0.0 standard drinks     Drug use: No     Sexual activity: Yes     Partners: Female   Lifestyle     Physical activity     Days per week: None     Minutes per session: None     Stress: None   Relationships     Social connections     Talks on phone: None     Gets together: None     Attends Sikhism service: None     Active member of club or organization: None     Attends meetings of clubs or organizations: None     Relationship status: None     Intimate partner violence     Fear of current or ex partner: None     Emotionally abused: None     Physically abused: None     Forced sexual activity: None   Other Topics Concern      Service Yes     Blood Transfusions No     Caffeine Concern Yes     Comment: decaff coffee     Occupational Exposure No     Hobby Hazards No     Sleep Concern No     Stress Concern No     Weight Concern No     Special Diet No     Back Care No     Exercise Yes     Comment: Excercises about 4-5 days per week     Bike Helmet Yes     Seat Belt Yes     Self-Exams No     Parent/sibling w/ CABG, MI or angioplasty before 65F 55M? Not Asked   Social History Narrative     None       Review of Systems:  Skin:  Negative       Eyes:  Positive for glasses;visual blurring    ENT:  Negative      Respiratory:  Positive for cough     Cardiovascular:  Negative      Gastroenterology: Negative      Genitourinary:  not assessed      Musculoskeletal:  Negative      Neurologic:  Negative      Psychiatric:  Negative      Heme/Lymph/Imm:  Negative      Endocrine:  Negative        Physical Exam:  Vitals: /74   Pulse 68   Ht 1.708 m (5' 7.25\")   Wt 68.5 kg (151 lb)   BMI 23.47 kg/m      Constitutional:  cooperative, alert and oriented, well developed, well " nourished, in no acute distress        Skin:  warm and dry to the touch surgical scars well-healed        Head:  normocephalic        Eyes:  pupils equal and round;sclera white        Lymph:      ENT:  no pallor or cyanosis        Neck:  carotid pulses are full and equal bilaterally, JVP normal, no carotid bruit        Respiratory:  normal breath sounds, clear to auscultation, normal A-P diameter, normal symmetry, normal respiratory excursion, no use of accessory muscles         Cardiac: normal S1 and S2;no S3 or S4;apical impulse not displaced;regular rhythm       holosystolic murmur;LLSB;radiation to the axilla        pulses full and equal     2+;right radial artery             left radial artery;2+                    GI:  abdomen soft;non-tender        Extremities and Muscular Skeletal:  no deformities, clubbing, cyanosis, erythema observed;no edema              Neurological:  no gross motor deficits;affect appropriate        Psych:  Alert and Oriented x 3    No diagnosis found.    Recent Lab Results:  LIPID RESULTS:  Lab Results   Component Value Date    CHOL 190 12/09/2019    HDL 39 (L) 12/09/2019     (H) 12/09/2019    TRIG 102 12/09/2019    CHOLHDLRATIO 4.5 11/10/2015       LIVER ENZYME RESULTS:  Lab Results   Component Value Date    AST 33 12/09/2019    ALT 40 12/09/2019       CBC RESULTS:  Lab Results   Component Value Date    WBC 9.7 09/21/2018    RBC 5.19 09/21/2018    HGB 15.4 09/21/2018    HCT 47.7 09/21/2018    MCV 92 09/21/2018    MCH 29.7 09/21/2018    MCHC 32.3 09/21/2018    RDW 13.5 09/21/2018     09/21/2018       BMP RESULTS:  Lab Results   Component Value Date     12/09/2019    POTASSIUM 4.4 12/09/2019    CHLORIDE 105 12/09/2019    CO2 29 12/09/2019    ANIONGAP 6 12/09/2019    GLC 99 12/09/2019    BUN 21 12/09/2019    CR 1.06 12/09/2019    GFRESTIMATED 66 12/09/2019    GFRESTBLACK 77 12/09/2019    MANUELA 9.7 12/09/2019        A1C RESULTS:  No results found for: A1C    INR  RESULTS:  No results found for: INR        CC  Stanley Saldaña MD  [address] [place] [address]

## 2020-09-22 NOTE — LETTER
9/22/2020    Stanley Saldaña MD  [address]  [facility] [address]    RE: Charles Nogueira       Dear Colleague,    I had the pleasure of seeing Charles Nogueira in the [facility].    HPI and Plan:   I had the pleasure of seeing Charles Nogueira today at [facility] for evaluation of paroxysmal atrial firbillation. He is a pleasant 80 year old patient of Dr. Witt.      Mr. Nogueira has a past medical history significant for paroxysmal atrial fibrillation, bradycardia and asystole status post permanent pacemaker, mitral regurgitation, hypertension and PVCs.       Patient had an episode of symptomatic bradycardia followed by asystole in 2004 for which he underwent a dual-chamber permanent pacemaker implantation at [facility].  He has not experience further episodes of syncope.  Echo in August 2019 showed a LV EF of 55%.  There was moderate mitral regurgitation.  This was eccentric anterior directed mitral regurgitation with some pulmonary vein reversal suggesting possibly higher degree of MR.  The LA was mildly dilated.  RVSP was 23 mmHg plus RA pressure.    Pacemaker interrogation on April 29, 2020 showed a 1 hour and 43-minute run of atrial fibrillation.  The patient denied being symptomatic at the time.  No other arrhythmias noted.  He was not started on anticoagulation given the single, isolated atrial fibrillation event.     A repeat echocardiogram in August 2020 showed a normal left ventricular systolic function with an ejection fraction of 60 to 65%.  The LA was mildly dilated.  Mild to moderate MR, primary MR.  The severity might be underestimated due to the concentric nature of the jet.  His most recent device check in July 2020 not show evidence of atrial fibrillation or any ventricular arrhythmias.    Today presents for his annual cardiology visit.  He has been doing well since our last virtual visit in May 2020.  He denies  palpitations, chest pain, shortness of breath.  He remains quite active walking 3 to 4 miles 5-6 times a week.  He eats a well-balanced, cardiac diet.    Physical Exam  Please see Below     Assessment and Plan  1.  Paroxysmal atrial fibrillation likely related to mitral regurgitation.  On recent device check, there was no evidence of atrial fibrillation.  He continues to deny palpitations.   He remains without anticoagulation given a single, isolated run of atrial fibrillation in the spring 2020. If his coming pacemaker check shows an increased frequency of mode switches or the patient notices irregularities on his pulse checks, anticoagulation would be suggested at that time.  The patient has not tolerated platelet inhibitors in the past due to significant bruising.  We will continue to monitor his routine device checks.  Follow-up with a cardiologist in 1 year.  Follow-up sooner with an NOE should he develop atrial fibrillation.     2.  Sick sinus syndrome with episodes of bradycardia and syncope status post dual-chamber pacemaker implantation in 2004 and replaced in 2014.  Most recent device check in July 2020.  Results above.  Continue routine device checks.     3.  Mitral valve prolapse and mild-moderate mitral regurgitation.  This is relatively stable over the last several years.    Continue with routine echocardiograms.     4.  Hypertension.  His blood pressure is well controlled.  Continue current regimen.    Thank you for allowing me to care for Charles Nogueira today.    CORBIN Marr, CNP  Cardiology    Voice recognition software was used for this note, I have reviewed this note, but errors may have been missed.    No orders of the defined types were placed in this encounter.    No orders of the defined types were placed in this encounter.    There are no discontinued medications.      CURRENT MEDICATIONS:  Current Outpatient Medications   Medication Sig Dispense Refill     amLODIPine (NORVASC) 5 MG  tablet Take 1 tablet (5 mg) by mouth daily 90 tablet 3     losartan (COZAAR) 100 MG tablet Take 1 tablet (100 mg) by mouth daily 90 tablet 3     metoprolol succinate ER (TOPROL-XL) 50 MG 24 hr tablet Take 1 tablet (50 mg) by mouth daily 90 tablet 3       ALLERGIES     Allergies   Allergen Reactions     Sulfa Drugs      rash       PAST MEDICAL HISTORY:  Past Medical History:   Diagnosis Date     Acute ischemic heart disease (H) 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 To use swallowing techniques     Essential hypertension, benign 11/11/2016     Heart murmur 5/10/2011     Melanoma (H)      Migraine equivalent 11/10/2015     Mitral valve prolapse      Non-rheumatic mitral regurgitation 8/9/2018     Other forms of migraine, without mention of intractable migraine without mention of status migrainosus      Paroxysmal A-fib (H)      Premature ventricular contraction      PVC (premature ventricular contraction) 5/10/2011     Sick sinus syndrome (H) 05/03/2016    s/p PPM implant 7/2/14     Squamous cell carcinoma      Syncope        PAST SURGICAL HISTORY:  Past Surgical History:   Procedure Laterality Date     APPENDECTOMY OPEN       BIOPSY OF SKIN LESION       COLONOSCOPY N/A 11/20/2015    Procedure: COLONOSCOPY;  Surgeon: David Us MD;  Location: [facility]     IMPLANT PACEMAKER  7/2004     MOHS MICROGRAPHIC PROCEDURE         FAMILY HISTORY:  Family History   Problem Relation Age of Onset     Heart Disease Mother         MI     Heart Disease Father         MI     C.A.D. Brother         Just had angioplasty in 2006     Colon Cancer No family hx of        SOCIAL HISTORY:  Social History     Socioeconomic History     Marital status:      Spouse name: Paula     Number of children: 3     Years of education: None     Highest education level: None   Occupational History     Employer:  RETIRED   Social Needs     Financial resource strain: None     Food insecurity     Worry: None     Inability: None     Transportation needs     Medical: None     Non-medical: None   Tobacco Use     Smoking status: Former Smoker     Packs/day: 1.00     Years: 4.00     Pack years: 4.00     Types: Cigarettes     Last attempt to quit: 1967     Years since quittin.7     Smokeless tobacco: Never Used   Substance and Sexual Activity     Alcohol use: No     Alcohol/week: 0.0 standard drinks     Drug use: No     Sexual activity: Yes     Partners: Female   Lifestyle     Physical activity     Days per week: None     Minutes per session: None     Stress: None   Relationships     Social connections     Talks on phone: None     Gets together: None     Attends [employer] service: None     Active member of club or organization: None     Attends meetings of clubs or organizations: None     Relationship status: None     Intimate partner violence     Fear of current or ex partner: None     Emotionally abused: None     Physically abused: None     Forced sexual activity: None   Other Topics Concern      Service Yes     Blood Transfusions No     Caffeine Concern Yes     Comment: decaff coffee     Occupational Exposure No     Hobby Hazards No     Sleep Concern No     Stress Concern No     Weight Concern No     Special Diet No     Back Care No     Exercise Yes     Comment: Excercises about 4-5 days per week     Bike Helmet Yes     Seat Belt Yes     Self-Exams No     Parent/sibling w/ CABG, MI or angioplasty before 65F 55M? Not Asked   Social History Narrative     None       Review of Systems:  Skin:  Negative       Eyes:  Positive for glasses;visual blurring    ENT:  Negative      Respiratory:  Positive for cough     Cardiovascular:  Negative      Gastroenterology: Negative      Genitourinary:  not assessed      Musculoskeletal:  Negative      Neurologic:  Negative      Psychiatric:  Negative      Heme/Lymph/Imm:  Negative   "    Endocrine:  Negative        Physical Exam:  Vitals: /74   Pulse 68   Ht 1.708 m (5' 7.25\")   Wt 68.5 kg (151 lb)   BMI 23.47 kg/m      Constitutional:  cooperative, alert and oriented, well developed, well nourished, in no acute distress        Skin:  warm and dry to the touch surgical scars well-healed        Head:  normocephalic        Eyes:  pupils equal and round;sclera white        Lymph:      ENT:  no pallor or cyanosis        Neck:  carotid pulses are full and equal bilaterally, JVP normal, no carotid bruit        Respiratory:  normal breath sounds, clear to auscultation, normal A-P diameter, normal symmetry, normal respiratory excursion, no use of accessory muscles         Cardiac: normal S1 and S2;no S3 or S4;apical impulse not displaced;regular rhythm       holosystolic murmur;LLSB;radiation to the axilla        pulses full and equal     2+;right radial artery             left radial artery;2+                    GI:  abdomen soft;non-tender        Extremities and Muscular Skeletal:  no deformities, clubbing, cyanosis, erythema observed;no edema              Neurological:  no gross motor deficits;affect appropriate        Psych:  Alert and Oriented x 3    No diagnosis found.    Recent Lab Results:  LIPID RESULTS:  Lab Results   Component Value Date    CHOL 190 12/09/2019    HDL 39 (L) 12/09/2019     (H) 12/09/2019    TRIG 102 12/09/2019    CHOLHDLRATIO 4.5 11/10/2015       LIVER ENZYME RESULTS:  Lab Results   Component Value Date    AST 33 12/09/2019    ALT 40 12/09/2019       CBC RESULTS:  Lab Results   Component Value Date    WBC 9.7 09/21/2018    RBC 5.19 09/21/2018    HGB 15.4 09/21/2018    HCT 47.7 09/21/2018    MCV 92 09/21/2018    MCH 29.7 09/21/2018    MCHC 32.3 09/21/2018    RDW 13.5 09/21/2018     09/21/2018       BMP RESULTS:  Lab Results   Component Value Date     12/09/2019    POTASSIUM 4.4 12/09/2019    CHLORIDE 105 12/09/2019    CO2 29 12/09/2019    ANIONGAP " 6 12/09/2019    GLC 99 12/09/2019    BUN 21 12/09/2019    CR 1.06 12/09/2019    GFRESTIMATED 66 12/09/2019    GFRESTBLACK 77 12/09/2019    MANUELA 9.7 12/09/2019        A1C RESULTS:  No results found for: A1C    INR RESULTS:  No results found for: INR        CC  Stanley Saldaña MD  [address] [place] [address]                      Thank you for allowing me to participate in the care of your patient.      Sincerely,     CORBIN Marr [facility] [place]    cc:   Stanley Saldaña MD  [address] [place] [address]

## 2020-10-28 ENCOUNTER — ANCILLARY PROCEDURE (OUTPATIENT)
Dept: CARDIOLOGY | Facility: CLINIC | Age: 80
End: 2020-10-28
Attending: INTERNAL MEDICINE
Payer: COMMERCIAL

## 2020-10-28 DIAGNOSIS — Z95.0 CARDIAC PACEMAKER IN SITU: ICD-10-CM

## 2020-10-28 LAB
MDC_IDC_LEAD_IMPLANT_DT: NORMAL
MDC_IDC_LEAD_IMPLANT_DT: NORMAL
MDC_IDC_LEAD_LOCATION: NORMAL
MDC_IDC_LEAD_LOCATION: NORMAL
MDC_IDC_LEAD_MFG: NORMAL
MDC_IDC_LEAD_MFG: NORMAL
MDC_IDC_LEAD_MODEL: NORMAL
MDC_IDC_LEAD_MODEL: NORMAL
MDC_IDC_LEAD_POLARITY_TYPE: NORMAL
MDC_IDC_LEAD_POLARITY_TYPE: NORMAL
MDC_IDC_LEAD_SERIAL: NORMAL
MDC_IDC_LEAD_SERIAL: NORMAL
MDC_IDC_MSMT_BATTERY_DTM: NORMAL
MDC_IDC_MSMT_BATTERY_IMPEDANCE: 448 OHM
MDC_IDC_MSMT_BATTERY_REMAINING_LONGEVITY: 113 MO
MDC_IDC_MSMT_BATTERY_STATUS: NORMAL
MDC_IDC_MSMT_BATTERY_VOLTAGE: 2.79 V
MDC_IDC_MSMT_LEADCHNL_RA_IMPEDANCE_VALUE: 573 OHM
MDC_IDC_MSMT_LEADCHNL_RA_PACING_THRESHOLD_AMPLITUDE: 0.5 V
MDC_IDC_MSMT_LEADCHNL_RA_PACING_THRESHOLD_PULSEWIDTH: 0.4 MS
MDC_IDC_MSMT_LEADCHNL_RV_IMPEDANCE_VALUE: 702 OHM
MDC_IDC_MSMT_LEADCHNL_RV_PACING_THRESHOLD_AMPLITUDE: 0.5 V
MDC_IDC_MSMT_LEADCHNL_RV_PACING_THRESHOLD_PULSEWIDTH: 0.4 MS
MDC_IDC_PG_IMPLANT_DTM: NORMAL
MDC_IDC_PG_MFG: NORMAL
MDC_IDC_PG_MODEL: NORMAL
MDC_IDC_PG_SERIAL: NORMAL
MDC_IDC_PG_TYPE: NORMAL
MDC_IDC_SESS_CLINIC_NAME: NORMAL
MDC_IDC_SESS_DTM: NORMAL
MDC_IDC_SESS_TYPE: NORMAL
MDC_IDC_SET_BRADY_AT_MODE_SWITCH_MODE: NORMAL
MDC_IDC_SET_BRADY_AT_MODE_SWITCH_RATE: 175 {BEATS}/MIN
MDC_IDC_SET_BRADY_LOWRATE: 60 {BEATS}/MIN
MDC_IDC_SET_BRADY_MAX_SENSOR_RATE: 130 {BEATS}/MIN
MDC_IDC_SET_BRADY_MAX_TRACKING_RATE: 130 {BEATS}/MIN
MDC_IDC_SET_BRADY_MODE: NORMAL
MDC_IDC_SET_BRADY_PAV_DELAY_LOW: 350 MS
MDC_IDC_SET_BRADY_SAV_DELAY_LOW: 350 MS
MDC_IDC_SET_LEADCHNL_RA_PACING_AMPLITUDE: 1.5 V
MDC_IDC_SET_LEADCHNL_RA_PACING_CAPTURE_MODE: NORMAL
MDC_IDC_SET_LEADCHNL_RA_PACING_POLARITY: NORMAL
MDC_IDC_SET_LEADCHNL_RA_PACING_PULSEWIDTH: 0.4 MS
MDC_IDC_SET_LEADCHNL_RA_SENSING_POLARITY: NORMAL
MDC_IDC_SET_LEADCHNL_RA_SENSING_SENSITIVITY: 0.5 MV
MDC_IDC_SET_LEADCHNL_RV_PACING_AMPLITUDE: 2 V
MDC_IDC_SET_LEADCHNL_RV_PACING_CAPTURE_MODE: NORMAL
MDC_IDC_SET_LEADCHNL_RV_PACING_POLARITY: NORMAL
MDC_IDC_SET_LEADCHNL_RV_PACING_PULSEWIDTH: 0.4 MS
MDC_IDC_SET_LEADCHNL_RV_SENSING_POLARITY: NORMAL
MDC_IDC_SET_LEADCHNL_RV_SENSING_SENSITIVITY: 2.8 MV
MDC_IDC_SET_ZONE_DETECTION_INTERVAL: 333.33 MS
MDC_IDC_SET_ZONE_DETECTION_INTERVAL: 342.86 MS
MDC_IDC_SET_ZONE_TYPE: NORMAL
MDC_IDC_SET_ZONE_TYPE: NORMAL
MDC_IDC_STAT_AT_BURDEN_PERCENT: 0 %
MDC_IDC_STAT_AT_DTM_END: NORMAL
MDC_IDC_STAT_AT_DTM_START: NORMAL
MDC_IDC_STAT_AT_MODE_SW_COUNT: 1
MDC_IDC_STAT_BRADY_AP_VP_PERCENT: 0 %
MDC_IDC_STAT_BRADY_AP_VS_PERCENT: 46 %
MDC_IDC_STAT_BRADY_AS_VP_PERCENT: 0 %
MDC_IDC_STAT_BRADY_AS_VS_PERCENT: 54 %
MDC_IDC_STAT_BRADY_DTM_END: NORMAL
MDC_IDC_STAT_BRADY_DTM_START: NORMAL
MDC_IDC_STAT_EPISODE_RECENT_COUNT: 0
MDC_IDC_STAT_EPISODE_RECENT_COUNT: 1
MDC_IDC_STAT_EPISODE_RECENT_COUNT_DTM_END: NORMAL
MDC_IDC_STAT_EPISODE_RECENT_COUNT_DTM_END: NORMAL
MDC_IDC_STAT_EPISODE_RECENT_COUNT_DTM_START: NORMAL
MDC_IDC_STAT_EPISODE_RECENT_COUNT_DTM_START: NORMAL
MDC_IDC_STAT_EPISODE_TYPE: NORMAL
MDC_IDC_STAT_EPISODE_TYPE: NORMAL

## 2020-10-28 PROCEDURE — 93296 REM INTERROG EVL PM/IDS: CPT | Performed by: INTERNAL MEDICINE

## 2020-10-28 PROCEDURE — 93294 REM INTERROG EVL PM/LDLS PM: CPT | Performed by: INTERNAL MEDICINE

## 2020-12-09 NOTE — PROGRESS NOTES
"Pre-Visit Planning   Next 5 appointments (look out 90 days)    Dec 11, 2020  3:00 PM  (Arrive by 2:35 PM)  Annual Wellness Visit with Stanley Saldaña MD  [facility] ([facility]) [address] [place] [id number] [phone]        Appointment Notes for this encounter:   Annual Wellness Visit    Questionnaires Reviewed/Assigned  No additional questionnaires are needed        Patient preferred phone number: [phone]      Spoke to patient via phone. Patient does not have additional questions or concerns.        Visit is preventive. Reviewed purpose of preventive visit with patient.    Patient is established.    Patient reminded of date and time. Barriers addressed: None  Chief complaint confirmed.    Health Maintenance Due   Topic Date Due     ANNUAL REVIEW OF HM ORDERS  1940     MIGRAINE ACTION PLAN  1940     HEPATITIS C SCREENING  02/05/1959     ZOSTER IMMUNIZATION (1 of 2) 02/05/1991     PHQ-2  01/01/2020     MEDICARE ANNUAL WELLNESS VISIT  12/09/2020     CREATININE  12/09/2020     FALL RISK ASSESSMENT  12/09/2020     Patient is due for:  PhQ2 & Fall Risk Assesment  assigned questionnaire    [employer]  Patient is active on [employer].    Questionnaire Review   Offered information on completing questionnaires via [employer].    Call Summary  \"Thank you for your time today.  If anything comes up before your appointment, please feel free to contact us at [phone].\"    "

## 2020-12-11 ENCOUNTER — OFFICE VISIT (OUTPATIENT)
Dept: FAMILY MEDICINE | Facility: CLINIC | Age: 80
End: 2020-12-11
Payer: COMMERCIAL

## 2020-12-11 VITALS
WEIGHT: 152 LBS | BODY MASS INDEX: 22.51 KG/M2 | OXYGEN SATURATION: 99 % | HEART RATE: 65 BPM | TEMPERATURE: 97.9 F | DIASTOLIC BLOOD PRESSURE: 62 MMHG | HEIGHT: 69 IN | SYSTOLIC BLOOD PRESSURE: 124 MMHG

## 2020-12-11 DIAGNOSIS — Z12.5 SCREENING FOR PROSTATE CANCER: ICD-10-CM

## 2020-12-11 DIAGNOSIS — Z00.00 ENCOUNTER FOR MEDICARE ANNUAL WELLNESS EXAM: Primary | ICD-10-CM

## 2020-12-11 DIAGNOSIS — I70.90 ASVD (ARTERIOSCLEROTIC VASCULAR DISEASE): ICD-10-CM

## 2020-12-11 DIAGNOSIS — C43.59 MALIGNANT MELANOMA OF SKIN OF TRUNK, EXCEPT SCROTUM (H): ICD-10-CM

## 2020-12-11 LAB — PSA SERPL-ACNC: 4.57 UG/L (ref 0–4)

## 2020-12-11 PROCEDURE — G0103 PSA SCREENING: HCPCS | Performed by: FAMILY MEDICINE

## 2020-12-11 PROCEDURE — 99397 PER PM REEVAL EST PAT 65+ YR: CPT | Performed by: FAMILY MEDICINE

## 2020-12-11 PROCEDURE — 36415 COLL VENOUS BLD VENIPUNCTURE: CPT | Performed by: FAMILY MEDICINE

## 2020-12-11 ASSESSMENT — ENCOUNTER SYMPTOMS
DIZZINESS: 0
MYALGIAS: 0
HEMATURIA: 0
ARTHRALGIAS: 1
NAUSEA: 0
CHILLS: 1
HEARTBURN: 0
HEMATOCHEZIA: 0
JOINT SWELLING: 0
PALPITATIONS: 0
DYSURIA: 0
SORE THROAT: 0
WEAKNESS: 0
HEADACHES: 0
EYE PAIN: 0
DIARRHEA: 0
COUGH: 1
ABDOMINAL PAIN: 0
CONSTIPATION: 0
PARESTHESIAS: 0
SHORTNESS OF BREATH: 0

## 2020-12-11 ASSESSMENT — ACTIVITIES OF DAILY LIVING (ADL): CURRENT_FUNCTION: NO ASSISTANCE NEEDED

## 2020-12-11 ASSESSMENT — MIFFLIN-ST. JEOR: SCORE: 1386.91

## 2020-12-11 NOTE — PROGRESS NOTES
"  SUBJECTIVE:   Charles Nogueira is a 80 year old male who presents for Preventive Visit.    }    Physical Health:Answers for HPI/ROS submitted by the patient on 12/11/2020   Annual Exam:  In general, how would you rate your overall physical health?: excellent  Frequency of exercise:: 6-7 days/week  Do you usually eat at least 4 servings of fruit and vegetables a day, include whole grains & fiber, and avoid regularly eating high fat or \"junk\" foods? : Yes  Taking medications regularly:: Yes  Medication side effects:: None  Activities of Daily Living: no assistance needed  Home safety: no safety concerns identified  Hearing Impairment:: find that men's voices are easier to understand than woman's, difficulty understanding soft or whispered speech  In the past 6 months, have you been bothered by leaking of urine?: No  abdominal pain: No  Blood in stool: No  Blood in urine: No  chest pain: No  chills: Yes  congestion: No  constipation: No  cough: Yes  diarrhea: No  dizziness: No  ear pain: No  eye pain: No  genital sores: No  headaches: No  hearing loss: No  heartburn: No  arthralgias: Yes  joint swelling: No  peripheral edema: No  mood changes: No  myalgias: No  nausea: No  dysuria: No  palpitations: No  Skin sensation changes: No  sore throat: No  urgency: No  rash: No  shortness of breath: No  visual disturbance: No  weakness: No  impotence: Yes  penile discharge: No  In general, how would you rate your overall mental or emotional health?: excellent  Additional concerns today:: No  Duration of exercise:: 45-60 minutes        PHQ-2 Score:      Do you feel safe in your environment? Yes    Have you ever done Advance Care Planning? (For example, a Health Directive, POLST, or a discussion with a medical provider or your loved ones about your wishes): Yes, patient states has an Advance Care Planning document and will bring a copy to the clinic.    Additional concerns to address?  No    Fall risk:  Fallen 2 or more times in " the past year?: (P) No  Any fall with injury in the past year?: (P) No  click delete button to remove this line now  Cognitive Screenin) Repeat 3 items (Leader, Season, Table)      2) Clock draw:   NORMAL  3) 3 item recall:   Recalls 3 objects  Results: 3 items recalled: COGNITIVE IMPAIRMENT LESS LIKELY    Mini-CogTM Copyright SOPHIE Fitch. Licensed by the author for use in [facility]; reprinted with permission (soob@[facility]). All rights reserved.      Do you have sleep apnea, excessive snoring or daytime drowsiness?: no            Reviewed and updated as needed this visit by clinical staff                 Reviewed and updated as needed this visit by Provider                Social History     Tobacco Use     Smoking status: Former Smoker     Packs/day: 1.00     Years: 4.00     Pack years: 4.00     Types: Cigarettes     Quit date: 1967     Years since quittin.9     Smokeless tobacco: Never Used   Substance Use Topics     Alcohol use: No     Alcohol/week: 0.0 standard drinks                           Current providers sharing in care for this patient include: Cardiology  Patient Care Team:  Stalney Saldaña MD as PCP - General  Stanley Saldaña MD as Assigned PCP  Michele Witt MD as Assigned Heart and Vascular Provider    The following health maintenance items are reviewed in Epic and correct as of today:  Health Maintenance   Topic Date Due     ANNUAL REVIEW OF HM ORDERS  1940     MIGRAINE ACTION PLAN  1940     HEPATITIS C SCREENING  1959     ZOSTER IMMUNIZATION (1 of 2) 1991     PHQ-2  2020     FALL RISK ASSESSMENT  2020     MEDICARE ANNUAL WELLNESS VISIT  2020     CREATININE  2020     LIPID  2024     ADVANCE CARE PLANNING  12/10/2024     COLORECTAL CANCER SCREENING  2025     DTAP/TDAP/TD IMMUNIZATION (3 - Td) 2029     INFLUENZA VACCINE  Completed     Pneumococcal Vaccine: 65+ Years  Completed      "Pneumococcal Vaccine: Pediatrics (0 to 5 Years) and At-Risk Patients (6 to 64 Years)  Aged Out     IPV IMMUNIZATION  Aged Out     MENINGITIS IMMUNIZATION  Aged Out     HEPATITIS B IMMUNIZATION  Aged Out     BP Readings from Last 3 Encounters:   12/11/20 124/62   09/22/20 124/74   05/04/20 122/69    Wt Readings from Last 3 Encounters:   12/11/20 68.9 kg (152 lb)   09/22/20 68.5 kg (151 lb)   05/04/20 67.6 kg (149 lb)                  Pneumonia Vaccine:Adults age 65+ who received Pneumovax (PPSV23) at 65 years or older: Should be given PCV13 > 1 year after their most recent PPSV23    ROS:  Constitutional, HEENT, cardiovascular, pulmonary, GI, , musculoskeletal, neuro, skin, endocrine and psych systems are negative, except as otherwise noted.    OBJECTIVE:   There were no vitals taken for this visit. Estimated body mass index is 23.47 kg/m  as calculated from the following:    Height as of 9/22/20: 1.708 m (5' 7.25\").    Weight as of 9/22/20: 68.5 kg (151 lb).  EXAM:   GENERAL: healthy, alert and no distress  EYES: Eyes grossly normal to inspection, PERRL and conjunctivae and sclerae normal  HENT: ear canals and TM's normal, nose and mouth without ulcers or lesions  NECK: no adenopathy, no asymmetry, masses, or scars and thyroid normal to palpation  RESP: lungs clear to auscultation - no rales, rhonchi or wheezes  CV: regular rate and rhythm, normal S1 S2, no S3 or S4, no murmur, click or rub, no peripheral edema and peripheral pulses strong  ABDOMEN: soft, nontender, no hepatosplenomegaly, no masses and bowel sounds normal  MS: no gross musculoskeletal defects noted, no edema  SKIN: no suspicious lesions or rashes  NEURO: Normal strength and tone, mentation intact and speech normal  PSYCH: mentation appears normal, affect normal/bright    Diagnostic Test Results:  Labs reviewed in Epic    ASSESSMENT / PLAN:   1. Encounter for Medicare annual wellness exam        Patient has been advised of split billing requirements " "and indicates understanding: Yes    COUNSELING:  Reviewed preventive health counseling, as reflected in patient instructions       Regular exercise       Healthy diet/nutrition       Vision screening    Estimated body mass index is 23.47 kg/m  as calculated from the following:    Height as of 9/22/20: 1.708 m (5' 7.25\").    Weight as of 9/22/20: 68.5 kg (151 lb).        He reports that he quit smoking about 53 years ago. His smoking use included cigarettes. He has a 4.00 pack-year smoking history. He has never used smokeless tobacco.    Appropriate preventive services were discussed with this patient, including applicable screening as appropriate for cardiovascular disease, diabetes, osteopenia/osteoporosis, and glaucoma.  As appropriate for age/gender, discussed screening for colorectal cancer, prostate cancer, breast cancer, and cervical cancer. Checklist reviewing preventive services available has been given to the patient.    Reviewed patients plan of care and provided an AVS. The Basic Care Plan (routine screening as documented in Health Maintenance) for Charles meets the Care Plan requirement. This Care Plan has been established and reviewed with the Patient.    Counseling Resources:  ATP IV Guidelines  Pooled Cohorts Equation Calculator  Breast Cancer Risk Calculator  BRCA-Related Cancer Risk Assessment: FHS-7 Tool  FRAX Risk Assessment  ICSI Preventive Guidelines  Dietary Guidelines for Americans, 2010  Machine Talker's MyPlate  ASA Prophylaxis  Lung CA Screening    Stanley Saldaña MD  [facility]"

## 2020-12-11 NOTE — PATIENT INSTRUCTIONS
Patient Education   Personalized Prevention Plan  You are due for the preventive services outlined below.  Your care team is available to assist you in scheduling these services.  If you have already completed any of these items, please share that information with your care team to update in your medical record.  Health Maintenance Due   Topic Date Due     ANNUAL REVIEW OF HM ORDERS  1940     Migraine Action Plan  1940     Hepatitis C Screening  02/05/1959     Zoster (Shingles) Vaccine (1 of 2) 02/05/1991     PHQ-2  01/01/2020     FALL RISK ASSESSMENT  12/09/2020     Annual Wellness Visit  12/09/2020     Creatinine Lab  12/09/2020

## 2020-12-16 ENCOUNTER — DOCUMENTATION ONLY (OUTPATIENT)
Dept: LAB | Facility: CLINIC | Age: 80
End: 2020-12-16

## 2020-12-16 DIAGNOSIS — I70.90 ASVD (ARTERIOSCLEROTIC VASCULAR DISEASE): Primary | ICD-10-CM

## 2020-12-16 NOTE — PROGRESS NOTES
..Charles Nogueira has an upcoming lab appointment:    Future Appointments   Date Time Provider Department Center   12/17/2020  8:00 AM CR LAB CRLAB CR   2/10/2021 12:00 AM [facility] [place] [address] [facility]      Please review Health Maintenance and sign order: Review of Health Maintenance Protocol Orders (HMPO) to authorize patient's due Health Maintenance labs to be drawn.    Health Maintenance Due   Topic     ANNUAL REVIEW OF HM ORDERS      HEPATITIS C SCREENING      CREATININE      Manuel Garcia

## 2020-12-17 DIAGNOSIS — I70.90 ASVD (ARTERIOSCLEROTIC VASCULAR DISEASE): ICD-10-CM

## 2020-12-17 LAB
ALBUMIN SERPL-MCNC: 3.8 G/DL (ref 3.4–5)
ALP SERPL-CCNC: 95 U/L (ref 40–150)
ALT SERPL W P-5'-P-CCNC: 26 U/L (ref 0–70)
ANION GAP SERPL CALCULATED.3IONS-SCNC: <1 MMOL/L (ref 3–14)
AST SERPL W P-5'-P-CCNC: 24 U/L (ref 0–45)
BILIRUB SERPL-MCNC: 0.8 MG/DL (ref 0.2–1.3)
BUN SERPL-MCNC: 19 MG/DL (ref 7–30)
CALCIUM SERPL-MCNC: 9 MG/DL (ref 8.5–10.1)
CHLORIDE SERPL-SCNC: 107 MMOL/L (ref 94–109)
CHOLEST SERPL-MCNC: 179 MG/DL
CO2 SERPL-SCNC: 32 MMOL/L (ref 20–32)
CREAT SERPL-MCNC: 1 MG/DL (ref 0.66–1.25)
GFR SERPL CREATININE-BSD FRML MDRD: 71 ML/MIN/{1.73_M2}
GLUCOSE SERPL-MCNC: 104 MG/DL (ref 70–99)
HDLC SERPL-MCNC: 39 MG/DL
LDLC SERPL CALC-MCNC: 121 MG/DL
NONHDLC SERPL-MCNC: 140 MG/DL
POTASSIUM SERPL-SCNC: 3.8 MMOL/L (ref 3.4–5.3)
PROT SERPL-MCNC: 7.4 G/DL (ref 6.8–8.8)
SODIUM SERPL-SCNC: 139 MMOL/L (ref 133–144)
TRIGL SERPL-MCNC: 97 MG/DL

## 2020-12-17 PROCEDURE — 80061 LIPID PANEL: CPT | Performed by: FAMILY MEDICINE

## 2020-12-17 PROCEDURE — 36415 COLL VENOUS BLD VENIPUNCTURE: CPT | Performed by: FAMILY MEDICINE

## 2020-12-17 PROCEDURE — 80053 COMPREHEN METABOLIC PANEL: CPT | Performed by: FAMILY MEDICINE

## 2021-01-14 ENCOUNTER — TELEPHONE (OUTPATIENT)
Dept: FAMILY MEDICINE | Facility: CLINIC | Age: 81
End: 2021-01-14

## 2021-01-14 NOTE — TELEPHONE ENCOUNTER
General Call:     Who is calling:  Charles    Reason for Call:  Lesion  On  face    What are your questions or concerns:  Removal of pre cancerous lesion on face    Date of last appointment with provider: 12/2020-SIRI    Okay to leave a detailed message:Yes at Home number on file [phone] (home)

## 2021-01-18 NOTE — PROGRESS NOTES
Pre-Visit Planning     Appointment Notes for this encounter:   Removal of pre cancerous lesion on face    Questionnaires Reviewed/Assigned  Additional questionnaires assigned - PHQ-2    Patient preferred phone number: [phone]    Chart Review:  12/11/2020 OV w/ SIRI for medicare annual wellness  Component      Latest Ref Rng & Units 12/9/2019 12/11/2020   PSA      0 - 4 ug/L 3.37 4.57 (H)   Ask about interest in referral to Urology??    Patient contact not needed.   Shai CONTI RN

## 2021-01-19 ENCOUNTER — OFFICE VISIT (OUTPATIENT)
Dept: FAMILY MEDICINE | Facility: CLINIC | Age: 81
End: 2021-01-19
Payer: COMMERCIAL

## 2021-01-19 VITALS
BODY MASS INDEX: 23.08 KG/M2 | HEART RATE: 62 BPM | WEIGHT: 154 LBS | TEMPERATURE: 97.4 F | DIASTOLIC BLOOD PRESSURE: 74 MMHG | SYSTOLIC BLOOD PRESSURE: 133 MMHG

## 2021-01-19 DIAGNOSIS — Z11.59 NEED FOR HEPATITIS C SCREENING TEST: Primary | ICD-10-CM

## 2021-01-19 DIAGNOSIS — L57.0 ACTINIC KERATOSIS: ICD-10-CM

## 2021-01-19 DIAGNOSIS — C43.59 MALIGNANT MELANOMA OF SKIN OF TRUNK, EXCEPT SCROTUM (H): ICD-10-CM

## 2021-01-19 DIAGNOSIS — Z23 NEED FOR VACCINATION: ICD-10-CM

## 2021-01-19 PROCEDURE — 99213 OFFICE O/P EST LOW 20 MIN: CPT | Mod: 25 | Performed by: FAMILY MEDICINE

## 2021-01-19 PROCEDURE — 17110 DESTRUCTION B9 LES UP TO 14: CPT | Performed by: FAMILY MEDICINE

## 2021-01-19 NOTE — PROGRESS NOTES
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  Discussed the success factors in treatments including intervals, additional peeling agents and recurrent lesions due to friction.  Treatment options including pealing or aldara alone are discussed. He goes to Dermatology later this year.   Hes' had no chest pain issues, walks an hour or more every day     All 3 lesions on the left face and neck and the right face  were treated with standdard 5 second bursts with the cryovac Standard thermal precautions for 24 hours and follow up in 30 days unless ressolved.      HPI       Patient is here to have some skin spots on the face and hands checked.      Review of Systems   Constitutional, HEENT, cardiovascular, pulmonary, gi and gu systems are negative, except as otherwise noted.      Objective    There were no vitals taken for this visit.  There is no height or weight on file to calculate BMI.  Physical Exam   GENERAL: healthy, alert and no distress  EYES: Eyes grossly normal to inspection, PERRL and conjunctivae and sclerae normal  HENT: ear canals and TM's normal, nose and mouth without ulcers or lesions  NECK: no adenopathy, no asymmetry, masses, or scars and thyroid normal to palpation  RESP: lungs clear to auscultation - no rales, rhonchi or wheezes  CV: regular rate and rhythm, normal S1 S2, no S3 or S4, no murmur, click or rub, no peripheral edema and peripheral pulses strong  MS: no gross musculoskeletal defects noted, no edema  PSYCH: mentation appears normal, affect normal/bright        (Z11.59) Need for hepatitis C screening test  (primary encounter diagnosis)  Comment:   Plan: next lab    (Z23) Need for vaccination  Comment:   Plan: consider zoster, not ready     (C43.59) Malignant melanoma of  skin of trunk, except scrotum (H)  Comment:   Plan: by history , follows with derm

## 2021-01-30 ENCOUNTER — IMMUNIZATION (OUTPATIENT)
Dept: NURSING | Facility: CLINIC | Age: 81
End: 2021-01-30
Payer: COMMERCIAL

## 2021-01-30 PROCEDURE — 91300 PR COVID VAC PFIZER DIL RECON 30 MCG/0.3 ML IM: CPT

## 2021-01-30 PROCEDURE — 0001A PR COVID VAC PFIZER DIL RECON 30 MCG/0.3 ML IM: CPT

## 2021-02-02 ENCOUNTER — TELEPHONE (OUTPATIENT)
Dept: FAMILY MEDICINE | Facility: CLINIC | Age: 81
End: 2021-02-02

## 2021-02-02 DIAGNOSIS — N13.9 LOWER URINARY OBSTRUCTIVE SYMPTOM: Primary | ICD-10-CM

## 2021-02-02 NOTE — TELEPHONE ENCOUNTER
Reason for Call: Request for an order or referral:    Order or referral being requested: urologist    Date needed: as soon as possible    Has the patient been seen by the PCP for this problem? YES    Additional comments: Patient said Dr Saldaña requested he see a urologist but he would like a referral     Phone number Patient can be reached at:  [phone]    Best Time:      Can we leave a detailed message on this number?  NO    Call taken on 2/2/2021 at 12:32 PM by Gisell Roberts

## 2021-02-10 ENCOUNTER — ANCILLARY PROCEDURE (OUTPATIENT)
Dept: CARDIOLOGY | Facility: CLINIC | Age: 81
End: 2021-02-10
Attending: INTERNAL MEDICINE
Payer: COMMERCIAL

## 2021-02-10 DIAGNOSIS — Z95.0 CARDIAC PACEMAKER IN SITU: ICD-10-CM

## 2021-02-10 PROCEDURE — 93294 REM INTERROG EVL PM/LDLS PM: CPT | Performed by: INTERNAL MEDICINE

## 2021-02-10 PROCEDURE — 93296 REM INTERROG EVL PM/IDS: CPT | Performed by: INTERNAL MEDICINE

## 2021-02-19 ENCOUNTER — OFFICE VISIT (OUTPATIENT)
Dept: UROLOGY | Facility: CLINIC | Age: 81
End: 2021-02-19
Payer: COMMERCIAL

## 2021-02-19 VITALS
SYSTOLIC BLOOD PRESSURE: 122 MMHG | BODY MASS INDEX: 22.58 KG/M2 | HEIGHT: 68 IN | WEIGHT: 149 LBS | DIASTOLIC BLOOD PRESSURE: 74 MMHG

## 2021-02-19 DIAGNOSIS — R31.29 MICROSCOPIC HEMATURIA: ICD-10-CM

## 2021-02-19 DIAGNOSIS — N40.0 ENLARGED PROSTATE: ICD-10-CM

## 2021-02-19 DIAGNOSIS — R97.20 ELEVATED PROSTATE SPECIFIC ANTIGEN (PSA): Primary | ICD-10-CM

## 2021-02-19 LAB
ALBUMIN UR-MCNC: NEGATIVE MG/DL
APPEARANCE UR: CLEAR
BILIRUB UR QL STRIP: NEGATIVE
COLOR UR AUTO: YELLOW
GLUCOSE UR STRIP-MCNC: NEGATIVE MG/DL
HGB UR QL STRIP: ABNORMAL
KETONES UR STRIP-MCNC: NEGATIVE MG/DL
LEUKOCYTE ESTERASE UR QL STRIP: NEGATIVE
MUCOUS THREADS #/AREA URNS LPF: PRESENT /LPF
NITRATE UR QL: NEGATIVE
PH UR STRIP: 5.5 PH (ref 5–7)
RBC #/AREA URNS AUTO: 4 /HPF (ref 0–2)
RESIDUAL VOLUME (RV) (EXTERNAL): 19
SOURCE: ABNORMAL
SP GR UR STRIP: 1.02 (ref 1–1.03)
SQUAMOUS #/AREA URNS AUTO: <1 /HPF (ref 0–1)
UROBILINOGEN UR STRIP-ACNC: 0.2 EU/DL (ref 0.2–1)
WBC #/AREA URNS AUTO: 1 /HPF (ref 0–5)

## 2021-02-19 PROCEDURE — 99000 SPECIMEN HANDLING OFFICE-LAB: CPT | Performed by: UROLOGY

## 2021-02-19 PROCEDURE — 81003 URINALYSIS AUTO W/O SCOPE: CPT | Performed by: UROLOGY

## 2021-02-19 PROCEDURE — 51798 US URINE CAPACITY MEASURE: CPT | Performed by: UROLOGY

## 2021-02-19 PROCEDURE — 99203 OFFICE O/P NEW LOW 30 MIN: CPT | Mod: 25 | Performed by: UROLOGY

## 2021-02-19 PROCEDURE — 84999 UNLISTED CHEMISTRY PROCEDURE: CPT | Mod: 90 | Performed by: UROLOGY

## 2021-02-19 ASSESSMENT — PAIN SCALES - GENERAL: PAINLEVEL: NO PAIN (0)

## 2021-02-19 ASSESSMENT — MIFFLIN-ST. JEOR: SCORE: 1360.36

## 2021-02-19 NOTE — NURSING NOTE
Chief Complaint   Patient presents with     Elevated PSA     post void residual: 19 mL    Honey Jones, EMT

## 2021-02-19 NOTE — LETTER
2/19/2021       RE: Charles Nogueira  [address]  [facility] [address]     Dear Colleague,    Thank you for referring your patient, Charles Nogueira, to the [facility]OLOG[facility] [place] at [facility]. Please see a copy of my visit note below.    [facility]  Main Office: [address]  [address]  [place] [address]       CHIEF COMPLAINT:  Elevated PSA    HISTORY:   I was asked by Dr. Saldaña to see this 81-year-old gentleman who presents with a rising PSA.  The patient understands that he is beyond the normally recommended screening age for prostate cancer but he had requested a PSA be checked at his last physical.  The PSA was 4.57.  Prior to this it had been in the 2 range until 2017 when it began rising slowly.  He has no urinary symptoms or complaints.  He does not complain of nocturia or a slow urinary stream or frequency or urgency.  He has no history of gross hematuria no prior history of microscopic hematuria.  He has no family history of prostate cancer.      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 To use swallowing techniques     Essential hypertension, benign 11/11/2016     Heart murmur 5/10/2011     Melanoma (H)      Migraine equivalent 11/10/2015     Mitral valve prolapse      Non-rheumatic mitral regurgitation 8/9/2018     Other forms of migraine, without mention of intractable migraine without mention of status migrainosus      Paroxysmal A-fib (H)      Premature ventricular contraction      PVC (premature ventricular contraction) 5/10/2011     Sick sinus syndrome (H) 05/03/2016    s/p PPM implant 7/2/14     Squamous cell carcinoma      Syncope        PAST SURGICAL HISTORY:   Past Surgical  History:   Procedure Laterality Date     APPENDECTOMY OPEN       BIOPSY OF SKIN LESION       COLONOSCOPY N/A 2015    Procedure: COLONOSCOPY;  Surgeon: David Us MD;  Location: [facility]     IMPLANT PACEMAKER  2004     MOHS MICROGRAPHIC PROCEDURE         FAMILY HISTORY:   Family History   Problem Relation Age of Onset     Heart Disease Mother         MI     Heart Disease Father         MI     C.A.D. Brother         Just had angioplasty in      Colon Cancer No family hx of        SOCIAL HISTORY:   Social History     Tobacco Use     Smoking status: Former Smoker     Packs/day: 1.00     Years: 4.00     Pack years: 4.00     Types: Cigarettes     Quit date: 1967     Years since quittin.1     Smokeless tobacco: Never Used   Substance Use Topics     Alcohol use: No     Alcohol/week: 0.0 standard drinks          Allergies   Allergen Reactions     Sulfa Drugs      rash         Current Outpatient Medications:      amLODIPine (NORVASC) 5 MG tablet, Take 1 tablet (5 mg) by mouth daily, Disp: 90 tablet, Rfl: 3     losartan (COZAAR) 100 MG tablet, Take 1 tablet (100 mg) by mouth daily, Disp: 90 tablet, Rfl: 3     metoprolol succinate ER (TOPROL-XL) 50 MG 24 hr tablet, Take 1 tablet (50 mg) by mouth daily, Disp: 90 tablet, Rfl: 3    Review Of Systems:  Skin: No rash, pruritis, or skin pigmentation  Eyes: No changes in vision  Ears/Nose/Throat: No changes in hearing, no nosebleeds  Respiratory: No shortness of breath, dyspnea on exertion, cough, or hemoptysis  Cardiovascular: No chest pain or palpitations  Gastrointestinal: No diarrhea or constipation. No abdominal pain. No hematochezia  Genitourinary: see HPI  Musculoskeletal: No pain or swelling of joints, normal range of motion  Neurologic: No weakness or tremors  Psychiatric: No recent changes in memory or mood  Hematologic/Lymphatic/Immunologic: No easy bruising or enlarged lymph nodes  Endocrine: No weight gain or loss      PHYSICAL EXAM:    /74  "  Ht 1.727 m (5' 8\")   Wt 67.6 kg (149 lb)   BMI 22.66 kg/m    General appearance: In NAD, conversant  HEENT: Normocephalic and atraumatic, anicteric sclera  Cardiovascular: Not examined  Respiratory: normal, non-labored breathing  Gastrointestinal: negative, Abdomen soft, non-tender, and non-distended.   Musculoskeletal: Not Examined  Peripheral Vascular/extremity: No peripheral edema  Skin: Normal temperature, turgor, and texture. No rash  Psychiatric: Appropriate affect, alert and oriented to person, place, and time    Penis: Normal  Scrotal skin: Normal, no lesions  Testicles: Normal to palpation bilaterally  Epididymis: Normal to palpation bilaterally  Lymphatic: Normal inguinal lymph nodes    Digital Rectal Exam: The prostate is moderately enlarged, benign and symmetric to palpation    Cystoscopy: Not done      PSA: 4.57    UA RESULTS:  Recent Labs   Lab Test 02/19/21  0921   COLOR Yellow   APPEARANCE Clear   URINEGLC Negative   URINEBILI Negative   URINEKETONE Negative   SG 1.025   UBLD Moderate*   URINEPH 5.5   PROTEIN Negative   UROBILINOGEN 0.2   NITRITE Negative   LEUKEST Negative       Bladder Scan: 19mL    Other Labs:      Imaging Studies: None      CLINICAL IMPRESSION:   Elevated PSA, enlarged prostate, possible microscopic hematuria    PLAN:   We discussed all of his test results today.  His examination is normal and he is emptying his bladder well.  His PSA has been rising over the past few years.  I explained to him that most men his age do have prostate cancer but are still unlikely to be affected by that cancer in their lifetime.  His current PSA is actually in the high normal range for his age group.  I do not think he requires any further evaluation at this time but I do think we should continue to follow the PSA fairly closely just to make certain it does not rise precipitously.  The PSA was checked in December so I will have him come back in June for his next PSA check.    Initial dipstick " urinalysis shows potential microscopic hematuria today.  There are no other [facility]inalyses on [facility].  We will send urine sample from today for microscopy.    Total time spent today in review of outside records and test results, discussion with the patient, and documentation: 20 minutes    Flako Foreman MD

## 2021-02-19 NOTE — PROGRESS NOTES
[facility]  Main Office: [address]  [address]  [place] [address]       CHIEF COMPLAINT:  Elevated PSA    HISTORY:   I was asked by Dr. Saldaña to see this 81-year-old gentleman who presents with a rising PSA.  The patient understands that he is beyond the normally recommended screening age for prostate cancer but he had requested a PSA be checked at his last physical.  The PSA was 4.57.  Prior to this it had been in the 2 range until 2017 when it began rising slowly.  He has no urinary symptoms or complaints.  He does not complain of nocturia or a slow urinary stream or frequency or urgency.  He has no history of gross hematuria no prior history of microscopic hematuria.  He has no family history of prostate cancer.      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 To use swallowing techniques     Essential hypertension, benign 11/11/2016     Heart murmur 5/10/2011     Melanoma (H)      Migraine equivalent 11/10/2015     Mitral valve prolapse      Non-rheumatic mitral regurgitation 8/9/2018     Other forms of migraine, without mention of intractable migraine without mention of status migrainosus      Paroxysmal A-fib (H)      Premature ventricular contraction      PVC (premature ventricular contraction) 5/10/2011     Sick sinus syndrome (H) 05/03/2016    s/p PPM implant 7/2/14     Squamous cell carcinoma      Syncope        PAST SURGICAL HISTORY:   Past Surgical History:   Procedure Laterality Date     APPENDECTOMY OPEN       BIOPSY OF SKIN LESION       COLONOSCOPY N/A 11/20/2015    Procedure: COLONOSCOPY;  Surgeon: David Us MD;  Location:  GI     IMPLANT PACEMAKER  7/2004     MOHS MICROGRAPHIC PROCEDURE         FAMILY HISTORY:   Family History   Problem Relation  "Age of Onset     Heart Disease Mother         MI     Heart Disease Father         MI     C.A.D. Brother         Just had angioplasty in 2006     Colon Cancer No family hx of        SOCIAL HISTORY:   Social History     Tobacco Use     Smoking status: Former Smoker     Packs/day: 1.00     Years: 4.00     Pack years: 4.00     Types: Cigarettes     Quit date: 1967     Years since quittin.1     Smokeless tobacco: Never Used   Substance Use Topics     Alcohol use: No     Alcohol/week: 0.0 standard drinks          Allergies   Allergen Reactions     Sulfa Drugs      rash         Current Outpatient Medications:      amLODIPine (NORVASC) 5 MG tablet, Take 1 tablet (5 mg) by mouth daily, Disp: 90 tablet, Rfl: 3     losartan (COZAAR) 100 MG tablet, Take 1 tablet (100 mg) by mouth daily, Disp: 90 tablet, Rfl: 3     metoprolol succinate ER (TOPROL-XL) 50 MG 24 hr tablet, Take 1 tablet (50 mg) by mouth daily, Disp: 90 tablet, Rfl: 3    Review Of Systems:  Skin: No rash, pruritis, or skin pigmentation  Eyes: No changes in vision  Ears/Nose/Throat: No changes in hearing, no nosebleeds  Respiratory: No shortness of breath, dyspnea on exertion, cough, or hemoptysis  Cardiovascular: No chest pain or palpitations  Gastrointestinal: No diarrhea or constipation. No abdominal pain. No hematochezia  Genitourinary: see HPI  Musculoskeletal: No pain or swelling of joints, normal range of motion  Neurologic: No weakness or tremors  Psychiatric: No recent changes in memory or mood  Hematologic/Lymphatic/Immunologic: No easy bruising or enlarged lymph nodes  Endocrine: No weight gain or loss      PHYSICAL EXAM:    /74   Ht 1.727 m (5' 8\")   Wt 67.6 kg (149 lb)   BMI 22.66 kg/m    General appearance: In NAD, conversant  HEENT: Normocephalic and atraumatic, anicteric sclera  Cardiovascular: Not examined  Respiratory: normal, non-labored breathing  Gastrointestinal: negative, Abdomen soft, non-tender, and non-distended. "   Musculoskeletal: Not Examined  Peripheral Vascular/extremity: No peripheral edema  Skin: Normal temperature, turgor, and texture. No rash  Psychiatric: Appropriate affect, alert and oriented to person, place, and time    Penis: Normal  Scrotal skin: Normal, no lesions  Testicles: Normal to palpation bilaterally  Epididymis: Normal to palpation bilaterally  Lymphatic: Normal inguinal lymph nodes    Digital Rectal Exam: The prostate is moderately enlarged, benign and symmetric to palpation    Cystoscopy: Not done      PSA: 4.57    UA RESULTS:  Recent Labs   Lab Test 02/19/21  0921   COLOR Yellow   APPEARANCE Clear   URINEGLC Negative   URINEBILI Negative   URINEKETONE Negative   SG 1.025   UBLD Moderate*   URINEPH 5.5   PROTEIN Negative   UROBILINOGEN 0.2   NITRITE Negative   LEUKEST Negative       Bladder Scan: 19mL    Other Labs:      Imaging Studies: None      CLINICAL IMPRESSION:   Elevated PSA, enlarged prostate, possible microscopic hematuria    PLAN:   We discussed all of his test results today.  His examination is normal and he is emptying his bladder well.  His PSA has been rising over the past few years.  I explained to him that most men his age do have prostate cancer but are still unlikely to be affected by that cancer in their lifetime.  His current PSA is actually in the high normal range for his age group.  I do not think he requires any further evaluation at this time but I do think we should continue to follow the PSA fairly closely just to make certain it does not rise precipitously.  The PSA was checked in December so I will have him come back in June for his next PSA check.    Initial dipstick urinalysis shows potential microscopic hematuria today.  There are no other urinalyses on [facility].  We will send urine sample from today for microscopy.    Total time spent today in review of outside records and test results, discussion with the patient, and documentation: 20 minutes    Flako Foreman  MD

## 2021-02-20 ENCOUNTER — IMMUNIZATION (OUTPATIENT)
Dept: NURSING | Facility: CLINIC | Age: 81
End: 2021-02-20
Attending: INTERNAL MEDICINE
Payer: COMMERCIAL

## 2021-02-20 PROCEDURE — 91300 PR COVID VAC PFIZER DIL RECON 30 MCG/0.3 ML IM: CPT

## 2021-02-20 PROCEDURE — 0002A PR COVID VAC PFIZER DIL RECON 30 MCG/0.3 ML IM: CPT

## 2021-02-22 LAB
MDC_IDC_LEAD_IMPLANT_DT: NORMAL
MDC_IDC_LEAD_IMPLANT_DT: NORMAL
MDC_IDC_LEAD_LOCATION: NORMAL
MDC_IDC_LEAD_LOCATION: NORMAL
MDC_IDC_LEAD_MFG: NORMAL
MDC_IDC_LEAD_MFG: NORMAL
MDC_IDC_LEAD_MODEL: NORMAL
MDC_IDC_LEAD_MODEL: NORMAL
MDC_IDC_LEAD_POLARITY_TYPE: NORMAL
MDC_IDC_LEAD_POLARITY_TYPE: NORMAL
MDC_IDC_LEAD_SERIAL: NORMAL
MDC_IDC_LEAD_SERIAL: NORMAL
MDC_IDC_MSMT_BATTERY_DTM: NORMAL
MDC_IDC_MSMT_BATTERY_IMPEDANCE: 448 OHM
MDC_IDC_MSMT_BATTERY_REMAINING_LONGEVITY: 113 MO
MDC_IDC_MSMT_BATTERY_STATUS: NORMAL
MDC_IDC_MSMT_BATTERY_VOLTAGE: 2.78 V
MDC_IDC_MSMT_LEADCHNL_RA_IMPEDANCE_VALUE: 563 OHM
MDC_IDC_MSMT_LEADCHNL_RA_PACING_THRESHOLD_AMPLITUDE: 0.5 V
MDC_IDC_MSMT_LEADCHNL_RA_PACING_THRESHOLD_PULSEWIDTH: 0.4 MS
MDC_IDC_MSMT_LEADCHNL_RV_IMPEDANCE_VALUE: 650 OHM
MDC_IDC_MSMT_LEADCHNL_RV_PACING_THRESHOLD_AMPLITUDE: 0.5 V
MDC_IDC_MSMT_LEADCHNL_RV_PACING_THRESHOLD_PULSEWIDTH: 0.4 MS
MDC_IDC_PG_IMPLANT_DTM: NORMAL
MDC_IDC_PG_MFG: NORMAL
MDC_IDC_PG_MODEL: NORMAL
MDC_IDC_PG_SERIAL: NORMAL
MDC_IDC_PG_TYPE: NORMAL
MDC_IDC_SESS_CLINIC_NAME: NORMAL
MDC_IDC_SESS_DTM: NORMAL
MDC_IDC_SESS_TYPE: NORMAL
MDC_IDC_SET_BRADY_AT_MODE_SWITCH_MODE: NORMAL
MDC_IDC_SET_BRADY_AT_MODE_SWITCH_RATE: 175 {BEATS}/MIN
MDC_IDC_SET_BRADY_LOWRATE: 60 {BEATS}/MIN
MDC_IDC_SET_BRADY_MAX_SENSOR_RATE: 130 {BEATS}/MIN
MDC_IDC_SET_BRADY_MAX_TRACKING_RATE: 130 {BEATS}/MIN
MDC_IDC_SET_BRADY_MODE: NORMAL
MDC_IDC_SET_BRADY_PAV_DELAY_LOW: 350 MS
MDC_IDC_SET_BRADY_SAV_DELAY_LOW: 350 MS
MDC_IDC_SET_LEADCHNL_RA_PACING_AMPLITUDE: 1.5 V
MDC_IDC_SET_LEADCHNL_RA_PACING_CAPTURE_MODE: NORMAL
MDC_IDC_SET_LEADCHNL_RA_PACING_POLARITY: NORMAL
MDC_IDC_SET_LEADCHNL_RA_PACING_PULSEWIDTH: 0.4 MS
MDC_IDC_SET_LEADCHNL_RA_SENSING_POLARITY: NORMAL
MDC_IDC_SET_LEADCHNL_RA_SENSING_SENSITIVITY: 0.5 MV
MDC_IDC_SET_LEADCHNL_RV_PACING_AMPLITUDE: 2 V
MDC_IDC_SET_LEADCHNL_RV_PACING_CAPTURE_MODE: NORMAL
MDC_IDC_SET_LEADCHNL_RV_PACING_POLARITY: NORMAL
MDC_IDC_SET_LEADCHNL_RV_PACING_PULSEWIDTH: 0.46 MS
MDC_IDC_SET_LEADCHNL_RV_SENSING_POLARITY: NORMAL
MDC_IDC_SET_LEADCHNL_RV_SENSING_SENSITIVITY: 2.8 MV
MDC_IDC_SET_ZONE_DETECTION_INTERVAL: 333.33 MS
MDC_IDC_SET_ZONE_DETECTION_INTERVAL: 342.86 MS
MDC_IDC_SET_ZONE_TYPE: NORMAL
MDC_IDC_SET_ZONE_TYPE: NORMAL
MDC_IDC_STAT_AT_BURDEN_PERCENT: 0 %
MDC_IDC_STAT_AT_DTM_END: NORMAL
MDC_IDC_STAT_AT_DTM_START: NORMAL
MDC_IDC_STAT_AT_MODE_SW_COUNT: 1
MDC_IDC_STAT_BRADY_AP_VP_PERCENT: 0 %
MDC_IDC_STAT_BRADY_AP_VS_PERCENT: 44 %
MDC_IDC_STAT_BRADY_AS_VP_PERCENT: 0 %
MDC_IDC_STAT_BRADY_AS_VS_PERCENT: 56 %
MDC_IDC_STAT_BRADY_DTM_END: NORMAL
MDC_IDC_STAT_BRADY_DTM_START: NORMAL
MDC_IDC_STAT_EPISODE_RECENT_COUNT: 1
MDC_IDC_STAT_EPISODE_RECENT_COUNT: 1
MDC_IDC_STAT_EPISODE_RECENT_COUNT_DTM_END: NORMAL
MDC_IDC_STAT_EPISODE_RECENT_COUNT_DTM_END: NORMAL
MDC_IDC_STAT_EPISODE_RECENT_COUNT_DTM_START: NORMAL
MDC_IDC_STAT_EPISODE_RECENT_COUNT_DTM_START: NORMAL
MDC_IDC_STAT_EPISODE_TYPE: NORMAL
MDC_IDC_STAT_EPISODE_TYPE: NORMAL

## 2021-05-27 ENCOUNTER — ANCILLARY PROCEDURE (OUTPATIENT)
Dept: CARDIOLOGY | Facility: CLINIC | Age: 81
End: 2021-05-27
Attending: INTERNAL MEDICINE
Payer: COMMERCIAL

## 2021-05-27 DIAGNOSIS — Z95.0 CARDIAC PACEMAKER IN SITU: ICD-10-CM

## 2021-05-27 PROCEDURE — 93294 REM INTERROG EVL PM/LDLS PM: CPT | Performed by: INTERNAL MEDICINE

## 2021-05-27 PROCEDURE — 93296 REM INTERROG EVL PM/IDS: CPT | Performed by: INTERNAL MEDICINE

## 2021-06-10 LAB
MDC_IDC_LEAD_IMPLANT_DT: NORMAL
MDC_IDC_LEAD_IMPLANT_DT: NORMAL
MDC_IDC_LEAD_LOCATION: NORMAL
MDC_IDC_LEAD_LOCATION: NORMAL
MDC_IDC_LEAD_MFG: NORMAL
MDC_IDC_LEAD_MFG: NORMAL
MDC_IDC_LEAD_MODEL: NORMAL
MDC_IDC_LEAD_MODEL: NORMAL
MDC_IDC_LEAD_POLARITY_TYPE: NORMAL
MDC_IDC_LEAD_POLARITY_TYPE: NORMAL
MDC_IDC_LEAD_SERIAL: NORMAL
MDC_IDC_LEAD_SERIAL: NORMAL
MDC_IDC_MSMT_BATTERY_DTM: NORMAL
MDC_IDC_MSMT_BATTERY_IMPEDANCE: 522 OHM
MDC_IDC_MSMT_BATTERY_REMAINING_LONGEVITY: 106 MO
MDC_IDC_MSMT_BATTERY_STATUS: NORMAL
MDC_IDC_MSMT_BATTERY_VOLTAGE: 2.78 V
MDC_IDC_MSMT_LEADCHNL_RA_IMPEDANCE_VALUE: 572 OHM
MDC_IDC_MSMT_LEADCHNL_RA_PACING_THRESHOLD_AMPLITUDE: 0.5 V
MDC_IDC_MSMT_LEADCHNL_RA_PACING_THRESHOLD_PULSEWIDTH: 0.4 MS
MDC_IDC_MSMT_LEADCHNL_RV_IMPEDANCE_VALUE: 630 OHM
MDC_IDC_MSMT_LEADCHNL_RV_PACING_THRESHOLD_AMPLITUDE: 0.62 V
MDC_IDC_MSMT_LEADCHNL_RV_PACING_THRESHOLD_PULSEWIDTH: 0.4 MS
MDC_IDC_PG_IMPLANT_DTM: NORMAL
MDC_IDC_PG_MFG: NORMAL
MDC_IDC_PG_MODEL: NORMAL
MDC_IDC_PG_SERIAL: NORMAL
MDC_IDC_PG_TYPE: NORMAL
MDC_IDC_SESS_CLINIC_NAME: NORMAL
MDC_IDC_SESS_DTM: NORMAL
MDC_IDC_SESS_TYPE: NORMAL
MDC_IDC_SET_BRADY_AT_MODE_SWITCH_MODE: NORMAL
MDC_IDC_SET_BRADY_AT_MODE_SWITCH_RATE: 175 {BEATS}/MIN
MDC_IDC_SET_BRADY_LOWRATE: 60 {BEATS}/MIN
MDC_IDC_SET_BRADY_MAX_SENSOR_RATE: 130 {BEATS}/MIN
MDC_IDC_SET_BRADY_MAX_TRACKING_RATE: 130 {BEATS}/MIN
MDC_IDC_SET_BRADY_MODE: NORMAL
MDC_IDC_SET_BRADY_PAV_DELAY_LOW: 350 MS
MDC_IDC_SET_BRADY_SAV_DELAY_LOW: 350 MS
MDC_IDC_SET_LEADCHNL_RA_PACING_AMPLITUDE: 1.5 V
MDC_IDC_SET_LEADCHNL_RA_PACING_CAPTURE_MODE: NORMAL
MDC_IDC_SET_LEADCHNL_RA_PACING_POLARITY: NORMAL
MDC_IDC_SET_LEADCHNL_RA_PACING_PULSEWIDTH: 0.4 MS
MDC_IDC_SET_LEADCHNL_RA_SENSING_POLARITY: NORMAL
MDC_IDC_SET_LEADCHNL_RA_SENSING_SENSITIVITY: 0.5 MV
MDC_IDC_SET_LEADCHNL_RV_PACING_AMPLITUDE: 2 V
MDC_IDC_SET_LEADCHNL_RV_PACING_CAPTURE_MODE: NORMAL
MDC_IDC_SET_LEADCHNL_RV_PACING_POLARITY: NORMAL
MDC_IDC_SET_LEADCHNL_RV_PACING_PULSEWIDTH: 0.4 MS
MDC_IDC_SET_LEADCHNL_RV_SENSING_POLARITY: NORMAL
MDC_IDC_SET_LEADCHNL_RV_SENSING_SENSITIVITY: 2.8 MV
MDC_IDC_SET_ZONE_DETECTION_INTERVAL: 333.33 MS
MDC_IDC_SET_ZONE_DETECTION_INTERVAL: 342.86 MS
MDC_IDC_SET_ZONE_TYPE: NORMAL
MDC_IDC_SET_ZONE_TYPE: NORMAL
MDC_IDC_STAT_AT_BURDEN_PERCENT: 0 %
MDC_IDC_STAT_AT_DTM_END: NORMAL
MDC_IDC_STAT_AT_DTM_START: NORMAL
MDC_IDC_STAT_AT_MODE_SW_COUNT: 1
MDC_IDC_STAT_BRADY_AP_VP_PERCENT: 0 %
MDC_IDC_STAT_BRADY_AP_VS_PERCENT: 44 %
MDC_IDC_STAT_BRADY_AS_VP_PERCENT: 0 %
MDC_IDC_STAT_BRADY_AS_VS_PERCENT: 56 %
MDC_IDC_STAT_BRADY_DTM_END: NORMAL
MDC_IDC_STAT_BRADY_DTM_START: NORMAL
MDC_IDC_STAT_EPISODE_RECENT_COUNT: 1
MDC_IDC_STAT_EPISODE_RECENT_COUNT: 1
MDC_IDC_STAT_EPISODE_RECENT_COUNT_DTM_END: NORMAL
MDC_IDC_STAT_EPISODE_RECENT_COUNT_DTM_END: NORMAL
MDC_IDC_STAT_EPISODE_RECENT_COUNT_DTM_START: NORMAL
MDC_IDC_STAT_EPISODE_RECENT_COUNT_DTM_START: NORMAL
MDC_IDC_STAT_EPISODE_TYPE: NORMAL
MDC_IDC_STAT_EPISODE_TYPE: NORMAL

## 2021-06-16 DIAGNOSIS — R97.20 ELEVATED PROSTATE SPECIFIC ANTIGEN (PSA): ICD-10-CM

## 2021-06-16 PROCEDURE — 84153 ASSAY OF PSA TOTAL: CPT | Performed by: UROLOGY

## 2021-06-16 PROCEDURE — 36415 COLL VENOUS BLD VENIPUNCTURE: CPT | Performed by: UROLOGY

## 2021-06-17 LAB — PSA SERPL-MCNC: 5.71 UG/L (ref 0–4)

## 2021-06-23 ENCOUNTER — OFFICE VISIT (OUTPATIENT)
Dept: UROLOGY | Facility: CLINIC | Age: 81
End: 2021-06-23
Payer: COMMERCIAL

## 2021-06-23 VITALS
DIASTOLIC BLOOD PRESSURE: 70 MMHG | HEIGHT: 68 IN | WEIGHT: 145 LBS | BODY MASS INDEX: 21.98 KG/M2 | SYSTOLIC BLOOD PRESSURE: 108 MMHG

## 2021-06-23 DIAGNOSIS — R31.29 MICROSCOPIC HEMATURIA: Primary | ICD-10-CM

## 2021-06-23 DIAGNOSIS — N40.0 ENLARGED PROSTATE: ICD-10-CM

## 2021-06-23 DIAGNOSIS — R97.20 ELEVATED PROSTATE SPECIFIC ANTIGEN (PSA): ICD-10-CM

## 2021-06-23 LAB
ALBUMIN UR-MCNC: ABNORMAL MG/DL
AMORPH CRY #/AREA URNS HPF: ABNORMAL /HPF
APPEARANCE UR: CLEAR
BILIRUB UR QL STRIP: NEGATIVE
COLOR UR AUTO: YELLOW
GLUCOSE UR STRIP-MCNC: NEGATIVE MG/DL
HGB UR QL STRIP: ABNORMAL
KETONES UR STRIP-MCNC: NEGATIVE MG/DL
LEUKOCYTE ESTERASE UR QL STRIP: NEGATIVE
MUCOUS THREADS #/AREA URNS LPF: PRESENT /LPF
NITRATE UR QL: NEGATIVE
PH UR STRIP: 5 PH (ref 5–7)
RBC #/AREA URNS AUTO: 2 /HPF (ref 0–2)
SOURCE: ABNORMAL
SP GR UR STRIP: 1.02 (ref 1–1.03)
UROBILINOGEN UR STRIP-ACNC: 0.2 EU/DL (ref 0.2–1)
WBC #/AREA URNS AUTO: 0 /HPF (ref 0–5)

## 2021-06-23 PROCEDURE — 81003 URINALYSIS AUTO W/O SCOPE: CPT | Mod: QW | Performed by: UROLOGY

## 2021-06-23 PROCEDURE — 99214 OFFICE O/P EST MOD 30 MIN: CPT | Performed by: UROLOGY

## 2021-06-23 PROCEDURE — 84999 UNLISTED CHEMISTRY PROCEDURE: CPT | Mod: 90 | Performed by: UROLOGY

## 2021-06-23 PROCEDURE — 99000 SPECIMEN HANDLING OFFICE-LAB: CPT | Performed by: UROLOGY

## 2021-06-23 ASSESSMENT — MIFFLIN-ST. JEOR: SCORE: 1342.22

## 2021-06-23 ASSESSMENT — PAIN SCALES - GENERAL: PAINLEVEL: NO PAIN (0)

## 2021-06-23 NOTE — LETTER
6/23/2021       RE: [place] [address]ogueira  [address] [address]     Dear Colleague,    Thank you for referring your patient, Charles Nogueira, to the [facility] UROLOG[facility] [place] at [facility]. Please see a copy of my visit note below.    Office Visit Note  [facility]  [phone]    UROLOGIC DIAGNOSES:   Elevated PSA, microscopic hematuria    CURRENT INTERVENTIONS:       HISTORY:   Charles returns to clinic today for recheck of PSA and urinalysis.  His previous urinalysis from previous visit had 4 red blood cells per high-power field.  The PSA has gone up somewhat at 5.71.  He continues to have no urinary symptoms or complaints.      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 To use swallowing techniques     Essential hypertension, benign 11/11/2016     Heart murmur 5/10/2011     Melanoma (H)      Migraine equivalent 11/10/2015     Mitral valve prolapse      Non-rheumatic mitral regurgitation 8/9/2018     Other forms of migraine, without mention of intractable migraine without mention of status migrainosus      Paroxysmal A-fib (H)      Premature ventricular contraction      PVC (premature ventricular contraction) 5/10/2011     Sick sinus syndrome (H) 05/03/2016    s/p PPM implant 7/2/14     Squamous cell carcinoma      Syncope        PAST SURGICAL HISTORY:   Past Surgical History:   Procedure Laterality Date     APPENDECTOMY OPEN       BIOPSY OF SKIN LESION       COLONOSCOPY N/A 11/20/2015    Procedure: COLONOSCOPY;  Surgeon: David Us MD;  Location:  GI     IMPLANT PACEMAKER  7/2004     MOHS MICROGRAPHIC PROCEDURE         FAMILY HISTORY:   Family History   Problem Relation Age of Onset      Heart Disease Mother         MI     Heart Disease Father         MI     C.A.D. Brother         Just had angioplasty in 2006     Colon Cancer No family hx of        SOCIAL HISTORY:   Social History     Socioeconomic History     Marital status:      Spouse name: Paula     Number of children: 3     Years of education: None     Highest education level: None   Occupational History     Employer: RETIRED   Social Needs     Financial resource strain: None     Food insecurity     Worry: None     Inability: None     Transportation needs     Medical: None     Non-medical: None   Tobacco Use     Smoking status: Former Smoker     Packs/day: 1.00     Years: 4.00     Pack years: 4.00     Types: Cigarettes     Quit date: 1967     Years since quittin.5     Smokeless tobacco: Never Used   Substance and Sexual Activity     Alcohol use: No     Alcohol/week: 0.0 standard drinks     Drug use: No     Sexual activity: Yes     Partners: Female   Lifestyle     Physical activity     Days per week: None     Minutes per session: None     Stress: None   Relationships     Social connections     Talks on phone: None     Gets together: None     Attends L[employer] service: None     Active member of club or organization: None     Attends meetings of clubs or organizations: None     Relationship status: None     Intimate partner violence     Fear of current or ex partner: None     Emotionally abused: None     Physically abused: None     Forced sexual activity: None   Other Topics Concern      Service Yes     Blood Transfusions No     Caffeine Concern Yes     Comment: decaff coffee     Occupational Exposure No     Hobby Hazards No     Sleep Concern No     Stress Concern No     Weight Concern No     Special Diet No     Back Care No     Exercise Yes     Comment: Excercises about 4-5 days per week     Bike Helmet Yes     Seat Belt Yes     Self-Exams No     Parent/sibling w/ CABG, MI or angioplasty before 65F 55M? Not Asked  "  Social History Narrative     None       Review Of Systems:  Skin: No rash, pruritis, or skin pigmentation  Eyes: No changes in vision  Ears/Nose/Throat: No changes in hearing, no nosebleeds  Respiratory: No shortness of breath, dyspnea on exertion, cough, or hemoptysis  Cardiovascular: No chest pain or palpitations  Gastrointestinal: No diarrhea or constipation. No abdominal pain. No hematochezia  Genitourinary: see HPI  Musculoskeletal: No pain or swelling of joints, normal range of motion  Neurologic: No weakness or tremors  Psychiatric: No recent changes in memory or mood  Hematologic/Lymphatic/Immunologic: No easy bruising or enlarged lymph nodes  Endocrine: No weight gain or loss      PHYSICAL EXAM:    /70   Ht 1.727 m (5' 8\")   Wt 65.8 kg (145 lb)   BMI 22.05 kg/m      Constitutional: Well developed. Conversant and in no acute distress  Eyes: Anicteric sclera, conjunctiva clear, normal extraocular movements  ENT: Normocephalic and atraumatic,   Skin: Warm and dry. No rashes or lesions  Cardiac: No peripheral edema  Back/Flank: Not done  CNS/PNS: Normal musculature and movements, moves all extremities normally  Respiratory: Normal non-labored breathing  Abdomen: Soft nontender and nondistended  Peripheral Vascular: No peripheral edema  Mental Status/Psych: Alert and Oriented x 3. Normal mood and affect    Penis: Not done  Scrotal Skin: Not done  Testicles: Not done  Epididymis: Not done  Digital Rectal Exam:     Cystoscopy: Not done    Imaging: None    Urinalysis: UA RESULTS:  Recent Labs   Lab Test 02/19/21  0950 02/19/21  0921   COLOR  --  Yellow   APPEARANCE  --  Clear   URINEGLC  --  Negative   URINEBILI  --  Negative   URINEKETONE  --  Negative   SG  --  1.025   UBLD  --  Moderate*   URINEPH  --  5.5   PROTEIN  --  Negative   UROBILINOGEN  --  0.2   NITRITE  --  Negative   LEUKEST  --  Negative   RBCU 4*  --    WBCU 1  --        PSA: 5.71    Post Void Residual:     Other labs: None " today      IMPRESSION:  Elevated PSA    PLAN:  We discussed his PSA.  The PSA is actually in the high normal range for his age group but it does appear to be rising.  We discussed the potential implications of this.  We discussed options of where he can go from here.  On one hand, he could pursue further evaluation of a potential prostate cancer first with an MRI and then possibly with a biopsy.  On the other hand, he could simply stop following this understanding that most men his age have prostate cancer and it is unlikely to cause any issues in his lifetime.  As a compromise he could also continue to follow the PSA closely.  He would like to follow the PSA closely.  We will check another PSA in 3 months.  I will contact him with today's urinalysis results to see if the microscopic hematuria persists.      Flako Foreman M.D.

## 2021-06-23 NOTE — PROGRESS NOTES
Office Visit Note  [facility]  [phone]    UROLOGIC DIAGNOSES:   Elevated PSA, microscopic hematuria    CURRENT INTERVENTIONS:       HISTORY:   Charles returns to clinic today for recheck of PSA and urinalysis.  His previous urinalysis from previous visit had 4 red blood cells per high-power field.  The PSA has gone up somewhat at 5.71.  He continues to have no urinary symptoms or complaints.      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 To use swallowing techniques     Essential hypertension, benign 11/11/2016     Heart murmur 5/10/2011     Melanoma (H)      Migraine equivalent 11/10/2015     Mitral valve prolapse      Non-rheumatic mitral regurgitation 8/9/2018     Other forms of migraine, without mention of intractable migraine without mention of status migrainosus      Paroxysmal A-fib (H)      Premature ventricular contraction      PVC (premature ventricular contraction) 5/10/2011     Sick sinus syndrome (H) 05/03/2016    s/p PPM implant 7/2/14     Squamous cell carcinoma      Syncope        PAST SURGICAL HISTORY:   Past Surgical History:   Procedure Laterality Date     APPENDECTOMY OPEN       BIOPSY OF SKIN LESION       COLONOSCOPY N/A 11/20/2015    Procedure: COLONOSCOPY;  Surgeon: David Us MD;  Location: [facility]     IMPLANT PACEMAKER  7/2004     MOHS MICROGRAPHIC PROCEDURE         FAMILY HISTORY:   Family History   Problem Relation Age of Onset     Heart Disease Mother         MI     Heart Disease Father         MI     C.A.D. Brother         Just had angioplasty in 2006     Colon Cancer No family hx of        SOCIAL HISTORY:   Social History     Socioeconomic History     Marital status:      Spouse name: Paula     Number of children: 3     Years of  education: None     Highest education level: None   Occupational History     Employer: RETIRED   Social Needs     Financial resource strain: None     Food insecurity     Worry: None     Inability: None     Transportation needs     Medical: None     Non-medical: None   Tobacco Use     Smoking status: Former Smoker     Packs/day: 1.00     Years: 4.00     Pack years: 4.00     Types: Cigarettes     Quit date: 1967     Years since quittin.5     Smokeless tobacco: Never Used   Substance and Sexual Activity     Alcohol use: No     Alcohol/week: 0.0 standard drinks     Drug use: No     Sexual activity: Yes     Partners: Female   Lifestyle     Physical activity     Days per week: None     Minutes per session: None     Stress: None   Relationships     Social connections     Talks on phone: None     Gets together: None     Attends Christianity service: None     Active member of club or organization: None     Attends meetings of clubs or organizations: None     Relationship status: None     Intimate partner violence     Fear of current or ex partner: None     Emotionally abused: None     Physically abused: None     Forced sexual activity: None   Other Topics Concern      Service Yes     Blood Transfusions No     Caffeine Concern Yes     Comment: decaff coffee     Occupational Exposure No     Hobby Hazards No     Sleep Concern No     Stress Concern No     Weight Concern No     Special Diet No     Back Care No     Exercise Yes     Comment: Excercises about 4-5 days per week     Bike Helmet Yes     Seat Belt Yes     Self-Exams No     Parent/sibling w/ CABG, MI or angioplasty before 65F 55M? Not Asked   Social History Narrative     None       Review Of Systems:  Skin: No rash, pruritis, or skin pigmentation  Eyes: No changes in vision  Ears/Nose/Throat: No changes in hearing, no nosebleeds  Respiratory: No shortness of breath, dyspnea on exertion, cough, or hemoptysis  Cardiovascular: No chest pain or  "palpitations  Gastrointestinal: No diarrhea or constipation. No abdominal pain. No hematochezia  Genitourinary: see HPI  Musculoskeletal: No pain or swelling of joints, normal range of motion  Neurologic: No weakness or tremors  Psychiatric: No recent changes in memory or mood  Hematologic/Lymphatic/Immunologic: No easy bruising or enlarged lymph nodes  Endocrine: No weight gain or loss      PHYSICAL EXAM:    /70   Ht 1.727 m (5' 8\")   Wt 65.8 kg (145 lb)   BMI 22.05 kg/m      Constitutional: Well developed. Conversant and in no acute distress  Eyes: Anicteric sclera, conjunctiva clear, normal extraocular movements  ENT: Normocephalic and atraumatic,   Skin: Warm and dry. No rashes or lesions  Cardiac: No peripheral edema  Back/Flank: Not done  CNS/PNS: Normal musculature and movements, moves all extremities normally  Respiratory: Normal non-labored breathing  Abdomen: Soft nontender and nondistended  Peripheral Vascular: No peripheral edema  Mental Status/Psych: Alert and Oriented x 3. Normal mood and affect    Penis: Not done  Scrotal Skin: Not done  Testicles: Not done  Epididymis: Not done  Digital Rectal Exam:     Cystoscopy: Not done    Imaging: None    Urinalysis: UA RESULTS:  Recent Labs   Lab Test 02/19/21  0950 02/19/21  0921   COLOR  --  Yellow   APPEARANCE  --  Clear   URINEGLC  --  Negative   URINEBILI  --  Negative   URINEKETONE  --  Negative   SG  --  1.025   UBLD  --  Moderate*   URINEPH  --  5.5   PROTEIN  --  Negative   UROBILINOGEN  --  0.2   NITRITE  --  Negative   LEUKEST  --  Negative   RBCU 4*  --    WBCU 1  --        PSA: 5.71    Post Void Residual:     Other labs: None today      IMPRESSION:  Elevated PSA    PLAN:  We discussed his PSA.  The PSA is actually in the high normal range for his age group but it does appear to be rising.  We discussed the potential implications of this.  We discussed options of where he can go from here.  On one hand, he could pursue further evaluation of " a potential prostate cancer first with an MRI and then possibly with a biopsy.  On the other hand, he could simply stop following this understanding that most men his age have prostate cancer and it is unlikely to cause any issues in his lifetime.  As a compromise he could also continue to follow the PSA closely.  He would like to follow the PSA closely.  We will check another PSA in 3 months.  I will contact him with today's urinalysis results to see if the microscopic hematuria persists.      Flako Foreman M.D.

## 2021-07-06 DIAGNOSIS — I10 ESSENTIAL HYPERTENSION, BENIGN: ICD-10-CM

## 2021-07-06 DIAGNOSIS — I10 ESSENTIAL HYPERTENSION: ICD-10-CM

## 2021-07-06 RX ORDER — LOSARTAN POTASSIUM 100 MG/1
100 TABLET ORAL DAILY
Qty: 90 TABLET | Refills: 0 | Status: SHIPPED | OUTPATIENT
Start: 2021-07-06 | End: 2021-09-17

## 2021-07-06 RX ORDER — AMLODIPINE BESYLATE 5 MG/1
5 TABLET ORAL DAILY
Qty: 90 TABLET | Refills: 0 | Status: SHIPPED | OUTPATIENT
Start: 2021-07-06 | End: 2021-09-17

## 2021-07-13 DIAGNOSIS — I10 ESSENTIAL HYPERTENSION: ICD-10-CM

## 2021-07-13 RX ORDER — METOPROLOL SUCCINATE 50 MG/1
50 TABLET, EXTENDED RELEASE ORAL DAILY
Qty: 90 TABLET | Refills: 0 | Status: SHIPPED | OUTPATIENT
Start: 2021-07-13 | End: 2021-09-17

## 2021-07-26 ENCOUNTER — OFFICE VISIT (OUTPATIENT)
Dept: FAMILY MEDICINE | Facility: CLINIC | Age: 81
End: 2021-07-26
Payer: COMMERCIAL

## 2021-07-26 VITALS
TEMPERATURE: 97.9 F | RESPIRATION RATE: 16 BRPM | SYSTOLIC BLOOD PRESSURE: 122 MMHG | HEIGHT: 69 IN | DIASTOLIC BLOOD PRESSURE: 74 MMHG | OXYGEN SATURATION: 99 % | HEART RATE: 71 BPM | BODY MASS INDEX: 21.77 KG/M2 | WEIGHT: 147 LBS

## 2021-07-26 DIAGNOSIS — R53.83 OTHER FATIGUE: Primary | ICD-10-CM

## 2021-07-26 DIAGNOSIS — R97.20 ELEVATED PROSTATE SPECIFIC ANTIGEN (PSA): ICD-10-CM

## 2021-07-26 DIAGNOSIS — Z95.0 CARDIAC PACEMAKER IN SITU: ICD-10-CM

## 2021-07-26 DIAGNOSIS — I50.32 CHRONIC DIASTOLIC CONGESTIVE HEART FAILURE (H): ICD-10-CM

## 2021-07-26 DIAGNOSIS — I48.0 PAF (PAROXYSMAL ATRIAL FIBRILLATION) (H): ICD-10-CM

## 2021-07-26 LAB
ALBUMIN UR-MCNC: NEGATIVE MG/DL
APPEARANCE UR: CLEAR
BACTERIA #/AREA URNS HPF: ABNORMAL /HPF
BASOPHILS # BLD AUTO: 0 10E3/UL (ref 0–0.2)
BASOPHILS NFR BLD AUTO: 0 %
BILIRUB UR QL STRIP: NEGATIVE
COLOR UR AUTO: YELLOW
EOSINOPHIL # BLD AUTO: 0.3 10E3/UL (ref 0–0.7)
EOSINOPHIL NFR BLD AUTO: 3 %
ERYTHROCYTE [DISTWIDTH] IN BLOOD BY AUTOMATED COUNT: 14.1 % (ref 10–15)
GLUCOSE UR STRIP-MCNC: NEGATIVE MG/DL
HCT VFR BLD AUTO: 44.7 % (ref 40–53)
HGB BLD-MCNC: 14.5 G/DL (ref 13.3–17.7)
HGB UR QL STRIP: ABNORMAL
KETONES UR STRIP-MCNC: ABNORMAL MG/DL
LEUKOCYTE ESTERASE UR QL STRIP: NEGATIVE
LYMPHOCYTES # BLD AUTO: 1.7 10E3/UL (ref 0.8–5.3)
LYMPHOCYTES NFR BLD AUTO: 18 %
MCH RBC QN AUTO: 29.9 PG (ref 26.5–33)
MCHC RBC AUTO-ENTMCNC: 32.4 G/DL (ref 31.5–36.5)
MCV RBC AUTO: 92 FL (ref 78–100)
MONOCYTES # BLD AUTO: 1.2 10E3/UL (ref 0–1.3)
MONOCYTES NFR BLD AUTO: 13 %
MUCOUS THREADS #/AREA URNS LPF: PRESENT /LPF
NEUTROPHILS # BLD AUTO: 6.2 10E3/UL (ref 1.6–8.3)
NEUTROPHILS NFR BLD AUTO: 66 %
NITRATE UR QL: NEGATIVE
PH UR STRIP: 5.5 [PH] (ref 5–7)
PLATELET # BLD AUTO: 166 10E3/UL (ref 150–450)
RBC # BLD AUTO: 4.85 10E6/UL (ref 4.4–5.9)
RBC #/AREA URNS AUTO: ABNORMAL /HPF
SP GR UR STRIP: 1.02 (ref 1–1.03)
SQUAMOUS #/AREA URNS AUTO: ABNORMAL /LPF
UROBILINOGEN UR STRIP-ACNC: 0.2 E.U./DL
WBC # BLD AUTO: 9.5 10E3/UL (ref 4–11)
WBC #/AREA URNS AUTO: ABNORMAL /HPF

## 2021-07-26 PROCEDURE — 84153 ASSAY OF PSA TOTAL: CPT | Performed by: FAMILY MEDICINE

## 2021-07-26 PROCEDURE — 84443 ASSAY THYROID STIM HORMONE: CPT | Performed by: FAMILY MEDICINE

## 2021-07-26 PROCEDURE — 80053 COMPREHEN METABOLIC PANEL: CPT | Performed by: FAMILY MEDICINE

## 2021-07-26 PROCEDURE — 36415 COLL VENOUS BLD VENIPUNCTURE: CPT | Performed by: FAMILY MEDICINE

## 2021-07-26 PROCEDURE — 99213 OFFICE O/P EST LOW 20 MIN: CPT | Performed by: FAMILY MEDICINE

## 2021-07-26 PROCEDURE — 85025 COMPLETE CBC W/AUTO DIFF WBC: CPT | Performed by: FAMILY MEDICINE

## 2021-07-26 PROCEDURE — 81001 URINALYSIS AUTO W/SCOPE: CPT | Performed by: FAMILY MEDICINE

## 2021-07-26 ASSESSMENT — MIFFLIN-ST. JEOR: SCORE: 1359.23

## 2021-07-26 ASSESSMENT — ENCOUNTER SYMPTOMS: FATIGUE: 1

## 2021-07-26 NOTE — PROGRESS NOTES
"        Do Soto is a 81 year old who presents for the following health issues follow up his cardiac issues, Missed his trip to [place] this year due to covid. Walks daily for fitness   Feels like  His  Energy is  Reduced, extercise tolerance more limited , weight is reduced , he eats less and  Exercises less vigorously, he feels  Better after a 15 minute horizontal rest or nap   Fatigue  Associated symptoms include fatigue.   History of Present Illness       He eats 2-3 servings of fruits and vegetables daily.He consumes 1 sweetened beverage(s) daily.He exercises with enough effort to increase his heart rate 30 to 60 minutes per day.  He exercises with enough effort to increase his heart rate 4 days per week.   He is taking medications regularly.             Review of Systems   Constitutional: Positive for fatigue.         REVIEW OF SYSTEMS    Generally has been generally  feeling well . . No problems with vision, hearing, dental or neck pain.Has mild  airborne or ingestion allergy  No chest pain, palpitations, dyspnea, change in bowel habits, blood  in stool or dyspepsia.  No rashes, changing moles, weakness, lassitude or back problems.  No chronic issues . No dysuria  Patient never  a smoker. No problems with significant headaches.        Objective    /74 (BP Location: Right arm, Patient Position: Chair, Cuff Size: Adult Regular)   Pulse 71   Temp 97.9  F (36.6  C) (Oral)   Resp 16   Ht 1.74 m (5' 8.5\")   Wt 66.7 kg (147 lb)   SpO2 99%   BMI 22.03 kg/m    Body mass index is 22.03 kg/m .  Physical Exam   GENERAL: healthy, alert and no distress  EYES: Eyes grossly normal to inspection, PERRL and conjunctivae and sclerae normal  HENT: ear canals and TM's normal, nose and mouth without ulcers or lesions  NECK: no adenopathy, no asymmetry, masses, or scars and thyroid normal to palpation  RESP: lungs clear to auscultation - no rales, rhonchi or wheezes  CV: regular rate and rhythm, normal S1 S2, " no S3 or S4, no murmur, click or rub, no peripheral edema and peripheral pulses strong  ABDOMEN: soft, nontender, no hepatosplenomegaly, no masses and bowel sounds normal  MS: no gross musculoskeletal defects noted, no edema  SKIN: no suspicious lesions or rashes  NEURO: Normal strength and tone, mentation intact and speech normal  PSYCH: mentation appears normal, affect normal/bright    (R53.83) Other fatigue  (primary encounter diagnosis)  Comment: check for metabolic,   Plan: TSH with free T4 reflex, CBC with platelets and        differential, Comprehensive metabolic panel         (BMP + Alb, Alk Phos, ALT, AST, Total. Bili,         TP)            (R97.20) Elevated prostate specific antigen (PSA)  Comment:   Plan: PSA, tumor marker

## 2021-07-27 LAB
ALBUMIN SERPL-MCNC: 3.8 G/DL (ref 3.4–5)
ALP SERPL-CCNC: 86 U/L (ref 40–150)
ALT SERPL W P-5'-P-CCNC: 29 U/L (ref 0–70)
ANION GAP SERPL CALCULATED.3IONS-SCNC: 7 MMOL/L (ref 3–14)
AST SERPL W P-5'-P-CCNC: 20 U/L (ref 0–45)
BILIRUB SERPL-MCNC: 0.5 MG/DL (ref 0.2–1.3)
BUN SERPL-MCNC: 20 MG/DL (ref 7–30)
CALCIUM SERPL-MCNC: 9.2 MG/DL (ref 8.5–10.1)
CHLORIDE BLD-SCNC: 109 MMOL/L (ref 94–109)
CO2 SERPL-SCNC: 26 MMOL/L (ref 20–32)
CREAT SERPL-MCNC: 1.21 MG/DL (ref 0.66–1.25)
GFR SERPL CREATININE-BSD FRML MDRD: 56 ML/MIN/1.73M2
GLUCOSE BLD-MCNC: 102 MG/DL (ref 70–99)
POTASSIUM BLD-SCNC: 4 MMOL/L (ref 3.4–5.3)
PROT SERPL-MCNC: 7 G/DL (ref 6.8–8.8)
PSA SERPL-MCNC: 5.46 UG/L (ref 0–4)
SODIUM SERPL-SCNC: 142 MMOL/L (ref 133–144)
TSH SERPL DL<=0.005 MIU/L-ACNC: 0.82 MU/L (ref 0.4–4)

## 2021-08-14 ENCOUNTER — HOSPITAL ENCOUNTER (EMERGENCY)
Facility: CLINIC | Age: 81
Discharge: HOME | End: 2021-08-15
Attending: EMERGENCY MEDICINE | Admitting: EMERGENCY MEDICINE
Payer: COMMERCIAL

## 2021-08-14 ENCOUNTER — APPOINTMENT (OUTPATIENT)
Dept: CT IMAGING | Facility: CLINIC | Age: 81
End: 2021-08-14
Attending: EMERGENCY MEDICINE
Payer: COMMERCIAL

## 2021-08-14 DIAGNOSIS — R41.0 CONFUSION: ICD-10-CM

## 2021-08-14 LAB
ALBUMIN UR-MCNC: NEGATIVE MG/DL
ANION GAP SERPL CALCULATED.3IONS-SCNC: 5 MMOL/L (ref 3–14)
APPEARANCE UR: CLEAR
BASOPHILS # BLD AUTO: 0 10E3/UL (ref 0–0.2)
BASOPHILS NFR BLD AUTO: 0 %
BILIRUB UR QL STRIP: NEGATIVE
BUN SERPL-MCNC: 22 MG/DL (ref 7–30)
CALCIUM SERPL-MCNC: 9.1 MG/DL (ref 8.5–10.1)
CHLORIDE BLD-SCNC: 106 MMOL/L (ref 94–109)
CO2 SERPL-SCNC: 26 MMOL/L (ref 20–32)
COLOR UR AUTO: ABNORMAL
CREAT SERPL-MCNC: 1.08 MG/DL (ref 0.66–1.25)
EOSINOPHIL # BLD AUTO: 0.1 10E3/UL (ref 0–0.7)
EOSINOPHIL NFR BLD AUTO: 1 %
ERYTHROCYTE [DISTWIDTH] IN BLOOD BY AUTOMATED COUNT: 13.1 % (ref 10–15)
GFR SERPL CREATININE-BSD FRML MDRD: 64 ML/MIN/1.73M2
GLUCOSE BLD-MCNC: 149 MG/DL (ref 70–99)
GLUCOSE UR STRIP-MCNC: NEGATIVE MG/DL
HCT VFR BLD AUTO: 45 % (ref 40–53)
HGB BLD-MCNC: 14.4 G/DL (ref 13.3–17.7)
HGB UR QL STRIP: ABNORMAL
HOLD SPECIMEN: NORMAL
IMM GRANULOCYTES # BLD: 0 10E3/UL
IMM GRANULOCYTES NFR BLD: 0 %
KETONES UR STRIP-MCNC: NEGATIVE MG/DL
LEUKOCYTE ESTERASE UR QL STRIP: NEGATIVE
LYMPHOCYTES # BLD AUTO: 1.4 10E3/UL (ref 0.8–5.3)
LYMPHOCYTES NFR BLD AUTO: 16 %
MCH RBC QN AUTO: 30.3 PG (ref 26.5–33)
MCHC RBC AUTO-ENTMCNC: 32 G/DL (ref 31.5–36.5)
MCV RBC AUTO: 95 FL (ref 78–100)
MONOCYTES # BLD AUTO: 0.9 10E3/UL (ref 0–1.3)
MONOCYTES NFR BLD AUTO: 10 %
NEUTROPHILS # BLD AUTO: 6.4 10E3/UL (ref 1.6–8.3)
NEUTROPHILS NFR BLD AUTO: 73 %
NITRATE UR QL: NEGATIVE
NRBC # BLD AUTO: 0 10E3/UL
NRBC BLD AUTO-RTO: 0 /100
PH UR STRIP: 5 [PH] (ref 5–7)
PLATELET # BLD AUTO: 175 10E3/UL (ref 150–450)
POTASSIUM BLD-SCNC: 4.2 MMOL/L (ref 3.4–5.3)
RBC # BLD AUTO: 4.76 10E6/UL (ref 4.4–5.9)
RBC URINE: <1 /HPF
SODIUM SERPL-SCNC: 137 MMOL/L (ref 133–144)
SP GR UR STRIP: 1.01 (ref 1–1.03)
UROBILINOGEN UR STRIP-MCNC: NORMAL MG/DL
WBC # BLD AUTO: 8.9 10E3/UL (ref 4–11)
WBC URINE: <1 /HPF

## 2021-08-14 PROCEDURE — 99285 EMERGENCY DEPT VISIT HI MDM: CPT | Mod: 25

## 2021-08-14 PROCEDURE — 85025 COMPLETE CBC W/AUTO DIFF WBC: CPT | Performed by: EMERGENCY MEDICINE

## 2021-08-14 PROCEDURE — 70450 CT HEAD/BRAIN W/O DYE: CPT

## 2021-08-14 PROCEDURE — 80048 BASIC METABOLIC PNL TOTAL CA: CPT | Performed by: EMERGENCY MEDICINE

## 2021-08-14 PROCEDURE — 93005 ELECTROCARDIOGRAM TRACING: CPT

## 2021-08-14 PROCEDURE — 81001 URINALYSIS AUTO W/SCOPE: CPT | Performed by: EMERGENCY MEDICINE

## 2021-08-14 PROCEDURE — 36415 COLL VENOUS BLD VENIPUNCTURE: CPT | Performed by: EMERGENCY MEDICINE

## 2021-08-15 VITALS
OXYGEN SATURATION: 98 % | DIASTOLIC BLOOD PRESSURE: 73 MMHG | TEMPERATURE: 99.1 F | WEIGHT: 145 LBS | BODY MASS INDEX: 21.73 KG/M2 | SYSTOLIC BLOOD PRESSURE: 137 MMHG | RESPIRATION RATE: 18 BRPM | HEART RATE: 67 BPM

## 2021-08-15 ASSESSMENT — ENCOUNTER SYMPTOMS
DIFFICULTY URINATING: 0
CONFUSION: 1
NUMBNESS: 0
FACIAL ASYMMETRY: 0
WEAKNESS: 0
FEVER: 0
HEADACHES: 0
FREQUENCY: 0
COUGH: 0

## 2021-08-15 NOTE — ED TRIAGE NOTES
"Pt wife reports some \"intermittent mental status changes\". Pt currently alert and wife reports some confusion with where they were at 6 am  "

## 2021-08-15 NOTE — ED PROVIDER NOTES
"  History   Chief Complaint:  Altered Mental Status     The history is provided by the patient and the spouse.      Charles Nogueira is a 81 year old male with history of syncope, squamous cell carcinoma, A-fib and hypertension who presents with altered mental status.Per wife's report, this past summer, the patient has had 4-5 episodes of confusions in which he forgets what he has done throughout the day or forgets a certain memory. These episodes usually last only a few minutes, and after he is able to remember everything he had been forgetting. This morning, the patient woke and was unsure of where he was, and forgot what his plans were for the day. After 10 minutes the he was back to his baseline. This is longer than his normal episodes. Later in the day, he had a second episode of confusion in which he had trouble explaining information about stocks and had memory problems. He notes that these episodes of confusion are not correlated with any time of day. Additionally, he notes episodes that occur once a month in which his vision becomes \"wavy\". The denies any trouble urinating, frequent urination, cough, fever, headache, visual disturbances, numbness, weakness or facial droop. Of note, he has not had any recent illness or medication changes. He has not yet seen a neurologist for these symptoms.     Review of Systems   Constitutional: Negative for fever.   Eyes: Negative for visual disturbance.   Respiratory: Negative for cough.    Genitourinary: Negative for difficulty urinating and frequency.   Neurological: Negative for facial asymmetry, weakness, numbness and headaches.   Psychiatric/Behavioral: Positive for confusion.   All other systems reviewed and are negative.    Allergies:  Sulfa Drugs    Medications:  Norvasc  Cozaar  Toprol    Past Medical History:    Syncope  Squamous cell carcinoma  PVC  Paroxysmal A-fib  Migraines  Non-rheumatic mitral regurgitation  Mitral valve " prolapse  Melanoma  Hypertension  Dysphagia  Cardiac pacemaker  Allergic rhinitis  Acute ischemic heart disease  Chronic diastolic congestive heart failure  Viral wars  Actinic keratosis    Past Surgical History:    Appendectomy  Biopsy of skin lesion  Colonoscopy  Implant pacemaker  Mohs micrographic procedure    Family History:    Mother: MI  Father: MI  Brother: CAD    Social History:  The patient was accompanied to the ED by his wife.    Physical Exam     Patient Vitals for the past 24 hrs:   BP Temp Temp src Pulse Resp SpO2 Weight   08/15/21 0100 137/73 -- -- 67 -- 98 % --   08/15/21 0030 132/67 -- -- 57 -- 98 % --   08/15/21 0000 (!) 153/86 -- -- 74 -- 99 % --   08/14/21 2127 135/66 99.1  F (37.3  C) Temporal 73 18 96 % 65.8 kg (145 lb)       Physical Exam  General: Sitting up in bed  Eyes:  The pupils are equal and round    Conjunctivae and sclerae are normal  ENT:    Wearing a mask  Neck:  Normal range of motion  CV:  Regular rate, regular rhythm     Skin warm and well perfused   Resp:  Non labored breathing on room air    No tachypnea    No cough heard    Lungs clear bilaterally  GI:  Abdomen is soft, there is no rigidity    No distension    No rebound tenderness     No abdominal tenderness  MS:  Normal muscular tone  Skin:  No rash or acute skin lesions noted  Neuro:   Awake, alert.      SILT on bilateral UE/LE    Speech is normal and fluent.    Face is symmetric.     Moves all extremities equally    No arm or leg weakness  Psych: Normal affect.  Appropriate interactions.    Emergency Department Course   ECG:  ECG taken at 0053, ECG read at 0056  Atrial-paced rhythm with occasional premature ventricular complexes  Left axis deviation  Right bundle branch block  Abnormal ECG  No significant change compared to EKG dated 10/01/2018  Rate 66 bpm. KS interval 208 ms. QRS duration 146 ms. QT/QTc 240/440 ms. P-R-T axes 73 -59 32.    Imaging:  Head CT w/o contrast  1.  No CT evidence for acute intracranial  "process.  2.  Brain atrophy and presumed chronic microvascular ischemic changes as above.  Reading per radiology    Laboratory:  UA with Microscopic: Blood Small (A) o/w Negative    CBC: WBC 8.9, HGB 14.4,   BMP: Glucose 149 (H) o/w WNL (Creatinine 1.08)    Emergency Department Course:  Reviewed:  I reviewed nursing notes, vitals, past medical history and care everywhere    Assessments:  2329 I obtained history and examined the patient as noted above.     0013 I rechecked and updated the patient regarding the imaging results, laboratory results and the plan for care.    Disposition:  The patient was discharged to home.     Impression & Plan   Medical Decision Making:  Charles Nogueira is a 81 year old male who presented to the ED with confusion. He is alert here in the emergency department.  He has a nonfocal exam.  Patient has had several episodes now where he has had mild confusion and memory issues that are self-limited.  There is no pattern to the symptoms.  There is no weakness, numbness, facial droop or slurred speech with these episodes.  Unlikely CVA.  Episodes seem to be more confusion/memory issues, TIA seems less likely as well though a consideration. CT head with chronic changes. No infectious symptoms. Labs are unremarkable. EKG with paced rhythm. He does not know if he can get an MRI given his pacemaker and even if pacemaker compatible, unable to obtain MRI brain tonight given the pacemaker. Patient feels fine here in ED and would like to go home. Discussed that he should follow-up with neurologist and given information on [email or web address] of Neurology. He states that he can't take aspirin because he \"bleeds too much\". Discussed monitoring for symptoms of infection, pattern to symptoms, or weakness/numbness, slurred speech, etc.    Diagnosis:    ICD-10-CM    1. Confusion  R41.0        Scribe Disclosure:  I, Thomas Nation, am serving as a scribe at 11:26 PM on 8/14/2021 to document services " personally performed by Randa Holcomb MD based on my observations and the provider's statements to me.      Randa Holcomb MD  08/15/21 0804

## 2021-08-16 LAB
ATRIAL RATE - MUSE: 66 BPM
DIASTOLIC BLOOD PRESSURE - MUSE: NORMAL MMHG
INTERPRETATION ECG - MUSE: NORMAL
P AXIS - MUSE: 73 DEGREES
PR INTERVAL - MUSE: 208 MS
QRS DURATION - MUSE: 146 MS
QT - MUSE: 420 MS
QTC - MUSE: 440 MS
R AXIS - MUSE: -59 DEGREES
SYSTOLIC BLOOD PRESSURE - MUSE: NORMAL MMHG
T AXIS - MUSE: 32 DEGREES
VENTRICULAR RATE- MUSE: 66 BPM

## 2021-09-01 ENCOUNTER — ANCILLARY PROCEDURE (OUTPATIENT)
Dept: CARDIOLOGY | Facility: CLINIC | Age: 81
End: 2021-09-01
Attending: INTERNAL MEDICINE
Payer: COMMERCIAL

## 2021-09-01 DIAGNOSIS — Z95.0 CARDIAC PACEMAKER IN SITU: ICD-10-CM

## 2021-09-01 DIAGNOSIS — I49.5 SICK SINUS SYNDROME (H): Primary | ICD-10-CM

## 2021-09-01 PROCEDURE — 93280 PM DEVICE PROGR EVAL DUAL: CPT | Performed by: INTERNAL MEDICINE

## 2021-09-14 LAB
MDC_IDC_LEAD_IMPLANT_DT: NORMAL
MDC_IDC_LEAD_IMPLANT_DT: NORMAL
MDC_IDC_LEAD_LOCATION: NORMAL
MDC_IDC_LEAD_LOCATION: NORMAL
MDC_IDC_LEAD_MFG: NORMAL
MDC_IDC_LEAD_MFG: NORMAL
MDC_IDC_LEAD_MODEL: NORMAL
MDC_IDC_LEAD_MODEL: NORMAL
MDC_IDC_LEAD_POLARITY_TYPE: NORMAL
MDC_IDC_LEAD_POLARITY_TYPE: NORMAL
MDC_IDC_LEAD_SERIAL: NORMAL
MDC_IDC_LEAD_SERIAL: NORMAL
MDC_IDC_MSMT_BATTERY_DTM: NORMAL
MDC_IDC_MSMT_BATTERY_IMPEDANCE: 596 OHM
MDC_IDC_MSMT_BATTERY_REMAINING_LONGEVITY: 100 MO
MDC_IDC_MSMT_BATTERY_STATUS: NORMAL
MDC_IDC_MSMT_BATTERY_VOLTAGE: 2.78 V
MDC_IDC_MSMT_LEADCHNL_RA_IMPEDANCE_VALUE: 564 OHM
MDC_IDC_MSMT_LEADCHNL_RA_PACING_THRESHOLD_AMPLITUDE: 0.38 V
MDC_IDC_MSMT_LEADCHNL_RA_PACING_THRESHOLD_PULSEWIDTH: 0.4 MS
MDC_IDC_MSMT_LEADCHNL_RA_SENSING_INTR_AMPL: 4 MV
MDC_IDC_MSMT_LEADCHNL_RV_IMPEDANCE_VALUE: 630 OHM
MDC_IDC_MSMT_LEADCHNL_RV_PACING_THRESHOLD_AMPLITUDE: 0.62 V
MDC_IDC_MSMT_LEADCHNL_RV_PACING_THRESHOLD_PULSEWIDTH: 0.4 MS
MDC_IDC_MSMT_LEADCHNL_RV_SENSING_INTR_AMPL: 8 MV
MDC_IDC_PG_IMPLANT_DTM: NORMAL
MDC_IDC_PG_MFG: NORMAL
MDC_IDC_PG_MODEL: NORMAL
MDC_IDC_PG_SERIAL: NORMAL
MDC_IDC_PG_TYPE: NORMAL
MDC_IDC_SESS_CLINIC_NAME: NORMAL
MDC_IDC_SESS_DTM: NORMAL
MDC_IDC_SESS_TYPE: NORMAL
MDC_IDC_SET_BRADY_AT_MODE_SWITCH_MODE: NORMAL
MDC_IDC_SET_BRADY_AT_MODE_SWITCH_RATE: 175 {BEATS}/MIN
MDC_IDC_SET_BRADY_LOWRATE: 60 {BEATS}/MIN
MDC_IDC_SET_BRADY_MAX_SENSOR_RATE: 130 {BEATS}/MIN
MDC_IDC_SET_BRADY_MAX_TRACKING_RATE: 130 {BEATS}/MIN
MDC_IDC_SET_BRADY_MODE: NORMAL
MDC_IDC_SET_BRADY_PAV_DELAY_LOW: 350 MS
MDC_IDC_SET_BRADY_SAV_DELAY_LOW: 350 MS
MDC_IDC_SET_LEADCHNL_RA_PACING_AMPLITUDE: 1.5 V
MDC_IDC_SET_LEADCHNL_RA_PACING_CAPTURE_MODE: NORMAL
MDC_IDC_SET_LEADCHNL_RA_PACING_POLARITY: NORMAL
MDC_IDC_SET_LEADCHNL_RA_PACING_PULSEWIDTH: 0.4 MS
MDC_IDC_SET_LEADCHNL_RA_SENSING_POLARITY: NORMAL
MDC_IDC_SET_LEADCHNL_RA_SENSING_SENSITIVITY: 0.5 MV
MDC_IDC_SET_LEADCHNL_RV_PACING_AMPLITUDE: 2 V
MDC_IDC_SET_LEADCHNL_RV_PACING_CAPTURE_MODE: NORMAL
MDC_IDC_SET_LEADCHNL_RV_PACING_POLARITY: NORMAL
MDC_IDC_SET_LEADCHNL_RV_PACING_PULSEWIDTH: 0.4 MS
MDC_IDC_SET_LEADCHNL_RV_SENSING_POLARITY: NORMAL
MDC_IDC_SET_LEADCHNL_RV_SENSING_SENSITIVITY: 2.8 MV
MDC_IDC_SET_ZONE_DETECTION_INTERVAL: 333.33 MS
MDC_IDC_SET_ZONE_DETECTION_INTERVAL: 342.86 MS
MDC_IDC_SET_ZONE_TYPE: NORMAL
MDC_IDC_SET_ZONE_TYPE: NORMAL
MDC_IDC_STAT_AT_BURDEN_PERCENT: 0 %
MDC_IDC_STAT_AT_DTM_END: NORMAL
MDC_IDC_STAT_AT_DTM_START: NORMAL
MDC_IDC_STAT_AT_MODE_SW_COUNT: 2
MDC_IDC_STAT_BRADY_AP_VP_PERCENT: 1 %
MDC_IDC_STAT_BRADY_AP_VS_PERCENT: 46 %
MDC_IDC_STAT_BRADY_AS_VP_PERCENT: 0 %
MDC_IDC_STAT_BRADY_AS_VS_PERCENT: 53 %
MDC_IDC_STAT_BRADY_DTM_END: NORMAL
MDC_IDC_STAT_BRADY_DTM_START: NORMAL
MDC_IDC_STAT_EPISODE_RECENT_COUNT: 1
MDC_IDC_STAT_EPISODE_RECENT_COUNT: 1
MDC_IDC_STAT_EPISODE_RECENT_COUNT_DTM_END: NORMAL
MDC_IDC_STAT_EPISODE_RECENT_COUNT_DTM_END: NORMAL
MDC_IDC_STAT_EPISODE_RECENT_COUNT_DTM_START: NORMAL
MDC_IDC_STAT_EPISODE_RECENT_COUNT_DTM_START: NORMAL
MDC_IDC_STAT_EPISODE_TYPE: NORMAL
MDC_IDC_STAT_EPISODE_TYPE: NORMAL

## 2021-09-15 DIAGNOSIS — R56.9 SEIZURE (H): Primary | ICD-10-CM

## 2021-09-17 ENCOUNTER — OFFICE VISIT (OUTPATIENT)
Dept: CARDIOLOGY | Facility: CLINIC | Age: 81
End: 2021-09-17
Payer: COMMERCIAL

## 2021-09-17 ENCOUNTER — LAB (OUTPATIENT)
Dept: LAB | Facility: CLINIC | Age: 81
End: 2021-09-17

## 2021-09-17 VITALS
HEART RATE: 64 BPM | WEIGHT: 148 LBS | SYSTOLIC BLOOD PRESSURE: 100 MMHG | DIASTOLIC BLOOD PRESSURE: 60 MMHG | HEIGHT: 68 IN | OXYGEN SATURATION: 97 % | BODY MASS INDEX: 22.43 KG/M2

## 2021-09-17 DIAGNOSIS — I34.0 NONRHEUMATIC MITRAL VALVE REGURGITATION: ICD-10-CM

## 2021-09-17 DIAGNOSIS — I49.5 SICK SINUS SYNDROME (H): ICD-10-CM

## 2021-09-17 DIAGNOSIS — I10 ESSENTIAL HYPERTENSION: ICD-10-CM

## 2021-09-17 DIAGNOSIS — E78.5 DYSLIPIDEMIA: ICD-10-CM

## 2021-09-17 DIAGNOSIS — R97.20 ELEVATED PROSTATE SPECIFIC ANTIGEN (PSA): ICD-10-CM

## 2021-09-17 DIAGNOSIS — I48.0 PAF (PAROXYSMAL ATRIAL FIBRILLATION) (H): ICD-10-CM

## 2021-09-17 DIAGNOSIS — Z95.0 CARDIAC PACEMAKER IN SITU: Primary | ICD-10-CM

## 2021-09-17 PROCEDURE — 36415 COLL VENOUS BLD VENIPUNCTURE: CPT

## 2021-09-17 PROCEDURE — 84153 ASSAY OF PSA TOTAL: CPT

## 2021-09-17 PROCEDURE — 99203 OFFICE O/P NEW LOW 30 MIN: CPT | Performed by: INTERNAL MEDICINE

## 2021-09-17 RX ORDER — METOPROLOL SUCCINATE 50 MG/1
50 TABLET, EXTENDED RELEASE ORAL DAILY
Qty: 90 TABLET | Refills: 3 | Status: SHIPPED | OUTPATIENT
Start: 2021-09-17 | End: 2022-09-23

## 2021-09-17 RX ORDER — LOSARTAN POTASSIUM 100 MG/1
100 TABLET ORAL DAILY
Qty: 90 TABLET | Refills: 3 | Status: SHIPPED | OUTPATIENT
Start: 2021-09-17 | End: 2022-09-23

## 2021-09-17 ASSESSMENT — MIFFLIN-ST. JEOR: SCORE: 1355.82

## 2021-09-17 NOTE — PATIENT INSTRUCTIONS
1.  Schedule an echo  2.  Follow up in one year  3.  We will contact you regarding your pacemaker MRI compatability

## 2021-09-17 NOTE — PROGRESS NOTES
[facility] Progress note    Assessment:    1.  History of Paroxysmal atrial fibrillation likely related to mitral regurgitation. No known recurrence, follows with device checks.    Previous plan which we will continue today would be to consider anticoagulation if he has recurrence of atrial fibrillation by symptoms are noted on device check.  He has a history of easy bruising is noted so we will continue to avoid antiplatelet anticoagulant agents if possible.  Would consider watchman in the future if appropriate or necessary.  At this time he apparently has only had one isolated episode of A. fib in the past.    2.  Sick sinus syndrome with episodes of bradycardia and syncope status post dual-chamber pacemaker implantation in 2004 and replaced in 2014.  Continue routine device checks.     3.  Mitral valve prolapse and mild-moderate mitral regurgitation.    Given recent vague symptoms and possibly more notable murmur on exam we will get an updated echo.    4.  Hypertension, with low normal blood pressure in clinic today.  Recheck blood pressure was in the 90s over 50s. His blood pressure is well controlled.      5.  Seizures/ would like to schedule MRI    Plan:  1.  Schedule an echo  2.  Clinic RN will contact the device clinic to look at his records regarding MRI compatibility of his cardiac device.  The patient will be notified by cardiology clinic nursing regarding compatibility.  3.  Stop amlodipine  4.  Follow-up in 1 year or sooner if needed    HPI:     Charles Nogueira is a very nice 81-year-old gentleman here today with his wife Paula who I have not previously and this is my first time meeting Charles for continuation of his cardiovascular care for paroxysmal atrial fibrillation, mitral valve prolapse with mild to moderate mitral regurgitation, sick sinus syndrome with a permanent pacemaker and hypertension.  He was previously followed by Dr. Witt and Tristin Rasmussen in cardiology clinic he is here for  routine follow-up.  He has only one known episode of atrial fibrillation and has a history of intolerance of antiplatelet agents due to easy bruising and is also not anticoagulated.  Looks like the plan is to continue to monitor for atrial fibrillation but avoid anticoagulation if possible.  He does note that recently he has felt more tired but wonders if it is just because he is now 81 years old.  He does not have chest pain or abnormal dyspnea.  He would like to have an MRI of his head done for recently diagnosed seizures and asks whether his pacemaker is compatible.    Blood pressure is low normal in clinic today.  He is on 3 antihypertensive agents.      CURRENT MEDICATIONS:  Current Outpatient Medications   Medication Sig Dispense Refill     amLODIPine (NORVASC) 5 MG tablet Take 1 tablet (5 mg) by mouth daily 90 tablet 0     losartan (COZAAR) 100 MG tablet Take 1 tablet (100 mg) by mouth daily 90 tablet 0     metoprolol succinate ER (TOPROL-XL) 50 MG 24 hr tablet Take 1 tablet (50 mg) by mouth daily 90 tablet 0     Multiple Vitamins-Minerals (ZINC PO)          ALLERGIES     Allergies   Allergen Reactions     Sulfa Drugs      rash       Social history he is , lives in the [facility].    REVIEW OF SYSTEMS:  Skin:  Positive for bruising   Eyes:  Positive for glasses  ENT:  Negative    Respiratory:  Negative    Cardiovascular:  Negative    Gastroenterology: Negative    Genitourinary:  Negative    Musculoskeletal:  Negative    Neurologic:  Positive for migraine headaches  Psychiatric:  Negative    Heme/Lymph/Imm:  Positive for easy bruising  Endocrine:  Negative      PHYSICAL EXAM:      BP: 100/60 Pulse: 64     SpO2: 97 %      Vital Signs with Ranges  Pulse:  [64] 64  BP: (100)/(60) 100/60  SpO2:  [97 %] 97 %  148 lbs 0 oz    Constitutional: awake, alert, no distress neatly groomed  Eyes: sclera nonicteric  ENT: trachea midline  Respiratory: Clear to auscultation bilaterally  Cardiovascular: Regular rate and  rhythm 2 out of 6 holosystolic murmur appreciated at the apex  GI: nondistended, nontender, bowel sounds present  Skin: dry, no rash no edema  Musculoskeletal: grossly normal muscle bulk and tone  Neurologic: no focal deficits  Neuropsychiatric: Normal affect    Recent Results:  Echo 8/18/20  1. Normal biventricular size and function. Left ventricular ejection fraction  of 60-65%. No segmental wall motion abnormalities noted.  2. The left atrium is mildly dilated. Right atrial size is normal..  3. There is mild to moderate (1-2+), primary mitral regurgitation. The  severity might be under estimated due to eccentric nature of the jet.  Compared to the previous echocardiogram on 8/2/2019, mitral regurgitation  appears mildly improved. This may be an underestimation due to eccentric  nature of the jet.  Technically adequate study.  _____________________________________________________________________________  LIPID RESULTS:  Lab Results   Component Value Date    CHOL 179 12/17/2020    HDL 39 (L) 12/17/2020     (H) 12/17/2020    TRIG 97 12/17/2020    CHOLHDLRATIO 4.5 11/10/2015       LIVER ENZYME RESULTS:  Lab Results   Component Value Date    AST 20 07/26/2021    AST 24 12/17/2020    ALT 29 07/26/2021    ALT 26 12/17/2020       CBC RESULTS:  Lab Results   Component Value Date    WBC 8.9 08/14/2021    WBC 9.7 09/21/2018    RBC 4.76 08/14/2021    RBC 5.19 09/21/2018    HGB 14.4 08/14/2021    HGB 15.4 09/21/2018    HCT 45.0 08/14/2021    HCT 47.7 09/21/2018    MCV 95 08/14/2021    MCV 92 09/21/2018    MCH 30.3 08/14/2021    MCH 29.7 09/21/2018    MCHC 32.0 08/14/2021    MCHC 32.3 09/21/2018    RDW 13.1 08/14/2021    RDW 13.5 09/21/2018     08/14/2021     09/21/2018       BMP RESULTS:  Lab Results   Component Value Date     08/14/2021     12/17/2020    POTASSIUM 4.2 08/14/2021    POTASSIUM 3.8 12/17/2020    CHLORIDE 106 08/14/2021    CHLORIDE 107 12/17/2020    CO2 26 08/14/2021    CO2 32  12/17/2020    ANIONGAP 5 08/14/2021    ANIONGAP <1 (L) 12/17/2020     (H) 08/14/2021     (H) 12/17/2020    BUN 22 08/14/2021    BUN 19 12/17/2020    CR 1.08 08/14/2021    CR 1.00 12/17/2020    GFRESTIMATED 64 08/14/2021    GFRESTIMATED 71 12/17/2020    GFRESTBLACK 83 12/17/2020    MANUELA 9.1 08/14/2021    MANUELA 9.0 12/17/2020

## 2021-09-17 NOTE — LETTER
9/17/2021    Stanley Saldaña MD  [address]  [facility] [address]    RE: Charles PEPE Nogueira       Dear Colleague,    I had the pleasure of seeing Charles Nogueira in the [facility].    [facility] Progress note    Assessment:    1.  History of Paroxysmal atrial fibrillation likely related to mitral regurgitation. No known recurrence, follows with device checks.    Previous plan which we will continue today would be to consider anticoagulation if he has recurrence of atrial fibrillation by symptoms are noted on device check.  He has a history of easy bruising is noted so we will continue to avoid antiplatelet anticoagulant agents if possible.  Would consider watchman in the future if appropriate or necessary.  At this time he apparently has only had one isolated episode of A. fib in the past.    2.  Sick sinus syndrome with episodes of bradycardia and syncope status post dual-chamber pacemaker implantation in 2004 and replaced in 2014.  Continue routine device checks.     3.  Mitral valve prolapse and mild-moderate mitral regurgitation.    Given recent vague symptoms and possibly more notable murmur on exam we will get an updated echo.    4.  Hypertension, with low normal blood pressure in clinic today.  Recheck blood pressure was in the 90s over 50s. His blood pressure is well controlled.      5.  Seizures/ would like to schedule MRI    Plan:  1.  Schedule an echo  2.  Clinic RN will contact the device clinic to look at his records regarding MRI compatibility of his cardiac device.  The patient will be notified by cardiology clinic nursing regarding compatibility.  3.  Stop amlodipine  4.  Follow-up in 1 year or sooner if needed    HPI:     Charles C Nogueira is a very nice 81-year-old gentleman here today with his wife Paula who I have not previously and this is my first time meeting Charles for continuation of his cardiovascular care for paroxysmal  atrial fibrillation, mitral valve prolapse with mild to moderate mitral regurgitation, sick sinus syndrome with a permanent pacemaker and hypertension.  He was previously followed by Dr. Witt and Tristin Rasmussen in cardiology clinic he is here for routine follow-up.  He has only one known episode of atrial fibrillation and has a history of intolerance of antiplatelet agents due to easy bruising and is also not anticoagulated.  Looks like the plan is to continue to monitor for atrial fibrillation but avoid anticoagulation if possible.  He does note that recently he has felt more tired but wonders if it is just because he is now 81 years old.  He does not have chest pain or abnormal dyspnea.  He would like to have an MRI of his head done for recently diagnosed seizures and asks whether his pacemaker is compatible.    Blood pressure is low normal in clinic today.  He is on 3 antihypertensive agents.      CURRENT MEDICATIONS:  Current Outpatient Medications   Medication Sig Dispense Refill     amLODIPine (NORVASC) 5 MG tablet Take 1 tablet (5 mg) by mouth daily 90 tablet 0     losartan (COZAAR) 100 MG tablet Take 1 tablet (100 mg) by mouth daily 90 tablet 0     metoprolol succinate ER (TOPROL-XL) 50 MG 24 hr tablet Take 1 tablet (50 mg) by mouth daily 90 tablet 0     Multiple Vitamins-Minerals (ZINC PO)          ALLERGIES     Allergies   Allergen Reactions     Sulfa Drugs      rash       Social history he is , lives in the [facility].    REVIEW OF SYSTEMS:  Skin:  Positive for bruising   Eyes:  Positive for glasses  ENT:  Negative    Respiratory:  Negative    Cardiovascular:  Negative    Gastroenterology: Negative    Genitourinary:  Negative    Musculoskeletal:  Negative    Neurologic:  Positive for migraine headaches  Psychiatric:  Negative    Heme/Lymph/Imm:  Positive for easy bruising  Endocrine:  Negative      PHYSICAL EXAM:      BP: 100/60 Pulse: 64     SpO2: 97 %      Vital Signs with Ranges  Pulse:  [64]  64  BP: (100)/(60) 100/60  SpO2:  [97 %] 97 %  148 lbs 0 oz    Constitutional: awake, alert, no distress neatly groomed  Eyes: sclera nonicteric  ENT: trachea midline  Respiratory: Clear to auscultation bilaterally  Cardiovascular: Regular rate and rhythm 2 out of 6 holosystolic murmur appreciated at the apex  GI: nondistended, nontender, bowel sounds present  Skin: dry, no rash no edema  Musculoskeletal: grossly normal muscle bulk and tone  Neurologic: no focal deficits  Neuropsychiatric: Normal affect    Recent Results:  Echo 8/18/20  1. Normal biventricular size and function. Left ventricular ejection fraction  of 60-65%. No segmental wall motion abnormalities noted.  2. The left atrium is mildly dilated. Right atrial size is normal..  3. There is mild to moderate (1-2+), primary mitral regurgitation. The  severity might be under estimated due to eccentric nature of the jet.  Compared to the previous echocardiogram on 8/2/2019, mitral regurgitation  appears mildly improved. This may be an underestimation due to eccentric  nature of the jet.  Technically adequate study.  _____________________________________________________________________________  LIPID RESULTS:  Lab Results   Component Value Date    CHOL 179 12/17/2020    HDL 39 (L) 12/17/2020     (H) 12/17/2020    TRIG 97 12/17/2020    CHOLHDLRATIO 4.5 11/10/2015       LIVER ENZYME RESULTS:  Lab Results   Component Value Date    AST 20 07/26/2021    AST 24 12/17/2020    ALT 29 07/26/2021    ALT 26 12/17/2020       CBC RESULTS:  Lab Results   Component Value Date    WBC 8.9 08/14/2021    WBC 9.7 09/21/2018    RBC 4.76 08/14/2021    RBC 5.19 09/21/2018    HGB 14.4 08/14/2021    HGB 15.4 09/21/2018    HCT 45.0 08/14/2021    HCT 47.7 09/21/2018    MCV 95 08/14/2021    MCV 92 09/21/2018    MCH 30.3 08/14/2021    MCH 29.7 09/21/2018    MCHC 32.0 08/14/2021    MCHC 32.3 09/21/2018    RDW 13.1 08/14/2021    RDW 13.5 09/21/2018     08/14/2021      09/21/2018       BMP RESULTS:  Lab Results   Component Value Date     08/14/2021     12/17/2020    POTASSIUM 4.2 08/14/2021    POTASSIUM 3.8 12/17/2020    CHLORIDE 106 08/14/2021    CHLORIDE 107 12/17/2020    CO2 26 08/14/2021    CO2 32 12/17/2020    ANIONGAP 5 08/14/2021    ANIONGAP <1 (L) 12/17/2020     (H) 08/14/2021     (H) 12/17/2020    BUN 22 08/14/2021    BUN 19 12/17/2020    CR 1.08 08/14/2021    CR 1.00 12/17/2020    GFRESTIMATED 64 08/14/2021    GFRESTIMATED 71 12/17/2020    GFRESTBLACK 83 12/17/2020    MANUELA 9.1 08/14/2021    MANUELA 9.0 12/17/2020                                Thank you for allowing me to participate in the care of your patient.      Sincerely,     Ana Dean MD     [facility]  cc:   No referring provider defined for this encounter.

## 2021-09-18 LAB — PSA SERPL-MCNC: 5.74 UG/L (ref 0–4)

## 2021-09-19 ENCOUNTER — HEALTH MAINTENANCE LETTER (OUTPATIENT)
Age: 81
End: 2021-09-19

## 2021-09-29 ENCOUNTER — OFFICE VISIT (OUTPATIENT)
Dept: UROLOGY | Facility: CLINIC | Age: 81
End: 2021-09-29
Payer: COMMERCIAL

## 2021-09-29 VITALS
HEIGHT: 68 IN | WEIGHT: 148 LBS | DIASTOLIC BLOOD PRESSURE: 66 MMHG | BODY MASS INDEX: 22.43 KG/M2 | SYSTOLIC BLOOD PRESSURE: 120 MMHG

## 2021-09-29 DIAGNOSIS — R97.20 ELEVATED PROSTATE SPECIFIC ANTIGEN (PSA): ICD-10-CM

## 2021-09-29 DIAGNOSIS — R31.29 MICROSCOPIC HEMATURIA: Primary | ICD-10-CM

## 2021-09-29 LAB
ALBUMIN UR-MCNC: NEGATIVE MG/DL
APPEARANCE UR: CLEAR
BILIRUB UR QL STRIP: NEGATIVE
COLOR UR AUTO: YELLOW
GLUCOSE UR STRIP-MCNC: NEGATIVE MG/DL
HGB UR QL STRIP: ABNORMAL
KETONES UR STRIP-MCNC: NEGATIVE MG/DL
LEUKOCYTE ESTERASE UR QL STRIP: NEGATIVE
MUCOUS THREADS #/AREA URNS LPF: PRESENT /LPF
NITRATE UR QL: NEGATIVE
PH UR STRIP: 5.5 [PH] (ref 5–7)
RBC URINE: 2 /HPF
SP GR UR STRIP: 1.01 (ref 1–1.03)
UROBILINOGEN UR STRIP-ACNC: 0.2 E.U./DL
WBC URINE: <1 /HPF

## 2021-09-29 PROCEDURE — 99214 OFFICE O/P EST MOD 30 MIN: CPT | Performed by: UROLOGY

## 2021-09-29 PROCEDURE — 81001 URINALYSIS AUTO W/SCOPE: CPT | Performed by: UROLOGY

## 2021-09-29 ASSESSMENT — MIFFLIN-ST. JEOR: SCORE: 1355.82

## 2021-09-29 ASSESSMENT — PAIN SCALES - GENERAL: PAINLEVEL: NO PAIN (0)

## 2021-09-29 NOTE — PROGRESS NOTES
Office Visit Note  [facility]  [phone]    UROLOGIC DIAGNOSES:   Elevated PSA    CURRENT INTERVENTIONS:       HISTORY:   Charles's urinalysis from last visit showed no microscopic hematuria present.  His PSA was rechecked and is stable at 5.74.  He continues to have no urinary symptoms or complaints.      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 To use swallowing techniques     Essential hypertension, benign 11/11/2016     Heart murmur 5/10/2011     Melanoma (H)      Migraine equivalent 11/10/2015     Mitral valve prolapse      Non-rheumatic mitral regurgitation 8/9/2018     Other forms of migraine, without mention of intractable migraine without mention of status migrainosus      Paroxysmal A-fib (H)      Premature ventricular contraction      PVC (premature ventricular contraction) 5/10/2011     Sick sinus syndrome (H) 05/03/2016    s/p PPM implant 7/2/14     Squamous cell carcinoma      Syncope        PAST SURGICAL HISTORY:   Past Surgical History:   Procedure Laterality Date     APPENDECTOMY OPEN       BIOPSY OF SKIN LESION       COLONOSCOPY N/A 11/20/2015    Procedure: COLONOSCOPY;  Surgeon: David Us MD;  Location: [facility]     IMPLANT PACEMAKER  7/2004     MOHS MICROGRAPHIC PROCEDURE         FAMILY HISTORY:   Family History   Problem Relation Age of Onset     Heart Disease Mother         MI     Heart Disease Father         MI     C.A.D. Brother         Just had angioplasty in 2006     Colon Cancer No family hx of        SOCIAL HISTORY:   Social History     Socioeconomic History     Marital status:      Spouse name: Paula     Number of children: 3     Years of education: Not on file     Highest education level: Not on file   Occupational History      Employer: RETIRED     Occupation: engineering managment     Comment: [employer]   Tobacco Use     Smoking status: Former Smoker     Packs/day: 1.00     Years: 4.00     Pack years: 4.00     Types: Cigarettes     Quit date: 1967     Years since quittin.7     Smokeless tobacco: Never Used   Substance and Sexual Activity     Alcohol use: No     Alcohol/week: 0.0 standard drinks     Drug use: No     Sexual activity: Yes     Partners: Female   Other Topics Concern      Service Yes     Blood Transfusions No     Caffeine Concern Yes     Comment: decaff coffee     Occupational Exposure No     Hobby Hazards No     Sleep Concern No     Stress Concern No     Weight Concern No     Special Diet No     Back Care No     Exercise Yes     Comment: Excercises about 4-5 days per week     Bike Helmet Yes     Seat Belt Yes     Self-Exams No     Parent/sibling w/ CABG, MI or angioplasty before 65F 55M? Not Asked   Social History Narrative     Not on file     Social Determinants of Health     Financial Resource Strain:      Difficulty of Paying Living Expenses:    Food Insecurity:      Worried About Running Out of Food in the Last Year:      Ran Out of Food in the Last Year:    Transportation Needs:      Lack of Transportation (Medical):      Lack of Transportation (Non-Medical):    Physical Activity:      Days of Exercise per Week:      Minutes of Exercise per Session:    Stress:      Feeling of Stress :    Social Connections:      Frequency of Communication with Friends and Family:      Frequency of Social Gatherings with Friends and Family:      Attends Muslim Services:      Active Member of Clubs or Organizations:      Attends Club or Organization Meetings:      Marital Status:    Intimate Partner Violence:      Fear of Current or Ex-Partner:      Emotionally Abused:      Physically Abused:      Sexually Abused:        Review Of Systems:  Skin: No rash, pruritis, or skin pigmentation  Eyes: No changes in  vision  Ears/Nose/Throat: No changes in hearing, no nosebleeds  Respiratory: No shortness of breath, dyspnea on exertion, cough, or hemoptysis  Cardiovascular: No chest pain or palpitations  Gastrointestinal: No diarrhea or constipation. No abdominal pain. No hematochezia  Genitourinary: see HPI  Musculoskeletal: No pain or swelling of joints, normal range of motion  Neurologic: No weakness or tremors  Psychiatric: No recent changes in memory or mood  Hematologic/Lymphatic/Immunologic: No easy bruising or enlarged lymph nodes  Endocrine: No weight gain or loss      PHYSICAL EXAM:    There were no vitals taken for this visit.    Constitutional: Well developed. Conversant and in no acute distress  Eyes: Anicteric sclera, conjunctiva clear, normal extraocular movements  ENT: Normocephalic and atraumatic,   Skin: Warm and dry. No rashes or lesions  Cardiac: No peripheral edema  Back/Flank: Not done  CNS/PNS: Normal musculature and movements, moves all extremities normally  Respiratory: Normal non-labored breathing  Abdomen: Soft nontender and nondistended  Peripheral Vascular: No peripheral edema  Mental Status/Psych: Alert and Oriented x 3. Normal mood and affect    Penis: Not done  Scrotal Skin: Not done  Testicles: Not done  Epididymis: Not done  Digital Rectal Exam: Not repeated    Cystoscopy: Not done    Imaging: None    Urinalysis: UA RESULTS:  Recent Labs   Lab Test 08/14/21  2344 07/26/21  1114 07/26/21  1114   COLOR Straw   < > Yellow   APPEARANCE Clear   < > Clear   URINEGLC Negative   < > Negative   URINEBILI Negative   < > Negative   URINEKETONE Negative   < > Trace*   SG 1.007   < > 1.025   UBLD Small*   < > Small*   URINEPH 5.0   < > 5.5   PROTEIN Negative   < > Negative   UROBILINOGEN  --   --  0.2   NITRITE Negative   < > Negative   LEUKEST Negative   < > Negative   RBCU <1  --  5-10*   WBCU <1  --  0-5    < > = values in this interval not displayed.       PSA: 5.74    Post Void Residual:     Other labs:  None today      IMPRESSION:  Elevated PSA    PLAN:  His PSA remains unchanged.  It remains in the high normal range for his age group.  We discussed his options of pursuing a potential prostate cancer with a prostate biopsy versus following the PSA versus stopping any further evaluation.  He understands that he is beyond the normally recommended screening age for prostate cancer.    At the end of our discussion he wishes to follow the PSA again in 1 year.  I will have him follow-up with my physician assistant for the PSA.  We expect the PSA to rise slowly.  We are only making certain that there is not a precipitous rise in the PSA.  Next year if the PSA is relatively stable he can again choose to stop PSA screening altogether after that if he wishes.    Urine sample from today will be sent for microscopy again.      Flako Foreman M.D.

## 2021-09-29 NOTE — LETTER
9/29/2021       RE: Charles Nogueira  [address]  [facility] [address]     Dear Colleague,    Thank you for referring your patient, Charles Nogueira, to the [facility] [place] [facility]. Please see a copy of my visit note below.    Office Visit Note  [facility]  [phone]    UROLOGIC DIAGNOSES:   Elevated PSA    CURRENT INTERVENTIONS:       HISTORY:   Charles's urinalysis from last visit showed no microscopic hematuria present.  His PSA was rechecked and is stable at 5.74.  He continues to have no urinary symptoms or complaints.      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 To use swallowing techniques     Essential hypertension, benign 11/11/2016     Heart murmur 5/10/2011     Melanoma (H)      Migraine equivalent 11/10/2015     Mitral valve prolapse      Non-rheumatic mitral regurgitation 8/9/2018     Other forms of migraine, without mention of intractable migraine without mention of status migrainosus      Paroxysmal A-fib (H)      Premature ventricular contraction      PVC (premature ventricular contraction) 5/10/2011     Sick sinus syndrome (H) 05/03/2016    s/p PPM implant 7/2/14     Squamous cell carcinoma      Syncope        PAST SURGICAL HISTORY:   Past Surgical History:   Procedure Laterality Date     APPENDECTOMY OPEN       BIOPSY OF SKIN LESION       COLONOSCOPY N/A 11/20/2015    Procedure: COLONOSCOPY;  Surgeon: David Us MD;  Location:  GI     IMPLANT PACEMAKER  7/2004     MOHS MICROGRAPHIC PROCEDURE         FAMILY HISTORY:   Family History   Problem Relation Age of Onset     Heart Disease Mother         MI     Heart Disease Father         MI     C.A.D. Brother          Just had angioplasty in 2006     Colon Cancer No family hx of        SOCIAL HISTORY:   Social History     Socioeconomic History     Marital status:      Spouse name: Paula     Number of children: 3     Years of education: Not on file     Highest education level: Not on file   Occupational History     Employer: RETIRED     Occupation: engineering managment     Comment: [employer]   Tobacco Use     Smoking status: Former Smoker     Packs/day: 1.00     Years: 4.00     Pack years: 4.00     Types: Cigarettes     Quit date: 1967     Years since quittin.7     Smokeless tobacco: Never Used   Substance and Sexual Activity     Alcohol use: No     Alcohol/week: 0.0 standard drinks     Drug use: No     Sexual activity: Yes     Partners: Female   Other Topics Concern      Service Yes     Blood Transfusions No     Caffeine Concern Yes     Comment: decaff coffee     Occupational Exposure No     Hobby Hazards No     Sleep Concern No     Stress Concern No     Weight Concern No     Special Diet No     Back Care No     Exercise Yes     Comment: Excercises about 4-5 days per week     Bike Helmet Yes     Seat Belt Yes     Self-Exams No     Parent/sibling w/ CABG, MI or angioplasty before 65F 55M? Not Asked   Social History Narrative     Not on file     Social Determinants of Health     Financial Resource Strain:      Difficulty of Paying Living Expenses:    Food Insecurity:      Worried About Running Out of Food in the Last Year:      Ran Out of Food in the Last Year:    Transportation Needs:      Lack of Transportation (Medical):      Lack of Transportation (Non-Medical):    Physical Activity:      Days of Exercise per Week:      Minutes of Exercise per Session:    Stress:      Feeling of Stress :    Social Connections:      Frequency of Communication with Friends and Family:      Frequency of Social Gatherings with Friends and Family:      Attends Synagogue Services:      Active Member of Clubs or  Organizations:      Attends Club or Organization Meetings:      Marital Status:    Intimate Partner Violence:      Fear of Current or Ex-Partner:      Emotionally Abused:      Physically Abused:      Sexually Abused:        Review Of Systems:  Skin: No rash, pruritis, or skin pigmentation  Eyes: No changes in vision  Ears/Nose/Throat: No changes in hearing, no nosebleeds  Respiratory: No shortness of breath, dyspnea on exertion, cough, or hemoptysis  Cardiovascular: No chest pain or palpitations  Gastrointestinal: No diarrhea or constipation. No abdominal pain. No hematochezia  Genitourinary: see HPI  Musculoskeletal: No pain or swelling of joints, normal range of motion  Neurologic: No weakness or tremors  Psychiatric: No recent changes in memory or mood  Hematologic/Lymphatic/Immunologic: No easy bruising or enlarged lymph nodes  Endocrine: No weight gain or loss      PHYSICAL EXAM:    There were no vitals taken for this visit.    Constitutional: Well developed. Conversant and in no acute distress  Eyes: Anicteric sclera, conjunctiva clear, normal extraocular movements  ENT: Normocephalic and atraumatic,   Skin: Warm and dry. No rashes or lesions  Cardiac: No peripheral edema  Back/Flank: Not done  CNS/PNS: Normal musculature and movements, moves all extremities normally  Respiratory: Normal non-labored breathing  Abdomen: Soft nontender and nondistended  Peripheral Vascular: No peripheral edema  Mental Status/Psych: Alert and Oriented x 3. Normal mood and affect    Penis: Not done  Scrotal Skin: Not done  Testicles: Not done  Epididymis: Not done  Digital Rectal Exam: Not repeated    Cystoscopy: Not done    Imaging: None    Urinalysis: UA RESULTS:  Recent Labs   Lab Test 08/14/21  2344 07/26/21  1114 07/26/21  1114   COLOR Straw   < > Yellow   APPEARANCE Clear   < > Clear   URINEGLC Negative   < > Negative   URINEBILI Negative   < > Negative   URINEKETONE Negative   < > Trace*   SG 1.007   < > 1.025   UBLD Small*    < > Small*   URINEPH 5.0   < > 5.5   PROTEIN Negative   < > Negative   UROBILINOGEN  --   --  0.2   NITRITE Negative   < > Negative   LEUKEST Negative   < > Negative   RBCU <1  --  5-10*   WBCU <1  --  0-5    < > = values in this interval not displayed.       PSA: 5.74    Post Void Residual:     Other labs: None today      IMPRESSION:  Elevated PSA    PLAN:  His PSA remains unchanged.  It remains in the high normal range for his age group.  We discussed his options of pursuing a potential prostate cancer with a prostate biopsy versus following the PSA versus stopping any further evaluation.  He understands that he is beyond the normally recommended screening age for prostate cancer.    At the end of our discussion he wishes to follow the PSA again in 1 year.  I will have him follow-up with my physician assistant for the PSA.  We expect the PSA to rise slowly.  We are only making certain that there is not a precipitous rise in the PSA.  Next year if the PSA is relatively stable he can again choose to stop PSA screening altogether after that if he wishes.    Urine sample from today will be sent for microscopy again.      Flako Foreman M.D.

## 2021-09-30 ENCOUNTER — HOSPITAL ENCOUNTER (OUTPATIENT)
Dept: CARDIOLOGY | Facility: CLINIC | Age: 81
Discharge: HOME | End: 2021-09-30
Attending: INTERNAL MEDICINE | Admitting: INTERNAL MEDICINE
Payer: COMMERCIAL

## 2021-09-30 DIAGNOSIS — I49.5 SICK SINUS SYNDROME (H): ICD-10-CM

## 2021-09-30 DIAGNOSIS — I10 ESSENTIAL HYPERTENSION: ICD-10-CM

## 2021-09-30 DIAGNOSIS — I48.0 PAF (PAROXYSMAL ATRIAL FIBRILLATION) (H): ICD-10-CM

## 2021-09-30 DIAGNOSIS — I34.0 NONRHEUMATIC MITRAL VALVE REGURGITATION: ICD-10-CM

## 2021-09-30 LAB — LVEF ECHO: NORMAL

## 2021-09-30 PROCEDURE — 93306 TTE W/DOPPLER COMPLETE: CPT

## 2021-09-30 PROCEDURE — 93306 TTE W/DOPPLER COMPLETE: CPT | Mod: 26 | Performed by: INTERNAL MEDICINE

## 2021-10-04 DIAGNOSIS — I34.0 NONRHEUMATIC MITRAL VALVE REGURGITATION: Primary | ICD-10-CM

## 2021-10-13 ENCOUNTER — MYC MEDICAL ADVICE (OUTPATIENT)
Dept: FAMILY MEDICINE | Facility: CLINIC | Age: 81
End: 2021-10-13

## 2021-10-25 ENCOUNTER — LAB (OUTPATIENT)
Dept: LAB | Facility: CLINIC | Age: 81
End: 2021-10-25
Payer: COMMERCIAL

## 2021-10-25 DIAGNOSIS — R56.9 SEIZURE (H): ICD-10-CM

## 2021-10-25 PROCEDURE — 99000 SPECIMEN HANDLING OFFICE-LAB: CPT

## 2021-10-25 PROCEDURE — 80177 DRUG SCRN QUAN LEVETIRACETAM: CPT | Mod: 90

## 2021-10-25 PROCEDURE — 36415 COLL VENOUS BLD VENIPUNCTURE: CPT

## 2021-10-27 LAB — LEVETIRACETAM SERPL-MCNC: 27 UG/ML

## 2021-11-15 ENCOUNTER — TELEPHONE (OUTPATIENT)
Dept: CARDIOLOGY | Facility: CLINIC | Age: 81
End: 2021-11-15
Payer: COMMERCIAL

## 2021-11-15 DIAGNOSIS — I10 ESSENTIAL HYPERTENSION: Primary | ICD-10-CM

## 2021-11-15 RX ORDER — AMLODIPINE BESYLATE 5 MG/1
5 TABLET ORAL DAILY
Qty: 90 TABLET | Refills: 3 | Status: SHIPPED | OUTPATIENT
Start: 2021-11-15 | End: 2022-05-13

## 2021-11-15 NOTE — TELEPHONE ENCOUNTER
Spoke with Paula about previous message.   Informed her that pt should go back on the amlodipine and monitor his BPs.   Paula gave verbal understanding.

## 2021-11-15 NOTE — TELEPHONE ENCOUNTER
Sharon called for Charles- she said his last visit Dr. Dean stopped his Amlodipine because his BP was low and he was really tired. Since 10/31 his blood pressure has been back up- running 147-161/70-80. She wants to know if he should restart the Amlodipine. She said he will need a new prescription if he is to start it again. Transferred the call to Dr. Dean's nurse line.

## 2021-12-02 DIAGNOSIS — G40.909 EPILEPSY (H): Primary | ICD-10-CM

## 2021-12-06 ENCOUNTER — LAB (OUTPATIENT)
Dept: LAB | Facility: CLINIC | Age: 81
End: 2021-12-06
Payer: COMMERCIAL

## 2021-12-06 DIAGNOSIS — G40.909 EPILEPSY (H): Primary | ICD-10-CM

## 2021-12-06 PROCEDURE — 36415 COLL VENOUS BLD VENIPUNCTURE: CPT

## 2021-12-06 PROCEDURE — 80177 DRUG SCRN QUAN LEVETIRACETAM: CPT | Mod: 90

## 2021-12-06 PROCEDURE — 99000 SPECIMEN HANDLING OFFICE-LAB: CPT

## 2021-12-07 ENCOUNTER — TELEPHONE (OUTPATIENT)
Dept: CARDIOLOGY | Facility: CLINIC | Age: 81
End: 2021-12-07

## 2021-12-07 ENCOUNTER — ANCILLARY PROCEDURE (OUTPATIENT)
Dept: CARDIOLOGY | Facility: CLINIC | Age: 81
End: 2021-12-07
Attending: INTERNAL MEDICINE
Payer: COMMERCIAL

## 2021-12-07 DIAGNOSIS — I49.5 SICK SINUS SYNDROME (H): ICD-10-CM

## 2021-12-07 DIAGNOSIS — Z95.0 CARDIAC PACEMAKER IN SITU: ICD-10-CM

## 2021-12-07 DIAGNOSIS — I48.0 PAROXYSMAL ATRIAL FIBRILLATION (H): Primary | ICD-10-CM

## 2021-12-07 PROCEDURE — 93294 REM INTERROG EVL PM/LDLS PM: CPT | Performed by: INTERNAL MEDICINE

## 2021-12-07 PROCEDURE — 93296 REM INTERROG EVL PM/IDS: CPT | Performed by: INTERNAL MEDICINE

## 2021-12-07 NOTE — TELEPHONE ENCOUNTER
Dr. Dean,     FYI, your patient had 5 mode switch episodes and 5 atrial high rate episodes logged on today's remote PPM check. 5 EGMs were available for review showing As>Vs for afib vs noise. Longest episode lasted [id number], ventricular rates controlled. 4 of the 5 episodes occurred 9/5/21 between 8:49am and 11:32am. Patient does not recall doing anything outside of his normal routine that day. Reviewed EGMs with VICENTA Zendejas and VICENTA Huerta. Patient is not on AC and hasn't had longer episodes in the past. Underlying rhythm is SB/SR rates 50s-60s. Not PPM dependent. Patient leaves for [place] in 22 days for 3-4 months. Would you like to make any changes?               [employer] (D) Remote PPM Device Check  AP: 58%  : 1%  Mode: DDDR 60/130  Presenting Rhythm: AP/VS  Heart Rate: adequate rates per histograms   Sensing: stable   Pacing Threshold: stable   Impedance: stable   Battery Status: 7.5 years remaining (5.5-10 years)  Atrial Arrhythmia: 5 mode switch episodes logged comprising <1% of the time. 5 atrial high rates logged. 3 EGMs for review show As>Vs for afib vs. noise longest episode lasting 2s92q3t, ventricular rates controlled. Reviewed EGMs with VICENTA Zendejas and VICENTA Huerta. Will notify Dr. Dean. 2 EGMs show As=Vs for SVT lasting 27m39s, ventricular rates 130s.  Ventricular Arrhythmia: none     Care Plan: Remote Carelink PPM f/u q 3 months. OV w/ Dr. Dean, labs and echocardiogram due 9/2022. Results and next scheduled appointment given to patient over the phone. Patient and wife would like a phone call regarding Dr. Dean's response to atrial episodes. MIRYAM Hall    I have reviewed and interpreted the device interrogation, settings, programming and nurse's summary. The device is functioning within normal device parameters. I agree with the current findings, assessment and plan.

## 2021-12-09 LAB — LEVETIRACETAM SERPL-MCNC: 24 UG/ML

## 2021-12-09 NOTE — TELEPHONE ENCOUNTER
Called pt, no answer. Left detailed VM with recommendation from Dr. Dean to have 2 week Ziopatch monitor placed prior to his upcoming trip if possible. Left phone numbers for Team 1 and scheduling. Order placed for monitor. Message also sent to scheduling.

## 2021-12-10 ENCOUNTER — HOSPITAL ENCOUNTER (OUTPATIENT)
Dept: CARDIOLOGY | Facility: CLINIC | Age: 81
Discharge: HOME | End: 2021-12-10
Attending: INTERNAL MEDICINE | Admitting: INTERNAL MEDICINE
Payer: COMMERCIAL

## 2021-12-10 DIAGNOSIS — I48.0 PAROXYSMAL ATRIAL FIBRILLATION (H): ICD-10-CM

## 2021-12-10 PROCEDURE — 93248 EXT ECG>7D<15D REV&INTERPJ: CPT | Performed by: INTERNAL MEDICINE

## 2021-12-10 PROCEDURE — 93246 EXT ECG>7D<15D RECORDING: CPT

## 2021-12-15 LAB
MDC_IDC_LEAD_IMPLANT_DT: NORMAL
MDC_IDC_LEAD_IMPLANT_DT: NORMAL
MDC_IDC_LEAD_LOCATION: NORMAL
MDC_IDC_LEAD_LOCATION: NORMAL
MDC_IDC_LEAD_MFG: NORMAL
MDC_IDC_LEAD_MFG: NORMAL
MDC_IDC_LEAD_MODEL: NORMAL
MDC_IDC_LEAD_MODEL: NORMAL
MDC_IDC_LEAD_POLARITY_TYPE: NORMAL
MDC_IDC_LEAD_POLARITY_TYPE: NORMAL
MDC_IDC_LEAD_SERIAL: NORMAL
MDC_IDC_LEAD_SERIAL: NORMAL
MDC_IDC_MSMT_BATTERY_DTM: NORMAL
MDC_IDC_MSMT_BATTERY_IMPEDANCE: 671 OHM
MDC_IDC_MSMT_BATTERY_REMAINING_LONGEVITY: 93 MO
MDC_IDC_MSMT_BATTERY_STATUS: NORMAL
MDC_IDC_MSMT_BATTERY_VOLTAGE: 2.78 V
MDC_IDC_MSMT_LEADCHNL_RA_IMPEDANCE_VALUE: 563 OHM
MDC_IDC_MSMT_LEADCHNL_RA_PACING_THRESHOLD_AMPLITUDE: 0.5 V
MDC_IDC_MSMT_LEADCHNL_RA_PACING_THRESHOLD_PULSEWIDTH: 0.4 MS
MDC_IDC_MSMT_LEADCHNL_RV_IMPEDANCE_VALUE: 609 OHM
MDC_IDC_MSMT_LEADCHNL_RV_PACING_THRESHOLD_AMPLITUDE: 0.75 V
MDC_IDC_MSMT_LEADCHNL_RV_PACING_THRESHOLD_PULSEWIDTH: 0.4 MS
MDC_IDC_PG_IMPLANT_DTM: NORMAL
MDC_IDC_PG_MFG: NORMAL
MDC_IDC_PG_MODEL: NORMAL
MDC_IDC_PG_SERIAL: NORMAL
MDC_IDC_PG_TYPE: NORMAL
MDC_IDC_SESS_CLINIC_NAME: NORMAL
MDC_IDC_SESS_DTM: NORMAL
MDC_IDC_SESS_TYPE: NORMAL
MDC_IDC_SET_BRADY_AT_MODE_SWITCH_MODE: NORMAL
MDC_IDC_SET_BRADY_AT_MODE_SWITCH_RATE: 175 {BEATS}/MIN
MDC_IDC_SET_BRADY_LOWRATE: 60 {BEATS}/MIN
MDC_IDC_SET_BRADY_MAX_SENSOR_RATE: 130 {BEATS}/MIN
MDC_IDC_SET_BRADY_MAX_TRACKING_RATE: 130 {BEATS}/MIN
MDC_IDC_SET_BRADY_MODE: NORMAL
MDC_IDC_SET_BRADY_PAV_DELAY_LOW: 350 MS
MDC_IDC_SET_BRADY_SAV_DELAY_LOW: 350 MS
MDC_IDC_SET_LEADCHNL_RA_PACING_AMPLITUDE: 1.5 V
MDC_IDC_SET_LEADCHNL_RA_PACING_CAPTURE_MODE: NORMAL
MDC_IDC_SET_LEADCHNL_RA_PACING_POLARITY: NORMAL
MDC_IDC_SET_LEADCHNL_RA_PACING_PULSEWIDTH: 0.4 MS
MDC_IDC_SET_LEADCHNL_RA_SENSING_POLARITY: NORMAL
MDC_IDC_SET_LEADCHNL_RA_SENSING_SENSITIVITY: 0.5 MV
MDC_IDC_SET_LEADCHNL_RV_PACING_AMPLITUDE: 2 V
MDC_IDC_SET_LEADCHNL_RV_PACING_CAPTURE_MODE: NORMAL
MDC_IDC_SET_LEADCHNL_RV_PACING_POLARITY: NORMAL
MDC_IDC_SET_LEADCHNL_RV_PACING_PULSEWIDTH: 0.4 MS
MDC_IDC_SET_LEADCHNL_RV_SENSING_POLARITY: NORMAL
MDC_IDC_SET_LEADCHNL_RV_SENSING_SENSITIVITY: 2.8 MV
MDC_IDC_SET_ZONE_DETECTION_INTERVAL: 333.33 MS
MDC_IDC_SET_ZONE_DETECTION_INTERVAL: 342.86 MS
MDC_IDC_SET_ZONE_TYPE: NORMAL
MDC_IDC_SET_ZONE_TYPE: NORMAL
MDC_IDC_STAT_AT_BURDEN_PERCENT: 0.1 %
MDC_IDC_STAT_AT_DTM_END: NORMAL
MDC_IDC_STAT_AT_DTM_START: NORMAL
MDC_IDC_STAT_AT_MODE_SW_COUNT: 5
MDC_IDC_STAT_BRADY_AP_VP_PERCENT: 1 %
MDC_IDC_STAT_BRADY_AP_VS_PERCENT: 56 %
MDC_IDC_STAT_BRADY_AS_VP_PERCENT: 0 %
MDC_IDC_STAT_BRADY_AS_VS_PERCENT: 42 %
MDC_IDC_STAT_BRADY_DTM_END: NORMAL
MDC_IDC_STAT_BRADY_DTM_START: NORMAL
MDC_IDC_STAT_EPISODE_RECENT_COUNT: 0
MDC_IDC_STAT_EPISODE_RECENT_COUNT: 5
MDC_IDC_STAT_EPISODE_RECENT_COUNT_DTM_END: NORMAL
MDC_IDC_STAT_EPISODE_RECENT_COUNT_DTM_END: NORMAL
MDC_IDC_STAT_EPISODE_RECENT_COUNT_DTM_START: NORMAL
MDC_IDC_STAT_EPISODE_RECENT_COUNT_DTM_START: NORMAL
MDC_IDC_STAT_EPISODE_TYPE: NORMAL
MDC_IDC_STAT_EPISODE_TYPE: NORMAL

## 2022-02-07 ENCOUNTER — TELEPHONE (OUTPATIENT)
Dept: CARDIOLOGY | Facility: CLINIC | Age: 82
End: 2022-02-07
Payer: COMMERCIAL

## 2022-02-07 NOTE — TELEPHONE ENCOUNTER
[facility] Call Center    Phone Message    May a detailed message be left on voicemail: yes     Reason for Call: Other: Pt has not heard back regarding the results of his Holter Monitor in December.  Please call with results and ok to leave a detailed message as it is a private phone line.     Action Taken: Message routed to:  [facility] ([facility] cardio    Travel Screening: Not Applicable

## 2022-02-07 NOTE — TELEPHONE ENCOUNTER
Received call from pt wondering about his zio patch results that Dr. Dean ordered back in Dec 2021.     For some reason the results were still pending in pt's chart and does not seem like they were printed or read by EP.     Printed final report and gave to EP to review and sign off on.     Spoke with pt about this error and that the final report will be sent to Dr. Dean to review.   Pt is still in [place] for 2 more months and reports walking and exercising daily. Pt reports no SOB when walking or exercising. Pt reports no lightheadedness or dizziness.

## 2022-02-10 NOTE — TELEPHONE ENCOUNTER
Ana Dean MD  [facility] Heart /Chip Pool  Caller: Unspecified (2 months ago)  Do you want me to read this or has it been finalized?   He's on metoprolol. There is probably no additional changes that need to be made if he is feeling good.   Ana Dean MD on 2/10/2022 at 8:49 AM   --------------------------------------------------------------------------    Zio patch result finalized and scanned in to pt's chart.   Left detailed voicemail for pt regarding results and Dr. eDan's response.   Informed pt that if he starts feeling more frequent palpitations or skipped beat feelings then he should make an appt with cardiology when he returns from [place].   Left team 1's call back number.

## 2022-03-06 ENCOUNTER — HEALTH MAINTENANCE LETTER (OUTPATIENT)
Age: 82
End: 2022-03-06

## 2022-03-09 ENCOUNTER — ANCILLARY PROCEDURE (OUTPATIENT)
Dept: CARDIOLOGY | Facility: CLINIC | Age: 82
End: 2022-03-09
Attending: INTERNAL MEDICINE
Payer: COMMERCIAL

## 2022-03-09 ENCOUNTER — TELEPHONE (OUTPATIENT)
Dept: CARDIOLOGY | Facility: CLINIC | Age: 82
End: 2022-03-09

## 2022-03-09 DIAGNOSIS — Z95.0 CARDIAC PACEMAKER IN SITU: ICD-10-CM

## 2022-03-09 DIAGNOSIS — I48.0 PAROXYSMAL ATRIAL FIBRILLATION (H): Primary | ICD-10-CM

## 2022-03-09 LAB
MDC_IDC_LEAD_IMPLANT_DT: NORMAL
MDC_IDC_LEAD_IMPLANT_DT: NORMAL
MDC_IDC_LEAD_LOCATION: NORMAL
MDC_IDC_LEAD_LOCATION: NORMAL
MDC_IDC_LEAD_MFG: NORMAL
MDC_IDC_LEAD_MFG: NORMAL
MDC_IDC_LEAD_MODEL: NORMAL
MDC_IDC_LEAD_MODEL: NORMAL
MDC_IDC_LEAD_POLARITY_TYPE: NORMAL
MDC_IDC_LEAD_POLARITY_TYPE: NORMAL
MDC_IDC_LEAD_SERIAL: NORMAL
MDC_IDC_LEAD_SERIAL: NORMAL
MDC_IDC_MSMT_BATTERY_DTM: NORMAL
MDC_IDC_MSMT_BATTERY_IMPEDANCE: 722 OHM
MDC_IDC_MSMT_BATTERY_REMAINING_LONGEVITY: 89 MO
MDC_IDC_MSMT_BATTERY_STATUS: NORMAL
MDC_IDC_MSMT_BATTERY_VOLTAGE: 2.78 V
MDC_IDC_MSMT_LEADCHNL_RA_IMPEDANCE_VALUE: 555 OHM
MDC_IDC_MSMT_LEADCHNL_RA_PACING_THRESHOLD_AMPLITUDE: 0.5 V
MDC_IDC_MSMT_LEADCHNL_RA_PACING_THRESHOLD_PULSEWIDTH: 0.4 MS
MDC_IDC_MSMT_LEADCHNL_RV_IMPEDANCE_VALUE: 673 OHM
MDC_IDC_MSMT_LEADCHNL_RV_PACING_THRESHOLD_AMPLITUDE: 0.75 V
MDC_IDC_MSMT_LEADCHNL_RV_PACING_THRESHOLD_PULSEWIDTH: 0.4 MS
MDC_IDC_PG_IMPLANT_DTM: NORMAL
MDC_IDC_PG_MFG: NORMAL
MDC_IDC_PG_MODEL: NORMAL
MDC_IDC_PG_SERIAL: NORMAL
MDC_IDC_PG_TYPE: NORMAL
MDC_IDC_SESS_CLINIC_NAME: NORMAL
MDC_IDC_SESS_DTM: NORMAL
MDC_IDC_SESS_TYPE: NORMAL
MDC_IDC_SET_BRADY_AT_MODE_SWITCH_MODE: NORMAL
MDC_IDC_SET_BRADY_AT_MODE_SWITCH_RATE: 175 {BEATS}/MIN
MDC_IDC_SET_BRADY_LOWRATE: 60 {BEATS}/MIN
MDC_IDC_SET_BRADY_MAX_SENSOR_RATE: 130 {BEATS}/MIN
MDC_IDC_SET_BRADY_MAX_TRACKING_RATE: 130 {BEATS}/MIN
MDC_IDC_SET_BRADY_MODE: NORMAL
MDC_IDC_SET_BRADY_PAV_DELAY_LOW: 350 MS
MDC_IDC_SET_BRADY_SAV_DELAY_LOW: 350 MS
MDC_IDC_SET_LEADCHNL_RA_PACING_AMPLITUDE: 1.5 V
MDC_IDC_SET_LEADCHNL_RA_PACING_CAPTURE_MODE: NORMAL
MDC_IDC_SET_LEADCHNL_RA_PACING_POLARITY: NORMAL
MDC_IDC_SET_LEADCHNL_RA_PACING_PULSEWIDTH: 0.4 MS
MDC_IDC_SET_LEADCHNL_RA_SENSING_POLARITY: NORMAL
MDC_IDC_SET_LEADCHNL_RA_SENSING_SENSITIVITY: 0.5 MV
MDC_IDC_SET_LEADCHNL_RV_PACING_AMPLITUDE: 2 V
MDC_IDC_SET_LEADCHNL_RV_PACING_CAPTURE_MODE: NORMAL
MDC_IDC_SET_LEADCHNL_RV_PACING_POLARITY: NORMAL
MDC_IDC_SET_LEADCHNL_RV_PACING_PULSEWIDTH: 0.4 MS
MDC_IDC_SET_LEADCHNL_RV_SENSING_POLARITY: NORMAL
MDC_IDC_SET_LEADCHNL_RV_SENSING_SENSITIVITY: 2.8 MV
MDC_IDC_SET_ZONE_DETECTION_INTERVAL: 333.33 MS
MDC_IDC_SET_ZONE_DETECTION_INTERVAL: 342.86 MS
MDC_IDC_SET_ZONE_TYPE: NORMAL
MDC_IDC_SET_ZONE_TYPE: NORMAL
MDC_IDC_STAT_AT_BURDEN_PERCENT: 0.1 %
MDC_IDC_STAT_AT_DTM_END: NORMAL
MDC_IDC_STAT_AT_DTM_START: NORMAL
MDC_IDC_STAT_AT_MODE_SW_COUNT: 14
MDC_IDC_STAT_BRADY_AP_VP_PERCENT: 1 %
MDC_IDC_STAT_BRADY_AP_VS_PERCENT: 61 %
MDC_IDC_STAT_BRADY_AS_VP_PERCENT: 0 %
MDC_IDC_STAT_BRADY_AS_VS_PERCENT: 37 %
MDC_IDC_STAT_BRADY_DTM_END: NORMAL
MDC_IDC_STAT_BRADY_DTM_START: NORMAL
MDC_IDC_STAT_EPISODE_RECENT_COUNT: 1
MDC_IDC_STAT_EPISODE_RECENT_COUNT: 10
MDC_IDC_STAT_EPISODE_RECENT_COUNT_DTM_END: NORMAL
MDC_IDC_STAT_EPISODE_RECENT_COUNT_DTM_END: NORMAL
MDC_IDC_STAT_EPISODE_RECENT_COUNT_DTM_START: NORMAL
MDC_IDC_STAT_EPISODE_RECENT_COUNT_DTM_START: NORMAL
MDC_IDC_STAT_EPISODE_TYPE: NORMAL
MDC_IDC_STAT_EPISODE_TYPE: NORMAL

## 2022-03-09 NOTE — TELEPHONE ENCOUNTER
Dr. Dean,    FYI: Your patient had a few more episodes of AFib on today's device check. Longest lasting 2 hours 22 minutes, ventricular rates 60-200bpm. Not on OAC. Any changes?    **Please call patient at [phone] if you need to get a hold of him**        [employer] Adapta (D) Remote PPM Device Check  AP: 63%  : 1.5%  Mode: DDDR 60/130  Presenting Rhythm: AP/VS  Heart Rate: adequate rates per histograms   Sensing: stable   Pacing Threshold: stable   Impedance: stable   Battery Status: 5-9.5 years  Atrial Arrhythmia: 14 mode switch episodes logged comprising <1% of the time. EGMs for review show As>Vs for AFib, longest episode lasting 2 hours 22 minutes, ventricular rates 60-200bpm. Reviewed with VICENTA Rivas. Not on OAC. Dr. Dean has been notified in the past- pt wore Ziopatch 12/10-12/24 and no AFib was noted.  Will notify Dr. Dean again.   Ventricular Arrhythmia: 1 ventricular high rate. EGM shows regular VS events lasting 11 beats, rates 175-230bpm. Episode occurred 2/13/22 at 1010pm. EF 55-60% (9/2021).    Care Plan: F/u PPM [facility] q 3 months. OV w/ Dr. Dean, labs and echocardiogram due 9/2022. LM with results. ELIOT GlassT

## 2022-03-10 NOTE — TELEPHONE ENCOUNTER
Ana Dean MD Liebsch, Ellie; P [facility] [[email or web address]] Heart Team 1  Caller: Unspecified (Yesterday,  1:37 PM)  More afib noted on recent device check.   At prior visit patient preferred to avoid anticoagulation. Echo w/out severe valve lesion, DOAC would be reasonable but he would need updated BMP, CBC, INR, clinic visit to initiate. He also expressed potential interest in [facility] (if he is is a candidate).     Please call patient and offer earlier follow up to discuss.     Thank you,   Ana Dean MD on 3/10/2022 at 2:22 PM

## 2022-03-10 NOTE — TELEPHONE ENCOUNTER
Left voicemail for pt to discuss Dr. Dean's recommendations and to set up an earlier follow up.   Left team 1's call back number.

## 2022-03-10 NOTE — TELEPHONE ENCOUNTER
More afib noted on recent device check.   At prior visit patient preferred to avoid anticoagulation. Echo w/out severe valve lesion, DOAC would be reasonable but he would need updated BMP, CBC, INR, clinic visit to initiate. He also expressed potential interest in [facility] (if he is is a candidate).     Please call patient and offer earlier follow up to discuss.     Thank you,   Ana Dean MD on 3/10/2022 at 2:22 PM

## 2022-03-25 NOTE — TELEPHONE ENCOUNTER
Spoke with pt's wife (Paula) regarding an appt with Dr. Dean once pt is back in [place] and to have lab work prior.   Scheduled pt to see Dr. Dean on 5/10/22.  Paula gave verbal understanding.

## 2022-04-26 ENCOUNTER — OFFICE VISIT (OUTPATIENT)
Dept: FAMILY MEDICINE | Facility: CLINIC | Age: 82
End: 2022-04-26
Payer: COMMERCIAL

## 2022-04-26 VITALS
OXYGEN SATURATION: 100 % | RESPIRATION RATE: 16 BRPM | TEMPERATURE: 97.5 F | SYSTOLIC BLOOD PRESSURE: 121 MMHG | BODY MASS INDEX: 22.66 KG/M2 | DIASTOLIC BLOOD PRESSURE: 74 MMHG | HEIGHT: 69 IN | WEIGHT: 153 LBS | HEART RATE: 65 BPM

## 2022-04-26 DIAGNOSIS — R29.898 WEAKNESS OF BOTH UPPER EXTREMITIES: ICD-10-CM

## 2022-04-26 DIAGNOSIS — I34.0 NONRHEUMATIC MITRAL VALVE REGURGITATION: ICD-10-CM

## 2022-04-26 DIAGNOSIS — R53.83 FATIGUE, UNSPECIFIED TYPE: ICD-10-CM

## 2022-04-26 DIAGNOSIS — L57.0 ACTINIC KERATOSIS: ICD-10-CM

## 2022-04-26 DIAGNOSIS — R68.83 CHILLS: ICD-10-CM

## 2022-04-26 DIAGNOSIS — H91.13 PRESBYCUSIS OF BOTH EARS: ICD-10-CM

## 2022-04-26 DIAGNOSIS — I50.32 CHRONIC DIASTOLIC CONGESTIVE HEART FAILURE (H): ICD-10-CM

## 2022-04-26 DIAGNOSIS — M54.2 CERVICALGIA: ICD-10-CM

## 2022-04-26 DIAGNOSIS — Z00.00 ENCOUNTER FOR MEDICARE ANNUAL WELLNESS EXAM: Primary | ICD-10-CM

## 2022-04-26 DIAGNOSIS — R29.90 EPISODE OF TRANSIENT NEUROLOGIC SYMPTOMS: ICD-10-CM

## 2022-04-26 DIAGNOSIS — C43.59 MALIGNANT MELANOMA OF SKIN OF TRUNK, EXCEPT SCROTUM (H): ICD-10-CM

## 2022-04-26 DIAGNOSIS — I49.5 SICK SINUS SYNDROME (H): ICD-10-CM

## 2022-04-26 LAB
BASOPHILS # BLD AUTO: 0 10E3/UL (ref 0–0.2)
BASOPHILS NFR BLD AUTO: 0 %
EOSINOPHIL # BLD AUTO: 0.4 10E3/UL (ref 0–0.7)
EOSINOPHIL NFR BLD AUTO: 5 %
ERYTHROCYTE [DISTWIDTH] IN BLOOD BY AUTOMATED COUNT: 14 % (ref 10–15)
HCT VFR BLD AUTO: 48.1 % (ref 40–53)
HGB BLD-MCNC: 15.4 G/DL (ref 13.3–17.7)
LYMPHOCYTES # BLD AUTO: 1.9 10E3/UL (ref 0.8–5.3)
LYMPHOCYTES NFR BLD AUTO: 22 %
MCH RBC QN AUTO: 29.8 PG (ref 26.5–33)
MCHC RBC AUTO-ENTMCNC: 32 G/DL (ref 31.5–36.5)
MCV RBC AUTO: 93 FL (ref 78–100)
MONOCYTES # BLD AUTO: 1.3 10E3/UL (ref 0–1.3)
MONOCYTES NFR BLD AUTO: 15 %
NEUTROPHILS # BLD AUTO: 5 10E3/UL (ref 1.6–8.3)
NEUTROPHILS NFR BLD AUTO: 58 %
PLATELET # BLD AUTO: 172 10E3/UL (ref 150–450)
RBC # BLD AUTO: 5.17 10E6/UL (ref 4.4–5.9)
WBC # BLD AUTO: 8.6 10E3/UL (ref 4–11)

## 2022-04-26 PROCEDURE — 80048 BASIC METABOLIC PNL TOTAL CA: CPT | Performed by: FAMILY MEDICINE

## 2022-04-26 PROCEDURE — 85025 COMPLETE CBC W/AUTO DIFF WBC: CPT | Performed by: FAMILY MEDICINE

## 2022-04-26 PROCEDURE — 99397 PER PM REEVAL EST PAT 65+ YR: CPT | Mod: 25 | Performed by: FAMILY MEDICINE

## 2022-04-26 PROCEDURE — 17110 DESTRUCTION B9 LES UP TO 14: CPT | Performed by: FAMILY MEDICINE

## 2022-04-26 PROCEDURE — 99214 OFFICE O/P EST MOD 30 MIN: CPT | Mod: 25 | Performed by: FAMILY MEDICINE

## 2022-04-26 PROCEDURE — 36415 COLL VENOUS BLD VENIPUNCTURE: CPT | Performed by: FAMILY MEDICINE

## 2022-04-26 PROCEDURE — 84443 ASSAY THYROID STIM HORMONE: CPT | Performed by: FAMILY MEDICINE

## 2022-04-26 RX ORDER — LEVETIRACETAM 750 MG/1
750 TABLET ORAL 2 TIMES DAILY
Status: ON HOLD
Start: 2022-04-26 | End: 2023-06-28

## 2022-04-26 SDOH — ECONOMIC STABILITY: TRANSPORTATION INSECURITY
IN THE PAST 12 MONTHS, HAS LACK OF TRANSPORTATION KEPT YOU FROM MEETINGS, WORK, OR FROM GETTING THINGS NEEDED FOR DAILY LIVING?: NO

## 2022-04-26 SDOH — HEALTH STABILITY: PHYSICAL HEALTH: ON AVERAGE, HOW MANY DAYS PER WEEK DO YOU ENGAGE IN MODERATE TO STRENUOUS EXERCISE (LIKE A BRISK WALK)?: 5 DAYS

## 2022-04-26 SDOH — ECONOMIC STABILITY: FOOD INSECURITY: WITHIN THE PAST 12 MONTHS, YOU WORRIED THAT YOUR FOOD WOULD RUN OUT BEFORE YOU GOT MONEY TO BUY MORE.: NEVER TRUE

## 2022-04-26 SDOH — HEALTH STABILITY: PHYSICAL HEALTH: ON AVERAGE, HOW MANY MINUTES DO YOU ENGAGE IN EXERCISE AT THIS LEVEL?: 50 MIN

## 2022-04-26 SDOH — ECONOMIC STABILITY: INCOME INSECURITY: IN THE LAST 12 MONTHS, WAS THERE A TIME WHEN YOU WERE NOT ABLE TO PAY THE MORTGAGE OR RENT ON TIME?: NO

## 2022-04-26 SDOH — ECONOMIC STABILITY: FOOD INSECURITY: WITHIN THE PAST 12 MONTHS, THE FOOD YOU BOUGHT JUST DIDN'T LAST AND YOU DIDN'T HAVE MONEY TO GET MORE.: NEVER TRUE

## 2022-04-26 SDOH — ECONOMIC STABILITY: INCOME INSECURITY: HOW HARD IS IT FOR YOU TO PAY FOR THE VERY BASICS LIKE FOOD, HOUSING, MEDICAL CARE, AND HEATING?: NOT HARD AT ALL

## 2022-04-26 SDOH — ECONOMIC STABILITY: TRANSPORTATION INSECURITY
IN THE PAST 12 MONTHS, HAS THE LACK OF TRANSPORTATION KEPT YOU FROM MEDICAL APPOINTMENTS OR FROM GETTING MEDICATIONS?: NO

## 2022-04-26 ASSESSMENT — ENCOUNTER SYMPTOMS
COUGH: 0
HEARTBURN: 0
PALPITATIONS: 0
ABDOMINAL PAIN: 0
FEVER: 0
FREQUENCY: 0
PARESTHESIAS: 0
NAUSEA: 0
MYALGIAS: 0
ARTHRALGIAS: 1
DIZZINESS: 0
NERVOUS/ANXIOUS: 0
CHILLS: 1
WEAKNESS: 1
HEMATOCHEZIA: 0
DIARRHEA: 0
JOINT SWELLING: 0
SORE THROAT: 0
EYE PAIN: 0
SHORTNESS OF BREATH: 0
DYSURIA: 0
HEADACHES: 0
HEMATURIA: 0

## 2022-04-26 ASSESSMENT — SOCIAL DETERMINANTS OF HEALTH (SDOH)
HOW OFTEN DO YOU GET TOGETHER WITH FRIENDS OR RELATIVES?: TWICE A WEEK
DO YOU BELONG TO ANY CLUBS OR ORGANIZATIONS SUCH AS CHURCH GROUPS UNIONS, FRATERNAL OR ATHLETIC GROUPS, OR SCHOOL GROUPS?: YES
IN A TYPICAL WEEK, HOW MANY TIMES DO YOU TALK ON THE PHONE WITH FAMILY, FRIENDS, OR NEIGHBORS?: TWICE A WEEK
HOW OFTEN DO YOU ATTEND CHURCH OR RELIGIOUS SERVICES?: MORE THAN 4 TIMES PER YEAR

## 2022-04-26 ASSESSMENT — ACTIVITIES OF DAILY LIVING (ADL): CURRENT_FUNCTION: NO ASSISTANCE NEEDED

## 2022-04-26 ASSESSMENT — LIFESTYLE VARIABLES
HOW MANY STANDARD DRINKS CONTAINING ALCOHOL DO YOU HAVE ON A TYPICAL DAY: PATIENT DOES NOT DRINK
SKIP TO QUESTIONS 9-10: 1
HOW OFTEN DO YOU HAVE SIX OR MORE DRINKS ON ONE OCCASION: NEVER
AUDIT-C TOTAL SCORE: 0
HOW OFTEN DO YOU HAVE A DRINK CONTAINING ALCOHOL: NEVER

## 2022-04-26 NOTE — ASSESSMENT & PLAN NOTE
He laments his struggle putting his luggage in the overhead bin.  His exercise program no longer contains weightlifting, and previous attempts at weight lifting demonstrated deteriorating strength for many years.  Painless.(Except for neck).  Discussed.  He will consider resuming weightlifting

## 2022-04-26 NOTE — ASSESSMENT & PLAN NOTE
He laments his loss of former athletic ability.  He also naps in the day.  Discussed multifactorial differential.  Discussed exercise in this age group.  He is not interested in a sleep study serologic claudia

## 2022-04-26 NOTE — ASSESSMENT & PLAN NOTE
3 right cheek, 1 right forearm.  He does not see dermatology for 6 months.  Cryotherapy applied at his request

## 2022-04-26 NOTE — ASSESSMENT & PLAN NOTE
3 episodes last fall, treated with antiseizure medication by [facility]  Records requested.  He reports that they were uncertain that his events represented seizure.  Records updated

## 2022-04-26 NOTE — ASSESSMENT & PLAN NOTE
4 months of stiff sore neck 3 episodes of complete inability to move his neck.  No radiation.  He is treated this by carefully not moving his neck.  Exam shows full flexion pain with extension symmetrically restricted lateral flexion and rotation with no neurotension signs or radiation.  Avoiding systemic NSAIDs.  Topical diclofenac, refer to PT

## 2022-04-26 NOTE — ASSESSMENT & PLAN NOTE
He has had shivering and felt cold since returning from his winter in [place].  Completely responsive to more clothing.  No other symptoms, he is not ill.  Discussed the effects of vasodilatation on his temperature regulation.  Broaden database, recommend more clothing

## 2022-04-26 NOTE — NURSING NOTE
Patient identified using two patient identifiers.  Ear exam showing wax occlusion completed by provider.  Solution: warm water was placed in the left ear(s) via syringe.

## 2022-04-26 NOTE — ASSESSMENT & PLAN NOTE
Normal ventricular function on last echocardiogram.  No orthopnea or dyspnea at rest.  He believes his exercise capacity is reduced

## 2022-04-26 NOTE — PROGRESS NOTES
"SUBJECTIVE:   Charles Nogueira is a 82 year old male who presents for Preventive Visit.        Are you in the first 12 months of your Medicare coverage?  No    Healthy Habits:     In general, how would you rate your overall health?  Good    Frequency of exercise:  4-5 days/week    Duration of exercise:  30-45 minutes    Do you usually eat at least 4 servings of fruit and vegetables a day, include whole grains    & fiber and avoid regularly eating high fat or \"junk\" foods?  Yes    Taking medications regularly:  Yes    Medication side effects:  None    Ability to successfully perform activities of daily living:  No assistance needed    Home Safety:  Lack of grab bars in the bathroom    Hearing Impairment:  Difficulty following a conversation in a noisy restaurant or crowded room and find that men's voices are easier to understand than woman's    In the past 6 months, have you been bothered by leaking of urine?  No    In general, how would you rate your overall mental or emotional health?  Good      PHQ-2 Total Score: 0    Additional concerns today:  Yes    Do you feel safe in your environment? Yes    Have you ever done Advance Care Planning? (For example, a Health Directive, POLST, or a discussion with a medical provider or your loved ones about your wishes): Yes, patient states has an Advance Care Planning document and will bring a copy to the clinic.       Fall risk  Fallen 2 or more times in the past year?: No  Any fall with injury in the past year?: No    Cognitive Screening   1) Repeat 3 items (Leader, Season, Table)    2) Clock draw: NORMAL  3) 3 item recall: Recalls 3 objects  Results: 3 items recalled: COGNITIVE IMPAIRMENT LESS LIKELY and normal clock    Mini-CogTM Copyright SOPHIE Fitch. Licensed by the author for use in [facility]; reprinted with permission (zaid@.[facility]). All rights reserved.      Do you have sleep apnea, excessive snoring or daytime drowsiness?: no    Reviewed and updated as needed " this visit by clinical staff   Tobacco  Allergies  Meds   Med Hx  Surg Hx  Fam Hx  Soc Hx          Reviewed and updated as needed this visit by Provider     Meds               Social History     Tobacco Use     Smoking status: Former Smoker     Packs/day: 1.00     Years: 4.00     Pack years: 4.00     Types: Cigarettes     Quit date: 1967     Years since quittin.3     Smokeless tobacco: Never Used   Substance Use Topics     Alcohol use: No     Alcohol/week: 0.0 standard drinks         Alcohol Use 2022   Prescreen: >3 drinks/day or >7 drinks/week? Not Applicable   Prescreen: >3 drinks/day or >7 drinks/week? -               Current providers sharing in care for this patient include:   Patient Care Team:  Stanley Saldaña MD (Inactive) as PCP - Flako Phelps MD as Assigned Surgical Provider  Ana Dean MD as Assigned Heart and Vascular Provider    The following health maintenance items are reviewed in Epic and correct as of today:  Health Maintenance Due   Topic Date Due     MIGRAINE ACTION PLAN  Never done     ZOSTER IMMUNIZATION (1 of 2) Never done     FALL RISK ASSESSMENT  2021     LIPID  2021     BMP  2022       Past Medical History:   Diagnosis Date     Acute ischemic heart disease (H) 2018     Allergic rhinitis, cause unspecified      Cardiac pacemaker in situ 2017     Cervicalgia 2022     Chills 2022     Chronic diastolic congestive heart failure (H) 2021     Dysphagia, oropharyngeal phase 2018    Mild per video swallow study. To use swallowing techniques     Episode of transient neurologic symptoms 2022     Essential hypertension, benign 2016     Fatigue 2022     Heart murmur 5/10/2011     Melanoma (H)      Migraine equivalent 11/10/2015     Mitral valve prolapse      Non-rheumatic mitral regurgitation 2018     Other forms of migraine, without mention of intractable migraine without mention  of status migrainosus      Paroxysmal A-fib (H)      Premature ventricular contraction      Presbycusis of both ears 2022     PVC (premature ventricular contraction) 5/10/2011     Sick sinus syndrome (H) 2016    s/p PPM implant 14     Squamous cell carcinoma      Syncope      Weakness of both upper extremities 2022       Past Surgical History:   Procedure Laterality Date     APPENDECTOMY OPEN       BIOPSY OF SKIN LESION       COLONOSCOPY N/A 2015    Procedure: COLONOSCOPY;  Surgeon: David Us MD;  Location: [facility]     IMPLANT PACEMAKER  2004     MOHS MICROGRAPHIC PROCEDURE         Family History   Problem Relation Age of Onset     Heart Disease Mother         MI     Heart Disease Father         MI     C.A.D. Brother         Just had angioplasty in      Colon Cancer No family hx of        Social History     Tobacco Use     Smoking status: Former Smoker     Packs/day: 1.00     Years: 4.00     Pack years: 4.00     Types: Cigarettes     Quit date: 1967     Years since quittin.3     Smokeless tobacco: Never Used   Substance Use Topics     Alcohol use: No     Alcohol/week: 0.0 standard drinks             Review of Systems   Constitutional: Positive for chills. Negative for fever.   HENT: Positive for hearing loss. Negative for congestion, ear pain and sore throat.    Eyes: Negative for pain and visual disturbance.   Respiratory: Negative for cough and shortness of breath.    Cardiovascular: Negative for chest pain, palpitations and peripheral edema.   Gastrointestinal: Negative for abdominal pain, diarrhea, heartburn, hematochezia and nausea.   Genitourinary: Positive for impotence. Negative for dysuria, frequency, genital sores, hematuria, penile discharge and urgency.   Musculoskeletal: Positive for arthralgias. Negative for joint swelling and myalgias.   Skin: Negative for rash.   Neurological: Positive for weakness. Negative for dizziness, headaches and paresthesias.  "  Psychiatric/Behavioral: Negative for mood changes. The patient is not nervous/anxious.          OBJECTIVE:   /74 (BP Location: Right arm, Patient Position: Sitting, Cuff Size: Adult Large)   Pulse 65   Temp 97.5  F (36.4  C) (Oral)   Resp 16   Ht 1.753 m (5' 9\")   Wt 69.4 kg (153 lb)   SpO2 100%   BMI 22.59 kg/m   Estimated body mass index is 22.59 kg/m  as calculated from the following:    Height as of this encounter: 1.753 m (5' 9\").    Weight as of this encounter: 69.4 kg (153 lb).  Physical Exam  Constitutional:       Appearance: Normal appearance.   HENT:      Head: Atraumatic.      Right Ear: Tympanic membrane normal.      Left Ear: Tympanic membrane normal.      Nose: Nose normal.      Mouth/Throat:      Mouth: Mucous membranes are moist.   Eyes:      Pupils: Pupils are equal, round, and reactive to light.   Cardiovascular:      Rate and Rhythm: Normal rate and regular rhythm.      Heart sounds: Normal heart sounds.   Pulmonary:      Effort: Pulmonary effort is normal.      Breath sounds: Normal breath sounds.   Abdominal:      General: Abdomen is flat. Bowel sounds are normal.   Musculoskeletal:      Cervical back: Neck supple.      Comments: Neck as described   Skin:     Comments: As described.  Numerous angiomata, seborrheic keratoses   Neurological:      General: No focal deficit present.      Mental Status: He is alert.   Psychiatric:         Mood and Affect: Mood normal.               ASSESSMENT / PLAN:     Problem List Items Addressed This Visit     Actinic keratosis     3 right cheek, 1 right forearm.  He does not see dermatology for 6 months.  Cryotherapy applied at his request           Relevant Orders    DESTRUCT BENIGN LESION, UP TO 14 (Completed)    Cervicalgia     4 months of stiff sore neck 3 episodes of complete inability to move his neck.  No radiation.  He is treated this by carefully not moving his neck.  Exam shows full flexion pain with extension symmetrically restricted " lateral flexion and rotation with no neurotension signs or radiation.  Avoiding systemic NSAIDs.  Topical diclofenac, refer to PT           Relevant Orders    Physical Therapy Referral    Chills     He has had shivering and felt cold since returning from his winter in [place].  Completely responsive to more clothing.  No other symptoms, he is not ill.  Discussed the effects of vasodilatation on his temperature regulation.  Broaden database, recommend more clothing           Chronic diastolic congestive heart failure (H)     Normal ventricular function on last echocardiogram.  No orthopnea or dyspnea at rest.  He believes his exercise capacity is reduced           Relevant Orders    HEART FAILURE ACTION PLAN (Completed)    Episode of transient neurologic symptoms     3 episodes last fall, treated with antiseizure medication by [facility].  Records requested.  He reports that they were uncertain that his events represented seizure.  Records updated           Relevant Medications    levETIRAcetam (KEPPRA) 750 MG tablet    Fatigue     He laments his loss of former athletic ability.  He also naps in the day.  Discussed multifactorial differential.  Discussed exercise in this age group.  He is not interested in a sleep study serologic eval           Relevant Orders    TSH with free T4 reflex    CBC with platelets and differential (Completed)    Malignant melanoma of skin of trunk, except scrotum (H)     He follows with dermatology yearly avoids the sun           Nonrheumatic mitral valve regurgitation     He follows with cardiology.  Last note indicates this is deteriorating           Presbycusis of both ears     Cerumen left ear, lavaged.  Monitor.  He does not use aids           Sick sinus syndrome (H)     Pacemaker has resolved all symptoms and is checked regularly remotely.  Cardiology follows           Relevant Orders    Basic metabolic panel  (Ca, Cl, CO2, Creat, Gluc, K, Na, BUN)     "Weakness of both upper extremities     He laments his struggle putting his luggage in the overhead bin.  His exercise program no longer contains weightlifting, and previous attempts at weight lifting demonstrated deteriorating strength for many years.  Painless.(Except for neck).  Discussed.  He will consider resuming weightlifting             Other Visit Diagnoses     Encounter for Medicare annual wellness exam    -  Primary    Relevant Orders    TSH with free T4 reflex          Patient has been advised of split billing requirements and indicates understanding: Yes    COUNSELING:  Reviewed preventive health counseling, as reflected in patient instructions    Estimated body mass index is 22.59 kg/m  as calculated from the following:    Height as of this encounter: 1.753 m (5' 9\").    Weight as of this encounter: 69.4 kg (153 lb).    Weight management plan maintain    He reports that he quit smoking about 55 years ago. His smoking use included cigarettes. He has a 4.00 pack-year smoking history. He has never used smokeless tobacco.      Appropriate preventive services were discussed with this patient, including applicable screening as appropriate for cardiovascular disease, diabetes, osteopenia/osteoporosis, and glaucoma.  As appropriate for age/gender, discussed screening for colorectal cancer, prostate cancer, breast cancer, and cervical cancer. Checklist reviewing preventive services available has been given to the patient.    Reviewed patients plan of care and provided an AVS. The Basic Care Plan (routine screening as documented in Health Maintenance) for Charles meets the Care Plan requirement. This Care Plan has been established and reviewed with the Patient.    Counseling Resources:  ATP IV Guidelines  Pooled Cohorts Equation Calculator  Breast Cancer Risk Calculator  Breast Cancer: Medication to Reduce Risk  FRAX Risk Assessment  ICSI Preventive Guidelines  Dietary Guidelines for Americans, 2010  USDA's " MyPlate  ASA Prophylaxis  Lung CA Screening    Ruy Burns MD  [facility]    Identified Health Risks:

## 2022-04-27 LAB
ANION GAP SERPL CALCULATED.3IONS-SCNC: 2 MMOL/L (ref 3–14)
BUN SERPL-MCNC: 21 MG/DL (ref 7–30)
CALCIUM SERPL-MCNC: 9.1 MG/DL (ref 8.5–10.1)
CHLORIDE BLD-SCNC: 107 MMOL/L (ref 94–109)
CO2 SERPL-SCNC: 30 MMOL/L (ref 20–32)
CREAT SERPL-MCNC: 1.02 MG/DL (ref 0.66–1.25)
GFR SERPL CREATININE-BSD FRML MDRD: 74 ML/MIN/1.73M2
GLUCOSE BLD-MCNC: 80 MG/DL (ref 70–99)
POTASSIUM BLD-SCNC: 4.1 MMOL/L (ref 3.4–5.3)
SODIUM SERPL-SCNC: 139 MMOL/L (ref 133–144)
TSH SERPL DL<=0.005 MIU/L-ACNC: 2.29 MU/L (ref 0.4–4)

## 2022-05-02 ENCOUNTER — THERAPY VISIT (OUTPATIENT)
Dept: PHYSICAL THERAPY | Facility: CLINIC | Age: 82
End: 2022-05-02
Attending: FAMILY MEDICINE
Payer: COMMERCIAL

## 2022-05-02 DIAGNOSIS — M54.2 CERVICALGIA: ICD-10-CM

## 2022-05-02 PROCEDURE — 97161 PT EVAL LOW COMPLEX 20 MIN: CPT | Mod: GP | Performed by: PHYSICAL THERAPIST

## 2022-05-02 PROCEDURE — 97110 THERAPEUTIC EXERCISES: CPT | Mod: GP | Performed by: PHYSICAL THERAPIST

## 2022-05-02 PROCEDURE — 97112 NEUROMUSCULAR REEDUCATION: CPT | Mod: GP | Performed by: PHYSICAL THERAPIST

## 2022-05-02 NOTE — PROGRESS NOTES
[facility]    OUTPATIENT Physical Therapy ORTHOPEDIC EVALUATION  PLAN OF TREATMENT FOR OUTPATIENT REHABILITATION  (COMPLETE FOR INITIAL CLAIMS ONLY)  Patient's Last Name, First Name, M.I.  YOB: 1940  Charles Nogueira    Provider s Name:  [facility]   Medical Record No.  [id number]   Start of Care Date:  05/02/22   Onset Date:   04/26/22   Type:     _X__PT   ___OT Medical Diagnosis:    Encounter Diagnosis   Name Primary?    Cervicalgia         Treatment Diagnosis:  Neck pain        Goals:     05/02/22 0500   Body Part   Goals listed below are for Neck   Goal #1   Goal #1 posture/body mechanics   Previous Functional Level Patient reports fair posture and proper body mechanics   Current Functional Level Poor posture/body mechanics   STG Target Performance Patient able to demonstrate good posture and body mechanics when prompted   Rationale to prevent neck/back pain and avoid injury when lifting/carrying and performing tasks requiring bending   Due Date 06/13/22   LTG Target Performance Patient able to demonstrate good posture and body mechanics without prompting   Rationale to prevent neck/back pain and avoid injury when lifting/carrying and performing tasks requiring bending   Due Date 07/25/22       Therapy Frequency:  1X/week for 6 weeks, tapering to 1X every other week  Predicted Duration of Therapy Intervention:  up to 12 weeks    Cherie Hewitt, PT                 I CERTIFY THE NEED FOR THESE SERVICES FURNISHED UNDER        THIS PLAN OF TREATMENT AND WHILE UNDER MY CARE     (Physician attestation of this document indicates review and certification of the therapy plan).                     Certification Date From:  05/02/22   Certification Date To:  07/30/22    Referring Provider:  Ruy Burns    Initial Assessment        See Epic Evaluation SOC Date:  05/02/22

## 2022-05-02 NOTE — PROGRESS NOTES
Physical Therapy Initial Evaluation  Subjective:  Physical Therapy Initial Evaluation   5/2/2022   Precautions/Restrictions/MD instructions: PT eval and treat    Therapist Impression:   Pt is a 83 y/o male, with chronic history of neck pain and stiffness. Pt presents with pain, decreased ROM, poor posture and decreased strength consistent with DDD/arthritis. These impairments limit their ability to turn head side to side; and look up. Skilled PT services necessary in order to reduce impairments and improve independent function.    Subjective:   Chief Complaint: Neck pain (bilatearlly) and very stiff in motion  DOI/onset: 4/26/2022 DOS: none  Location: neck (UT's)  Quality: dull/ache; can be sharp  Frequency: constant  Radiates: intermittently hands go numb   Pain scale: Rest 3/10 Activity 9/10    Sleeping: not affected   Exacerbated by: turning head, and looking up Relieved by: walking, tylenol Progression: same  Previous Treatment: none Effect of prior treatment: n/a   PMH and/or surgical history: none   Imaging: none  Occupation: retired  Job duties: none   Current HEP/exercise regimen: walking  Patient's goals: improve motion and lessen pain   Medications: refer to chart   General health as reported by patient: good  Return to MD: as needed   Red Flags: none (skin CA); pace maker      HPI                    Objective:  CERVICAL:    Posture: forward head and rounded shoulders    AROM: (Major, Moderate, Minimal or Nil loss)  Movement Loss Jarocho Mod Min Nil Pain   Protrusion    X No pain   Flexion    X No pain   Retraction X    No pain/ just stiffness   Extension X    Pain bilaterally    Left Rotation X    Pain at end range   Right Rotation X    Pain at end range   Left Side Bending X    Pain    Right Side bending X    pain       Repeated movement testing:   Tolerated repeated retraction well today      System    Physical Exam    General     ROS    Assessment/Plan:    Patient is a 82 year old male with cervical  complaints.    Patient has the following significant findings with corresponding treatment plan.                Diagnosis 1:  Neck pain  Pain -  hot/cold therapy, US, manual therapy, self management, education, directional preference exercise and home program  Decreased ROM/flexibility - manual therapy and therapeutic exercise  Decreased joint mobility - manual therapy and therapeutic exercise  Decreased strength - therapeutic exercise and therapeutic activities  Inflammation - cold therapy and self management/home program  Impaired muscle performance - neuro re-education  Decreased function - therapeutic activities  Impaired posture - neuro re-education    Therapy Evaluation Codes:     Cumulative Therapy Evaluation is: Low complexity.    Previous and current functional limitations:  (See Goal Flow Sheet for this information)    Short term and Long term goals: (See Goal Flow Sheet for this information)     Communication ability:  Patient appears to be able to clearly communicate and understand verbal and written communication and follow directions correctly.  Treatment Explanation - The following has been discussed with the patient:   RX ordered/plan of care  Anticipated outcomes  Possible risks and side effects  This patient would benefit from PT intervention to resume normal activities.   Rehab potential is good.    Frequency:  1 X week, once daily  Duration:  for 6 weeks  Discharge Plan:  Achieve all LTG.  Independent in home treatment program.  Reach maximal therapeutic benefit.    Please refer to the daily flowsheet for treatment today, total treatment time and time spent performing 1:1 timed codes.

## 2022-05-05 ENCOUNTER — TELEPHONE (OUTPATIENT)
Dept: CARDIOLOGY | Facility: CLINIC | Age: 82
End: 2022-05-05
Payer: COMMERCIAL

## 2022-05-05 NOTE — TELEPHONE ENCOUNTER
[facility] Call Center    Phone Message    May a detailed message be left on voicemail: yes     Reason for Call: Other: Patient called to reschedule per clinic request. He would like to see Dr Carney sooner than what is available to [facility]. He declined to schediuled in September. Is there any way to get him in sooner. or is is it alright for him to see another provider. please call to discuss options     Action Taken: Other: cardio    Travel Screening: Not Applicable

## 2022-05-12 ENCOUNTER — LAB (OUTPATIENT)
Dept: LAB | Facility: CLINIC | Age: 82
End: 2022-05-12
Payer: COMMERCIAL

## 2022-05-12 DIAGNOSIS — I48.0 PAROXYSMAL ATRIAL FIBRILLATION (H): ICD-10-CM

## 2022-05-12 DIAGNOSIS — I34.0 NONRHEUMATIC MITRAL VALVE REGURGITATION: ICD-10-CM

## 2022-05-12 LAB
ANION GAP SERPL CALCULATED.3IONS-SCNC: 2 MMOL/L (ref 3–14)
BUN SERPL-MCNC: 27 MG/DL (ref 7–30)
CALCIUM SERPL-MCNC: 8.9 MG/DL (ref 8.5–10.1)
CHLORIDE BLD-SCNC: 108 MMOL/L (ref 94–109)
CO2 SERPL-SCNC: 29 MMOL/L (ref 20–32)
CREAT SERPL-MCNC: 0.92 MG/DL (ref 0.66–1.25)
ERYTHROCYTE [DISTWIDTH] IN BLOOD BY AUTOMATED COUNT: 13.2 % (ref 10–15)
GFR SERPL CREATININE-BSD FRML MDRD: 84 ML/MIN/1.73M2
GLUCOSE BLD-MCNC: 93 MG/DL (ref 70–99)
HCT VFR BLD AUTO: 45.6 % (ref 40–53)
HGB BLD-MCNC: 14.3 G/DL (ref 13.3–17.7)
INR PPP: 1.04 (ref 0.85–1.15)
MCH RBC QN AUTO: 29.2 PG (ref 26.5–33)
MCHC RBC AUTO-ENTMCNC: 31.4 G/DL (ref 31.5–36.5)
MCV RBC AUTO: 93 FL (ref 78–100)
PLATELET # BLD AUTO: 170 10E3/UL (ref 150–450)
POTASSIUM BLD-SCNC: 4.2 MMOL/L (ref 3.4–5.3)
RBC # BLD AUTO: 4.9 10E6/UL (ref 4.4–5.9)
SODIUM SERPL-SCNC: 139 MMOL/L (ref 133–144)
WBC # BLD AUTO: 7 10E3/UL (ref 4–11)

## 2022-05-12 PROCEDURE — 85610 PROTHROMBIN TIME: CPT | Performed by: INTERNAL MEDICINE

## 2022-05-12 PROCEDURE — 85027 COMPLETE CBC AUTOMATED: CPT | Performed by: INTERNAL MEDICINE

## 2022-05-12 PROCEDURE — 36415 COLL VENOUS BLD VENIPUNCTURE: CPT | Performed by: INTERNAL MEDICINE

## 2022-05-12 PROCEDURE — 80048 BASIC METABOLIC PNL TOTAL CA: CPT | Performed by: INTERNAL MEDICINE

## 2022-05-13 ENCOUNTER — TELEPHONE (OUTPATIENT)
Dept: ANTICOAGULATION | Facility: CLINIC | Age: 82
End: 2022-05-13

## 2022-05-13 ENCOUNTER — TELEPHONE (OUTPATIENT)
Dept: CARDIOLOGY | Facility: CLINIC | Age: 82
End: 2022-05-13

## 2022-05-13 ENCOUNTER — OFFICE VISIT (OUTPATIENT)
Dept: CARDIOLOGY | Facility: CLINIC | Age: 82
End: 2022-05-13
Payer: COMMERCIAL

## 2022-05-13 VITALS
OXYGEN SATURATION: 97 % | HEART RATE: 65 BPM | WEIGHT: 154.9 LBS | SYSTOLIC BLOOD PRESSURE: 106 MMHG | HEIGHT: 69 IN | BODY MASS INDEX: 22.94 KG/M2 | DIASTOLIC BLOOD PRESSURE: 56 MMHG

## 2022-05-13 DIAGNOSIS — I49.5 SICK SINUS SYNDROME (H): ICD-10-CM

## 2022-05-13 DIAGNOSIS — I10 ESSENTIAL HYPERTENSION, BENIGN: ICD-10-CM

## 2022-05-13 DIAGNOSIS — I48.0 PAROXYSMAL ATRIAL FIBRILLATION (H): Primary | ICD-10-CM

## 2022-05-13 DIAGNOSIS — I05.9 MITRAL VALVE DISEASE: ICD-10-CM

## 2022-05-13 DIAGNOSIS — I49.5 SICK SINUS SYNDROME (H): Primary | ICD-10-CM

## 2022-05-13 DIAGNOSIS — I10 ESSENTIAL HYPERTENSION: ICD-10-CM

## 2022-05-13 DIAGNOSIS — I48.0 PAROXYSMAL ATRIAL FIBRILLATION (H): ICD-10-CM

## 2022-05-13 PROCEDURE — 99214 OFFICE O/P EST MOD 30 MIN: CPT | Performed by: INTERNAL MEDICINE

## 2022-05-13 RX ORDER — AMLODIPINE BESYLATE 5 MG/1
2.5 TABLET ORAL DAILY
Qty: 90 TABLET | Refills: 3 | Status: SHIPPED | OUTPATIENT
Start: 2022-05-13 | End: 2022-09-20 | Stop reason: ALTCHOICE

## 2022-05-13 RX ORDER — WARFARIN SODIUM 2.5 MG/1
TABLET ORAL
Qty: 40 TABLET | Refills: 0 | Status: SHIPPED | OUTPATIENT
Start: 2022-05-13 | End: 2022-06-23

## 2022-05-13 NOTE — LETTER
5/13/2022    Ruy Burns MD  [id number]  [facility] [address]    RE: Charles Nogueira       Dear Colleague,     I had the pleasure of seeing Charles Nogueira in the [facility].  [facility] Progress note    Assessment:  1.  Paroxysmal atrial fibrillation. MR is moderate, can tx w/DOAC for nonvalvular afib.  [id number] is 3.     2.  Sick sinus syndrome with episodes of bradycardia and syncope status post dual-chamber pacemaker implantation in 2004 and replaced in 2014.  Continue routine device checks.     3.  Mitral valve prolapse and Moderate mitral regurgitation.        4.  Hypertension, BP in low-normal rage. Decrease amlodipine to 2.5mg PO daily. His BP has been somewhat labile in the past and he would prefer to remain on amlodpine rather than stop completely to avoid uncontrolled HTN.     6.  PVCs.  Longstanding h/o frequent PVCs. Asymptomatic.  Will continue metoprolol.  beta-blocker. Normal EF.    7.  h/o Seizures    Plan:  1. Decrease amlodipine to 2.5mg PO daily  2. Gave prescription for xarelto 20mg daily but the cost will be about $1100 for 3-month supply.  We will need to evaluate for lower cost options.  I sent a message to our nursing team to help navigate this.  3. Echo in 6 months to follow-up mitral regurgitation.  Follow-up with me after echo.  They plan to leave for 4 to 5 months in [place] after that appointment.    HPI:     Charles Nougeira is a very nice 82-year-old gentleman with history of sick sinus syndrome status post dual-chamber pacemaker, paroxysmal atrial fibrillation noted on multiple device checks, PVCs and NSVT, mitral valve prolapse with moderate mitral regurgitation here for routine follow-up.  He is with his very nice wife.  They spent the winter in [place] and they are now back for the summer.    He goes for long walks for exercise nearly every day. Walks 3mph and has no limiting dyspnea and has no chest pain.  Only limiting factor is left hip  arthritis.    We discussed his atrial fibrillation and estimated 3 to 4% risk of stroke per year.  We reviewed the mechanism for stroke with atrial fibrillation and that aspirin is not a very effective choice for minimizing risk of stroke with A. fib.  He is interested in starting anticoagulation.  He prefers a once a day medication.  I given a prescription for Xarelto but after the visit his wife looked up the cost and it would be about $1100 for 3-month supply.    Echo 9/30/21  Left ventricular systolic function is normal.The visual ejection fraction is  55-60%.  The right ventricular systolic function is normal.  Mild posterior leaflet prolapse.There is moderate (2+) mitral  regurgitation.Anteriorly directed jet. Mildly left atrial enlargement. Normal  E velocity.MR may be understimated due to eccentric nature of the jet.    CURRENT MEDICATIONS:  Current Outpatient Medications   Medication Sig Dispense Refill     amLODIPine (NORVASC) 5 MG tablet Take 1 tablet (5 mg) by mouth daily 90 tablet 3     levETIRAcetam (KEPPRA) 750 MG tablet Take 1 tablet (750 mg) by mouth 2 times daily       losartan (COZAAR) 100 MG tablet Take 1 tablet (100 mg) by mouth daily 90 tablet 3     metoprolol succinate ER (TOPROL-XL) 50 MG 24 hr tablet Take 1 tablet (50 mg) by mouth daily 90 tablet 3     Multiple Vitamins-Minerals (ZINC PO)          ALLERGIES     Allergies   Allergen Reactions     Sulfa Drugs      rash       PAST MEDICAL, SURGICAL, FAMILY, SOCIAL HISTORY:  History was reviewed and updated as needed, see medical record.    REVIEW OF SYSTEMS:  Skin:  Positive for bruising   Eyes:  Positive for glasses  ENT:  Negative    Respiratory:  Negative    Cardiovascular:  Negative    Gastroenterology: Negative    Genitourinary:  Positive for nocturia  Musculoskeletal:  Positive for arthritis  Neurologic:  Positive for migraine headaches  Psychiatric:  Negative    Heme/Lymph/Imm:  Positive for easy bruising;allergies  Endocrine:  Negative       PHYSICAL EXAM:      BP: 106/56 Pulse: 65     SpO2: 97 %      Vital Signs with Ranges  Pulse:  [65] 65  BP: (106)/(56) 106/56  SpO2:  [97 %] 97 %  154 lbs 14.4 oz    Constitutional: awake, alert, no distress  Eyes: sclera nonicteric  ENT: trachea midline  Respiratory: Clear to auscultation bilaterally  Cardiovascular: Regular rate and rhythm II/VI apical systolic murmur  GI: nondistended, nontender, bowel sounds present  Lymph/Hematologic: no lymphadenopathy  Skin: dry, no rash no edema  Musculoskeletal: grossly normal muscle bulk and tone  Neurologic: no focal deficits  Neuropsychiatric: appropriate affect    Recent Lab Results:  LIPID RESULTS:  Lab Results   Component Value Date    CHOL 179 12/17/2020    HDL 39 (L) 12/17/2020     (H) 12/17/2020    TRIG 97 12/17/2020    CHOLHDLRATIO 4.5 11/10/2015       LIVER ENZYME RESULTS:  Lab Results   Component Value Date    AST 20 07/26/2021    AST 24 12/17/2020    ALT 29 07/26/2021    ALT 26 12/17/2020       CBC RESULTS:  Lab Results   Component Value Date    WBC 7.0 05/12/2022    WBC 9.7 09/21/2018    RBC 4.90 05/12/2022    RBC 5.19 09/21/2018    HGB 14.3 05/12/2022    HGB 15.4 09/21/2018    HCT 45.6 05/12/2022    HCT 47.7 09/21/2018    MCV 93 05/12/2022    MCV 92 09/21/2018    MCH 29.2 05/12/2022    MCH 29.7 09/21/2018    MCHC 31.4 (L) 05/12/2022    MCHC 32.3 09/21/2018    RDW 13.2 05/12/2022    RDW 13.5 09/21/2018     05/12/2022     09/21/2018       BMP RESULTS:  Lab Results   Component Value Date     05/12/2022     12/17/2020    POTASSIUM 4.2 05/12/2022    POTASSIUM 3.8 12/17/2020    CHLORIDE 108 05/12/2022    CHLORIDE 107 12/17/2020    CO2 29 05/12/2022    CO2 32 12/17/2020    ANIONGAP 2 (L) 05/12/2022    ANIONGAP <1 (L) 12/17/2020    GLC 93 05/12/2022     (H) 12/17/2020    BUN 27 05/12/2022    BUN 19 12/17/2020    CR 0.92 05/12/2022    CR 1.00 12/17/2020    GFRESTIMATED 84 05/12/2022    GFRESTIMATED 71 12/17/2020     GFRESTBLACK 83 12/17/2020    MANUELA 8.9 05/12/2022    MANUELA 9.0 12/17/2020        A1C RESULTS:  No results found for: A1C    INR RESULTS:  Lab Results   Component Value Date    INR 1.04 05/12/2022         Thank you for allowing me to participate in the care of your patient.      Sincerely,     Ana Dean MD     [facility]  cc:   No referring provider defined for this encounter.

## 2022-05-13 NOTE — PROGRESS NOTES
[facility] Progress note    Assessment:  1.  Paroxysmal atrial fibrillation. MR is moderate, can tx w/DOAC for nonvalvular afib.  [id number] is 3.     2.  Sick sinus syndrome with episodes of bradycardia and syncope status post dual-chamber pacemaker implantation in 2004 and replaced in 2014.  Continue routine device checks.     3.  Mitral valve prolapse and Moderate mitral regurgitation.        4.  Hypertension, BP in low-normal rage. Decrease amlodipine to 2.5mg PO daily. His BP has been somewhat labile in the past and he would prefer to remain on amlodpine rather than stop completely to avoid uncontrolled HTN.     6.  PVCs.  Longstanding h/o frequent PVCs. Asymptomatic.  Will continue metoprolol.  beta-blocker. Normal EF.    7.  h/o Seizures    Plan:  1. Decrease amlodipine to 2.5mg PO daily  2. Gave prescription for xarelto 20mg daily but the cost will be about $1100 for 3-month supply.  We will need to evaluate for lower cost options.  I sent a message to our nursing team to help navigate this.  3. Echo in 6 months to follow-up mitral regurgitation.  Follow-up with me after echo.  They plan to leave for 4 to 5 months in [place] after that appointment.    HPI:     Charles Nogueira is a very nice 82-year-old gentleman with history of sick sinus syndrome status post dual-chamber pacemaker, paroxysmal atrial fibrillation noted on multiple device checks, PVCs and NSVT, mitral valve prolapse with moderate mitral regurgitation here for routine follow-up.  He is with his very nice wife.  They spent the winter in [place] and they are now back for the summer.    He goes for long walks for exercise nearly every day. Walks 3mph and has no limiting dyspnea and has no chest pain.  Only limiting factor is left hip arthritis.    We discussed his atrial fibrillation and estimated 3 to 4% risk of stroke per year.  We reviewed the mechanism for stroke with atrial fibrillation and that aspirin is not a very effective choice for  minimizing risk of stroke with A. fib.  He is interested in starting anticoagulation.  He prefers a once a day medication.  I given a prescription for Xarelto but after the visit his wife looked up the cost and it would be about $1100 for 3-month supply.    Echo 9/30/21  Left ventricular systolic function is normal.The visual ejection fraction is  55-60%.  The right ventricular systolic function is normal.  Mild posterior leaflet prolapse.There is moderate (2+) mitral  regurgitation.Anteriorly directed jet. Mildly left atrial enlargement. Normal  E velocity.MR may be understimated due to eccentric nature of the jet.    CURRENT MEDICATIONS:  Current Outpatient Medications   Medication Sig Dispense Refill     amLODIPine (NORVASC) 5 MG tablet Take 1 tablet (5 mg) by mouth daily 90 tablet 3     levETIRAcetam (KEPPRA) 750 MG tablet Take 1 tablet (750 mg) by mouth 2 times daily       losartan (COZAAR) 100 MG tablet Take 1 tablet (100 mg) by mouth daily 90 tablet 3     metoprolol succinate ER (TOPROL-XL) 50 MG 24 hr tablet Take 1 tablet (50 mg) by mouth daily 90 tablet 3     Multiple Vitamins-Minerals (ZINC PO)          ALLERGIES     Allergies   Allergen Reactions     Sulfa Drugs      rash       PAST MEDICAL, SURGICAL, FAMILY, SOCIAL HISTORY:  History was reviewed and updated as needed, see medical record.    REVIEW OF SYSTEMS:  Skin:  Positive for bruising   Eyes:  Positive for glasses  ENT:  Negative    Respiratory:  Negative    Cardiovascular:  Negative    Gastroenterology: Negative    Genitourinary:  Positive for nocturia  Musculoskeletal:  Positive for arthritis  Neurologic:  Positive for migraine headaches  Psychiatric:  Negative    Heme/Lymph/Imm:  Positive for easy bruising;allergies  Endocrine:  Negative      PHYSICAL EXAM:      BP: 106/56 Pulse: 65     SpO2: 97 %      Vital Signs with Ranges  Pulse:  [65] 65  BP: (106)/(56) 106/56  SpO2:  [97 %] 97 %  154 lbs 14.4 oz    Constitutional: awake, alert, no  distress  Eyes: sclera nonicteric  ENT: trachea midline  Respiratory: Clear to auscultation bilaterally  Cardiovascular: Regular rate and rhythm II/VI apical systolic murmur  GI: nondistended, nontender, bowel sounds present  Lymph/Hematologic: no lymphadenopathy  Skin: dry, no rash no edema  Musculoskeletal: grossly normal muscle bulk and tone  Neurologic: no focal deficits  Neuropsychiatric: appropriate affect    Recent Lab Results:  LIPID RESULTS:  Lab Results   Component Value Date    CHOL 179 12/17/2020    HDL 39 (L) 12/17/2020     (H) 12/17/2020    TRIG 97 12/17/2020    CHOLHDLRATIO 4.5 11/10/2015       LIVER ENZYME RESULTS:  Lab Results   Component Value Date    AST 20 07/26/2021    AST 24 12/17/2020    ALT 29 07/26/2021    ALT 26 12/17/2020       CBC RESULTS:  Lab Results   Component Value Date    WBC 7.0 05/12/2022    WBC 9.7 09/21/2018    RBC 4.90 05/12/2022    RBC 5.19 09/21/2018    HGB 14.3 05/12/2022    HGB 15.4 09/21/2018    HCT 45.6 05/12/2022    HCT 47.7 09/21/2018    MCV 93 05/12/2022    MCV 92 09/21/2018    MCH 29.2 05/12/2022    MCH 29.7 09/21/2018    MCHC 31.4 (L) 05/12/2022    MCHC 32.3 09/21/2018    RDW 13.2 05/12/2022    RDW 13.5 09/21/2018     05/12/2022     09/21/2018       BMP RESULTS:  Lab Results   Component Value Date     05/12/2022     12/17/2020    POTASSIUM 4.2 05/12/2022    POTASSIUM 3.8 12/17/2020    CHLORIDE 108 05/12/2022    CHLORIDE 107 12/17/2020    CO2 29 05/12/2022    CO2 32 12/17/2020    ANIONGAP 2 (L) 05/12/2022    ANIONGAP <1 (L) 12/17/2020    GLC 93 05/12/2022     (H) 12/17/2020    BUN 27 05/12/2022    BUN 19 12/17/2020    CR 0.92 05/12/2022    CR 1.00 12/17/2020    GFRESTIMATED 84 05/12/2022    GFRESTIMATED 71 12/17/2020    GFRESTBLACK 83 12/17/2020    MANUELA 8.9 05/12/2022    MANUELA 9.0 12/17/2020        A1C RESULTS:  No results found for: A1C    INR RESULTS:  Lab Results   Component Value Date    INR 1.04 05/12/2022

## 2022-05-13 NOTE — TELEPHONE ENCOUNTER
Received INR referral from Dr. Dean.  Xarelto/Eliquis is too expensive so Warfarin will be started.  Called and spoke to the patient's wife.  Sent script for Warfarin 2.5mg and patient will start tomorrow night.  INR scheduled at [place] lab for Monday and standing order placed.  Education briefly reviewed, could mail education packet on Monday.  Patient spends 4-5 months in [place] in the Fall/Winter.  Manoj YADAV

## 2022-05-13 NOTE — TELEPHONE ENCOUNTER
Soha Holden Leandra K, RN  Caller: Unspecified (Today, 11:14 AM)  Not sure where patient got a cost of $1100, but this is inaccurate.  Perhaps that is the price without insurance for a 3 mo supply?     Using his [facility] Medicare D plan, pricing for Xarelto/Eliquis is as follows:       --$47/mo, or $94/3 mo.   --lf total drug costs exceed $4,430, price will increase to a 25% coinsurance, equivalent to $141/mo.   --If total out of pocket exceeds $7,050, coinsurance will be reduced to a 5% coinsurance, equivalent to $28/mo.     Soha Holden   Pharmacy Technician/Liaison, Discharge Pharmacy   [phone]  [email or web address]

## 2022-05-13 NOTE — TELEPHONE ENCOUNTER
Spoke with pt and his wife (Paula) regarding pricing of Xarelto.   They both agreed that they would rather go forward with coumadin at this time.   Will update Dr. Dean.

## 2022-05-13 NOTE — TELEPHONE ENCOUNTER
----- Message from Ana Dean MD sent at 5/13/2022 10:05 AM CDT -----  I gave this patient a prescription for Xarelto 20 mg p.o. daily for stroke prevention with paroxysmal atrial fibrillation today.  His wife checked on the cost with the pharmacy and it will be about $1100 for a 3-month supply which is too expensive.    How can we help this patient navigate an affordable option for anticoagulation? Can he get a pharmacy liason consult like what they do in the hospital?    -Ana

## 2022-05-13 NOTE — TELEPHONE ENCOUNTER
Dr. Dean OK with moving forward with coumadin clinic and getting pt started.     Will place order for coumadin clinic referral.

## 2022-05-16 ENCOUNTER — ANTICOAGULATION THERAPY VISIT (OUTPATIENT)
Dept: ANTICOAGULATION | Facility: CLINIC | Age: 82
End: 2022-05-16

## 2022-05-16 ENCOUNTER — LAB (OUTPATIENT)
Dept: LAB | Facility: CLINIC | Age: 82
End: 2022-05-16
Payer: COMMERCIAL

## 2022-05-16 DIAGNOSIS — I48.0 PAROXYSMAL ATRIAL FIBRILLATION (H): ICD-10-CM

## 2022-05-16 DIAGNOSIS — I49.5 SICK SINUS SYNDROME (H): Primary | ICD-10-CM

## 2022-05-16 DIAGNOSIS — I05.9 MITRAL VALVE DISEASE: ICD-10-CM

## 2022-05-16 LAB — INR BLD: 1.3 (ref 0.9–1.1)

## 2022-05-16 PROCEDURE — 85610 PROTHROMBIN TIME: CPT

## 2022-05-16 PROCEDURE — 36416 COLLJ CAPILLARY BLOOD SPEC: CPT

## 2022-05-16 NOTE — PROGRESS NOTES
ANTICOAGULATION MANAGEMENT     Charles Nogueira 82 year old male is on warfarin with subtherapeutic INR result. (Goal INR 2.0-3.0)    Recent labs: (last 7 days)     05/16/22  0929   INR 1.3*       ASSESSMENT       Source(s): Chart Review and Patient/Caregiver Call       Warfarin doses taken: Warfarin taken as instructed    Diet: No new diet changes identified    New illness, injury, or hospitalization: No    Medication/supplement changes: None noted    Signs or symptoms of bleeding or clotting: No    Previous INR: Subtherapeutic    Additional findings: New start on day 3 of warfarin       PLAN     Recommended plan for no diet, medication or health factor changes affecting INR     Dosing Instructions: continue your current warfarin dose with next INR in 3 days       Summary  As of 5/16/2022    Full warfarin instructions:  2.5 mg every day   Next INR check:  5/19/2022             Telephone call with  Paula who verbalizes understanding and agrees to plan and who agrees to plan and repeated back plan correctly    Lab visit scheduled    Education provided: Goal range and significance of current result and Importance of therapeutic range    Plan made per [facility] anticoagulation protocol    Louise Baltazar RN  Anticoagulation Clinic  5/16/2022    _______________________________________________________________________     Anticoagulation Episode Summary     Current INR goal:  2.0-3.0   TTR:  --   Target end date:  Indefinite   Send INR reminders to:  [facility] HEART INR NURSE    Indications    Sick sinus syndrome (H) [I49.5]  Paroxysmal atrial fibrillation (H) [I48.0]  Mitral valve disease [I05.9]           Comments:           Anticoagulation Care Providers     Provider Role Specialty Phone number    Ana Dean MD Referring Cardiovascular Disease [phone]

## 2022-05-19 ENCOUNTER — ANTICOAGULATION THERAPY VISIT (OUTPATIENT)
Dept: ANTICOAGULATION | Facility: CLINIC | Age: 82
End: 2022-05-19

## 2022-05-19 ENCOUNTER — LAB (OUTPATIENT)
Dept: LAB | Facility: CLINIC | Age: 82
End: 2022-05-19
Payer: COMMERCIAL

## 2022-05-19 DIAGNOSIS — I48.0 PAROXYSMAL ATRIAL FIBRILLATION (H): ICD-10-CM

## 2022-05-19 DIAGNOSIS — I49.5 SICK SINUS SYNDROME (H): Primary | ICD-10-CM

## 2022-05-19 DIAGNOSIS — I05.9 MITRAL VALVE DISEASE: ICD-10-CM

## 2022-05-19 LAB — INR BLD: 3.4 (ref 0.9–1.1)

## 2022-05-19 PROCEDURE — 85610 PROTHROMBIN TIME: CPT

## 2022-05-19 PROCEDURE — 36416 COLLJ CAPILLARY BLOOD SPEC: CPT

## 2022-05-19 NOTE — PROGRESS NOTES
ANTICOAGULATION MANAGEMENT     Charles Nogueira 82 year old male is on warfarin with supratherapeutic INR result. (Goal INR 2.0-3.0)    Recent labs: (last 7 days)     05/19/22  1030   INR 3.4*       ASSESSMENT       Source(s): Chart Review and Patient/Caregiver Call       Warfarin doses taken: Warfarin taken as instructed    Diet: No new diet changes identified    New illness, injury, or hospitalization: No    Medication/supplement changes: None noted    Signs or symptoms of bleeding or clotting: No    Previous INR: Subtherapeutic    Additional findings: New start on day 6 of warfarin     Rapid increase in INR since Monday. Elected for larger dose increase today.        PLAN     Recommended plan for no diet, medication or health factor changes affecting INR     Dosing Instructions: hold dose then decrease your warfarin dose (21.4% change) with next INR in 4 days       Summary  As of 5/19/2022    Full warfarin instructions:  5/19: Hold; Otherwise 1.25 mg every Mon, Wed, Fri; 2.5 mg all other days   Next INR check:  5/23/2022             Telephone call with  wife, Paula who agrees to plan and repeated back plan correctly    Lab visit scheduled    Education provided: Goal range and significance of current result, Importance of therapeutic range, Importance of taking warfarin as instructed, Monitoring for bleeding signs and symptoms and When to seek medical attention/emergency care    Plan made with [facility] Pharmacist Massiel Russ RN  Anticoagulation Clinic  5/19/2022    _______________________________________________________________________     Anticoagulation Episode Summary     Current INR goal:  2.0-3.0   TTR:  --   Target end date:  Indefinite   Send INR reminders to:  [facility] HEART INR NURSE    Indications    Sick sinus syndrome (H) [I49.5]  Paroxysmal atrial fibrillation (H) [I48.0]  Mitral valve disease [I05.9]           Comments:           Anticoagulation Care Providers     Provider Role Specialty  Phone number    Ana Dean MD Referring Cardiovascular Disease [phone]

## 2022-05-23 ENCOUNTER — ANTICOAGULATION THERAPY VISIT (OUTPATIENT)
Dept: ANTICOAGULATION | Facility: CLINIC | Age: 82
End: 2022-05-23

## 2022-05-23 ENCOUNTER — LAB (OUTPATIENT)
Dept: LAB | Facility: CLINIC | Age: 82
End: 2022-05-23
Payer: COMMERCIAL

## 2022-05-23 DIAGNOSIS — I49.5 SICK SINUS SYNDROME (H): Primary | ICD-10-CM

## 2022-05-23 DIAGNOSIS — I05.9 MITRAL VALVE DISEASE: ICD-10-CM

## 2022-05-23 DIAGNOSIS — I48.0 PAROXYSMAL ATRIAL FIBRILLATION (H): ICD-10-CM

## 2022-05-23 LAB — INR BLD: 2.7 (ref 0.9–1.1)

## 2022-05-23 PROCEDURE — 85610 PROTHROMBIN TIME: CPT

## 2022-05-23 PROCEDURE — 36416 COLLJ CAPILLARY BLOOD SPEC: CPT

## 2022-05-23 NOTE — PROGRESS NOTES
ANTICOAGULATION MANAGEMENT     Charles Nogueira 82 year old male is on warfarin with therapeutic INR result. (Goal INR 2.0-3.0)    Recent labs: (last 7 days)     05/23/22  1110   INR 2.7*       ASSESSMENT       Source(s): Chart Review and Patient/Caregiver Call       Warfarin doses taken: Warfarin taken as instructed, INR jumped up quickly last week and dosing was decreased    Diet: No new diet changes identified    New illness, injury, or hospitalization: No    Medication/supplement changes: None noted    Signs or symptoms of bleeding or clotting: Yes: bruising on arms, bruises very easily in general     Previous INR: Supratherapeutic    Additional findings: New start on day 10 of warfarin       PLAN     Recommended plan for no diet, medication or health factor changes affecting INR     Dosing Instructions: continue your current warfarin dose with next INR in 3 days       Summary  As of 5/23/2022    Full warfarin instructions:  1.25 mg every Mon, Wed, Fri; 2.5 mg all other days   Next INR check:  5/26/2022             Telephone call with  wife Paula who verbalizes understanding and agrees to plan    Lab visit scheduled    Education provided: Goal range and significance of current result, Monitoring for bleeding signs and symptoms and Contact [phone] with any changes, questions or concerns.     Plan made per [facility] anticoagulation protocol    Jessika Yoo RN  Anticoagulation Clinic  5/23/2022    _______________________________________________________________________     Anticoagulation Episode Summary     Current INR goal:  2.0-3.0   TTR:  --   Target end date:  Indefinite   Send INR reminders to:  [facility] HEART INR NURSE    Indications    Sick sinus syndrome (H) [I49.5]  Paroxysmal atrial fibrillation (H) [I48.0]  Mitral valve disease [I05.9]           Comments:           Anticoagulation Care Providers     Provider Role Specialty Phone number    Ana Dean MD Referring Cardiovascular Disease  [phone]

## 2022-05-26 ENCOUNTER — ANTICOAGULATION THERAPY VISIT (OUTPATIENT)
Dept: ANTICOAGULATION | Facility: CLINIC | Age: 82
End: 2022-05-26

## 2022-05-26 ENCOUNTER — LAB (OUTPATIENT)
Dept: LAB | Facility: CLINIC | Age: 82
End: 2022-05-26
Payer: COMMERCIAL

## 2022-05-26 DIAGNOSIS — I48.0 PAROXYSMAL ATRIAL FIBRILLATION (H): ICD-10-CM

## 2022-05-26 DIAGNOSIS — I49.5 SICK SINUS SYNDROME (H): Primary | ICD-10-CM

## 2022-05-26 DIAGNOSIS — I05.9 MITRAL VALVE DISEASE: ICD-10-CM

## 2022-05-26 LAB — INR BLD: 3 (ref 0.9–1.1)

## 2022-05-26 PROCEDURE — 85610 PROTHROMBIN TIME: CPT

## 2022-05-26 PROCEDURE — 36416 COLLJ CAPILLARY BLOOD SPEC: CPT

## 2022-05-26 NOTE — PROGRESS NOTES
ANTICOAGULATION MANAGEMENT     Charles Nogueira 82 year old male is on warfarin with therapeutic INR result. (Goal INR 2.0-3.0)    Recent labs: (last 7 days)     05/26/22  1313   INR 3.0*       ASSESSMENT       Source(s): Chart Review and Patient/Caregiver Call       Warfarin doses taken: Warfarin taken as instructed    Diet: No new diet changes identified    New illness, injury, or hospitalization: No    Medication/supplement changes: None noted    Signs or symptoms of bleeding or clotting: No, bruises easily     Previous INR: Therapeutic last visit; previously outside of goal range    Additional findings: None       PLAN     Recommended plan for no diet, medication or health factor changes affecting INR     Dosing Instructions: decrease your warfarin dose (9.1% change) with next INR in 1 week.  Wife would like INR closer to 2.0 so dosing was decreased since INR did go up since Monday.       Summary  As of 5/26/2022    Full warfarin instructions:  2.5 mg every Sun, Tue, Thu; 1.25 mg all other days   Next INR check:  6/2/2022             Telephone call with  wife who verbalizes understanding and agrees to plan    Lab visit scheduled    Education provided: Goal range and significance of current result and Contact [phone] with any changes, questions or concerns.     Plan made per [facility] anticoagulation protocol    Jessika Yoo, RN  Anticoagulation Clinic  5/26/2022    _______________________________________________________________________     Anticoagulation Episode Summary     Current INR goal:  2.0-3.0   TTR:  100.0 % (3 d)   Target end date:  Indefinite   Send INR reminders to:  [facility] HEART INR NURSE    Indications    Sick sinus syndrome (H) [I49.5]  Paroxysmal atrial fibrillation (H) [I48.0]  Mitral valve disease [I05.9]           Comments:           Anticoagulation Care Providers     Provider Role Specialty Phone number    Ana Dean MD Referring Cardiovascular Disease [phone]

## 2022-06-02 ENCOUNTER — LAB (OUTPATIENT)
Dept: LAB | Facility: CLINIC | Age: 82
End: 2022-06-02
Payer: COMMERCIAL

## 2022-06-02 ENCOUNTER — ANTICOAGULATION THERAPY VISIT (OUTPATIENT)
Dept: ANTICOAGULATION | Facility: CLINIC | Age: 82
End: 2022-06-02

## 2022-06-02 DIAGNOSIS — I48.0 PAROXYSMAL ATRIAL FIBRILLATION (H): ICD-10-CM

## 2022-06-02 DIAGNOSIS — I05.9 MITRAL VALVE DISEASE: ICD-10-CM

## 2022-06-02 DIAGNOSIS — I49.5 SICK SINUS SYNDROME (H): Primary | ICD-10-CM

## 2022-06-02 LAB — INR BLD: 3.3 (ref 0.9–1.1)

## 2022-06-02 PROCEDURE — 36416 COLLJ CAPILLARY BLOOD SPEC: CPT

## 2022-06-02 PROCEDURE — 85610 PROTHROMBIN TIME: CPT

## 2022-06-02 NOTE — PROGRESS NOTES
ANTICOAGULATION MANAGEMENT     Charles Nogueira 82 year old male is on warfarin with supratherapeutic INR result. (Goal INR 2.0-3.0)    Recent labs: (last 7 days)     06/02/22  1118   INR 3.3*       ASSESSMENT       Source(s): Chart Review and Patient/Caregiver Call       Warfarin doses taken: Warfarin taken as instructed    Diet: No new diet changes identified    New illness, injury, or hospitalization: No    Medication/supplement changes: None noted    Signs or symptoms of bleeding or clotting: Yes: per wife, arms are very bruised - will hold full dose today d/t this    Previous INR: Therapeutic last 2(+) visits    Additional findings: None       PLAN     Recommended plan for temporary change(s) affecting INR     Dosing Instructions: hold dose then decrease your warfarin dose (10% change) with next INR in 5 days       Summary  As of 6/2/2022    Full warfarin instructions:  6/2: Hold; Otherwise 2.5 mg every Sun, Wed; 1.25 mg all other days   Next INR check:  6/9/2022             Telephone call with  wifePaula who agrees to plan and repeated back plan correctly    Lab visit scheduled    Education provided: Goal range and significance of current result, Importance of following up at instructed interval, Importance of taking warfarin as instructed and Monitoring for bleeding signs and symptoms    Plan made per [facility] anticoagulation protocol    Nani Russ RN  Anticoagulation Clinic  6/2/2022    _______________________________________________________________________     Anticoagulation Episode Summary     Current INR goal:  2.0-3.0   TTR:  33.9 % (1.4 wk)   Target end date:  Indefinite   Send INR reminders to:  [facility] HEART INR NURSE    Indications    Sick sinus syndrome (H) [I49.5]  Paroxysmal atrial fibrillation (H) [I48.0]  Mitral valve disease [I05.9]           Comments:           Anticoagulation Care Providers     Provider Role Specialty Phone number    Ana Dean MD Referring Cardiovascular Disease  [phone]

## 2022-06-07 ENCOUNTER — LAB (OUTPATIENT)
Dept: LAB | Facility: CLINIC | Age: 82
End: 2022-06-07
Payer: COMMERCIAL

## 2022-06-07 ENCOUNTER — ANTICOAGULATION THERAPY VISIT (OUTPATIENT)
Dept: ANTICOAGULATION | Facility: CLINIC | Age: 82
End: 2022-06-07

## 2022-06-07 DIAGNOSIS — I48.0 PAROXYSMAL ATRIAL FIBRILLATION (H): ICD-10-CM

## 2022-06-07 DIAGNOSIS — I49.5 SICK SINUS SYNDROME (H): Primary | ICD-10-CM

## 2022-06-07 DIAGNOSIS — I05.9 MITRAL VALVE DISEASE: ICD-10-CM

## 2022-06-07 LAB — INR BLD: 2 (ref 0.9–1.1)

## 2022-06-07 PROCEDURE — 36416 COLLJ CAPILLARY BLOOD SPEC: CPT

## 2022-06-07 PROCEDURE — 85610 PROTHROMBIN TIME: CPT

## 2022-06-07 NOTE — PROGRESS NOTES
ANTICOAGULATION MANAGEMENT     Charles Nogueira 82 year old male is on warfarin with therapeutic INR result. (Goal INR 2.0-3.0)    Recent labs: (last 7 days)     06/07/22  0928   INR 2.0*       ASSESSMENT       Source(s): Chart Review and Patient/Caregiver Call       Warfarin doses taken: Warfarin taken as instructed    Diet: No new diet changes identified    New illness, injury, or hospitalization: No    Medication/supplement changes: None noted    Signs or symptoms of bleeding or clotting: No    Previous INR: Supratherapeutic, dose held then overall decreased last week    Additional findings: bruises easily, keep INR on lower side of range       PLAN     Recommended plan for no diet, medication or health factor changes affecting INR     Dosing Instructions: continue your current warfarin dose with next INR in 1 week       Summary  As of 6/7/2022    Full warfarin instructions:  2.5 mg every Sun, Wed; 1.25 mg all other days   Next INR check:  6/14/2022             Telephone call with  patient and wife who verbalizes understanding and agrees to plan    Lab visit scheduled    Education provided: Goal range and significance of current result and Contact [phone] with any changes, questions or concerns.     Plan made per [facility] anticoagulation protocol    Jessika Yoo, RN  Anticoagulation Clinic  6/7/2022    _______________________________________________________________________     Anticoagulation Episode Summary     Current INR goal:  2.0-3.0   TTR:  47.7 % (2.1 wk)   Target end date:  Indefinite   Send INR reminders to:  [facility] HEART INR NURSE    Indications    Sick sinus syndrome (H) [I49.5]  Paroxysmal atrial fibrillation (H) [I48.0]  Mitral valve disease [I05.9]           Comments:           Anticoagulation Care Providers     Provider Role Specialty Phone number    Ana Dean MD Referring Cardiovascular Disease [phone]

## 2022-06-14 ENCOUNTER — LAB (OUTPATIENT)
Dept: LAB | Facility: CLINIC | Age: 82
End: 2022-06-14
Payer: COMMERCIAL

## 2022-06-14 ENCOUNTER — ANTICOAGULATION THERAPY VISIT (OUTPATIENT)
Dept: ANTICOAGULATION | Facility: CLINIC | Age: 82
End: 2022-06-14

## 2022-06-14 DIAGNOSIS — I49.5 SICK SINUS SYNDROME (H): Primary | ICD-10-CM

## 2022-06-14 DIAGNOSIS — I48.0 PAROXYSMAL ATRIAL FIBRILLATION (H): ICD-10-CM

## 2022-06-14 DIAGNOSIS — I05.9 MITRAL VALVE DISEASE: ICD-10-CM

## 2022-06-14 LAB — INR BLD: 2 (ref 0.9–1.1)

## 2022-06-14 PROCEDURE — 36416 COLLJ CAPILLARY BLOOD SPEC: CPT

## 2022-06-14 PROCEDURE — 85610 PROTHROMBIN TIME: CPT

## 2022-06-14 NOTE — PROGRESS NOTES
ANTICOAGULATION MANAGEMENT     Charles Nogueira 82 year old male is on warfarin with therapeutic INR result. (Goal INR 2.0-3.0)    Recent labs: (last 7 days)     06/14/22  1011   INR 2.0*       ASSESSMENT       Source(s): Chart Review and Patient/Caregiver Call       Warfarin doses taken: Warfarin taken as instructed    Diet: No new diet changes identified    New illness, injury, or hospitalization: No    Medication/supplement changes: None noted    Signs or symptoms of bleeding or clotting: No    Previous INR: Therapeutic last visit; previously outside of goal range    Additional findings: None       PLAN     Recommended plan for no diet, medication or health factor changes affecting INR     Dosing Instructions: continue your current warfarin dose with next INR in 8 days       Summary  As of 6/14/2022    Full warfarin instructions:  2.5 mg every Sun, Wed; 1.25 mg all other days   Next INR check:  6/22/2022             Telephone call with  wife who verbalizes understanding and agrees to plan    Patient elected to schedule next visit 6/22/22    Education provided: Goal range and significance of current result and Contact [phone] with any changes, questions or concerns.     Plan made per [facility] anticoagulation protocol    Jessika Yoo RN  Anticoagulation Clinic  6/14/2022    _______________________________________________________________________     Anticoagulation Episode Summary     Current INR goal:  2.0-3.0   TTR:  64.1 % (3.1 wk)   Target end date:  Indefinite   Send INR reminders to:  [facility] HEART INR NURSE    Indications    Sick sinus syndrome (H) [I49.5]  Paroxysmal atrial fibrillation (H) [I48.0]  Mitral valve disease [I05.9]           Comments:           Anticoagulation Care Providers     Provider Role Specialty Phone number    Ana Dean MD Referring Cardiovascular Disease [phone]

## 2022-06-15 ENCOUNTER — ANCILLARY PROCEDURE (OUTPATIENT)
Dept: CARDIOLOGY | Facility: CLINIC | Age: 82
End: 2022-06-15
Attending: INTERNAL MEDICINE
Payer: COMMERCIAL

## 2022-06-15 ENCOUNTER — TELEPHONE (OUTPATIENT)
Dept: CARDIOLOGY | Facility: CLINIC | Age: 82
End: 2022-06-15
Payer: COMMERCIAL

## 2022-06-15 DIAGNOSIS — Z95.0 CARDIAC PACEMAKER IN SITU: Primary | ICD-10-CM

## 2022-06-15 DIAGNOSIS — I49.5 SICK SINUS SYNDROME (H): ICD-10-CM

## 2022-06-15 DIAGNOSIS — Z95.0 CARDIAC PACEMAKER IN SITU: ICD-10-CM

## 2022-06-15 PROCEDURE — 93296 REM INTERROG EVL PM/IDS: CPT | Performed by: INTERNAL MEDICINE

## 2022-06-15 PROCEDURE — 93294 REM INTERROG EVL PM/LDLS PM: CPT | Performed by: INTERNAL MEDICINE

## 2022-06-15 NOTE — TELEPHONE ENCOUNTER
[facility] Call Center    Phone Message    May a detailed message be left on voicemail: yes     Reason for Call: Other: unable to make appointment in timeframe specified. PLease review and call patient.      Action Taken: Other: routed ru cardiology    Travel Screening: Not Applicable

## 2022-06-22 ENCOUNTER — ANTICOAGULATION THERAPY VISIT (OUTPATIENT)
Dept: ANTICOAGULATION | Facility: CLINIC | Age: 82
End: 2022-06-22

## 2022-06-22 ENCOUNTER — LAB (OUTPATIENT)
Dept: LAB | Facility: CLINIC | Age: 82
End: 2022-06-22
Payer: COMMERCIAL

## 2022-06-22 DIAGNOSIS — I48.0 PAROXYSMAL ATRIAL FIBRILLATION (H): ICD-10-CM

## 2022-06-22 DIAGNOSIS — I49.5 SICK SINUS SYNDROME (H): Primary | ICD-10-CM

## 2022-06-22 DIAGNOSIS — I05.9 MITRAL VALVE DISEASE: ICD-10-CM

## 2022-06-22 LAB — INR BLD: 1.7 (ref 0.9–1.1)

## 2022-06-22 PROCEDURE — 36416 COLLJ CAPILLARY BLOOD SPEC: CPT

## 2022-06-22 PROCEDURE — 85610 PROTHROMBIN TIME: CPT

## 2022-06-22 NOTE — PROGRESS NOTES
ANTICOAGULATION MANAGEMENT     Charles Nogueira 82 year old male is on warfarin with subtherapeutic INR result. (Goal INR 2.0-3.0)    Recent labs: (last 7 days)     06/22/22  0816   INR 1.7*       ASSESSMENT       Source(s): Chart Review and Patient/Caregiver Call       Warfarin doses taken: Warfarin taken as instructed    Diet: No new diet changes identified    New illness, injury, or hospitalization: No    Medication/supplement changes: None noted    Signs or symptoms of bleeding or clotting: easily bruises on arms ( skin is thin )    Previous INR: Therapeutic last 2(+) visits last 2 INR's were at the low end of goal range ( patient's spouse prefers low end goal range )    Additional findings: None       PLAN     Recommended plan for no diet, medication or health factor changes affecting INR     Dosing Instructions: Increase your warfarin dose (11% change) with next INR in 7-10 days       Summary  As of 6/22/2022    Full warfarin instructions:  2.5 mg every Sun, Wed, Fri; 1.25 mg all other days   Next INR check:  7/1/2022             Telephone call with  patient's spouse Paula who verbalizes understanding and agrees to plan and who agrees to plan and repeated back plan correctly    Patient elected to schedule next visit 6/28 due to busy schedule for the Holiday    Education provided: Goal range and significance of current result, Importance of therapeutic range, Importance of following up at instructed interval and Monitoring for bleeding signs and symptoms    Plan made per [facility] anticoagulation protocol    Lydia Henao RN  Anticoagulation Clinic  6/22/2022    _______________________________________________________________________     Anticoagulation Episode Summary     Current INR goal:  2.0-3.0   TTR:  47.3 % (1 mo)   Target end date:  Indefinite   Send INR reminders to:  [facility] HEART INR NURSE    Indications    Sick sinus syndrome (H) [I49.5]  Paroxysmal atrial fibrillation (H) [I48.0]  Mitral valve disease  [I05.9]           Comments:           Anticoagulation Care Providers     Provider Role Specialty Phone number    Ana Dean MD Referring Cardiovascular Disease [phone]

## 2022-06-23 ENCOUNTER — TELEPHONE (OUTPATIENT)
Dept: FAMILY MEDICINE | Facility: CLINIC | Age: 82
End: 2022-06-23

## 2022-06-23 DIAGNOSIS — I48.0 PAROXYSMAL ATRIAL FIBRILLATION (H): ICD-10-CM

## 2022-06-23 RX ORDER — WARFARIN SODIUM 2.5 MG/1
TABLET ORAL
Qty: 64 TABLET | Refills: 1 | Status: SHIPPED | OUTPATIENT
Start: 2022-06-23 | End: 2022-06-23

## 2022-06-23 RX ORDER — WARFARIN SODIUM 2.5 MG/1
1.25-2.5 TABLET ORAL DAILY
Qty: 70 TABLET | Refills: 1 | Status: SHIPPED | OUTPATIENT
Start: 2022-06-23 | End: 2022-10-17

## 2022-06-23 NOTE — TELEPHONE ENCOUNTER
Routed high priority to [facility] and [place] as out of med and unsure if anticoag here today.  Caroline Carrera RN

## 2022-06-23 NOTE — TELEPHONE ENCOUNTER
Reason for Call:  Other call back    Detailed comments: Pt states that he is out of Warfarin. Please call back to advise. Thank you.    Phone Number Patient can be reached at: Home number on file [phone] (home)    Best Time: Any    Can we leave a detailed message on this number? YES    Call taken on 6/23/2022 at 1:30 PM by Beatriz Kirkland

## 2022-06-23 NOTE — TELEPHONE ENCOUNTER
Reason for Call:  Medication or medication refill:    Do you use a [facility]?  Name of the pharmacy and phone number for the current request:  [facility]    Name of the medication requested: warfarin    Other request: COMPLETELY OUT    Can we leave a detailed message on this number? YES    Phone number patient can be reached at: Home number on file [phone] (home)    Best Time: anytime    Call taken on 6/23/2022 at 12:29 PM by Janessa Malcolm

## 2022-06-23 NOTE — TELEPHONE ENCOUNTER
ANTICOAGULATION MANAGEMENT:  Medication Refill    Anticoagulation Summary  As of 6/22/2022    Warfarin maintenance plan:  2.5 mg (2.5 mg x 1) every Sun, Wed, Fri; 1.25 mg (2.5 mg x 0.5) all other days   Next INR check:  7/1/2022   Target end date:  Indefinite    Indications    Sick sinus syndrome (H) [I49.5]  Paroxysmal atrial fibrillation (H) [I48.0]  Mitral valve disease [I05.9]             Anticoagulation Care Providers     Provider Role Specialty Phone number    Ana Dean MD Referring Cardiovascular Disease [phone]          Visit with referring provider/group within last year: Yes    [facility] referral signed within last year: Yes new referral as of 5/13/2022    Charles meets all criteria for refill (current [facility] referral, office visit with referring provider/group in last year, lab monitoring up to date or not exceeding 2 weeks overdue). Rx instructions and quantity supplied updated to match patient's current dosing plan. Warfarin 90 day supply with 1 refill granted per [facility] protocol     ACN called and spoke with patient and updated that Warfarin refill [facility] [address]. He has no other concerns this time.    Lydia Henao RN  Anticoagulation Clinic

## 2022-06-23 NOTE — TELEPHONE ENCOUNTER
ANTICOAGULATION MANAGEMENT:  Medication Refill    Anticoagulation Summary  As of 6/22/2022    Warfarin maintenance plan:  2.5 mg (2.5 mg x 1) every Sun, Wed, Fri; 1.25 mg (2.5 mg x 0.5) all other days   Next INR check:  7/1/2022   Target end date:  Indefinite    Indications    Sick sinus syndrome (H) [I49.5]  Paroxysmal atrial fibrillation (H) [I48.0]  Mitral valve disease [I05.9]             Anticoagulation Care Providers     Provider Role Specialty Phone number    Ana Dean MD Referring Cardiovascular Disease [phone]          Visit with referring provider/group within last year: Yes    [facility] referral signed within last year: Yes    Charles meets all criteria for refill (current [facility] referral, office visit with referring provider/group in last year, lab monitoring up to date or not exceeding 2 weeks overdue). Rx instructions and quantity supplied updated to match patient's current dosing plan. Warfarin 90 day supply with 1 refill granted per [facility] protocol     Luca Will RN  Anticoagulation Clinic

## 2022-06-28 ENCOUNTER — LAB (OUTPATIENT)
Dept: LAB | Facility: CLINIC | Age: 82
End: 2022-06-28
Payer: COMMERCIAL

## 2022-06-28 ENCOUNTER — ANTICOAGULATION THERAPY VISIT (OUTPATIENT)
Dept: ANTICOAGULATION | Facility: CLINIC | Age: 82
End: 2022-06-28

## 2022-06-28 DIAGNOSIS — I48.0 PAROXYSMAL ATRIAL FIBRILLATION (H): ICD-10-CM

## 2022-06-28 DIAGNOSIS — I05.9 MITRAL VALVE DISEASE: ICD-10-CM

## 2022-06-28 DIAGNOSIS — I49.5 SICK SINUS SYNDROME (H): Primary | ICD-10-CM

## 2022-06-28 LAB — INR BLD: 2.3 (ref 0.9–1.1)

## 2022-06-28 PROCEDURE — 85610 PROTHROMBIN TIME: CPT

## 2022-06-28 PROCEDURE — 36416 COLLJ CAPILLARY BLOOD SPEC: CPT

## 2022-06-28 NOTE — PROGRESS NOTES
ANTICOAGULATION MANAGEMENT     Charles Nogueira 82 year old male is on warfarin with therapeutic INR result. (Goal INR 2.0-3.0)    Recent labs: (last 7 days)     06/28/22  0958   INR 2.3*       ASSESSMENT       Source(s): Chart Review and Patient/Caregiver Call       Warfarin doses taken: Warfarin taken as instructed    Diet: No new diet changes identified    New illness, injury, or hospitalization: No    Medication/supplement changes: None noted    Signs or symptoms of bleeding or clotting: No    Previous INR: Subtherapeutic    Additional findings: None       PLAN     Recommended plan for no diet, medication or health factor changes affecting INR     Dosing Instructions: continue your current warfarin dose with next INR in 2 weeks       Summary  As of 6/28/2022    Full warfarin instructions:  2.5 mg every Sun, Wed, Fri; 1.25 mg all other days   Next INR check:  7/12/2022             Telephone call with Charles who verbalizes understanding and agrees to plan    Lab visit scheduled    Education provided: Goal range and significance of current result and Contact [phone] with any changes, questions or concerns.     Plan made per [facility] anticoagulation protocol    Jessika Yoo, RN  Anticoagulation Clinic  6/28/2022    _______________________________________________________________________     Anticoagulation Episode Summary     Current INR goal:  2.0-3.0   TTR:  47.8 % (1.2 mo)   Target end date:  Indefinite   Send INR reminders to:  [facility] HEART INR NURSE    Indications    Sick sinus syndrome (H) [I49.5]  Paroxysmal atrial fibrillation (H) [I48.0]  Mitral valve disease [I05.9]           Comments:           Anticoagulation Care Providers     Provider Role Specialty Phone number    Ana Dean MD Referring Cardiovascular Disease [phone]

## 2022-06-30 DIAGNOSIS — R97.20 ELEVATED PROSTATE SPECIFIC ANTIGEN (PSA): Primary | ICD-10-CM

## 2022-07-01 LAB
MDC_IDC_LEAD_IMPLANT_DT: NORMAL
MDC_IDC_LEAD_IMPLANT_DT: NORMAL
MDC_IDC_LEAD_LOCATION: NORMAL
MDC_IDC_LEAD_LOCATION: NORMAL
MDC_IDC_LEAD_MFG: NORMAL
MDC_IDC_LEAD_MFG: NORMAL
MDC_IDC_LEAD_MODEL: NORMAL
MDC_IDC_LEAD_MODEL: NORMAL
MDC_IDC_LEAD_POLARITY_TYPE: NORMAL
MDC_IDC_LEAD_POLARITY_TYPE: NORMAL
MDC_IDC_LEAD_SERIAL: NORMAL
MDC_IDC_LEAD_SERIAL: NORMAL
MDC_IDC_MSMT_BATTERY_DTM: NORMAL
MDC_IDC_MSMT_BATTERY_IMPEDANCE: 774 OHM
MDC_IDC_MSMT_BATTERY_REMAINING_LONGEVITY: 86 MO
MDC_IDC_MSMT_BATTERY_STATUS: NORMAL
MDC_IDC_MSMT_BATTERY_VOLTAGE: 2.78 V
MDC_IDC_MSMT_LEADCHNL_RA_IMPEDANCE_VALUE: 555 OHM
MDC_IDC_MSMT_LEADCHNL_RA_PACING_THRESHOLD_AMPLITUDE: 0.5 V
MDC_IDC_MSMT_LEADCHNL_RA_PACING_THRESHOLD_PULSEWIDTH: 0.4 MS
MDC_IDC_MSMT_LEADCHNL_RV_IMPEDANCE_VALUE: 620 OHM
MDC_IDC_MSMT_LEADCHNL_RV_PACING_THRESHOLD_AMPLITUDE: 0.75 V
MDC_IDC_MSMT_LEADCHNL_RV_PACING_THRESHOLD_PULSEWIDTH: 0.4 MS
MDC_IDC_PG_IMPLANT_DTM: NORMAL
MDC_IDC_PG_MFG: NORMAL
MDC_IDC_PG_MODEL: NORMAL
MDC_IDC_PG_SERIAL: NORMAL
MDC_IDC_PG_TYPE: NORMAL
MDC_IDC_SESS_CLINIC_NAME: NORMAL
MDC_IDC_SESS_DTM: NORMAL
MDC_IDC_SESS_TYPE: NORMAL
MDC_IDC_SET_BRADY_AT_MODE_SWITCH_MODE: NORMAL
MDC_IDC_SET_BRADY_AT_MODE_SWITCH_RATE: 175 {BEATS}/MIN
MDC_IDC_SET_BRADY_LOWRATE: 60 {BEATS}/MIN
MDC_IDC_SET_BRADY_MAX_SENSOR_RATE: 130 {BEATS}/MIN
MDC_IDC_SET_BRADY_MAX_TRACKING_RATE: 130 {BEATS}/MIN
MDC_IDC_SET_BRADY_MODE: NORMAL
MDC_IDC_SET_BRADY_PAV_DELAY_LOW: 350 MS
MDC_IDC_SET_BRADY_SAV_DELAY_LOW: 350 MS
MDC_IDC_SET_LEADCHNL_RA_PACING_AMPLITUDE: 1.5 V
MDC_IDC_SET_LEADCHNL_RA_PACING_CAPTURE_MODE: NORMAL
MDC_IDC_SET_LEADCHNL_RA_PACING_POLARITY: NORMAL
MDC_IDC_SET_LEADCHNL_RA_PACING_PULSEWIDTH: 0.4 MS
MDC_IDC_SET_LEADCHNL_RA_SENSING_POLARITY: NORMAL
MDC_IDC_SET_LEADCHNL_RA_SENSING_SENSITIVITY: 0.5 MV
MDC_IDC_SET_LEADCHNL_RV_PACING_AMPLITUDE: 2 V
MDC_IDC_SET_LEADCHNL_RV_PACING_CAPTURE_MODE: NORMAL
MDC_IDC_SET_LEADCHNL_RV_PACING_POLARITY: NORMAL
MDC_IDC_SET_LEADCHNL_RV_PACING_PULSEWIDTH: 0.4 MS
MDC_IDC_SET_LEADCHNL_RV_SENSING_POLARITY: NORMAL
MDC_IDC_SET_LEADCHNL_RV_SENSING_SENSITIVITY: 2.8 MV
MDC_IDC_SET_ZONE_DETECTION_INTERVAL: 333.33 MS
MDC_IDC_SET_ZONE_DETECTION_INTERVAL: 342.86 MS
MDC_IDC_SET_ZONE_TYPE: NORMAL
MDC_IDC_SET_ZONE_TYPE: NORMAL
MDC_IDC_STAT_AT_BURDEN_PERCENT: 0.1 %
MDC_IDC_STAT_AT_DTM_END: NORMAL
MDC_IDC_STAT_AT_DTM_START: NORMAL
MDC_IDC_STAT_AT_MODE_SW_COUNT: 15
MDC_IDC_STAT_BRADY_AP_VP_PERCENT: 2 %
MDC_IDC_STAT_BRADY_AP_VS_PERCENT: 64 %
MDC_IDC_STAT_BRADY_AS_VP_PERCENT: 0 %
MDC_IDC_STAT_BRADY_AS_VS_PERCENT: 34 %
MDC_IDC_STAT_BRADY_DTM_END: NORMAL
MDC_IDC_STAT_BRADY_DTM_START: NORMAL
MDC_IDC_STAT_EPISODE_RECENT_COUNT: 1
MDC_IDC_STAT_EPISODE_RECENT_COUNT: 10
MDC_IDC_STAT_EPISODE_RECENT_COUNT_DTM_END: NORMAL
MDC_IDC_STAT_EPISODE_RECENT_COUNT_DTM_END: NORMAL
MDC_IDC_STAT_EPISODE_RECENT_COUNT_DTM_START: NORMAL
MDC_IDC_STAT_EPISODE_RECENT_COUNT_DTM_START: NORMAL
MDC_IDC_STAT_EPISODE_TYPE: NORMAL
MDC_IDC_STAT_EPISODE_TYPE: NORMAL

## 2022-07-12 ENCOUNTER — ANTICOAGULATION THERAPY VISIT (OUTPATIENT)
Dept: ANTICOAGULATION | Facility: CLINIC | Age: 82
End: 2022-07-12

## 2022-07-12 ENCOUNTER — LAB (OUTPATIENT)
Dept: LAB | Facility: CLINIC | Age: 82
End: 2022-07-12
Payer: COMMERCIAL

## 2022-07-12 DIAGNOSIS — I49.5 SICK SINUS SYNDROME (H): Primary | ICD-10-CM

## 2022-07-12 DIAGNOSIS — I48.0 PAROXYSMAL ATRIAL FIBRILLATION (H): ICD-10-CM

## 2022-07-12 DIAGNOSIS — I05.9 MITRAL VALVE DISEASE: ICD-10-CM

## 2022-07-12 LAB — INR BLD: 2.4 (ref 0.9–1.1)

## 2022-07-12 PROCEDURE — 85610 PROTHROMBIN TIME: CPT

## 2022-07-12 PROCEDURE — 36415 COLL VENOUS BLD VENIPUNCTURE: CPT

## 2022-07-12 NOTE — PROGRESS NOTES
ANTICOAGULATION MANAGEMENT     Charles Nogueira 82 year old male is on warfarin with therapeutic INR result. (Goal INR 2.0-3.0)    Recent labs: (last 7 days)     07/12/22  0931   INR 2.4*       ASSESSMENT       Source(s): Chart Review    Previous INR was Therapeutic last visit; previously outside of goal range    Medication, diet, health changes since last INR chart reviewed; none identified           PLAN     Recommended plan for no diet, medication or health factor changes affecting INR     Dosing Instructions: continue your current warfarin dose with next INR in 3 weeks       Summary  As of 7/12/2022    Full warfarin instructions:  2.5 mg every Sun, Wed, Fri; 1.25 mg all other days   Next INR check:  8/2/2022             Telephone call with  wife who verbalizes understanding and agrees to plan    Lab visit scheduled    Education provided: Goal range and significance of current result and Contact [phone] with any changes, questions or concerns.     Plan made per [facility] anticoagulation protocol    Jessika Yoo RN  Anticoagulation Clinic  7/12/2022    _______________________________________________________________________     Anticoagulation Episode Summary     Current INR goal:  2.0-3.0   TTR:  62.3 % (1.7 mo)   Target end date:  Indefinite   Send INR reminders to:  [facility] HEART INR NURSE    Indications    Sick sinus syndrome (H) [I49.5]  Paroxysmal atrial fibrillation (H) [I48.0]  Mitral valve disease [I05.9]           Comments:           Anticoagulation Care Providers     Provider Role Specialty Phone number    Ana Dean MD Referring Cardiovascular Disease [phone]

## 2022-08-02 ENCOUNTER — ANTICOAGULATION THERAPY VISIT (OUTPATIENT)
Dept: ANTICOAGULATION | Facility: CLINIC | Age: 82
End: 2022-08-02

## 2022-08-02 ENCOUNTER — LAB (OUTPATIENT)
Dept: LAB | Facility: CLINIC | Age: 82
End: 2022-08-02
Payer: COMMERCIAL

## 2022-08-02 DIAGNOSIS — R97.20 ELEVATED PROSTATE SPECIFIC ANTIGEN (PSA): ICD-10-CM

## 2022-08-02 DIAGNOSIS — I49.5 SICK SINUS SYNDROME (H): Primary | ICD-10-CM

## 2022-08-02 DIAGNOSIS — I05.9 MITRAL VALVE DISEASE: ICD-10-CM

## 2022-08-02 DIAGNOSIS — I48.0 PAROXYSMAL ATRIAL FIBRILLATION (H): ICD-10-CM

## 2022-08-02 LAB — INR BLD: 2.4 (ref 0.9–1.1)

## 2022-08-02 PROCEDURE — 84153 ASSAY OF PSA TOTAL: CPT

## 2022-08-02 PROCEDURE — 85610 PROTHROMBIN TIME: CPT

## 2022-08-02 PROCEDURE — 36415 COLL VENOUS BLD VENIPUNCTURE: CPT

## 2022-08-02 NOTE — PROGRESS NOTES
ANTICOAGULATION MANAGEMENT     Charles Nogueira 82 year old male is on warfarin with therapeutic INR result. (Goal INR 2.0-3.0)    Recent labs: (last 7 days)     08/02/22  0938   INR 2.4*       ASSESSMENT       Source(s): Chart Review    Previous INR was Therapeutic last 2(+) visits    Medication, diet, health changes since last INR chart reviewed; none identified           PLAN     Recommended plan for no diet, medication or health factor changes affecting INR     Dosing Instructions: Continue your current warfarin dose with next INR in 3-4 weeks       Summary  As of 8/2/2022    Full warfarin instructions:  2.5 mg every Sun, Wed, Fri; 1.25 mg all other days   Next INR check:  8/26/2022             Telephone call with  wife who verbalizes understanding and agrees to plan    Patient elected to schedule next visit 8/26    Education provided: Goal range and significance of current result and Contact [phone] with any changes, questions or concerns.     Plan made per [facility] anticoagulation protocol    Jessika Yoo, RN  Anticoagulation Clinic  8/2/2022    _______________________________________________________________________     Anticoagulation Episode Summary     Current INR goal:  2.0-3.0   TTR:  73.4 % (2.4 mo)   Target end date:  Indefinite   Send INR reminders to:  [facility] HEART INR NURSE    Indications    Sick sinus syndrome (H) [I49.5]  Paroxysmal atrial fibrillation (H) [I48.0]  Mitral valve disease [I05.9]           Comments:           Anticoagulation Care Providers     Provider Role Specialty Phone number    Ana Dean MD Referring Cardiovascular Disease [phone]

## 2022-08-03 LAB — PSA SERPL-MCNC: 0.05 NG/ML

## 2022-08-25 ENCOUNTER — ANTICOAGULATION THERAPY VISIT (OUTPATIENT)
Dept: ANTICOAGULATION | Facility: CLINIC | Age: 82
End: 2022-08-25

## 2022-08-25 ENCOUNTER — LAB (OUTPATIENT)
Dept: LAB | Facility: CLINIC | Age: 82
End: 2022-08-25
Payer: COMMERCIAL

## 2022-08-25 DIAGNOSIS — I05.9 MITRAL VALVE DISEASE: ICD-10-CM

## 2022-08-25 DIAGNOSIS — I48.0 PAROXYSMAL ATRIAL FIBRILLATION (H): ICD-10-CM

## 2022-08-25 DIAGNOSIS — I49.5 SICK SINUS SYNDROME (H): Primary | ICD-10-CM

## 2022-08-25 LAB — INR BLD: 1.9 (ref 0.9–1.1)

## 2022-08-25 PROCEDURE — 85610 PROTHROMBIN TIME: CPT

## 2022-08-25 PROCEDURE — 36415 COLL VENOUS BLD VENIPUNCTURE: CPT

## 2022-08-25 NOTE — PROGRESS NOTES
ANTICOAGULATION MANAGEMENT     Charles Nogueira 82 year old male is on warfarin with subtherapeutic INR result. (Goal INR 2.0-3.0)    Recent labs: (last 7 days)     08/25/22  0910   INR 1.9*       ASSESSMENT       Source(s): Chart Review and Patient/Caregiver Call       Warfarin doses taken: Warfarin taken as instructed    Diet: No new diet changes identified    New illness, injury, or hospitalization: No    Medication/supplement changes: None noted    Signs or symptoms of bleeding or clotting: No    Previous INR: Therapeutic last 2(+) visits    Additional findings: None       PLAN     Recommended plan for no diet, medication or health factor changes affecting INR     Dosing Instructions: Continue your current warfarin dose with next INR in 2 weeks       Summary  As of 8/25/2022    Full warfarin instructions:  2.5 mg every Sun, Wed, Fri; 1.25 mg all other days   Next INR check:  9/8/2022             Telephone call with  wife who verbalizes understanding and agrees to plan    Lab visit scheduled    Education provided: Goal range and significance of current result and Contact [phone] with any changes, questions or concerns.     Plan made per [facility] anticoagulation protocol    Jessika Yoo, RN  Anticoagulation Clinic  8/25/2022    _______________________________________________________________________     Anticoagulation Episode Summary     Current INR goal:  2.0-3.0   TTR:  75.0 % (3.1 mo)   Target end date:  Indefinite   Send INR reminders to:  [facility] HEART INR NURSE    Indications    Sick sinus syndrome (H) [I49.5]  Paroxysmal atrial fibrillation (H) [I48.0]  Mitral valve disease [I05.9]           Comments:           Anticoagulation Care Providers     Provider Role Specialty Phone number    Ana Dean MD Referring Cardiovascular Disease [phone]

## 2022-08-31 ENCOUNTER — OFFICE VISIT (OUTPATIENT)
Dept: UROLOGY | Facility: CLINIC | Age: 82
End: 2022-08-31
Payer: COMMERCIAL

## 2022-08-31 VITALS
SYSTOLIC BLOOD PRESSURE: 122 MMHG | HEIGHT: 69 IN | DIASTOLIC BLOOD PRESSURE: 74 MMHG | BODY MASS INDEX: 22.81 KG/M2 | WEIGHT: 154 LBS

## 2022-08-31 DIAGNOSIS — R97.20 ELEVATED PROSTATE SPECIFIC ANTIGEN (PSA): Primary | ICD-10-CM

## 2022-08-31 DIAGNOSIS — R31.29 MICROSCOPIC HEMATURIA: ICD-10-CM

## 2022-08-31 LAB
ALBUMIN UR-MCNC: ABNORMAL MG/DL
APPEARANCE UR: CLEAR
BILIRUB UR QL STRIP: NEGATIVE
COLOR UR AUTO: YELLOW
GLUCOSE UR STRIP-MCNC: NEGATIVE MG/DL
HGB UR QL STRIP: ABNORMAL
HYALINE CASTS: 1 /LPF
KETONES UR STRIP-MCNC: NEGATIVE MG/DL
LEUKOCYTE ESTERASE UR QL STRIP: NEGATIVE
MUCOUS THREADS #/AREA URNS LPF: PRESENT /LPF
NITRATE UR QL: NEGATIVE
PH UR STRIP: 5.5 [PH] (ref 5–7)
RBC URINE: 5 /HPF
SP GR UR STRIP: 1.01 (ref 1–1.03)
UROBILINOGEN UR STRIP-ACNC: 0.2 E.U./DL
WBC URINE: <1 /HPF

## 2022-08-31 PROCEDURE — 99214 OFFICE O/P EST MOD 30 MIN: CPT | Performed by: PHYSICIAN ASSISTANT

## 2022-08-31 PROCEDURE — 81001 URINALYSIS AUTO W/SCOPE: CPT | Performed by: PHYSICIAN ASSISTANT

## 2022-08-31 ASSESSMENT — ENCOUNTER SYMPTOMS
VOMITING: 0
FREQUENCY: 0
HEMATURIA: 0
SHORTNESS OF BREATH: 0
UNEXPECTED WEIGHT CHANGE: 0
APPETITE CHANGE: 1
NAUSEA: 0
CHILLS: 0
FEVER: 0
DYSURIA: 0

## 2022-08-31 ASSESSMENT — PAIN SCALES - GENERAL: PAINLEVEL: NO PAIN (0)

## 2022-08-31 NOTE — NURSING NOTE
Chief Complaint   Patient presents with     Elevated PSA     1 year with PSA     Pt has no urinary symptoms today, pt states there was microscopic hematuria on his last UA.    Silvana Mack, CMA

## 2022-08-31 NOTE — PATIENT INSTRUCTIONS
Will do urinalysis today to look for blood. If positive, will send for microscopy. If positive, would do CT Urogram and cystoscopy.    Repeat PSA.  The change is abnormal.  I have concern for an error.

## 2022-08-31 NOTE — PROGRESS NOTES
Subjective      CHIEF COMPLAINT/REASON FOR VISIT   Patient of Dr. Foreman's seen on my calendar  Elevated PSA  Microscopic blood     HISTORY OF PRESENT ILLNESS   Mr. Nogueira is a very pleasant 82-year-old gentleman, who presents today for follow-up regarding previous elevated PSA.  Patient was last seen by Dr. Foreman on 09/29/2021.  PSA was 5.74 at that time.      Patient had his PSA rechecked several weeks ago.  This was 0.05.  Patient denies any new medications other than warfarin.  He only takes zinc as far as supplements.  He has been on this for years.    There was also previous concern for blood noted on dipstick.  Last year urine microscopy showed 2 RBCs per high-power field.  Patient is concerned if this is still present.  Urinalysis today does show moderate amount of blood.  Patient denies any gross hematuria.    Patient denies minimal urinary symptomatology.  He notes nocturia x1.  He denies dysuria, gross hematuria, urgency, frequency, urinary incontinence.      Patient does note decreased appetite.  He continues to remain [place] [facility].  He regularly goes there.    Patient denies any unexplained weight loss, bone pain, nausea, vomiting, fevers, chills, shortness of breath, or chest pain.    The following portions of the patient's history were reviewed and updated as appropriate: allergies, current medications, past family history, past medical history, past social history, past surgical history, and problem list.     REVIEW OF SYSTEMS   Review of Systems   Constitutional: Positive for appetite change. Negative for chills, fever and unexpected weight change.   Respiratory: Negative for shortness of breath.    Cardiovascular: Negative for chest pain.   Gastrointestinal: Negative for nausea and vomiting.   Genitourinary: Negative for dysuria, frequency, hematuria and urgency.      Per HPI.     Patient Active Problem List   Diagnosis     Impotence of organic origin      Actinic keratosis     Viral warts     Malignant melanoma of skin of trunk, except scrotum (H)     CARDIOVASCULAR SCREENING; LDL GOAL LESS THAN 130     Pacemaker     PVC (premature ventricular contraction)     Heart murmur     Advanced directives, counseling/discussion     SCC (squamous cell carcinoma), face     Syncope     Mitral valve disease     Migraine equivalent     Sick sinus syndrome (H)     Essential hypertension, benign     Essential hypertension     Cardiac pacemaker in situ     Nonrheumatic mitral valve regurgitation     Acute ischemic heart disease (H)     Paroxysmal atrial fibrillation (H)     Paroxysmal ventricular tachycardia (H)     Chronic diastolic congestive heart failure (H)     Fatigue     Cervicalgia     Episode of transient neurologic symptoms     Chills     Presbycusis of both ears     Weakness of both upper extremities      Past Medical History:   Diagnosis Date     Acute ischemic heart disease (H) 8/24/2018     Allergic rhinitis, cause unspecified      Cardiac pacemaker in situ 8/14/2017     Cervicalgia 4/26/2022     Chills 4/26/2022     Chronic diastolic congestive heart failure (H) 7/26/2021     Dysphagia, oropharyngeal phase 06/14/2018    Mild per video swallow study. To use swallowing techniques     Episode of transient neurologic symptoms 4/26/2022     Essential hypertension, benign 11/11/2016     Fatigue 4/26/2022     Heart murmur 5/10/2011     Melanoma (H)      Migraine equivalent 11/10/2015     Mitral valve prolapse      Non-rheumatic mitral regurgitation 8/9/2018     Other forms of migraine, without mention of intractable migraine without mention of status migrainosus      Paroxysmal A-fib (H)      Premature ventricular contraction      Presbycusis of both ears 4/26/2022     PVC (premature ventricular contraction) 5/10/2011     Sick sinus syndrome (H) 05/03/2016    s/p PPM implant 7/2/14     Squamous cell carcinoma      Syncope      Weakness of both upper extremities 4/26/2022  "       Objective      PHYSICAL EXAM   /74   Ht 1.753 m (5' 9\")   Wt 69.9 kg (154 lb)   BMI 22.74 kg/m     Physical Exam  Constitutional:       Appearance: Normal appearance.   HENT:      Head: Normocephalic.      Nose: Nose normal.   Eyes:      General: No scleral icterus.  Pulmonary:      Effort: Pulmonary effort is normal.   Abdominal:      General: There is no distension.   Neurological:      General: No focal deficit present.      Mental Status: He is alert and oriented to person, place, and time.   Psychiatric:         Mood and Affect: Mood normal.         Behavior: Behavior normal.         LABORATORY     Lab Results   Component Value Date    PSA 0.05 08/02/2022    PSA 5.74 (H) 09/17/2021    PSA 5.46 (H) 07/26/2021    PSA 5.71 (H) 06/16/2021      Recent Labs   Lab Test 08/31/22  1350 09/29/21  0800   COLOR Yellow Yellow   APPEARANCE Clear Clear   URINEGLC Negative Negative   URINEBILI Negative Negative   URINEKETONE Negative Negative   SG 1.015 1.015   UBLD Moderate* Moderate*   URINEPH 5.5 5.5   PROTEIN Trace* Negative   UROBILINOGEN 0.2 0.2   NITRITE Negative Negative   LEUKEST Negative Negative   RBCU  --  2   WBCU  --  <1     Assessment & Plan    1. Elevated prostate specific antigen (PSA)    2. Microscopic hematuria        I had the pleasure today of meeting with Mr. Nogueira to discuss his elevated PSA.  PSA last year was 5.74.  We discussed changing this year to 0.05 is unexpected and unusual.  I do have a concern about possible misidentification or interfering substance with the assay.  Reviewing this, I does appear that there is a change in the reported units, but it appear they are equivalent.    -Would recommend repeat PSA at first availability.  Depending upon the results, may need further evaluation.  Previously he had discussed this with Dr. Foreman about possible prostate biopsy versus stopping any further evaluation.    We are try to make sure there is not a precipitous change in " PSA.    Urinalysis today continues to show presence of blood.      -We will send urine for urine microscopy to confirm presence of microscopic blood.  If greater than or equal to 3 RBCs per high-power field would recommend CT urogram and cystoscopy.    Signed by:       Nani Fuentes PA-C 8/31/2022 1:50 PM

## 2022-09-08 ENCOUNTER — LAB (OUTPATIENT)
Dept: LAB | Facility: CLINIC | Age: 82
End: 2022-09-08
Payer: COMMERCIAL

## 2022-09-08 ENCOUNTER — ANTICOAGULATION THERAPY VISIT (OUTPATIENT)
Dept: ANTICOAGULATION | Facility: CLINIC | Age: 82
End: 2022-09-08

## 2022-09-08 ENCOUNTER — TELEPHONE (OUTPATIENT)
Dept: ANTICOAGULATION | Facility: CLINIC | Age: 82
End: 2022-09-08

## 2022-09-08 DIAGNOSIS — I05.9 MITRAL VALVE DISEASE: ICD-10-CM

## 2022-09-08 DIAGNOSIS — I49.5 SICK SINUS SYNDROME (H): Primary | ICD-10-CM

## 2022-09-08 DIAGNOSIS — I48.0 PAROXYSMAL ATRIAL FIBRILLATION (H): ICD-10-CM

## 2022-09-08 DIAGNOSIS — R97.20 ELEVATED PROSTATE SPECIFIC ANTIGEN (PSA): ICD-10-CM

## 2022-09-08 LAB — INR BLD: 2.5 (ref 0.9–1.1)

## 2022-09-08 PROCEDURE — 85610 PROTHROMBIN TIME: CPT

## 2022-09-08 PROCEDURE — 36415 COLL VENOUS BLD VENIPUNCTURE: CPT

## 2022-09-08 PROCEDURE — 84153 ASSAY OF PSA TOTAL: CPT

## 2022-09-08 NOTE — PROGRESS NOTES
ANTICOAGULATION MANAGEMENT     Charles Nogueira 82 year old male is on warfarin with therapeutic INR result. (Goal INR 2.0-3.0)    Recent labs: (last 7 days)     09/08/22  0844   INR 2.5*       ASSESSMENT       Source(s): Chart Review    Previous INR was Subtherapeutic    Medication, diet, health changes since last INR chart reviewed; none identified     Will send home INR request message to [facility].  [place]    Microscopic hematuria noted in 8/31 urine sample, being worked up by urology            PLAN     Recommended plan for no diet, medication or health factor changes affecting INR     Dosing Instructions: Continue your current warfarin dose with next INR in 3 weeks       Summary  As of 9/8/2022    Full warfarin instructions:  2.5 mg every Sun, Wed, Fri; 1.25 mg all other days   Next INR check:  9/29/2022             Telephone call with  wife who verbalizes understanding and agrees to plan    Lab visit scheduled    Education provided: Goal range and significance of current result and Contact [phone] with any changes, questions or concerns.     Plan made per [facility] anticoagulation protocol    Jessika Yoo, RN  Anticoagulation Clinic  9/8/2022    _______________________________________________________________________     Anticoagulation Episode Summary     Current INR goal:  2.0-3.0   TTR:  76.1 % (3.6 mo)   Target end date:  Indefinite   Send INR reminders to:  [facility] HEART INR NURSE    Indications    Sick sinus syndrome (H) [I49.5]  Paroxysmal atrial fibrillation (H) [I48.0]  Mitral valve disease [I05.9]           Comments:           Anticoagulation Care Providers     Provider Role Specialty Phone number    Ana Dean MD Referring Cardiovascular Disease [phone]

## 2022-09-08 NOTE — TELEPHONE ENCOUNTER
Anticoagulation Management    Discussed INR home monitoring program with Charles Nogueira reviewing:      [employer] requirements: >= 3 months of anticoagulation therapy, indication for chronic anticoagulation and order from provider    Required testing frequency (q1-2 weeks)    Home meters, testing supplies, meter training, and reporting of INR results done through an outside company. Patient would be contacted by home monitoring company to review insurance coverage with home monitoring company prior to enrolling.    [employer] would continue to receive and manage INR results.    Home monitoring application may take several weeks and must continue to follow up with recommended INR monitoring in clinic until receives monitor and training completed.     Home monitoring terms reviewed with patient      Patient agrees to frequency of testing as directed by referring provider ( weekly or biweekly) Yes    Testing to be performed during business hours of [facility] Yes    Patient agrees they have the skill (or a designated caregiver) necessary to perform the self test Yes    Patient agrees to report all INR results to INR home monitoring company Yes    Patient agrees to have additional INR test in clinic if a home result is critical Yes    Patient agrees to use a [employer] approved service provider and device for home monitoring Yes    [facility]     Referring provider: Dr. Dean    Referring providers Clinic Fax number [phone]    Charles Nogueira is interested home INR monitoring and requests order be submitted.

## 2022-09-09 LAB — PSA SERPL-MCNC: 7.09 UG/L (ref 0–4)

## 2022-09-14 ENCOUNTER — HOSPITAL ENCOUNTER (OUTPATIENT)
Dept: CARDIOLOGY | Facility: CLINIC | Age: 82
Discharge: HOME | End: 2022-09-14
Attending: INTERNAL MEDICINE | Admitting: INTERNAL MEDICINE
Payer: COMMERCIAL

## 2022-09-14 ENCOUNTER — LAB (OUTPATIENT)
Dept: LAB | Facility: CLINIC | Age: 82
End: 2022-09-14
Payer: COMMERCIAL

## 2022-09-14 DIAGNOSIS — R31.29 MICROSCOPIC HEMATURIA: Primary | ICD-10-CM

## 2022-09-14 DIAGNOSIS — I34.0 NONRHEUMATIC MITRAL VALVE REGURGITATION: ICD-10-CM

## 2022-09-14 DIAGNOSIS — E78.5 DYSLIPIDEMIA: ICD-10-CM

## 2022-09-14 LAB
CHOLEST SERPL-MCNC: 159 MG/DL
HDLC SERPL-MCNC: 34 MG/DL
LDLC SERPL CALC-MCNC: 107 MG/DL
LVEF ECHO: NORMAL
NONHDLC SERPL-MCNC: 125 MG/DL
TRIGL SERPL-MCNC: 89 MG/DL

## 2022-09-14 PROCEDURE — 80061 LIPID PANEL: CPT | Performed by: INTERNAL MEDICINE

## 2022-09-14 PROCEDURE — 36415 COLL VENOUS BLD VENIPUNCTURE: CPT | Performed by: INTERNAL MEDICINE

## 2022-09-14 PROCEDURE — 93306 TTE W/DOPPLER COMPLETE: CPT | Mod: 26 | Performed by: INTERNAL MEDICINE

## 2022-09-14 PROCEDURE — 93306 TTE W/DOPPLER COMPLETE: CPT

## 2022-09-20 ENCOUNTER — OFFICE VISIT (OUTPATIENT)
Dept: CARDIOLOGY | Facility: CLINIC | Age: 82
End: 2022-09-20
Payer: COMMERCIAL

## 2022-09-20 VITALS
DIASTOLIC BLOOD PRESSURE: 65 MMHG | OXYGEN SATURATION: 97 % | HEART RATE: 64 BPM | SYSTOLIC BLOOD PRESSURE: 107 MMHG | BODY MASS INDEX: 22.78 KG/M2 | HEIGHT: 69 IN | WEIGHT: 153.8 LBS

## 2022-09-20 DIAGNOSIS — I48.0 PAF (PAROXYSMAL ATRIAL FIBRILLATION) (H): ICD-10-CM

## 2022-09-20 DIAGNOSIS — I34.0 NONRHEUMATIC MITRAL VALVE REGURGITATION: ICD-10-CM

## 2022-09-20 DIAGNOSIS — I48.0 PAROXYSMAL ATRIAL FIBRILLATION (H): ICD-10-CM

## 2022-09-20 DIAGNOSIS — Z95.0 CARDIAC PACEMAKER IN SITU: Primary | ICD-10-CM

## 2022-09-20 DIAGNOSIS — I10 ESSENTIAL HYPERTENSION: ICD-10-CM

## 2022-09-20 DIAGNOSIS — E78.5 DYSLIPIDEMIA: ICD-10-CM

## 2022-09-20 PROCEDURE — 99214 OFFICE O/P EST MOD 30 MIN: CPT | Performed by: INTERNAL MEDICINE

## 2022-09-20 NOTE — PROGRESS NOTES
[facility] Progress note    Assessment:  1. Paroxysmal atrial fibrillation on metoprolol and Coumadin. Prefers Coumadin due to cost of DOAC.   2. S/p PPM  3. Moderate mitral regurgitation, stable  4. Hypertension  5. Dyslipidemia with low HDL elevated LDL  6. Hypertension history of PVCs      Plan:  1. Stop amlodipine   2. Continue routine device checks  3. Follow up in one year or sooner if needed. Echo in 1-2 yrs.       HPI:     Charles Nogueira is a very nice 82-year-old gentleman with history of sick sinus syndrome status post dual-chamber pacemaker, paroxysmal atrial fibrillation noted on multiple device checks, PVCs and NSVT, mitral valve prolapse with moderate mitral regurgitation here for routine follow-up.    At his last visit we decreased amlodipine to 2.5 mg p.o. daily and he was initiated on anticoagulation with Coumadin for paroxysmal atrial fibrillation.  DOAC's were prohibitively expensive.    Last device check on 9/1/2021 he was atrial paced 64% ventricular paced 2% has 7 years remaining battery life and had no atrial or ventricular arrhythmias.    He and his wife are looking forward to winter in [place].  They have requested a home INR testing kit to use in [place], and the INR clinic is looking into this for him.  He is feeling good and has no complaints.     Echo on 9/14/2022 showed  The left ventricle is normal in size.  The visual ejection fraction is 55-60%.  There is a catheter/pacemaker lead seen in the right ventricle.  The left atrium is mildly dilated.  Mild mitral valve prolapse, posterior leaflet  There is moderate (2+) mitral regurgitation.  The mitral regurgitant jet is posteriorly directed, which is consistent with  anterior leaflet pathology.  Compared to prior study, there is no significant change.    CURRENT MEDICATIONS:  Current Outpatient Medications   Medication Sig Dispense Refill     amLODIPine (NORVASC) 5 MG tablet Take 0.5 tablets (2.5 mg) by mouth daily 90 tablet 3      levETIRAcetam (KEPPRA) 750 MG tablet Take 1 tablet (750 mg) by mouth 2 times daily       losartan (COZAAR) 100 MG tablet Take 1 tablet (100 mg) by mouth daily 90 tablet 3     metoprolol succinate ER (TOPROL-XL) 50 MG 24 hr tablet Take 1 tablet (50 mg) by mouth daily 90 tablet 3     Multiple Vitamins-Minerals (ZINC PO)        warfarin ANTICOAGULANT (COUMADIN) 2.5 MG tablet Take 0.5-1 tablets (1.25-2.5 mg) by mouth daily OR as directed by INR clinic. 70 tablet 1       ALLERGIES     Allergies   Allergen Reactions     Sulfa Drugs      rash       PAST MEDICAL, SURGICAL, FAMILY, SOCIAL HISTORY:  History was reviewed and updated as needed, see medical record.    REVIEW OF SYSTEMS:  Skin:  Positive for bruising   Eyes:  Positive for glasses  ENT:  Negative    Respiratory:  Negative shortness of breath;dyspnea on exertion  Cardiovascular:  chest pain;Negative;edema;lightheadedness;dizziness;fatigue palpitations;Positive for  Gastroenterology: Negative    Genitourinary:  Negative    Musculoskeletal:  Negative back pain;neck pain  Neurologic:  Negative headaches  Psychiatric:  Negative    Heme/Lymph/Imm:  Positive for allergies  Endocrine:  Negative      PHYSICAL EXAM:      BP: 107/65 Pulse: 64     SpO2: 97 %      Vital Signs with Ranges  Pulse:  [63-64] 64  BP: ()/(63-65) 107/65  SpO2:  [97 %] 97 %  153 lbs 12.8 oz    Constitutional: awake, alert, no distress  Eyes: sclera nonicteric  ENT: trachea midline  Respiratory: clear bilaterally  Cardiovascular: regular rate and rhythm I/VI apical systolic murmur  GI: nondistended, nontender, bowel sounds present  Skin: dry, no rash no edema  Musculoskeletal: grossly normal muscle bulk and tone  Neuropsychiatric: appropriate affect    Recent Lab Results:  LIPID RESULTS:  Lab Results   Component Value Date    CHOL 159 09/14/2022    CHOL 179 12/17/2020    HDL 34 (L) 09/14/2022    HDL 39 (L) 12/17/2020     (H) 09/14/2022     (H) 12/17/2020    TRIG 89 09/14/2022     TRIG 97 12/17/2020    CHOLHDLRATIO 4.5 11/10/2015       LIVER ENZYME RESULTS:  Lab Results   Component Value Date    AST 20 07/26/2021    AST 24 12/17/2020    ALT 29 07/26/2021    ALT 26 12/17/2020       CBC RESULTS:  Lab Results   Component Value Date    WBC 7.0 05/12/2022    WBC 9.7 09/21/2018    RBC 4.90 05/12/2022    RBC 5.19 09/21/2018    HGB 14.3 05/12/2022    HGB 15.4 09/21/2018    HCT 45.6 05/12/2022    HCT 47.7 09/21/2018    MCV 93 05/12/2022    MCV 92 09/21/2018    MCH 29.2 05/12/2022    MCH 29.7 09/21/2018    MCHC 31.4 (L) 05/12/2022    MCHC 32.3 09/21/2018    RDW 13.2 05/12/2022    RDW 13.5 09/21/2018     05/12/2022     09/21/2018       BMP RESULTS:  Lab Results   Component Value Date     05/12/2022     12/17/2020    POTASSIUM 4.2 05/12/2022    POTASSIUM 3.8 12/17/2020    CHLORIDE 108 05/12/2022    CHLORIDE 107 12/17/2020    CO2 29 05/12/2022    CO2 32 12/17/2020    ANIONGAP 2 (L) 05/12/2022    ANIONGAP <1 (L) 12/17/2020    GLC 93 05/12/2022     (H) 12/17/2020    BUN 27 05/12/2022    BUN 19 12/17/2020    CR 0.92 05/12/2022    CR 1.00 12/17/2020    GFRESTIMATED 84 05/12/2022    GFRESTIMATED 71 12/17/2020    GFRESTBLACK 83 12/17/2020    MANUELA 8.9 05/12/2022    MANUELA 9.0 12/17/2020        A1C RESULTS:  No results found for: A1C    INR RESULTS:  Lab Results   Component Value Date    INR 2.5 (H) 09/08/2022    INR 1.9 (H) 08/25/2022    INR 1.04 05/12/2022

## 2022-09-20 NOTE — LETTER
9/20/2022    Ruy Burns MD  [id number]  [facility] [address]    RE: Charles Nogueira       Dear Colleague,     I had the pleasure of seeing Charles Nogueira in the [facility].  [facility] Progress note    Assessment:  1. Paroxysmal atrial fibrillation on metoprolol and Coumadin. Prefers Coumadin due to cost of DOAC.   2. S/p PPM  3. Moderate mitral regurgitation, stable  4. Hypertension  5. Dyslipidemia with low HDL elevated LDL  6. Hypertension history of PVCs      Plan:  1. Stop amlodipine   2. Continue routine device checks  3. Follow up in one year or sooner if needed. Echo in 1-2 yrs.       HPI:     Charles Nogueira is a very nice 82-year-old gentleman with history of sick sinus syndrome status post dual-chamber pacemaker, paroxysmal atrial fibrillation noted on multiple device checks, PVCs and NSVT, mitral valve prolapse with moderate mitral regurgitation here for routine follow-up.    At his last visit we decreased amlodipine to 2.5 mg p.o. daily and he was initiated on anticoagulation with Coumadin for paroxysmal atrial fibrillation.  DOAC's were prohibitively expensive.    Last device check on 9/1/2021 he was atrial paced 64% ventricular paced 2% has 7 years remaining battery life and had no atrial or ventricular arrhythmias.    He and his wife are looking forward to winter in [place].  They have requested a home INR testing kit to use in [place], and the INR clinic is looking into this for him.  He is feeling good and has no complaints.     Echo on 9/14/2022 showed  The left ventricle is normal in size.  The visual ejection fraction is 55-60%.  There is a catheter/pacemaker lead seen in the right ventricle.  The left atrium is mildly dilated.  Mild mitral valve prolapse, posterior leaflet  There is moderate (2+) mitral regurgitation.  The mitral regurgitant jet is posteriorly directed, which is consistent with  anterior leaflet pathology.  Compared to prior study, there is no  significant change.    CURRENT MEDICATIONS:  Current Outpatient Medications   Medication Sig Dispense Refill     amLODIPine (NORVASC) 5 MG tablet Take 0.5 tablets (2.5 mg) by mouth daily 90 tablet 3     levETIRAcetam (KEPPRA) 750 MG tablet Take 1 tablet (750 mg) by mouth 2 times daily       losartan (COZAAR) 100 MG tablet Take 1 tablet (100 mg) by mouth daily 90 tablet 3     metoprolol succinate ER (TOPROL-XL) 50 MG 24 hr tablet Take 1 tablet (50 mg) by mouth daily 90 tablet 3     Multiple Vitamins-Minerals (ZINC PO)        warfarin ANTICOAGULANT (COUMADIN) 2.5 MG tablet Take 0.5-1 tablets (1.25-2.5 mg) by mouth daily OR as directed by INR clinic. 70 tablet 1       ALLERGIES     Allergies   Allergen Reactions     Sulfa Drugs      rash       PAST MEDICAL, SURGICAL, FAMILY, SOCIAL HISTORY:  History was reviewed and updated as needed, see medical record.    REVIEW OF SYSTEMS:  Skin:  Positive for bruising   Eyes:  Positive for glasses  ENT:  Negative    Respiratory:  Negative shortness of breath;dyspnea on exertion  Cardiovascular:  chest pain;Negative;edema;lightheadedness;dizziness;fatigue palpitations;Positive for  Gastroenterology: Negative    Genitourinary:  Negative    Musculoskeletal:  Negative back pain;neck pain  Neurologic:  Negative headaches  Psychiatric:  Negative    Heme/Lymph/Imm:  Positive for allergies  Endocrine:  Negative      PHYSICAL EXAM:      BP: 107/65 Pulse: 64     SpO2: 97 %      Vital Signs with Ranges  Pulse:  [63-64] 64  BP: ()/(63-65) 107/65  SpO2:  [97 %] 97 %  153 lbs 12.8 oz    Constitutional: awake, alert, no distress  Eyes: sclera nonicteric  ENT: trachea midline  Respiratory: clear bilaterally  Cardiovascular: regular rate and rhythm I/VI apical systolic murmur  GI: nondistended, nontender, bowel sounds present  Skin: dry, no rash no edema  Musculoskeletal: grossly normal muscle bulk and tone  Neuropsychiatric: appropriate affect    Recent Lab Results:  LIPID RESULTS:  Lab  Results   Component Value Date    CHOL 159 09/14/2022    CHOL 179 12/17/2020    HDL 34 (L) 09/14/2022    HDL 39 (L) 12/17/2020     (H) 09/14/2022     (H) 12/17/2020    TRIG 89 09/14/2022    TRIG 97 12/17/2020    CHOLHDLRATIO 4.5 11/10/2015       LIVER ENZYME RESULTS:  Lab Results   Component Value Date    AST 20 07/26/2021    AST 24 12/17/2020    ALT 29 07/26/2021    ALT 26 12/17/2020       CBC RESULTS:  Lab Results   Component Value Date    WBC 7.0 05/12/2022    WBC 9.7 09/21/2018    RBC 4.90 05/12/2022    RBC 5.19 09/21/2018    HGB 14.3 05/12/2022    HGB 15.4 09/21/2018    HCT 45.6 05/12/2022    HCT 47.7 09/21/2018    MCV 93 05/12/2022    MCV 92 09/21/2018    MCH 29.2 05/12/2022    MCH 29.7 09/21/2018    MCHC 31.4 (L) 05/12/2022    MCHC 32.3 09/21/2018    RDW 13.2 05/12/2022    RDW 13.5 09/21/2018     05/12/2022     09/21/2018       BMP RESULTS:  Lab Results   Component Value Date     05/12/2022     12/17/2020    POTASSIUM 4.2 05/12/2022    POTASSIUM 3.8 12/17/2020    CHLORIDE 108 05/12/2022    CHLORIDE 107 12/17/2020    CO2 29 05/12/2022    CO2 32 12/17/2020    ANIONGAP 2 (L) 05/12/2022    ANIONGAP <1 (L) 12/17/2020    GLC 93 05/12/2022     (H) 12/17/2020    BUN 27 05/12/2022    BUN 19 12/17/2020    CR 0.92 05/12/2022    CR 1.00 12/17/2020    GFRESTIMATED 84 05/12/2022    GFRESTIMATED 71 12/17/2020    GFRESTBLACK 83 12/17/2020    MANUELA 8.9 05/12/2022    MANUELA 9.0 12/17/2020        A1C RESULTS:  No results found for: A1C    INR RESULTS:  Lab Results   Component Value Date    INR 2.5 (H) 09/08/2022    INR 1.9 (H) 08/25/2022    INR 1.04 05/12/2022       Thank you for allowing me to participate in the care of your patient.      Sincerely,     Ana Dean MD     [facility]  cc:   No referring provider defined for this encounter.

## 2022-09-23 ENCOUNTER — TELEPHONE (OUTPATIENT)
Dept: CARDIOLOGY | Facility: CLINIC | Age: 82
End: 2022-09-23

## 2022-09-23 DIAGNOSIS — I10 ESSENTIAL HYPERTENSION: ICD-10-CM

## 2022-09-23 RX ORDER — LOSARTAN POTASSIUM 100 MG/1
100 TABLET ORAL DAILY
Qty: 90 TABLET | Refills: 3 | Status: SHIPPED | OUTPATIENT
Start: 2022-09-23 | End: 2023-07-11

## 2022-09-23 RX ORDER — METOPROLOL SUCCINATE 50 MG/1
50 TABLET, EXTENDED RELEASE ORAL DAILY
Qty: 90 TABLET | Refills: 3 | Status: ON HOLD | OUTPATIENT
Start: 2022-09-23 | End: 2023-06-28

## 2022-09-23 NOTE — TELEPHONE ENCOUNTER
[facility] Refill Guideline reviewed.  Medication meets criteria for refill. Called back to pt, no answer. Left VM advising prescriptions were sent to pharmacy as requested.

## 2022-09-23 NOTE — TELEPHONE ENCOUNTER
[facility] Call Center    Phone Message    May a detailed message be left on voicemail: yes     Reason for Call: Medication Refill Request    Has the patient contacted the pharmacy for the refill? Yes   Name of medication being requested: metoprolol 50 mg and losartan 100 mg   Provider who prescribed the medication: Dr. Dean  Pharmacy: [facility] in [place]  Date medication is needed: 09/23/2022   Pt is almost out and his refill was not placed after his appt and the request is suppose to be for a years worth of refills      Action Taken: Message routed to:  [facility] ([facility]): Cardio    Travel Screening: Not Applicable

## 2022-09-27 ENCOUNTER — HOSPITAL ENCOUNTER (OUTPATIENT)
Dept: CT IMAGING | Facility: CLINIC | Age: 82
Discharge: HOME | End: 2022-09-27
Attending: PHYSICIAN ASSISTANT | Admitting: PHYSICIAN ASSISTANT
Payer: COMMERCIAL

## 2022-09-27 DIAGNOSIS — R31.29 MICROSCOPIC HEMATURIA: ICD-10-CM

## 2022-09-27 LAB
CREAT BLD-MCNC: 1.1 MG/DL (ref 0.7–1.3)
GFR SERPL CREATININE-BSD FRML MDRD: >60 ML/MIN/1.73M2

## 2022-09-27 PROCEDURE — 250N000011 HC RX IP 250 OP 636: Performed by: PHYSICIAN ASSISTANT

## 2022-09-27 PROCEDURE — 250N000009 HC RX 250: Performed by: PHYSICIAN ASSISTANT

## 2022-09-27 PROCEDURE — 82565 ASSAY OF CREATININE: CPT

## 2022-09-27 PROCEDURE — 74178 CT ABD&PLV WO CNTR FLWD CNTR: CPT

## 2022-09-27 RX ORDER — IOPAMIDOL 755 MG/ML
500 INJECTION, SOLUTION INTRAVASCULAR ONCE
Status: COMPLETED | OUTPATIENT
Start: 2022-09-27 | End: 2022-09-27

## 2022-09-27 RX ADMIN — IOPAMIDOL 76 ML: 755 INJECTION, SOLUTION INTRAVENOUS at 14:47

## 2022-09-27 RX ADMIN — SODIUM CHLORIDE 95 ML: 9 INJECTION, SOLUTION INTRAVENOUS at 14:48

## 2022-09-29 ENCOUNTER — LAB (OUTPATIENT)
Dept: LAB | Facility: CLINIC | Age: 82
End: 2022-09-29
Payer: COMMERCIAL

## 2022-09-29 ENCOUNTER — ANTICOAGULATION THERAPY VISIT (OUTPATIENT)
Dept: ANTICOAGULATION | Facility: CLINIC | Age: 82
End: 2022-09-29

## 2022-09-29 DIAGNOSIS — I48.0 PAROXYSMAL ATRIAL FIBRILLATION (H): ICD-10-CM

## 2022-09-29 DIAGNOSIS — I05.9 MITRAL VALVE DISEASE: ICD-10-CM

## 2022-09-29 DIAGNOSIS — I49.5 SICK SINUS SYNDROME (H): Primary | ICD-10-CM

## 2022-09-29 LAB — INR BLD: 2.4 (ref 0.9–1.1)

## 2022-09-29 PROCEDURE — 85610 PROTHROMBIN TIME: CPT

## 2022-09-29 PROCEDURE — 36416 COLLJ CAPILLARY BLOOD SPEC: CPT

## 2022-10-05 ENCOUNTER — TELEPHONE (OUTPATIENT)
Dept: FAMILY MEDICINE | Facility: CLINIC | Age: 82
End: 2022-10-05

## 2022-10-05 ENCOUNTER — ANCILLARY PROCEDURE (OUTPATIENT)
Dept: CARDIOLOGY | Facility: CLINIC | Age: 82
End: 2022-10-05
Attending: INTERNAL MEDICINE
Payer: COMMERCIAL

## 2022-10-05 DIAGNOSIS — I49.5 SICK SINUS SYNDROME (H): ICD-10-CM

## 2022-10-05 DIAGNOSIS — Z95.0 CARDIAC PACEMAKER IN SITU: ICD-10-CM

## 2022-10-05 PROCEDURE — 93280 PM DEVICE PROGR EVAL DUAL: CPT | Performed by: INTERNAL MEDICINE

## 2022-10-05 NOTE — TELEPHONE ENCOUNTER
Reason for Call:  Other      Detailed comments: insurance  Denied a remote INR machine and wife was told of other options if this was denied. Asking for a INR nurse to call back to discuss.     Phone Number Patient can be reached at: Home number on file [phone] (home)    Best Time: any    Can we leave a detailed message on this number? YES    Call taken on 10/5/2022 at 1:07 PM by Denisa Fernandes

## 2022-10-05 NOTE — TELEPHONE ENCOUNTER
Spoke with wife, Paula. Patient received letter from [employer] that insurance does not cover [employer] home monitoring. Patient is leaving for [place] November 26th for the Winter and is hoping to have a home meter by then. Paula was told there are a few companies and another may be covered under insurance. Routing encounter to Casie Coley to advise.     Nani Russ, RN

## 2022-10-06 LAB
MDC_IDC_LEAD_IMPLANT_DT: NORMAL
MDC_IDC_LEAD_IMPLANT_DT: NORMAL
MDC_IDC_LEAD_LOCATION: NORMAL
MDC_IDC_LEAD_LOCATION: NORMAL
MDC_IDC_LEAD_MFG: NORMAL
MDC_IDC_LEAD_MFG: NORMAL
MDC_IDC_LEAD_MODEL: NORMAL
MDC_IDC_LEAD_MODEL: NORMAL
MDC_IDC_LEAD_POLARITY_TYPE: NORMAL
MDC_IDC_LEAD_POLARITY_TYPE: NORMAL
MDC_IDC_LEAD_SERIAL: NORMAL
MDC_IDC_LEAD_SERIAL: NORMAL
MDC_IDC_MSMT_BATTERY_DTM: NORMAL
MDC_IDC_MSMT_BATTERY_IMPEDANCE: 850 OHM
MDC_IDC_MSMT_BATTERY_REMAINING_LONGEVITY: 82 MO
MDC_IDC_MSMT_BATTERY_STATUS: NORMAL
MDC_IDC_MSMT_BATTERY_VOLTAGE: 2.78 V
MDC_IDC_MSMT_LEADCHNL_RA_IMPEDANCE_VALUE: 546 OHM
MDC_IDC_MSMT_LEADCHNL_RA_PACING_THRESHOLD_AMPLITUDE: 0.5 V
MDC_IDC_MSMT_LEADCHNL_RA_PACING_THRESHOLD_AMPLITUDE: 0.5 V
MDC_IDC_MSMT_LEADCHNL_RA_PACING_THRESHOLD_PULSEWIDTH: 0.4 MS
MDC_IDC_MSMT_LEADCHNL_RA_PACING_THRESHOLD_PULSEWIDTH: 0.4 MS
MDC_IDC_MSMT_LEADCHNL_RA_SENSING_INTR_AMPL: 4 MV
MDC_IDC_MSMT_LEADCHNL_RV_IMPEDANCE_VALUE: 667 OHM
MDC_IDC_MSMT_LEADCHNL_RV_PACING_THRESHOLD_AMPLITUDE: 0.75 V
MDC_IDC_MSMT_LEADCHNL_RV_PACING_THRESHOLD_AMPLITUDE: 0.75 V
MDC_IDC_MSMT_LEADCHNL_RV_PACING_THRESHOLD_PULSEWIDTH: 0.4 MS
MDC_IDC_MSMT_LEADCHNL_RV_PACING_THRESHOLD_PULSEWIDTH: 0.4 MS
MDC_IDC_MSMT_LEADCHNL_RV_SENSING_INTR_AMPL: 5.6 MV
MDC_IDC_PG_IMPLANT_DTM: NORMAL
MDC_IDC_PG_MFG: NORMAL
MDC_IDC_PG_MODEL: NORMAL
MDC_IDC_PG_SERIAL: NORMAL
MDC_IDC_PG_TYPE: NORMAL
MDC_IDC_SESS_CLINIC_NAME: NORMAL
MDC_IDC_SESS_DTM: NORMAL
MDC_IDC_SESS_TYPE: NORMAL
MDC_IDC_SET_BRADY_AT_MODE_SWITCH_MODE: NORMAL
MDC_IDC_SET_BRADY_AT_MODE_SWITCH_RATE: 175 {BEATS}/MIN
MDC_IDC_SET_BRADY_LOWRATE: 60 {BEATS}/MIN
MDC_IDC_SET_BRADY_MAX_SENSOR_RATE: 130 {BEATS}/MIN
MDC_IDC_SET_BRADY_MAX_TRACKING_RATE: 130 {BEATS}/MIN
MDC_IDC_SET_BRADY_MODE: NORMAL
MDC_IDC_SET_BRADY_PAV_DELAY_LOW: 350 MS
MDC_IDC_SET_BRADY_SAV_DELAY_LOW: 350 MS
MDC_IDC_SET_LEADCHNL_RA_PACING_AMPLITUDE: 1.5 V
MDC_IDC_SET_LEADCHNL_RA_PACING_CAPTURE_MODE: NORMAL
MDC_IDC_SET_LEADCHNL_RA_PACING_POLARITY: NORMAL
MDC_IDC_SET_LEADCHNL_RA_PACING_PULSEWIDTH: 0.4 MS
MDC_IDC_SET_LEADCHNL_RA_SENSING_POLARITY: NORMAL
MDC_IDC_SET_LEADCHNL_RA_SENSING_SENSITIVITY: 0.5 MV
MDC_IDC_SET_LEADCHNL_RV_PACING_AMPLITUDE: 2 V
MDC_IDC_SET_LEADCHNL_RV_PACING_CAPTURE_MODE: NORMAL
MDC_IDC_SET_LEADCHNL_RV_PACING_POLARITY: NORMAL
MDC_IDC_SET_LEADCHNL_RV_PACING_PULSEWIDTH: 0.4 MS
MDC_IDC_SET_LEADCHNL_RV_SENSING_POLARITY: NORMAL
MDC_IDC_SET_LEADCHNL_RV_SENSING_SENSITIVITY: 2.8 MV
MDC_IDC_SET_ZONE_DETECTION_INTERVAL: 333.33 MS
MDC_IDC_SET_ZONE_DETECTION_INTERVAL: 342.86 MS
MDC_IDC_SET_ZONE_TYPE: NORMAL
MDC_IDC_SET_ZONE_TYPE: NORMAL
MDC_IDC_STAT_AT_BURDEN_PERCENT: 0.1 %
MDC_IDC_STAT_AT_DTM_END: NORMAL
MDC_IDC_STAT_AT_DTM_START: NORMAL
MDC_IDC_STAT_AT_MODE_SW_COUNT: 21
MDC_IDC_STAT_BRADY_AP_VP_PERCENT: 2 %
MDC_IDC_STAT_BRADY_AP_VS_PERCENT: 66 %
MDC_IDC_STAT_BRADY_AS_VP_PERCENT: 0 %
MDC_IDC_STAT_BRADY_AS_VS_PERCENT: 32 %
MDC_IDC_STAT_BRADY_DTM_END: NORMAL
MDC_IDC_STAT_BRADY_DTM_START: NORMAL
MDC_IDC_STAT_EPISODE_RECENT_COUNT: 12
MDC_IDC_STAT_EPISODE_RECENT_COUNT: 2
MDC_IDC_STAT_EPISODE_RECENT_COUNT_DTM_END: NORMAL
MDC_IDC_STAT_EPISODE_RECENT_COUNT_DTM_END: NORMAL
MDC_IDC_STAT_EPISODE_RECENT_COUNT_DTM_START: NORMAL
MDC_IDC_STAT_EPISODE_RECENT_COUNT_DTM_START: NORMAL
MDC_IDC_STAT_EPISODE_TYPE: NORMAL
MDC_IDC_STAT_EPISODE_TYPE: NORMAL

## 2022-10-11 NOTE — TELEPHONE ENCOUNTER
Please see home monitor application encounter-      [employer] order filled out and sent to provider for signature

## 2022-10-12 ENCOUNTER — OFFICE VISIT (OUTPATIENT)
Dept: UROLOGY | Facility: CLINIC | Age: 82
End: 2022-10-12
Payer: COMMERCIAL

## 2022-10-12 ENCOUNTER — TELEPHONE (OUTPATIENT)
Dept: OTHER | Facility: CLINIC | Age: 82
End: 2022-10-12

## 2022-10-12 ENCOUNTER — TELEPHONE (OUTPATIENT)
Dept: ANTICOAGULATION | Facility: CLINIC | Age: 82
End: 2022-10-12

## 2022-10-12 VITALS
DIASTOLIC BLOOD PRESSURE: 72 MMHG | BODY MASS INDEX: 21.92 KG/M2 | HEIGHT: 69 IN | WEIGHT: 148 LBS | SYSTOLIC BLOOD PRESSURE: 108 MMHG

## 2022-10-12 DIAGNOSIS — I72.3 ILIAC ARTERY ANEURYSM (H): Primary | ICD-10-CM

## 2022-10-12 DIAGNOSIS — R31.29 MICROSCOPIC HEMATURIA: Primary | ICD-10-CM

## 2022-10-12 DIAGNOSIS — N40.0 ENLARGED PROSTATE: ICD-10-CM

## 2022-10-12 DIAGNOSIS — R97.20 ELEVATED PROSTATE SPECIFIC ANTIGEN (PSA): ICD-10-CM

## 2022-10-12 DIAGNOSIS — Z79.2 PROPHYLACTIC ANTIBIOTIC: ICD-10-CM

## 2022-10-12 LAB
ALBUMIN UR-MCNC: ABNORMAL MG/DL
APPEARANCE UR: CLEAR
BILIRUB UR QL STRIP: NEGATIVE
COLOR UR AUTO: YELLOW
GLUCOSE UR STRIP-MCNC: NEGATIVE MG/DL
HGB UR QL STRIP: ABNORMAL
KETONES UR STRIP-MCNC: NEGATIVE MG/DL
LEUKOCYTE ESTERASE UR QL STRIP: NEGATIVE
NITRATE UR QL: NEGATIVE
PH UR STRIP: 5.5 [PH] (ref 5–7)
SP GR UR STRIP: 1.02 (ref 1–1.03)
UROBILINOGEN UR STRIP-ACNC: 0.2 E.U./DL

## 2022-10-12 PROCEDURE — 52000 CYSTOURETHROSCOPY: CPT | Performed by: UROLOGY

## 2022-10-12 PROCEDURE — 99214 OFFICE O/P EST MOD 30 MIN: CPT | Mod: 25 | Performed by: UROLOGY

## 2022-10-12 PROCEDURE — 81003 URINALYSIS AUTO W/O SCOPE: CPT | Mod: QW | Performed by: UROLOGY

## 2022-10-12 RX ORDER — LIDOCAINE HYDROCHLORIDE 20 MG/ML
JELLY TOPICAL ONCE
Status: COMPLETED | OUTPATIENT
Start: 2022-10-12 | End: 2022-10-12

## 2022-10-12 RX ORDER — CIPROFLOXACIN 500 MG/1
500 TABLET, FILM COATED ORAL ONCE
Qty: 1 TABLET | Refills: 0 | Status: SHIPPED | OUTPATIENT
Start: 2022-10-12 | End: 2022-10-12

## 2022-10-12 RX ADMIN — LIDOCAINE HYDROCHLORIDE: 20 JELLY TOPICAL at 10:39

## 2022-10-12 ASSESSMENT — PAIN SCALES - GENERAL: PAINLEVEL: NO PAIN (0)

## 2022-10-12 NOTE — TELEPHONE ENCOUNTER
ANTICOAGULATION  MANAGEMENT     Interacting Medication Review    Interacting medication(s): Ciprofloxacin (Cipro) with warfarin.    Duration: Single dose on 10/12/22    Indication: Prophylaxis    New medication?: Yes, interaction may increase INR and risk of bleeding, however, only a single dose       PLAN     No adjustment to anticoagulation plan needed. No Warfarin adjustment needed.     Patient was not contacted    No adjustment to anticoagulation calendar was required    Plan made per [facility] anticoagulation protocol    Nani Russ RN  Anticoagulation Clinic

## 2022-10-12 NOTE — NURSING NOTE
Chief Complaint   Patient presents with     Microhematuria     Cystoscopy     Prior to the start of the procedure and with procedural staff participation, I verbally confirmed the patient s identity using two indicators, relevant allergies, that the procedure was appropriate and matched the consent or emergent situation, and that the correct equipment/implants were available. Immediately prior to starting the procedure I conducted the Time Out with the procedural staff and re-confirmed the patient s name, procedure, and site/side. I have wiped the patient off with the povidone-Iodine solution, draped them, and used Lidocaine hydrochloride jelly. (The Joint Commission universal protocol was followed.)  Yes    Sedation (Moderate or Deep): None    5mL 2% lidocaine hydrochloride Urojet instilled into urethra.    NDC# [address]-6  Lot #: [id number]  Expiration Date:  2/24    Honey oJnes EMT

## 2022-10-12 NOTE — TELEPHONE ENCOUNTER
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    9/27/22 CT urogram noted dilatation of the bilateral common iliac arteries, measuring up to  1.8 cm on the right.     Pt needs to be scheduled for consult with Dr. Wang or Dr. Finnegan.  Will route to scheduling to coordinate an appointment at next availabld.    Appt note: Ref by Dr. Ruy Burns for iliac artery aneurysm; notes and imaging in Epic.    RAO EdwardsN, RN-[facility] [place]

## 2022-10-12 NOTE — PROGRESS NOTES
Office Visit Note  [facility]  [phone]    UROLOGIC DIAGNOSES:   Microscopic hematuria  Elevated PSA    CURRENT INTERVENTIONS:       HISTORY:   This is an 82-year-old gentleman who is undergoing a microscopic hematuria work-up in the urology clinic.  He had a CT urogram performed that showed an enlarged prostate but no other abnormalities.      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 To use swallowing techniques     Episode of transient neurologic symptoms 4/26/2022     Essential hypertension, benign 11/11/2016     Fatigue 4/26/2022     Heart murmur 5/10/2011     Melanoma (H)      Migraine equivalent 11/10/2015     Mitral valve prolapse      Non-rheumatic mitral regurgitation 8/9/2018     Other forms of migraine, without mention of intractable migraine without mention of status migrainosus      Paroxysmal A-fib (H)      Premature ventricular contraction      Presbycusis of both ears 4/26/2022     PVC (premature ventricular contraction) 5/10/2011     Sick sinus syndrome (H) 05/03/2016    s/p PPM implant 7/2/14     Squamous cell carcinoma      Syncope      Weakness of both upper extremities 4/26/2022       PAST SURGICAL HISTORY:   Past Surgical History:   Procedure Laterality Date     APPENDECTOMY OPEN       BIOPSY OF SKIN LESION       COLONOSCOPY N/A 11/20/2015    Procedure: COLONOSCOPY;  Surgeon: David Us MD;  Location:  GI     IMPLANT PACEMAKER  7/2004     MOHS MICROGRAPHIC PROCEDURE         FAMILY HISTORY:   Family History   Problem Relation Age of Onset     Heart Disease Mother         MI     Heart Disease Father         MI     C.A.D. Brother          Just had angioplasty in 2006     Colon Cancer No family hx of        SOCIAL HISTORY:   Social History     Socioeconomic History     Marital status:      Spouse name: Paula     Number of children: 3     Years of education: None     Highest education level: None   Occupational History     Employer: RETIRED     Occupation: engineering managment     Comment: [employer]   Tobacco Use     Smoking status: Former     Packs/day: 1.00     Years: 4.00     Pack years: 4.00     Types: Cigarettes     Quit date: 1967     Years since quittin.8     Smokeless tobacco: Never   Vaping Use     Vaping Use: Never used   Substance and Sexual Activity     Alcohol use: No     Alcohol/week: 0.0 standard drinks     Drug use: No     Sexual activity: Yes     Partners: Female   Other Topics Concern      Service Yes     Blood Transfusions No     Caffeine Concern Yes     Comment: decaff coffee     Occupational Exposure No     Hobby Hazards No     Sleep Concern No     Stress Concern No     Weight Concern No     Special Diet No     Back Care No     Exercise Yes     Comment: Excercises about 4-5 days per week     Bike Helmet Yes     Seat Belt Yes     Self-Exams No     Social Determinants of Health     Financial Resource Strain: Low Risk      Difficulty of Paying Living Expenses: Not hard at all   Food Insecurity: No Food Insecurity     Worried About Running Out of Food in the Last Year: Never true     Ran Out of Food in the Last Year: Never true   Transportation Needs: No Transportation Needs     Lack of Transportation (Medical): No     Lack of Transportation (Non-Medical): No   Physical Activity: Sufficiently Active     Days of Exercise per Week: 5 days     Minutes of Exercise per Session: 50 min   Social Connections: Socially Integrated     Frequency of Communication with Friends and Family: Twice a week     Frequency of Social Gatherings with Friends and Family: Twice a week     Attends Buddhism Services: More than 4 times  "per year     Active Member of Clubs or Organizations: Yes     Marital Status:    Housing Stability: High Risk     Unable to Pay for Housing in the Last Year: No     Number of Places Lived in the Last Year: 3     Unstable Housing in the Last Year: No       Review Of Systems:  Skin: No rash, pruritis, or skin pigmentation  Eyes: No changes in vision  Ears/Nose/Throat: No changes in hearing, no nosebleeds  Respiratory: No shortness of breath, dyspnea on exertion, cough, or hemoptysis  Cardiovascular: No chest pain or palpitations  Gastrointestinal: No diarrhea or constipation. No abdominal pain. No hematochezia  Genitourinary: see HPI  Musculoskeletal: No pain or swelling of joints, normal range of motion  Neurologic: No weakness or tremors  Psychiatric: No recent changes in memory or mood  Hematologic/Lymphatic/Immunologic: No easy bruising or enlarged lymph nodes  Endocrine: No weight gain or loss      PHYSICAL EXAM:    /72   Ht 1.753 m (5' 9\")   Wt 67.1 kg (148 lb)   BMI 21.86 kg/m      Constitutional: Well developed. Conversant and in no acute distress  Eyes: Anicteric sclera, conjunctiva clear, normal extraocular movements  ENT: Normocephalic and atraumatic,   Skin: Warm and dry. No rashes or lesions  Cardiac: No peripheral edema  Back/Flank: Not done  CNS/PNS: Normal musculature and movements, moves all extremities normally  Respiratory: Normal non-labored breathing  Abdomen: Soft nontender and nondistended  Peripheral Vascular: No peripheral edema  Mental Status/Psych: Alert and Oriented x 3. Normal mood and affect    Penis: Normal  Scrotal skin: Normal, no lesions  Testicles: Normal to palpation bilaterally  Epididymis: Normal to palpation bilaterally  Lymphatic: Normal inguinal lymph nodes  Digital Rectal Exam: The prostate is moderately enlarged, benign and symmetric to palpation    Cystoscopy: I performed flexible cystoscopy and the bladder was normal throughout.  No tumors identified " throughout the bladder.  No trabeculations.  On retroflexion of the scope there was no intravesical segment of the prostate.  In pulling back the scope there was moderate lateral lobe enlargement bilaterally.    Imaging: None    Urinalysis: UA RESULTS:  Recent Labs   Lab Test 08/31/22  1356 08/31/22  1350   COLOR  --  Yellow   APPEARANCE  --  Clear   URINEGLC  --  Negative   URINEBILI  --  Negative   URINEKETONE  --  Negative   SG  --  1.015   UBLD  --  Moderate*   URINEPH  --  5.5   PROTEIN  --  Trace*   UROBILINOGEN  --  0.2   NITRITE  --  Negative   LEUKEST  --  Negative   RBCU 5*  --    WBCU <1  --        PSA: 7.09    Post Void Residual:     Other labs: None today      IMPRESSION:  Enlarged prostate  Elevated PSA  Negative hematuria work-up    PLAN:  His urine sample from today will be sent for cytology in order to complete his hematuria work-up.  He was otherwise provided reassurance that no concerning causes for his hematuria been identified.  He would only require no hematuria work-up if he develops any urinary symptoms, worsening hematuria, or gross hematuria.    We discussed his PSA as well.  He is well beyond the normally recommended screening age for prostate cancer.  The PSA is somewhat high.  We discussed the pros and cons of proceeding with prostate cancer and/or further work-up for potential prostate cancer versus stopping screening altogether.  He will think things over and if he does wish to continue PSA screening he will do so with his primary care provider.    I will also alert his primary care physician, Dr. 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  I am not certain if this was already known about or if it requires further follow-up.    Total time spent today in review of outside records and test results, discussion with the patient, and documentation: 25 minutes      Flako Foreman M.D.

## 2022-10-12 NOTE — PATIENT INSTRUCTIONS

## 2022-10-12 NOTE — LETTER
10/12/2022       RE: Charles Nogueira  [address]  [place] [address]     Dear Colleague,    Thank you for referring your patient, Charles Nogueira, to the [facility] [place] at [facility]. Please see a copy of my visit note below.    Office Visit Note  [facility]  [phone]    UROLOGIC DIAGNOSES:   Microscopic hematuria  Elevated PSA    CURRENT INTERVENTIONS:       HISTORY:   This is an 82-year-old gentleman who is undergoing a microscopic hematuria work-up in the urology clinic.  He had a CT urogram performed that showed an enlarged prostate but no other abnormalities.      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 To use swallowing techniques     Episode of transient neurologic symptoms 4/26/2022     Essential hypertension, benign 11/11/2016     Fatigue 4/26/2022     Heart murmur 5/10/2011     Melanoma (H)      Migraine equivalent 11/10/2015     Mitral valve prolapse      Non-rheumatic mitral regurgitation 8/9/2018     Other forms of migraine, without mention of intractable migraine without mention of status migrainosus      Paroxysmal A-fib (H)      Premature ventricular contraction      Presbycusis of both ears 4/26/2022     PVC (premature ventricular contraction) 5/10/2011     Sick sinus syndrome (H) 05/03/2016    s/p PPM implant 7/2/14     Squamous cell carcinoma      Syncope      Weakness of both upper extremities 4/26/2022       PAST SURGICAL HISTORY:   Past Surgical History:   Procedure Laterality Date     APPENDECTOMY OPEN       BIOPSY OF SKIN LESION       COLONOSCOPY N/A  2015    Procedure: COLONOSCOPY;  Surgeon: David Us MD;  Location: [facility]     IMPLANT PACEMAKER  2004     MOHS MICROGRAPHIC PROCEDURE         FAMILY HISTORY:   Family History   Problem Relation Age of Onset     Heart Disease Mother         MI     Heart Disease Father         MI     C.A.D. Brother         Just had angioplasty in      Colon Cancer No family hx of        SOCIAL HISTORY:   Social History     Socioeconomic History     Marital status:      Spouse name: Paula     Number of children: 3     Years of education: None     Highest education level: None   Occupational History     Employer: RETIRED     Occupation: engineering managment     Comment: [employer]   Tobacco Use     Smoking status: Former     Packs/day: 1.00     Years: 4.00     Pack years: 4.00     Types: Cigarettes     Quit date: 1967     Years since quittin.8     Smokeless tobacco: Never   Vaping Use     Vaping Use: Never used   Substance and Sexual Activity     Alcohol use: No     Alcohol/week: 0.0 standard drinks     Drug use: No     Sexual activity: Yes     Partners: Female   Other Topics Concern      Service Yes     Blood Transfusions No     Caffeine Concern Yes     Comment: decaff coffee     Occupational Exposure No     Hobby Hazards No     Sleep Concern No     Stress Concern No     Weight Concern No     Special Diet No     Back Care No     Exercise Yes     Comment: Excercises about 4-5 days per week     Bike Helmet Yes     Seat Belt Yes     Self-Exams No     Social Determinants of Health     Financial Resource Strain: Low Risk      Difficulty of Paying Living Expenses: Not hard at all   Food Insecurity: No Food Insecurity     Worried About Running Out of Food in the Last Year: Never true     Ran Out of Food in the Last Year: Never true   Transportation Needs: No Transportation Needs     Lack of Transportation (Medical): No     Lack of Transportation (Non-Medical): No   Physical Activity: Sufficiently  "Active     Days of Exercise per Week: 5 days     Minutes of Exercise per Session: 50 min   Social Connections: Socially Integrated     Frequency of Communication with Friends and Family: Twice a week     Frequency of Social Gatherings with Friends and Family: Twice a week     Attends Protestant Services: More than 4 times per year     Active Member of Clubs or Organizations: Yes     Marital Status:    Housing Stability: High Risk     Unable to Pay for Housing in the Last Year: No     Number of Places Lived in the Last Year: 3     Unstable Housing in the Last Year: No       Review Of Systems:  Skin: No rash, pruritis, or skin pigmentation  Eyes: No changes in vision  Ears/Nose/Throat: No changes in hearing, no nosebleeds  Respiratory: No shortness of breath, dyspnea on exertion, cough, or hemoptysis  Cardiovascular: No chest pain or palpitations  Gastrointestinal: No diarrhea or constipation. No abdominal pain. No hematochezia  Genitourinary: see HPI  Musculoskeletal: No pain or swelling of joints, normal range of motion  Neurologic: No weakness or tremors  Psychiatric: No recent changes in memory or mood  Hematologic/Lymphatic/Immunologic: No easy bruising or enlarged lymph nodes  Endocrine: No weight gain or loss      PHYSICAL EXAM:    /72   Ht 1.753 m (5' 9\")   Wt 67.1 kg (148 lb)   BMI 21.86 kg/m      Constitutional: Well developed. Conversant and in no acute distress  Eyes: Anicteric sclera, conjunctiva clear, normal extraocular movements  ENT: Normocephalic and atraumatic,   Skin: Warm and dry. No rashes or lesions  Cardiac: No peripheral edema  Back/Flank: Not done  CNS/PNS: Normal musculature and movements, moves all extremities normally  Respiratory: Normal non-labored breathing  Abdomen: Soft nontender and nondistended  Peripheral Vascular: No peripheral edema  Mental Status/Psych: Alert and Oriented x 3. Normal mood and affect    Penis: Normal  Scrotal skin: Normal, no lesions  Testicles: " Normal to palpation bilaterally  Epididymis: Normal to palpation bilaterally  Lymphatic: Normal inguinal lymph nodes  Digital Rectal Exam: The prostate is moderately enlarged, benign and symmetric to palpation    Cystoscopy: I performed flexible cystoscopy and the bladder was normal throughout.  No tumors identified throughout the bladder.  No trabeculations.  On retroflexion of the scope there was no intravesical segment of the prostate.  In pulling back the scope there was moderate lateral lobe enlargement bilaterally.    Imaging: None    Urinalysis: UA RESULTS:  Recent Labs   Lab Test 08/31/22  1356 08/31/22  1350   COLOR  --  Yellow   APPEARANCE  --  Clear   URINEGLC  --  Negative   URINEBILI  --  Negative   URINEKETONE  --  Negative   SG  --  1.015   UBLD  --  Moderate*   URINEPH  --  5.5   PROTEIN  --  Trace*   UROBILINOGEN  --  0.2   NITRITE  --  Negative   LEUKEST  --  Negative   RBCU 5*  --    WBCU <1  --        PSA: 7.09    Post Void Residual:     Other labs: None today      IMPRESSION:  Enlarged prostate  Elevated PSA  Negative hematuria work-up    PLAN:  His urine sample from today will be sent for cytology in order to complete his hematuria work-up.  He was otherwise provided reassurance that no concerning causes for his hematuria been identified.  He would only require no hematuria work-up if he develops any urinary symptoms, worsening hematuria, or gross hematuria.    We discussed his PSA as well.  He is well beyond the normally recommended screening age for prostate cancer.  The PSA is somewhat high.  We discussed the pros and cons of proceeding with prostate cancer and/or further work-up for potential prostate cancer versus stopping screening altogether.  He will think things over and if he does wish to continue PSA screening he will do so with his primary care provider.    I will also alert his primary care physician, Dr. 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  I am  not certain if this was already known about or if it requires further follow-up.    Total time spent today in review of outside records and test results, discussion with the patient, and documentation: 25 minutes      Flako Foreman M.D.

## 2022-10-13 NOTE — TELEPHONE ENCOUNTER
Patient didn't have context for the referral. I directed them to Dr. Burns's  Office for clarification. I provided patient with the [facility] phone#  Patient will call back when ready to schedule.

## 2022-10-13 NOTE — TELEPHONE ENCOUNTER
Future Appointments   Date Time Provider Department Center   10/21/2022 12:00 PM Zach Finnegan MD [facility]   10/27/2022  9:45 AM [facility]

## 2022-10-14 ENCOUNTER — OFFICE VISIT (OUTPATIENT)
Dept: OTHER | Facility: CLINIC | Age: 82
End: 2022-10-14
Attending: INTERNAL MEDICINE
Payer: COMMERCIAL

## 2022-10-14 VITALS
HEIGHT: 69 IN | WEIGHT: 150 LBS | DIASTOLIC BLOOD PRESSURE: 78 MMHG | BODY MASS INDEX: 22.22 KG/M2 | SYSTOLIC BLOOD PRESSURE: 127 MMHG | HEART RATE: 80 BPM | OXYGEN SATURATION: 99 %

## 2022-10-14 DIAGNOSIS — I10 BENIGN ESSENTIAL HYPERTENSION: ICD-10-CM

## 2022-10-14 DIAGNOSIS — E78.5 HYPERLIPIDEMIA LDL GOAL <70: ICD-10-CM

## 2022-10-14 DIAGNOSIS — R09.89 OTHER SPECIFIED SYMPTOMS AND SIGNS INVOLVING THE CIRCULATORY AND RESPIRATORY SYSTEMS: ICD-10-CM

## 2022-10-14 DIAGNOSIS — I70.0 ATHEROSCLEROSIS OF ABDOMINAL AORTA (H): ICD-10-CM

## 2022-10-14 DIAGNOSIS — I48.0 PAROXYSMAL A-FIB (H): ICD-10-CM

## 2022-10-14 DIAGNOSIS — Z95.0 CARDIAC PACEMAKER IN SITU: ICD-10-CM

## 2022-10-14 DIAGNOSIS — I72.3 ILIAC ARTERY ANEURYSM (H): Primary | ICD-10-CM

## 2022-10-14 PROCEDURE — G0463 HOSPITAL OUTPT CLINIC VISIT: HCPCS

## 2022-10-14 PROCEDURE — 99205 OFFICE O/P NEW HI 60 MIN: CPT | Performed by: INTERNAL MEDICINE

## 2022-10-14 RX ORDER — ATORVASTATIN CALCIUM 10 MG/1
10 TABLET, FILM COATED ORAL DAILY
Qty: 30 TABLET | Refills: 0 | Status: SHIPPED | OUTPATIENT
Start: 2022-10-14 | End: 2022-11-16

## 2022-10-14 NOTE — PATIENT INSTRUCTIONS
1.  We will arrange bilateral lower extremity arterial duplex to look for any other peripheral aneurysms in the legs, our staff will call and schedule the test    2.  Goal of LDL that is bad cholesterol should be less than 70 and yours is greater than 100 on many occasions he will benefit with cholesterol-lowering medication initiate atorvastatin 10 mg daily and repeat lipid panel in 6 weeks new prescription sent to the pharmacy    3.  Continue rest of the medications same    4.  Iliac arterial dilatation surveillance in a year

## 2022-10-14 NOTE — PROGRESS NOTES
[facility] INITIAL VASCULAR MEDICINE CONSULT    (New patient visit)      PRIMARY HEALTH CARE PROVIDER:  Ruy Burns MD      REFERRING HEALTH CARE PROVIDER;   Ruy Burns MD      REASON FOR CONSULT: Evaluation and management of iliac artery aneurysm right side 1.8 cm and left side 1.7 cm incidentally found on the CT urogram work-up being done for microscopic hematuria.  He has a complex and complicated past medical and past surgical history as delineated below      HPI: Charles Nogueira is a 82 year old very pleasant male with past medical history of hypertension, hyperlipidemia not on any treatment, sick sinus syndrome status post pacemaker in place and also paroxysmal atrial fibrillation, atherosclerosis of abdominal aorta taking warfarin for A. fib and blood pressure is well controlled with medications developed hematuria underwent work-up including the CT urogram and cystoscopy and CT urogram revealed incidental finding of right iliac artery dilatation at 1.8 cm and left 1.7 cm and he is asymptomatic.  Here for further evaluation  He denies any abdominal pain or back pain no history of claudication  CT scan did not reveal any abdominal aorta aneurysms.    He is new to this clinic reviewed available extensive records in the epic and updated chart      PAST MEDICAL HISTORY  Past Medical History:   Diagnosis Date     Acute ischemic heart disease (H) 8/24/2018     Allergic rhinitis, cause unspecified      Cardiac pacemaker in situ 8/14/2017     Cervicalgia 4/26/2022     Chills 4/26/2022     Chronic diastolic congestive heart failure (H) 7/26/2021     Dysphagia, oropharyngeal phase 06/14/2018    Mild per video swallow study. 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25-2.5 mg) by mouth daily OR as directed by INR clinic.    No current facility-administered medications on file prior to visit.      PAST SURGICAL HISTORY:  Past Surgical History:   Procedure Laterality Date     APPENDECTOMY OPEN       BIOPSY OF SKIN LESION       COLONOSCOPY N/A 11/20/2015    Procedure: COLONOSCOPY;  Surgeon: David Us MD;  Location:  GI     IMPLANT PACEMAKER  7/2004     MOHS MICROGRAPHIC PROCEDURE         ALLERGIES     Allergies   Allergen Reactions     Sulfa Drugs      rash       FAMILY HISTORY  Family History   Problem Relation Age of Onset     Heart Disease Mother         MI     Heart Disease Father         MI     C.A.D. Brother         Just had angioplasty in 2006     Colon Cancer No family hx of        VASCULAR FAMILY HISTORY  1st order relative with atherosclerotic PAD: No  1st order relative with AAA: No  Family history of Familial Hyperlipidemia No  Family History of Hypercoagulable state:No    VASCULAR RISK FACTORS  1. Diabetes:No   2. Smoking: quit smoking some time ago.  3. HTN:  controlled  4.Hyperlipidemia: Yes - controlled      SOCIAL HISTORY  Social History     Socioeconomic History     Marital status:      Spouse name: Paula     Number of children: 3     Years of education: Not on file     Highest education level: Not on file   Occupational History     Employer: RETIRED     Occupation: engineering managment     Comment: [employer]   Tobacco Use     Smoking status: Former     Packs/day: 1.00     Years: 4.00     Pack years: 4.00     Types: Cigarettes     Quit date: 1967     Years since quittin.8     Smokeless tobacco: Never   Vaping Use     Vaping Use: Never used   Substance and Sexual Activity     Alcohol use: No     Alcohol/week: 0.0 standard drinks     Drug use: No     Sexual activity: Yes     Partners: Female   Other Topics Concern      Service Yes     Blood Transfusions No     Caffeine Concern Yes     Comment: decaff coffee     Occupational Exposure No     Hobby Hazards No     Sleep Concern No     Stress Concern No     Weight Concern No     Special Diet No     Back Care No     Exercise Yes     Comment: Excercises about 4-5 days per week     Bike Helmet Yes     Seat Belt Yes     Self-Exams No     Parent/sibling w/ CABG, MI or angioplasty before 65F 55M? Not Asked   Social History Narrative     Not on file     Social Determinants of Health     Financial Resource Strain: Low Risk      Difficulty of Paying Living Expenses: Not hard at all   Food Insecurity: No Food Insecurity     Worried About Running Out of Food in the Last Year: Never true     Ran Out of Food in the Last Year: Never true   Transportation Needs: No Transportation Needs     Lack of Transportation (Medical): No     Lack of Transportation (Non-Medical): No   Physical Activity: Sufficiently Active     Days of Exercise per Week: 5 days     Minutes of Exercise per Session: 50 min   Stress: Not on file   Social Connections: Socially Integrated     Frequency of Communication with Friends and Family: Twice  "a week     Frequency of Social Gatherings with Friends and Family: Twice a week     Attends Quaker Services: More than 4 times per year     Active Member of Clubs or Organizations: Yes     Attends Club or Organization Meetings: Not on file     Marital Status:    Intimate Partner Violence: Not on file   Housing Stability: High Risk     Unable to Pay for Housing in the Last Year: No     Number of Places Lived in the Last Year: 3     Unstable Housing in the Last Year: No       ROS:   General: No change in weight, sleep or appetite.  Normal energy.  No fever or chills  Eyes: Negative for vision changes or eye problems  ENT: No problems with ears, nose or throat.  No difficulty swallowing.  Resp: No coughing, wheezing or shortness of breath  CV: No chest pains or palpitations  GI: No nausea, vomiting,  heartburn, abdominal pain, diarrhea, constipation or change in bowel habits  : No urinary frequency or dysuria, bladder or kidney problems  Musculoskeletal: No significant muscle or joint pains  Neurologic: No headaches, numbness, tingling, weakness, problems with balance or coordination  Psychiatric: No problems with anxiety, depression or mental health  Heme/immune/allergy: No history of bleeding or clotting problems or anemia.  No allergies or immune system problems  Endocrine: No history of thyroid disease, diabetes or other endocrine disorders  Skin: No rashes,worrisome lesions or skin problems  Vascular:  No claudication, lifestyle limiting or otherwise; no ischemic rest pain; no non-healing ulcers. No weakness, No loss of sensation      EXAM:  /78 (BP Location: Left arm, Patient Position: Chair, Cuff Size: Adult Regular)   Pulse 80   Ht 5' 9\" (1.753 m)   Wt 150 lb (68 kg)   SpO2 99%   BMI 22.15 kg/m    In general, the patient is a pleasant male in no apparent distress.    HEENT: NC/AT.  PERRLA.  EOMI.  Sclerae white, not injected.  Nares clear.  Pharynx without erythema or exudate.  Dentition " intact.    Neck: No adenopathy.  No thyromegaly. Carotids +2/2 bilaterally without bruits.  No jugular venous distension.   Heart: RRR. Normal S1, S2 splits physiologically. No murmur, rub, click, or gallop. The PMI is in the 5th ICS in the midclavicular line. There is no heave.    Lungs: CTA.  No ronchi, wheezes, rales.  No dullness to percussion.   Abdomen: Soft, nontender, nondistended. No organomegaly. No AAA.  No bruits.   Extremities: Vascular :no clubbing, cyanosis, or edema.No wounds.   Bilateral symmetrical palpable peripheral pulses DP, PT, popliteal and femoral pulses  No foot ulcers or leg ulcers  Minimal varicose veins        Labs:  LIPID RESULTS:  Lab Results   Component Value Date    CHOL 159 09/14/2022    CHOL 179 12/17/2020    HDL 34 (L) 09/14/2022    HDL 39 (L) 12/17/2020     (H) 09/14/2022     (H) 12/17/2020    TRIG 89 09/14/2022    TRIG 97 12/17/2020    CHOLHDLRATIO 4.5 11/10/2015       LIVER ENZYME RESULTS:  Lab Results   Component Value Date    AST 20 07/26/2021    AST 24 12/17/2020    ALT 29 07/26/2021    ALT 26 12/17/2020       CBC RESULTS:  Lab Results   Component Value Date    WBC 7.0 05/12/2022    WBC 9.7 09/21/2018    RBC 4.90 05/12/2022    RBC 5.19 09/21/2018    HGB 14.3 05/12/2022    HGB 15.4 09/21/2018    HCT 45.6 05/12/2022    HCT 47.7 09/21/2018    MCV 93 05/12/2022    MCV 92 09/21/2018    MCH 29.2 05/12/2022    MCH 29.7 09/21/2018    MCHC 31.4 (L) 05/12/2022    MCHC 32.3 09/21/2018    RDW 13.2 05/12/2022    RDW 13.5 09/21/2018     05/12/2022     09/21/2018       BMP RESULTS:  Lab Results   Component Value Date     05/12/2022     12/17/2020    POTASSIUM 4.2 05/12/2022    POTASSIUM 3.8 12/17/2020    CHLORIDE 108 05/12/2022    CHLORIDE 107 12/17/2020    CO2 29 05/12/2022    CO2 32 12/17/2020    ANIONGAP 2 (L) 05/12/2022    ANIONGAP <1 (L) 12/17/2020    GLC 93 05/12/2022     (H) 12/17/2020    BUN 27 05/12/2022    BUN 19 12/17/2020    CR 1.1  09/27/2022    CR 0.92 05/12/2022    CR 1.00 12/17/2020    GFRESTIMATED >60 09/27/2022    GFRESTIMATED 71 12/17/2020    GFRESTBLACK 83 12/17/2020    MANUELA 8.9 05/12/2022    MANUELA 9.0 12/17/2020        THYROID RESULTS:  Lab Results   Component Value Date    TSH 2.29 04/26/2022    TSH 1.42 09/30/2009       Procedures:   CT UROGRAM WITHOUT AND WITH CONTRAST   9/27/2022 3:03 PM      HISTORY: Microscopic hematuria.     TECHNIQUE: CT urogram protocol was performed. Volumetric helical  sections were acquired from the lung bases through the ischial  tuberosities prior to administration of IV contrast. 76mL Isovue-370  IV were administered using a split bolus technique. After contrast  administration, volumetric helical sections were again acquired from  the lung bases to the ischial tuberosities.  Coronal images were also  reconstructed. Radiation dose for this scan was reduced using  automated exposure control, adjustment of the mA and/or kV according  to patient size, or iterative reconstruction technique.     COMPARISON: None.     FINDINGS:      Right urinary tract: No urinary calculi. No hydronephrosis. No solid  renal masses. Small cortical cysts in the lower pole of the right  kidney would require no specific follow-up. The collecting system and  proximal ureter are moderately well opacified, and no filling defects  are identified.     Left urinary tract: No urinary calculi. No hydronephrosis. No solid  renal masses. The collecting system and proximal ureter are moderately  well opacified, and no filling defects are identified.     Urinary bladder: No bladder stones or masses are identified. Mild  prostatic enlargement.     Other findings:  The visualized lung bases are clear. Several  gallstones are noted within the gallbladder. The liver, gallbladder,  pancreas, spleen, and adrenal glands are otherwise unremarkable. No  bowel obstruction. No lymphadenopathy. Moderate atherosclerotic  aortoiliac calcification. Dilatation  of the bilateral common iliac  arteries measure up to 1.8 cm on the right and 1.7 cm on the left. No  free fluid in the pelvis. No destructive bony lesions.                                                                      IMPRESSION:   1. Mild prostatic enlargement.  2. No other cause for hematuria is identified. No urinary calculi or  urinary tract masses.   3. Cholelithiasis.  4. Dilatation of the bilateral common iliac arteries, measuring up to  1.8 cm on the right.      BARBARA CRAFT MD          Assessment and Plan:     1. Iliac artery aneurysm (H) Rt 1.8 cm and left 1.7 Cm on CT urogram 9/27/2022    2. Atherosclerosis of abdominal aorta (H)    3. Benign essential hypertension    4. Paroxysmal A-fib (H)    5. Cardiac pacemaker in situ sec to SSS    6. Hyperlipidemia LDL goal <70    7.  Former smoker      This is a very pleasant male with history of multiple medical problems as delineated above incidentally found dilatation of the bilateral iliac arteries right 1.8 and left 1.7 cm and he is asymptomatic but he has extensive atherosclerosis of abdominal aorta and also paroxysmal A. fib on warfarin.  Reviewed imaging studies with the patient.  He is not on any statin LDL greater than 100 on many occasions  Blood pressure is well controlled and taking appropriate medications  He accompanied by his wife today for this visit and they are planning to go to [place] for the winter and leaving last week of November and coming back sometime in May 2023.  He is a former smoker    At present my recommendations,    Will obtain bilateral lower extremity arterial duplex to rule out other peripheral aneurysms  Continue current blood pressure medications and other medications  Walk as much as he can  Initiate atorvastatin 10 mg daily new prescription given, will repeat lipid panel in 6 weeks before he leaves to [place] if he tolerates and benefits will prescribe 20 mg tablets and then he can cut into half and this can help  his [place] trip duration.  Virtual or office follow-up with me in May or June 2023    - atorvastatin (LIPITOR) 10 MG tablet; Take 1 tablet (10 mg) by mouth daily  Dispense: 30 tablet; Refill: 0  - Lipid panel reflex to direct LDL Fasting; Future      60  minutes spent on the date of the encounter doing chart review, history and exam, documentation, and further activities as noted above.  He is new to this clinic reviewed extensive records in the epic and updated chart  AVS with written instructions given and patient and his wife had a lot of questions and all of them were answered    Thank you for the consultation !  This note was dictated by utilizing Dragon software  Copy of this note to primary care physician    Zach Finnegan MD, KEVIN, FSROHIT, FNLA  Vascular medicine  Clinical Lipidologist  Clinical hypertension specialist

## 2022-10-14 NOTE — PROGRESS NOTES
"Patient is here to discuss consult.    /78 (BP Location: Left arm, Patient Position: Chair, Cuff Size: Adult Regular)   Pulse 80   Ht 5' 9\" (1.753 m)   Wt 150 lb (68 kg)   SpO2 99%   BMI 22.15 kg/m      Questions patient would like addressed today are: N/A.    Refills are needed: N/A    Has homecare services and agency name:  [facility]  "

## 2022-10-17 ENCOUNTER — TELEPHONE (OUTPATIENT)
Dept: OTHER | Facility: CLINIC | Age: 82
End: 2022-10-17

## 2022-10-17 ENCOUNTER — TELEPHONE (OUTPATIENT)
Dept: FAMILY MEDICINE | Facility: CLINIC | Age: 82
End: 2022-10-17

## 2022-10-17 DIAGNOSIS — I48.0 PAROXYSMAL ATRIAL FIBRILLATION (H): ICD-10-CM

## 2022-10-17 RX ORDER — WARFARIN SODIUM 2.5 MG/1
2.5 TABLET ORAL DAILY
Qty: 90 TABLET | Refills: 1 | Status: ON HOLD | OUTPATIENT
Start: 2022-10-17 | End: 2023-06-28

## 2022-10-17 NOTE — TELEPHONE ENCOUNTER
Spoke to Paula Aguirre sent for warfarin so they have enough for going to [place] for 5 months.  [employer] denied home INR monitor. Said another home monitor company will contact them soon hopefully.  Nothing else needed at this time.  Melissa Grady RN

## 2022-10-17 NOTE — TELEPHONE ENCOUNTER
Reason for Call:  Anticoagulation call back    Detailed comments: Sharon called she has some questions about Charles's medication. Please call and discuss    Phone Number Patient can be reached at: Home number on file  [phone]    Best Time: Anytime    Can we leave a detailed message on this number? YES    Call taken on 10/17/2022 at 11:30 AM by Christel John

## 2022-10-17 NOTE — TELEPHONE ENCOUNTER
Follow-up to 10/14/22      BLE Arterial Ultrasound (next available)    Fasting labs (lipids) in 6 weeks (last week of November or first week of December)    Results will be communicated via [employer] System[employer] or telephone.

## 2022-10-18 NOTE — TELEPHONE ENCOUNTER
Future Appointments   Date Time Provider Department Center   10/26/2022  1:00 PM [id number]         Patients spouse explained that they are leaving the country prior to the end of November and were told that the lab could be collected mid November. Please advise.

## 2022-10-18 NOTE — TELEPHONE ENCOUNTER
Left voicemail with instructions for patient to call back to schedule their appointment(s)    October 18, 2022 , 10:58 AM

## 2022-10-18 NOTE — TELEPHONE ENCOUNTER
Patient was started on Atorvastatin at 10/14/22 office visit. Dr. Finnegan wanted to re-check lipids in 6 weeks to give the medication enough time to work. If patient completes lab early it may not have been enough time to show if medication is working. This is up to the patient if he wants the lab done early- Dr. Finnegan recommended the recheck at 6 weeks.     Debi MULTANI, RN    [facility]  Office: [phone]  Fax: [phone]

## 2022-10-19 NOTE — TELEPHONE ENCOUNTER
Left voicemail with instructions for patient to call back to schedule their appointment(s)    October 19, 2022 , 9:35 AM

## 2022-10-20 NOTE — TELEPHONE ENCOUNTER
Spoke with wife as patient was driving.  They have having INR done 10/26/22 and will schedule the lab for Dr. Finnegan around 11/14/22.  Wife has the number and will call to make another appt to have Dr. Finnegan lab drawn. Wants to keep imaging appointment scheduled as is.

## 2022-10-26 ENCOUNTER — HOSPITAL ENCOUNTER (OUTPATIENT)
Dept: ULTRASOUND IMAGING | Facility: CLINIC | Age: 82
Discharge: HOME | End: 2022-10-26
Attending: INTERNAL MEDICINE | Admitting: INTERNAL MEDICINE
Payer: COMMERCIAL

## 2022-10-26 DIAGNOSIS — I72.3 ILIAC ARTERY ANEURYSM (H): ICD-10-CM

## 2022-10-26 DIAGNOSIS — R09.89 OTHER SPECIFIED SYMPTOMS AND SIGNS INVOLVING THE CIRCULATORY AND RESPIRATORY SYSTEMS: ICD-10-CM

## 2022-10-26 PROCEDURE — 93925 LOWER EXTREMITY STUDY: CPT

## 2022-10-27 ENCOUNTER — ANTICOAGULATION THERAPY VISIT (OUTPATIENT)
Dept: ANTICOAGULATION | Facility: CLINIC | Age: 82
End: 2022-10-27

## 2022-10-27 ENCOUNTER — LAB (OUTPATIENT)
Dept: LAB | Facility: CLINIC | Age: 82
End: 2022-10-27
Payer: COMMERCIAL

## 2022-10-27 DIAGNOSIS — I05.9 MITRAL VALVE DISEASE: ICD-10-CM

## 2022-10-27 DIAGNOSIS — I48.0 PAROXYSMAL ATRIAL FIBRILLATION (H): ICD-10-CM

## 2022-10-27 DIAGNOSIS — I49.5 SICK SINUS SYNDROME (H): Primary | ICD-10-CM

## 2022-10-27 LAB — INR BLD: 2.6 (ref 0.9–1.1)

## 2022-10-27 PROCEDURE — 85610 PROTHROMBIN TIME: CPT

## 2022-10-27 PROCEDURE — 36415 COLL VENOUS BLD VENIPUNCTURE: CPT

## 2022-10-27 NOTE — PROGRESS NOTES
ANTICOAGULATION MANAGEMENT     Charles Nogueira 82 year old male is on warfarin with therapeutic INR result. (Goal INR 2.0-3.0)    Recent labs: (last 7 days)     10/27/22  0911   INR 2.6*       ASSESSMENT       Source(s): Chart Review and Patient/Caregiver Call       Warfarin doses taken: Warfarin taken as instructed    Diet: No new diet changes identified    New illness, injury, or hospitalization: No    Medication/supplement changes: 10/14/22: Started Atorvastatin 10mg     Signs or symptoms of bleeding or clotting: No    Previous INR: Therapeutic last 2(+) visits    Additional findings: 10/11/22 order for [facility] home monitor sent, since [employer] was denied, patient heads to [place] on 11/26/22 for 5 months, there are labs/clinics close to them to have INR drawn if home monitor isn't an option        PLAN     Recommended plan for ongoing change(s) affecting INR     Dosing Instructions: Continue your current warfarin dose with next INR in 3 weeks       Summary  As of 10/27/2022    Full warfarin instructions:  2.5 mg every Sun, Wed, Fri; 1.25 mg all other days; Starting 10/27/2022   Next INR check:  11/14/2022             Telephone call with  wife who verbalizes understanding and agrees to plan    Lab visit scheduled    Education provided:     Goal range and lab monitoring: goal range and significance of current result    Interaction IS anticipated between warfarin and statin    Information on home INR monitoring    Contact [phone] with any changes, questions or concerns.     Plan made per [facility] anticoagulation protocol    Jessika Yoo, RN  Anticoagulation Clinic  10/27/2022    _______________________________________________________________________     Anticoagulation Episode Summary     Current INR goal:  2.0-3.0   TTR:  83.5 % (5.2 mo)   Target end date:  Indefinite   Send INR reminders to:  [facility] HEART INR NURSE    Indications    Sick sinus syndrome (H) [I49.5]  Paroxysmal atrial fibrillation (H)  [I48.0]  Mitral valve disease [I05.9]           Comments:           Anticoagulation Care Providers     Provider Role Specialty Phone number    Ana Dean MD Referring Cardiovascular Disease [phone]

## 2022-11-14 ENCOUNTER — ANTICOAGULATION THERAPY VISIT (OUTPATIENT)
Dept: ANTICOAGULATION | Facility: CLINIC | Age: 82
End: 2022-11-14

## 2022-11-14 ENCOUNTER — LAB (OUTPATIENT)
Dept: LAB | Facility: CLINIC | Age: 82
End: 2022-11-14
Payer: COMMERCIAL

## 2022-11-14 DIAGNOSIS — I48.0 PAROXYSMAL ATRIAL FIBRILLATION (H): ICD-10-CM

## 2022-11-14 DIAGNOSIS — I10 ESSENTIAL HYPERTENSION: ICD-10-CM

## 2022-11-14 DIAGNOSIS — I05.9 MITRAL VALVE DISEASE: ICD-10-CM

## 2022-11-14 DIAGNOSIS — I49.5 SICK SINUS SYNDROME (H): Primary | ICD-10-CM

## 2022-11-14 DIAGNOSIS — E78.5 HYPERLIPIDEMIA LDL GOAL <70: ICD-10-CM

## 2022-11-14 LAB — INR BLD: 2.6 (ref 0.9–1.1)

## 2022-11-14 PROCEDURE — 36415 COLL VENOUS BLD VENIPUNCTURE: CPT

## 2022-11-14 PROCEDURE — 85610 PROTHROMBIN TIME: CPT

## 2022-11-14 NOTE — PROGRESS NOTES
ANTICOAGULATION MANAGEMENT     Charles Nogueira 82 year old male is on warfarin with therapeutic INR result. (Goal INR 2.0-3.0)    Recent labs: (last 7 days)     11/14/22  1020   INR 2.6*       ASSESSMENT       Source(s): Chart Review and Patient/Caregiver Call       Warfarin doses taken: Warfarin taken as instructed    Diet: No new diet changes identified    New illness, injury, or hospitalization: No    Medication/supplement changes: None noted    Signs or symptoms of bleeding or clotting: No    Previous INR: Therapeutic last 2(+) visits    Additional findings: They did get a call from the home INR company. They know they are under a time constraint. They are going to try and get machine to them and do education prior to them leaving for [place] on 11/29.       PLAN     Recommended plan for no diet, medication or health factor changes affecting INR     Dosing Instructions: Continue your current warfarin dose with next INR in 3-4 weeks       Summary  As of 11/14/2022    Full warfarin instructions:  2.5 mg every Sun, Wed, Fri; 1.25 mg all other days; Starting 11/14/2022   Next INR check:  12/12/2022             Telephone call with Paula who verbalizes understanding and agrees to plan    May have home meter for next check.    Education provided:     Please call back if any changes to your diet, medications or how you've been taking warfarin    Goal range and lab monitoring: Importance of therapeutic range    Plan made per [facility] anticoagulation protocol    Louise Baltazar, RN  Anticoagulation Clinic  11/14/2022    _______________________________________________________________________     Anticoagulation Episode Summary     Current INR goal:  2.0-3.0   TTR:  85.2 % (5.8 mo)   Target end date:  Indefinite   Send INR reminders to:  [facility] HEART INR NURSE    Indications    Sick sinus syndrome (H) [I49.5]  Paroxysmal atrial fibrillation (H) [I48.0]  Mitral valve disease [I05.9]           Comments:            Anticoagulation Care Providers     Provider Role Specialty Phone number    Ana Dean MD Referring Cardiovascular Disease [phone]

## 2022-11-15 ENCOUNTER — LAB (OUTPATIENT)
Dept: LAB | Facility: CLINIC | Age: 82
End: 2022-11-15
Payer: COMMERCIAL

## 2022-11-15 ENCOUNTER — TELEPHONE (OUTPATIENT)
Dept: CARDIOLOGY | Facility: CLINIC | Age: 82
End: 2022-11-15

## 2022-11-15 DIAGNOSIS — E78.5 HYPERLIPIDEMIA LDL GOAL <70: ICD-10-CM

## 2022-11-15 DIAGNOSIS — I10 ESSENTIAL HYPERTENSION: ICD-10-CM

## 2022-11-15 LAB
ALT SERPL W P-5'-P-CCNC: 22 U/L (ref 10–50)
ANION GAP SERPL CALCULATED.3IONS-SCNC: 13 MMOL/L (ref 7–15)
BUN SERPL-MCNC: 19.5 MG/DL (ref 8–23)
CALCIUM SERPL-MCNC: 9.1 MG/DL (ref 8.8–10.2)
CHLORIDE SERPL-SCNC: 106 MMOL/L (ref 98–107)
CHOLEST SERPL-MCNC: 133 MG/DL
CREAT SERPL-MCNC: 1.12 MG/DL (ref 0.67–1.17)
DEPRECATED HCO3 PLAS-SCNC: 25 MMOL/L (ref 22–29)
GFR SERPL CREATININE-BSD FRML MDRD: 66 ML/MIN/1.73M2
GLUCOSE SERPL-MCNC: 98 MG/DL (ref 70–99)
HDLC SERPL-MCNC: 39 MG/DL
LDLC SERPL CALC-MCNC: 77 MG/DL
NONHDLC SERPL-MCNC: 94 MG/DL
POTASSIUM SERPL-SCNC: 3.9 MMOL/L (ref 3.4–5.3)
SODIUM SERPL-SCNC: 144 MMOL/L (ref 136–145)
TRIGL SERPL-MCNC: 84 MG/DL

## 2022-11-15 PROCEDURE — 36415 COLL VENOUS BLD VENIPUNCTURE: CPT

## 2022-11-15 PROCEDURE — 80048 BASIC METABOLIC PNL TOTAL CA: CPT

## 2022-11-15 PROCEDURE — 84460 ALANINE AMINO (ALT) (SGPT): CPT

## 2022-11-15 PROCEDURE — 80061 LIPID PANEL: CPT

## 2022-11-15 NOTE — TELEPHONE ENCOUNTER
Called pt. Rescheduled pt's remote appt as he will be out of the country for 5 mo. He is coming back for a week in Feb. Rescheduled to accommodate travels. VICENTA Sainz

## 2022-11-15 NOTE — RESULT ENCOUNTER NOTE
Your lipids improved and as we discussed during last office visit I will increase atorvastatin dose to 20 mg daily and send 90-day supply and this should last to her [place] trip.  Electrolyte panel looks good  Liver test ALT looks good  Continue same plan discussed in the clinic

## 2022-11-15 NOTE — TELEPHONE ENCOUNTER
[facility] Call Center    Phone Message    May a detailed message be left on voicemail: yes     Reason for Call: Other: Pt would like someone to contact him about changing his appoinmtment for a device check in Jan. Please reach ouit to the Pt so that he can update his appointment.     Action Taken: Other: cardio    Travel Screening: Not Applicable         Thank you!  [facility]

## 2022-11-16 ENCOUNTER — TELEPHONE (OUTPATIENT)
Dept: OTHER | Facility: CLINIC | Age: 82
End: 2022-11-16

## 2022-11-16 DIAGNOSIS — E78.5 HYPERLIPIDEMIA LDL GOAL <70: ICD-10-CM

## 2022-11-16 RX ORDER — ATORVASTATIN CALCIUM 20 MG/1
20 TABLET, FILM COATED ORAL DAILY
Qty: 90 TABLET | Refills: 0 | Status: SHIPPED | OUTPATIENT
Start: 2022-11-16 | End: 2023-04-25

## 2022-11-16 NOTE — TELEPHONE ENCOUNTER
Per review of 10/14/22 note:  Initiate atorvastatin 10 mg daily new prescription given, will repeat lipid panel in 6 weeks before he leaves to [place] if he tolerates and benefits will prescribe 20 mg tablets and then he can cut into half and this can help his [place] trip duration.    Per result note from lipid panel:  Your lipids improved and as we discussed during last office visit I will increase atorvastatin dose to 20 mg daily and send 90-day supply and this should last to her [place] trip.  Electrolyte panel looks good  Liver test ALT looks good ...    Refill corrected.  Spoke with patient's wife, Paula, who had no further questions.    Sobia Stoner, RAON, RN-[facility] [place]

## 2022-11-16 NOTE — TELEPHONE ENCOUNTER
[facility]    Who is the name of the provider: Dr Finnegan    What is the location you see this provider at/preferred location: [place] / [place]    Person calling: Charles    Phone number:  [phone]    Nurse call back needed:  Yes    Reason for call:     Pt stated that he needs a Refill on his Lipitor - he stated that the supply was changed to 90 tablets and the dosage was changed to 20 mg?      Pharmacy location: [facility]    Outside Imaging:     Can we leave a detailed message on this number: Yes    Additional Info:

## 2022-11-21 ENCOUNTER — TRANSFERRED RECORDS (OUTPATIENT)
Dept: HEALTH INFORMATION MANAGEMENT | Facility: CLINIC | Age: 82
End: 2022-11-21

## 2022-11-21 ENCOUNTER — HEALTH MAINTENANCE LETTER (OUTPATIENT)
Age: 82
End: 2022-11-21

## 2022-11-21 ENCOUNTER — ANTICOAGULATION THERAPY VISIT (OUTPATIENT)
Dept: ANTICOAGULATION | Facility: CLINIC | Age: 82
End: 2022-11-21

## 2022-11-21 DIAGNOSIS — I48.0 PAROXYSMAL ATRIAL FIBRILLATION (H): ICD-10-CM

## 2022-11-21 DIAGNOSIS — I05.9 MITRAL VALVE DISEASE: ICD-10-CM

## 2022-11-21 DIAGNOSIS — I49.5 SICK SINUS SYNDROME (H): Primary | ICD-10-CM

## 2022-11-21 LAB — INR (EXTERNAL): 1.9 (ref 0.9–1.1)

## 2022-11-21 NOTE — PROGRESS NOTES
ANTICOAGULATION MANAGEMENT     Charles Nogueira 82 year old male is on warfarin with subtherapeutic INR result. (Goal INR 2.0-3.0)    Recent labs: (last 7 days)     11/21/22  0000   INR 1.9*       ASSESSMENT       Source(s): Chart Review       Warfarin doses taken: Warfarin taken as instructed    Diet: No new diet changes identified    New illness, injury, or hospitalization: No    Medication/supplement changes: None noted    Signs or symptoms of bleeding or clotting: No    Previous INR: Therapeutic last 2(+) visits    Additional findings: talked with wife about testing weekly. She notes she was never informed of this. Noted it is given during training and the form. Called and verified with [facility] that patient is to test weekly.    Called wife back and she will be returning the make because she will not be testing him weekly. They will go to a lab in [place] and have blood work done and call it in.       PLAN     Recommended plan for no diet, medication or health factor changes affecting INR     Dosing Instructions: Continue your current warfarin dose with next INR in 1 week       Summary  As of 11/21/2022    Full warfarin instructions:  2.5 mg every Sun, Wed, Fri; 1.25 mg all other days; Starting 11/21/2022   Next INR check:  11/25/2022             Telephone call with  Paula who verbalizes understanding and agrees to plan    Lab visit scheduled    Education provided:     Informed will have to return home monitor.             ANTICOAGULATION  MANAGEMENT- Travel planning    Charles Nogueira reports upcoming travel plans to [place].    Departure date: 11/26/2022  Anticipated return date: 04/15/2022  Alternate contact information (if applicable): Paula [phone]      INR monitoring plan:     [facility] to monitor and dose while traveling. patient to follow up with [facility] with lab information to fax/mail standing order. Faxed results to be routed to [facility]     will be update after lab on 11/25/2022      Instructed to call the Anticoauglation Clinic for any changes, questions or concerns. [phone]   ?   Ana Martínez RN              Plan made per [facility] anticoagulation protocol    Ana Martínez RN  Anticoagulation Clinic  11/21/2022    _______________________________________________________________________     Anticoagulation Episode Summary     Current INR goal:  2.0-3.0   TTR:  85.2 % (6 mo)   Target end date:  Indefinite   Send INR reminders to:  [facility]    Indications    Sick sinus syndrome (H) [I49.5]  Paroxysmal atrial fibrillation (H) [I48.0]  Mitral valve disease [I05.9]           Comments:           Anticoagulation Care Providers     Provider Role Specialty Phone number    Ana Dean MD Referring Cardiovascular Disease [phone]

## 2022-11-21 NOTE — PROGRESS NOTES
Incoming fax from [employer]    Date of result 11/21/22    INR result 1.9    New home monitoring

## 2022-11-25 ENCOUNTER — ANTICOAGULATION THERAPY VISIT (OUTPATIENT)
Dept: ANTICOAGULATION | Facility: CLINIC | Age: 82
End: 2022-11-25

## 2022-11-25 ENCOUNTER — LAB (OUTPATIENT)
Dept: LAB | Facility: CLINIC | Age: 82
End: 2022-11-25
Payer: COMMERCIAL

## 2022-11-25 DIAGNOSIS — I05.9 MITRAL VALVE DISEASE: ICD-10-CM

## 2022-11-25 DIAGNOSIS — I49.5 SICK SINUS SYNDROME (H): Primary | ICD-10-CM

## 2022-11-25 DIAGNOSIS — I48.0 PAROXYSMAL ATRIAL FIBRILLATION (H): ICD-10-CM

## 2022-11-25 LAB — INR BLD: 1.6 (ref 0.9–1.1)

## 2022-11-25 PROCEDURE — 36416 COLLJ CAPILLARY BLOOD SPEC: CPT

## 2022-11-25 PROCEDURE — 85610 PROTHROMBIN TIME: CPT

## 2022-11-25 NOTE — PROGRESS NOTES
ANTICOAGULATION  MANAGEMENT- Travel planning    Charles Nogueira reports upcoming travel plans to [place].    Departure date: 11/26/22  Anticipated return date: 4/15/23  Alternate contact information (if applicable): [phone] wife cell      INR monitoring plan:     [email or web address] to monitor and dose while traveling. patient to follow up with [facility] with lab information to fax/mail standing order. Faxed results to be routed to [facility] heart inr nurse pool .  Wife said there is a lab close to them and in the past they didn't need an order.  She will check next week with the lab and if they need an order faxed she will call the INR clinic.  Provided our phone and fax number.  She will return to [place] briefly in February and INR was scheduled in [place] for that time.  Reviewed need for provider in [place] to manage INRs if out of town for more then 3 months for the future.    Anticoagulation calendar updated with date of next INR     Instructed to call the Anticoauglation Clinic for any changes, questions or concerns. [phone]  ?   Jessika Yoo RN

## 2022-11-25 NOTE — PROGRESS NOTES
ANTICOAGULATION MANAGEMENT     Charles Nogueira 82 year old male is on warfarin with subtherapeutic INR result. (Goal INR 2.0-3.0)    Recent labs: (last 7 days)     11/25/22  0934   INR 1.6*       ASSESSMENT       Source(s): Chart Review and Patient/Caregiver Call       Warfarin doses taken: Warfarin taken as instructed    Diet: No new diet changes identified    New illness, injury, or hospitalization: No    Medication/supplement changes: None noted    Signs or symptoms of bleeding or clotting: No    Previous INR: Subtherapeutic    Additional findings: Returned home INR machine to [facility] on 11/21/22, they thought testing would be every 2 weeks and did not want weekly mainly because they didn't have enough test strips per wife to do weekly before leaving for [place] tomorrow       PLAN     Recommended plan for no diet, medication or health factor changes affecting INR     Dosing Instructions: booster dose then Increase your warfarin dose (10% change) with next INR in 1-2 week       Summary  As of 11/25/2022    Full warfarin instructions:  11/25: 3.75 mg; Otherwise 1.25 mg every Tue, Thu, Sat; 2.5 mg all other days; Starting 11/25/2022   Next INR check:  12/7/2022             Telephone call with  wife who verbalizes understanding and agrees to plan    check INR in [place]    Education provided:     Goal range and lab monitoring: goal range and significance of current result    Contact [phone] with any changes, questions or concerns.     Plan made per [facility] anticoagulation protocol    Jessika Yoo, RN  Anticoagulation Clinic  11/25/2022    _______________________________________________________________________     Anticoagulation Episode Summary     Current INR goal:  2.0-3.0   TTR:  83.2 % (6.2 mo)   Target end date:  Indefinite   Send INR reminders to:  [facility] HEART INR NURSE    Indications    Sick sinus syndrome (H) [I49.5]  Paroxysmal atrial fibrillation (H) [I48.0]  Mitral valve disease [I05.9]            Comments:           Anticoagulation Care Providers     Provider Role Specialty Phone number    Ana Dean MD Referring Cardiovascular Disease [phone]

## 2022-12-08 ENCOUNTER — TELEPHONE (OUTPATIENT)
Dept: ANTICOAGULATION | Facility: CLINIC | Age: 82
End: 2022-12-08

## 2022-12-08 DIAGNOSIS — I05.9 MITRAL VALVE DISEASE: ICD-10-CM

## 2022-12-08 DIAGNOSIS — I48.0 PAROXYSMAL ATRIAL FIBRILLATION (H): ICD-10-CM

## 2022-12-08 DIAGNOSIS — I49.5 SICK SINUS SYNDROME (H): Primary | ICD-10-CM

## 2022-12-08 LAB — INR (EXTERNAL): 5.2 (ref 0.9–1.1)

## 2022-12-08 NOTE — TELEPHONE ENCOUNTER
ANTICOAGULATION MANAGEMENT     Charles Nogueira 82 year old male is on warfarin with supratherapeutic INR result. (Goal INR 2-3 )    Recent labs: (last 7 days)     12/08/22  1439   INR 5.2*       ASSESSMENT       Source(s): Chart Review and Patient/Caregiver Call       Warfarin doses taken: More warfarin taken than planned which may be affecting INR    Diet: No new diet changes identified. Pt planning to have large lettuce salad tonight given elevated INR.    New illness, injury, or hospitalization: No    Medication/supplement changes: None noted    Signs or symptoms of bleeding or clotting: Yes: R upper thigh bruise. 2x4 inches. Starting to heal, turning yellow.     Previous INR: Subtherapeutic    Additional findings: Patient has been in [place] since November 26.  INR was done at local lab and result was sent to patient via email.  Requested result be sent directly to [facility] via fax but wife Paula states that the hospital in [place] does not have a fax machine.    Catalina has the results from the hospital in an email.  She will attempt to screenshot the email and send the results in an attachment via [employer] message.     PLAN     Recommended plan for temporary change(s) affecting INR     Dosing Instructions: hold dose then continue your current warfarin dose with next INR in 1 day       Summary  As of 12/8/2022    Full warfarin instructions:  12/8: Hold; Otherwise 2.5 mg every Sun, Wed, Fri; 1.25 mg all other days; Starting 12/8/2022   Next INR check:  12/9/2022             Telephone call with  Paula who verbalizes understanding and agrees to plan    Patient using outside facility for labs    Education provided:     Goal range and lab monitoring: goal range and significance of current result    Plan made with [facility] Pharmacist Cindy Will, RN  Anticoagulation Clinic  12/8/2022    _______________________________________________________________________     Anticoagulation Episode Summary     Current  INR goal:  2.0-3.0   TTR:  79.6 % (6.6 mo)   Target end date:  Indefinite   Send INR reminders to:  [facility] HEART INR NURSE    Indications    Sick sinus syndrome (H) [I49.5]  Paroxysmal atrial fibrillation (H) [I48.0]  Mitral valve disease [I05.9]           Comments:           Anticoagulation Care Providers     Provider Role Specialty Phone number    Ana Dean MD Referring Cardiovascular Disease [phone]

## 2022-12-09 ENCOUNTER — ANTICOAGULATION THERAPY VISIT (OUTPATIENT)
Dept: ANTICOAGULATION | Facility: CLINIC | Age: 82
End: 2022-12-09

## 2022-12-09 ENCOUNTER — MYC MEDICAL ADVICE (OUTPATIENT)
Dept: ANTICOAGULATION | Facility: CLINIC | Age: 82
End: 2022-12-09

## 2022-12-09 ENCOUNTER — TELEPHONE (OUTPATIENT)
Dept: FAMILY MEDICINE | Facility: CLINIC | Age: 82
End: 2022-12-09

## 2022-12-09 ENCOUNTER — TELEPHONE (OUTPATIENT)
Dept: ANTICOAGULATION | Facility: CLINIC | Age: 82
End: 2022-12-09

## 2022-12-09 DIAGNOSIS — I48.0 PAROXYSMAL ATRIAL FIBRILLATION (H): ICD-10-CM

## 2022-12-09 DIAGNOSIS — I05.9 MITRAL VALVE DISEASE: ICD-10-CM

## 2022-12-09 DIAGNOSIS — I49.5 SICK SINUS SYNDROME (H): Primary | ICD-10-CM

## 2022-12-09 LAB — INR (EXTERNAL): 2.9 (ref 0.9–1.1)

## 2022-12-09 NOTE — TELEPHONE ENCOUNTER
Luca, Please call Paula back regarding Charles's INR. I didn't see a number to warm transfer her to you on his chart.    [phone]    Thanks      Koko BONDS

## 2022-12-09 NOTE — PROGRESS NOTES
ANTICOAGULATION MANAGEMENT     Charles Nogueira 82 year old male is on warfarin with therapeutic INR result. (Goal INR 2.0-3.0)    Recent labs: (last 7 days)     12/09/22  0800   INR 2.9*       ASSESSMENT       Source(s): Chart Review and Patient/Caregiver Call       Warfarin doses taken: More warfarin taken than planned which may be affecting INR, took 2.5mg instead of 1.25mg the past 2 Mondays, held warfarin dose last night x1     Diet: No new diet changes identified, ate a large salad last night    New illness, injury, or hospitalization: No    Medication/supplement changes: None noted    Signs or symptoms of bleeding or clotting: bruise is healing     Previous INR: Supratherapeutic    Additional findings: Currently in [place] for the winter       PLAN     Recommended plan for temporary change(s) affecting INR     Dosing Instructions: Continue your current warfarin dose with next INR in 10 days       Summary  As of 12/9/2022    Full warfarin instructions:  2.5 mg every Sun, Wed, Fri; 1.25 mg all other days; Starting 12/9/2022   Next INR check:  12/19/2022             Telephone call with  wife who verbalizes understanding and agrees to plan    Patient using outside facility for labs.  Sent [employer] message so wife will be able to reply and send message with lab results from yesterday and today.      Education provided:     Goal range and lab monitoring: goal range and significance of current result    Contact [phone] with any changes, questions or concerns.     Plan made per [facility] anticoagulation protocol    Jessika Yoo, RN  Anticoagulation Clinic  12/9/2022    _______________________________________________________________________     Anticoagulation Episode Summary     Current INR goal:  2.0-3.0   TTR:  79.3 % (6.7 mo)   Target end date:  Indefinite   Send INR reminders to:  [facility] HEART INR NURSE    Indications    Sick sinus syndrome (H) [I49.5]  Paroxysmal atrial fibrillation (H) [I48.0]  Mitral valve  disease [I05.9]           Comments:           Anticoagulation Care Providers     Provider Role Specialty Phone number    Ana Dean MD Referring Cardiovascular Disease [phone]

## 2022-12-19 ENCOUNTER — TELEPHONE (OUTPATIENT)
Dept: FAMILY MEDICINE | Facility: CLINIC | Age: 82
End: 2022-12-19

## 2022-12-19 DIAGNOSIS — I48.0 PAROXYSMAL ATRIAL FIBRILLATION (H): ICD-10-CM

## 2022-12-19 DIAGNOSIS — I49.5 SICK SINUS SYNDROME (H): Primary | ICD-10-CM

## 2022-12-19 DIAGNOSIS — I05.9 MITRAL VALVE DISEASE: ICD-10-CM

## 2022-12-19 LAB — INR (EXTERNAL): 4.5 (ref 0.9–1.1)

## 2022-12-19 NOTE — TELEPHONE ENCOUNTER
Received a VM on [facility] line reporting today's INR of 4.5.  Patient would like a call back at [phone].    Thank you,  Beatriz Arndt RN

## 2022-12-19 NOTE — TELEPHONE ENCOUNTER
ANTICOAGULATION MANAGEMENT     Charles Nogueira 82 year old male is on warfarin with supratherapeutic INR result. (Goal INR 2-3)    Recent labs: (last 7 days)     12/19/22  0000   INR 4.5*       ASSESSMENT       Source(s): Chart Review and Patient/Caregiver Call       Warfarin doses taken: Warfarin taken as instructed    Diet: Decreased greens/vitamin K in diet; plans to resume previous intake    New illness, injury, or hospitalization: No    Medication/supplement changes: None noted    Signs or symptoms of bleeding or clotting: No    Previous INR: Therapeutic last visit; previously outside of goal range    Additional findings: Diet change while in [place] - will try to add some greens back into diet       PLAN     Recommended plan for temporary change(s) affecting INR     Dosing Instructions: hold dose then decrease your warfarin dose (10% change) with next INR in 10 days       Summary  As of 12/19/2022    Full warfarin instructions:  12/19: Hold; Otherwise 2.5 mg every Sun, Wed; 1.25 mg all other days; Starting 12/19/2022   Next INR check:  12/29/2022             Telephone call with  wife, Paula who agrees to plan and repeated back plan correctly   MyChart also sent.     Patient using outside facility for labs    Education provided:     Goal range and lab monitoring: goal range and significance of current result    Dietary considerations: importance of consistent vitamin K intake    Symptom monitoring: monitoring for bleeding signs and symptoms    Plan made per [facility] anticoagulation protocol    Nani Russ RN  Anticoagulation Clinic  12/19/2022    _______________________________________________________________________     Anticoagulation Episode Summary     Current INR goal:  2.0-3.0   TTR:  75.8 % (7 mo)   Target end date:  Indefinite   Send INR reminders to:  [facility] HEART INR NURSE    Indications    Sick sinus syndrome (H) [I49.5]  Paroxysmal atrial fibrillation (H) [I48.0]  Mitral valve disease [I05.9]            Comments:           Anticoagulation Care Providers     Provider Role Specialty Phone number    Ana Dean MD Referring Cardiovascular Disease [phone]

## 2022-12-19 NOTE — TELEPHONE ENCOUNTER
Reason for Call:  INR    Who is calling?  PATIENT WIFE    Phone number: [phone]     Fax number:  N/A    Name of caller: WALI CHAMORRO    INR Value:  4.5    Are there any other concerns:  Yes: PLEASE CALL PATIENT    Can we leave a detailed message on this number? YES    Phone number patient can be reached at: Home number on file [phone] (home)      Call taken on 12/19/2022 at 12:24 PM by Michelle Donato

## 2022-12-29 ENCOUNTER — TELEPHONE (OUTPATIENT)
Dept: FAMILY MEDICINE | Facility: CLINIC | Age: 82
End: 2022-12-29

## 2022-12-29 DIAGNOSIS — I48.0 PAROXYSMAL ATRIAL FIBRILLATION (H): ICD-10-CM

## 2022-12-29 DIAGNOSIS — I05.9 MITRAL VALVE DISEASE: ICD-10-CM

## 2022-12-29 DIAGNOSIS — I49.5 SICK SINUS SYNDROME (H): Primary | ICD-10-CM

## 2022-12-29 LAB — INR (EXTERNAL): 2.5 (ref 0.9–1.1)

## 2022-12-29 NOTE — TELEPHONE ENCOUNTER
Reason for Call:  Other call back    Detailed comments: Patient wife is calling in INR result 2.5. call back with instructions.    Phone Number Patient can be reached at: Other phone number:     [phone]          Best Time: anytime     Can we leave a detailed message on this number? YES    Call taken on 12/29/2022 at 1:11 PM by Marta Marin

## 2022-12-29 NOTE — TELEPHONE ENCOUNTER
ANTICOAGULATION MANAGEMENT     Charles Nogueira 82 year old male is on warfarin with therapeutic INR result. (Goal INR 2-3 )    Recent labs: (last 7 days)     12/29/22  0000   INR 2.5*       ASSESSMENT       Source(s): Chart Review and Patient/Caregiver Call       Warfarin doses taken: Warfarin taken as instructed    Diet: Increased greens/vitamin K in diet; ongoing change - Paula states she was mindful of patients green intake this past week and plans to keep it consistent    New illness, injury, or hospitalization: No    Medication/supplement changes: None noted    Signs or symptoms of bleeding or clotting: No    Previous INR: Supratherapeutic    Additional findings: Paula to send copy of lab result to [employer] message       PLAN     Recommended plan for no diet, medication or health factor changes affecting INR     Dosing Instructions: Continue your current warfarin dose with next INR in 2 weeks       Summary  As of 12/29/2022    Full warfarin instructions:  2.5 mg every Sun, Wed; 1.25 mg all other days   Next INR check:  1/12/2023             Telephone call with  wife, Paula who agrees to plan and repeated back plan correctly    Patient using outside facility for labs    Education provided:     Goal range and lab monitoring: goal range and significance of current result and Importance of therapeutic range    Dietary considerations: importance of consistent vitamin K intake    Plan made per [facility] anticoagulation protocol    Nani Russ RN  Anticoagulation Clinic  12/29/2022    _______________________________________________________________________     Anticoagulation Episode Summary     Current INR goal:  2.0-3.0   TTR:  73.6 % (7.3 mo)   Target end date:  Indefinite   Send INR reminders to:  [facility] HEART INR NURSE    Indications    Sick sinus syndrome (H) [I49.5]  Paroxysmal atrial fibrillation (H) [I48.0]  Mitral valve disease [I05.9]           Comments:           Anticoagulation Care Providers     Provider  Role Specialty Phone number    Ana Dean MD Referring Cardiovascular Disease [phone]

## 2023-01-12 ENCOUNTER — ANTICOAGULATION THERAPY VISIT (OUTPATIENT)
Dept: ANTICOAGULATION | Facility: CLINIC | Age: 83
End: 2023-01-12

## 2023-01-12 DIAGNOSIS — I05.9 MITRAL VALVE DISEASE: ICD-10-CM

## 2023-01-12 DIAGNOSIS — I48.0 PAROXYSMAL ATRIAL FIBRILLATION (H): ICD-10-CM

## 2023-01-12 DIAGNOSIS — I49.5 SICK SINUS SYNDROME (H): Primary | ICD-10-CM

## 2023-01-12 LAB — INR (EXTERNAL): 2.3 (ref 0.9–1.1)

## 2023-01-12 NOTE — PROGRESS NOTES
An unidentified female caller left  reporting an INR result of 2.3. No callback number left. Routing to managing [facility].

## 2023-01-12 NOTE — PROGRESS NOTES
ANTICOAGULATION MANAGEMENT     Charles Nogueira 82 year old male is on warfarin with therapeutic INR result. (Goal INR 2.0-3.0)    Recent labs: (last 7 days)     01/12/23  0900   INR 2.3*       ASSESSMENT       Source(s): Chart Review and Patient/Caregiver Call       Warfarin doses taken: Warfarin taken as instructed    Diet: No new diet changes identified    New illness, injury, or hospitalization: No    Medication/supplement changes: None noted    Signs or symptoms of bleeding or clotting: No    Previous INR: Therapeutic last visit; previously outside of goal range    Additional findings: None       PLAN     Recommended plan for no diet, medication or health factor changes affecting INR     Dosing Instructions: Continue your current warfarin dose with next INR in 3 weeks       Summary  As of 1/12/2023    Full warfarin instructions:  2.5 mg every Sun, Wed; 1.25 mg all other days   Next INR check:  2/2/2023             Telephone call with  Paula who verbalizes understanding and agrees to plan.  Will send [employer] message then Paula will send copy of the lab result from today.      Patient using outside facility for labs done at lab in [place].  They will be in [place] for the INR on 2/22/23.     Education provided:     Goal range and lab monitoring: goal range and significance of current result    Contact [phone] with any changes, questions or concerns.     Plan made per [facility] anticoagulation protocol    Jessika Yoo, RN  Anticoagulation Clinic  1/12/2023    _______________________________________________________________________     Anticoagulation Episode Summary     Current INR goal:  2.0-3.0   TTR:  75.2 % (7.8 mo)   Target end date:  Indefinite   Send INR reminders to:  [facility] HEART INR NURSE    Indications    Sick sinus syndrome (H) [I49.5]  Paroxysmal atrial fibrillation (H) [I48.0]  Mitral valve disease [I05.9]           Comments:           Anticoagulation Care Providers     Provider Role Specialty Phone  number    Ana Dean MD Referring Cardiovascular Disease [phone]

## 2023-02-02 ENCOUNTER — MYC MEDICAL ADVICE (OUTPATIENT)
Dept: ANTICOAGULATION | Facility: CLINIC | Age: 83
End: 2023-02-02
Payer: COMMERCIAL

## 2023-02-02 ENCOUNTER — ANTICOAGULATION THERAPY VISIT (OUTPATIENT)
Dept: ANTICOAGULATION | Facility: CLINIC | Age: 83
End: 2023-02-02
Payer: COMMERCIAL

## 2023-02-02 DIAGNOSIS — I48.0 PAROXYSMAL ATRIAL FIBRILLATION (H): ICD-10-CM

## 2023-02-02 DIAGNOSIS — I05.9 MITRAL VALVE DISEASE: ICD-10-CM

## 2023-02-02 DIAGNOSIS — I49.5 SICK SINUS SYNDROME (H): Primary | ICD-10-CM

## 2023-02-02 LAB — INR (EXTERNAL): 1.8 (ref 0.9–1.1)

## 2023-02-02 NOTE — PROGRESS NOTES
ANTICOAGULATION MANAGEMENT     Charles Nogueira 82 year old male is on warfarin with subtherapeutic INR result. (Goal INR 2.0-3.0)    Recent labs: (last 7 days)     02/02/23  1350   INR 1.8*       ASSESSMENT       Source(s): Chart Review and Patient/Caregiver Call       Warfarin doses taken: Warfarin taken as instructed    Diet: Increased greens/vitamin K in diet; plans to resume previous intake    New illness, injury, or hospitalization: No    Medication/supplement changes: None noted    Signs or symptoms of bleeding or clotting: No    Previous INR: Therapeutic last 2(+) visits    Additional findings: None       PLAN     Recommended plan for temporary change(s) affecting INR     Dosing Instructions: Continue your current warfarin dose with next INR in 3 weeks       Summary  As of 2/2/2023    Full warfarin instructions:  2.5 mg every Sun, Wed; 1.25 mg all other days   Next INR check:  2/22/2023             Telephone call with  Paula who verbalizes understanding and agrees to plan    Patient using outside facility for labs    Education provided:     Dietary considerations: importance of consistent vitamin K intake    Plan made per [facility] anticoagulation protocol    Ana Martínez, RN  Anticoagulation Clinic  2/2/2023    _______________________________________________________________________     Anticoagulation Episode Summary     Current INR goal:  2.0-3.0   TTR:  74.0 % (8.5 mo)   Target end date:  Indefinite   Send INR reminders to:  [facility] HEART INR NURSE    Indications    Sick sinus syndrome (H) [I49.5]  Paroxysmal atrial fibrillation (H) [I48.0]  Mitral valve disease [I05.9]           Comments:           Anticoagulation Care Providers     Provider Role Specialty Phone number    Ana Dean MD Referring Cardiovascular Disease [phone]

## 2023-02-02 NOTE — TELEPHONE ENCOUNTER
Faxed to [place] to be printed for abstraction of the sent in result.    Ana Martínez RN    [facility] Anticoagulation [facility]

## 2023-02-20 ENCOUNTER — ANCILLARY PROCEDURE (OUTPATIENT)
Dept: CARDIOLOGY | Facility: CLINIC | Age: 83
End: 2023-02-20
Attending: INTERNAL MEDICINE
Payer: COMMERCIAL

## 2023-02-20 DIAGNOSIS — Z95.0 CARDIAC PACEMAKER IN SITU: ICD-10-CM

## 2023-02-20 DIAGNOSIS — I49.5 SICK SINUS SYNDROME (H): ICD-10-CM

## 2023-02-20 PROCEDURE — 93294 REM INTERROG EVL PM/LDLS PM: CPT | Performed by: INTERNAL MEDICINE

## 2023-02-20 PROCEDURE — 93296 REM INTERROG EVL PM/IDS: CPT | Performed by: INTERNAL MEDICINE

## 2023-02-22 ENCOUNTER — ANTICOAGULATION THERAPY VISIT (OUTPATIENT)
Dept: ANTICOAGULATION | Facility: CLINIC | Age: 83
End: 2023-02-22

## 2023-02-22 ENCOUNTER — LAB (OUTPATIENT)
Dept: LAB | Facility: CLINIC | Age: 83
End: 2023-02-22
Payer: COMMERCIAL

## 2023-02-22 DIAGNOSIS — I49.5 SICK SINUS SYNDROME (H): Primary | ICD-10-CM

## 2023-02-22 DIAGNOSIS — I48.0 PAROXYSMAL ATRIAL FIBRILLATION (H): ICD-10-CM

## 2023-02-22 DIAGNOSIS — I05.9 MITRAL VALVE DISEASE: ICD-10-CM

## 2023-02-22 LAB
INR BLD: 2 (ref 0.9–1.1)
MDC_IDC_LEAD_IMPLANT_DT: NORMAL
MDC_IDC_LEAD_IMPLANT_DT: NORMAL
MDC_IDC_LEAD_LOCATION: NORMAL
MDC_IDC_LEAD_LOCATION: NORMAL
MDC_IDC_LEAD_MFG: NORMAL
MDC_IDC_LEAD_MFG: NORMAL
MDC_IDC_LEAD_MODEL: NORMAL
MDC_IDC_LEAD_MODEL: NORMAL
MDC_IDC_LEAD_POLARITY_TYPE: NORMAL
MDC_IDC_LEAD_POLARITY_TYPE: NORMAL
MDC_IDC_LEAD_SERIAL: NORMAL
MDC_IDC_LEAD_SERIAL: NORMAL
MDC_IDC_MSMT_BATTERY_DTM: NORMAL
MDC_IDC_MSMT_BATTERY_IMPEDANCE: 1004 OHM
MDC_IDC_MSMT_BATTERY_REMAINING_LONGEVITY: 74 MO
MDC_IDC_MSMT_BATTERY_STATUS: NORMAL
MDC_IDC_MSMT_BATTERY_VOLTAGE: 2.77 V
MDC_IDC_MSMT_LEADCHNL_RA_IMPEDANCE_VALUE: 564 OHM
MDC_IDC_MSMT_LEADCHNL_RA_PACING_THRESHOLD_AMPLITUDE: 0.5 V
MDC_IDC_MSMT_LEADCHNL_RA_PACING_THRESHOLD_PULSEWIDTH: 0.4 MS
MDC_IDC_MSMT_LEADCHNL_RV_IMPEDANCE_VALUE: 597 OHM
MDC_IDC_MSMT_LEADCHNL_RV_PACING_THRESHOLD_AMPLITUDE: 0.75 V
MDC_IDC_MSMT_LEADCHNL_RV_PACING_THRESHOLD_PULSEWIDTH: 0.4 MS
MDC_IDC_PG_IMPLANT_DTM: NORMAL
MDC_IDC_PG_MFG: NORMAL
MDC_IDC_PG_MODEL: NORMAL
MDC_IDC_PG_SERIAL: NORMAL
MDC_IDC_PG_TYPE: NORMAL
MDC_IDC_SESS_CLINIC_NAME: NORMAL
MDC_IDC_SESS_DTM: NORMAL
MDC_IDC_SESS_TYPE: NORMAL
MDC_IDC_SET_BRADY_AT_MODE_SWITCH_MODE: NORMAL
MDC_IDC_SET_BRADY_AT_MODE_SWITCH_RATE: 175 {BEATS}/MIN
MDC_IDC_SET_BRADY_LOWRATE: 60 {BEATS}/MIN
MDC_IDC_SET_BRADY_MAX_SENSOR_RATE: 130 {BEATS}/MIN
MDC_IDC_SET_BRADY_MAX_TRACKING_RATE: 130 {BEATS}/MIN
MDC_IDC_SET_BRADY_MODE: NORMAL
MDC_IDC_SET_BRADY_PAV_DELAY_LOW: 350 MS
MDC_IDC_SET_BRADY_SAV_DELAY_LOW: 350 MS
MDC_IDC_SET_LEADCHNL_RA_PACING_AMPLITUDE: 1.5 V
MDC_IDC_SET_LEADCHNL_RA_PACING_CAPTURE_MODE: NORMAL
MDC_IDC_SET_LEADCHNL_RA_PACING_POLARITY: NORMAL
MDC_IDC_SET_LEADCHNL_RA_PACING_PULSEWIDTH: 0.4 MS
MDC_IDC_SET_LEADCHNL_RA_SENSING_POLARITY: NORMAL
MDC_IDC_SET_LEADCHNL_RA_SENSING_SENSITIVITY: 0.5 MV
MDC_IDC_SET_LEADCHNL_RV_PACING_AMPLITUDE: 2 V
MDC_IDC_SET_LEADCHNL_RV_PACING_CAPTURE_MODE: NORMAL
MDC_IDC_SET_LEADCHNL_RV_PACING_POLARITY: NORMAL
MDC_IDC_SET_LEADCHNL_RV_PACING_PULSEWIDTH: 0.4 MS
MDC_IDC_SET_LEADCHNL_RV_SENSING_POLARITY: NORMAL
MDC_IDC_SET_LEADCHNL_RV_SENSING_SENSITIVITY: 2.8 MV
MDC_IDC_SET_ZONE_DETECTION_INTERVAL: 333.33 MS
MDC_IDC_SET_ZONE_DETECTION_INTERVAL: 342.86 MS
MDC_IDC_SET_ZONE_TYPE: NORMAL
MDC_IDC_SET_ZONE_TYPE: NORMAL
MDC_IDC_STAT_AT_BURDEN_PERCENT: 0 %
MDC_IDC_STAT_AT_DTM_END: NORMAL
MDC_IDC_STAT_AT_DTM_START: NORMAL
MDC_IDC_STAT_AT_MODE_SW_COUNT: 7
MDC_IDC_STAT_BRADY_AP_VP_PERCENT: 2 %
MDC_IDC_STAT_BRADY_AP_VS_PERCENT: 67 %
MDC_IDC_STAT_BRADY_AS_VP_PERCENT: 0 %
MDC_IDC_STAT_BRADY_AS_VS_PERCENT: 32 %
MDC_IDC_STAT_BRADY_DTM_END: NORMAL
MDC_IDC_STAT_BRADY_DTM_START: NORMAL
MDC_IDC_STAT_EPISODE_RECENT_COUNT: 0
MDC_IDC_STAT_EPISODE_RECENT_COUNT: 4
MDC_IDC_STAT_EPISODE_RECENT_COUNT_DTM_END: NORMAL
MDC_IDC_STAT_EPISODE_RECENT_COUNT_DTM_END: NORMAL
MDC_IDC_STAT_EPISODE_RECENT_COUNT_DTM_START: NORMAL
MDC_IDC_STAT_EPISODE_RECENT_COUNT_DTM_START: NORMAL
MDC_IDC_STAT_EPISODE_TYPE: NORMAL
MDC_IDC_STAT_EPISODE_TYPE: NORMAL

## 2023-02-22 PROCEDURE — 85610 PROTHROMBIN TIME: CPT

## 2023-02-22 PROCEDURE — 36416 COLLJ CAPILLARY BLOOD SPEC: CPT

## 2023-02-22 NOTE — PROGRESS NOTES
ANTICOAGULATION MANAGEMENT     Charles Nogueira 83 year old male is on warfarin with therapeutic INR result. (Goal INR 2.0-3.0)    Recent labs: (last 7 days)     02/22/23  0848   INR 2.0*       ASSESSMENT       Source(s): Chart Review    Previous INR was Subtherapeutic    Medication, diet, health changes since last INR chart reviewed; none identified     Now back from [place]     PLAN     Recommended plan for no diet, medication or health factor changes affecting INR     Dosing Instructions: Continue your current warfarin dose with next INR in 4 weeks       Summary  As of 2/22/2023    Full warfarin instructions:  2.5 mg every Sun, Wed; 1.25 mg all other days   Next INR check:  3/22/2023             Detailed voice message left for wifePaula with dosing instructions and follow up date.   Sent [employer] message with dosing and follow up instructions    Contact [phone] to schedule and with any changes, questions or concerns.     Education provided:     Please call back if any changes to your diet, medications or how you've been taking warfarin    Goal range and lab monitoring: goal range and significance of current result    Plan made per [facility] anticoagulation protocol    Mary Arguelles, RN  Anticoagulation Clinic  2/22/2023    _______________________________________________________________________     Anticoagulation Episode Summary     Current INR goal:  2.0-3.0   TTR:  68.6 % (9.2 mo)   Target end date:  Indefinite   Send INR reminders to:  [facility] HEART INR NURSE    Indications    Sick sinus syndrome (H) [I49.5]  Paroxysmal atrial fibrillation (H) [I48.0]  Mitral valve disease [I05.9]           Comments:           Anticoagulation Care Providers     Provider Role Specialty Phone number    Ana Dean MD Referring Cardiovascular Disease [phone]

## 2023-03-17 ENCOUNTER — ANTICOAGULATION THERAPY VISIT (OUTPATIENT)
Dept: ANTICOAGULATION | Facility: CLINIC | Age: 83
End: 2023-03-17
Payer: COMMERCIAL

## 2023-03-17 ENCOUNTER — TELEPHONE (OUTPATIENT)
Dept: FAMILY MEDICINE | Facility: CLINIC | Age: 83
End: 2023-03-17
Payer: COMMERCIAL

## 2023-03-17 DIAGNOSIS — I05.9 MITRAL VALVE DISEASE: ICD-10-CM

## 2023-03-17 DIAGNOSIS — I49.5 SICK SINUS SYNDROME (H): Primary | ICD-10-CM

## 2023-03-17 DIAGNOSIS — I48.0 PAROXYSMAL ATRIAL FIBRILLATION (H): ICD-10-CM

## 2023-03-17 LAB — INR (EXTERNAL): 1.7 (ref 0.9–1.1)

## 2023-03-17 NOTE — TELEPHONE ENCOUNTER
General Call    Contacts       Type Contact Phone/Fax    03/17/2023 02:48 PM CDT Phone (Incoming) Paula Nogueira (Emergency Contact) [phone]        Reason for Call:  INR result from today was 1.7    What are your questions or concerns:  Dosing instructions    Date of last appointment with provider:     Could we send this information to you in [employer] or would you prefer to receive a phone call?:   Patient would prefer a phone call   Okay to leave a detailed message?: Yes at Other phone number:  [phone]

## 2023-03-17 NOTE — PROGRESS NOTES
ANTICOAGULATION MANAGEMENT     Charles Nogueira 83 year old male is on warfarin with subtherapeutic INR result. (Goal INR 2.0-3.0)    Recent labs: (last 7 days)     03/17/23  0800   INR 1.7*       ASSESSMENT       Source(s): Chart Review and Patient/Caregiver Call       Warfarin doses taken: Warfarin taken as instructed    Diet: Increased greens/vitamin K in diet; plans to resume previous intake, broccoli last night    New illness, injury, or hospitalization: No    Medication/supplement changes: None noted    Signs or symptoms of bleeding or clotting: No    Previous INR: Therapeutic last visit; previously outside of goal range    Additional findings: Returning to [place] on 4/15/23, will check in [place] 1 more time         PLAN     Recommended plan for temporary change(s) affecting INR     Dosing Instructions: booster dose then continue your current warfarin dose with next INR in 2 weeks       Summary  As of 3/17/2023    Full warfarin instructions:  3/17: 2.5 mg; Otherwise 2.5 mg every Sun, Wed; 1.25 mg all other days   Next INR check:  3/30/2023             Telephone call with wife who verbalizes understanding and agrees to plan.  [employer] message sent.    Patient using outside facility for labs    Education provided:     Goal range and lab monitoring: goal range and significance of current result and Importance of therapeutic range    Contact [phone] with any changes, questions or concerns.     Plan made per [facility] anticoagulation protocol    Jessika Yoo, RN  Anticoagulation Clinic  3/17/2023    _______________________________________________________________________     Anticoagulation Episode Summary     Current INR goal:  2.0-3.0   TTR:  63.3 % (9.9 mo)   Target end date:  Indefinite   Send INR reminders to:  [facility] HEART INR NURSE    Indications    Sick sinus syndrome (H) [I49.5]  Paroxysmal atrial fibrillation (H) [I48.0]  Mitral valve disease [I05.9]           Comments:           Anticoagulation Care  Providers     Provider Role Specialty Phone number    Ana Dean MD Referring Cardiovascular Disease [phone]

## 2023-03-30 ENCOUNTER — TELEPHONE (OUTPATIENT)
Dept: FAMILY MEDICINE | Facility: CLINIC | Age: 83
End: 2023-03-30
Payer: COMMERCIAL

## 2023-03-30 DIAGNOSIS — I48.0 PAROXYSMAL ATRIAL FIBRILLATION (H): ICD-10-CM

## 2023-03-30 DIAGNOSIS — I05.9 MITRAL VALVE DISEASE: ICD-10-CM

## 2023-03-30 DIAGNOSIS — I49.5 SICK SINUS SYNDROME (H): Primary | ICD-10-CM

## 2023-03-30 LAB — INR (EXTERNAL): 2.2 (ref 0.9–1.1)

## 2023-03-30 NOTE — TELEPHONE ENCOUNTER
ANTICOAGULATION MANAGEMENT     Charles Nogueira 83 year old male is on warfarin with therapeutic INR result. (Goal INR )    Recent labs: (last 7 days)     03/30/23  0800   INR 2.2*       ASSESSMENT       Source(s): Chart Review and Patient/Caregiver Call       Warfarin doses taken: Warfarin taken as instructed    Diet: No new diet changes identified    New illness, injury, or hospitalization: No    Medication/supplement changes: None noted    Signs or symptoms of bleeding or clotting: No    Previous INR: Subtherapeutic    Additional findings: None         PLAN     Recommended plan for no diet, medication or health factor changes affecting INR     Dosing Instructions: Continue your current warfarin dose with next INR in 3 weeks       Summary  As of 3/30/2023    Full warfarin instructions:  2.5 mg every Sun, Wed; 1.25 mg all other days   Next INR check:  4/17/2023             Telephone call with Charles who verbalizes understanding and agrees to plan    Lab visit scheduled Next INR back in [place]    Education provided:     Goal range and lab monitoring: goal range and significance of current result    Contact [phone] with any changes, questions or concerns.     Plan made per [facility] anticoagulation protocol    Jessika Yoo, RN  Anticoagulation Clinic  3/30/2023    _______________________________________________________________________     Anticoagulation Episode Summary     Current INR goal:  2.0-3.0   TTR:  62.4 % (10.4 mo)   Target end date:  Indefinite   Send INR reminders to:  [facility] HEART INR NURSE    Indications    Sick sinus syndrome (H) [I49.5]  Paroxysmal atrial fibrillation (H) [I48.0]  Mitral valve disease [I05.9]           Comments:           Anticoagulation Care Providers     Provider Role Specialty Phone number    Ana Dean MD Referring Cardiovascular Disease [phone]

## 2023-03-30 NOTE — TELEPHONE ENCOUNTER
Reason for Call:  INR follow up    Detailed comments: Wife is calling with INR result is 2.2 today and would like a call back    Phone Number Patient can be reached at: [phone]     Best Time: any    Can we leave a detailed message on this number? YES    Call taken on 3/30/2023 at 2:25 PM by Genet Moraes

## 2023-04-17 ENCOUNTER — ANTICOAGULATION THERAPY VISIT (OUTPATIENT)
Dept: ANTICOAGULATION | Facility: CLINIC | Age: 83
End: 2023-04-17

## 2023-04-17 ENCOUNTER — LAB (OUTPATIENT)
Dept: LAB | Facility: CLINIC | Age: 83
End: 2023-04-17
Payer: COMMERCIAL

## 2023-04-17 DIAGNOSIS — I48.0 PAROXYSMAL ATRIAL FIBRILLATION (H): ICD-10-CM

## 2023-04-17 DIAGNOSIS — I05.9 MITRAL VALVE DISEASE: ICD-10-CM

## 2023-04-17 DIAGNOSIS — I49.5 SICK SINUS SYNDROME (H): Primary | ICD-10-CM

## 2023-04-17 LAB — INR BLD: 2.3 (ref 0.9–1.1)

## 2023-04-17 PROCEDURE — 36416 COLLJ CAPILLARY BLOOD SPEC: CPT

## 2023-04-17 PROCEDURE — 85610 PROTHROMBIN TIME: CPT

## 2023-04-17 NOTE — PROGRESS NOTES
ANTICOAGULATION MANAGEMENT     Charles Nogueira 83 year old male is on warfarin with therapeutic INR result. (Goal INR 2.0-3.0)    Recent labs: (last 7 days)     04/17/23  1021   INR 2.3*       ASSESSMENT     Warfarin Lab Questionnaire    Warfarin Doses Last 7 Days      4/17/2023    10:18 AM   Dose in Tablet or Mg   TAB or MG? tablet (tab)     Pt Rptd Dose SUNDAY MONDAY TUESDAY WED THURS FRIDAY SATURDAY 4/17/2023  10:18 AM 1 0.5 0.5 1 0.5 0.5 0.5         4/17/2023   Warfarin Lab Questionnaire   Missed doses? No   Changes in diet or alcohol? No   Medication changes? No   Injuries or illness since last INR? No   Shortness of breath? No   Abnormal bleeding? No   Upcoming surgery, procedure? No   Best number to call with results? [phone]        Previous INR: Therapeutic last visit; previously outside of goal range  Additional findings: None       PLAN     Recommended plan for no diet, medication or health factor changes affecting INR     Dosing Instructions: Continue your current warfarin dose with next INR in 4 weeks       Summary  As of 4/17/2023    Full warfarin instructions:  2.5 mg every Sun, Wed; 1.25 mg all other days   Next INR check:  5/15/2023             Telephone call with wife, Paula who verbalizes understanding and agrees to plan    Lab visit scheduled    Education provided:     Contact [phone] with any changes, questions or concerns.     Plan made per [facility] anticoagulation protocol    Mary Arguelles RN  Anticoagulation Clinic  4/17/2023    _______________________________________________________________________     Anticoagulation Episode Summary     Current INR goal:  2.0-3.0   TTR:  64.4 % (11 mo)   Target end date:  Indefinite   Send INR reminders to:  [facility] HEART INR NURSE    Indications    Sick sinus syndrome (H) [I49.5]  Paroxysmal atrial fibrillation (H) [I48.0]  Mitral valve disease [I05.9]           Comments:           Anticoagulation Care Providers     Provider Role Specialty Phone  number    Ana Dean MD Referring Cardiovascular Disease [phone]

## 2023-04-25 ENCOUNTER — VIRTUAL VISIT (OUTPATIENT)
Dept: OTHER | Facility: CLINIC | Age: 83
End: 2023-04-25
Attending: INTERNAL MEDICINE
Payer: COMMERCIAL

## 2023-04-25 DIAGNOSIS — I10 BENIGN ESSENTIAL HYPERTENSION: ICD-10-CM

## 2023-04-25 DIAGNOSIS — I70.0 ATHEROSCLEROSIS OF ABDOMINAL AORTA (H): ICD-10-CM

## 2023-04-25 DIAGNOSIS — I48.0 PAROXYSMAL A-FIB (H): ICD-10-CM

## 2023-04-25 DIAGNOSIS — I72.3 ILIAC ARTERY ANEURYSM (H): Primary | ICD-10-CM

## 2023-04-25 DIAGNOSIS — Z95.0 CARDIAC PACEMAKER IN SITU: ICD-10-CM

## 2023-04-25 DIAGNOSIS — E78.5 HYPERLIPIDEMIA LDL GOAL <70: ICD-10-CM

## 2023-04-25 PROCEDURE — 99214 OFFICE O/P EST MOD 30 MIN: CPT | Mod: 95 | Performed by: INTERNAL MEDICINE

## 2023-04-25 PROCEDURE — G0463 HOSPITAL OUTPT CLINIC VISIT: HCPCS | Mod: TEL

## 2023-04-25 RX ORDER — ATORVASTATIN CALCIUM 20 MG/1
20 TABLET, FILM COATED ORAL DAILY
Qty: 90 TABLET | Refills: 3 | Status: SHIPPED | OUTPATIENT
Start: 2023-04-25 | End: 2023-07-14

## 2023-04-25 NOTE — PATIENT INSTRUCTIONS
Your recent lipids looks good and arterial duplex unremarkable    Continue atorvastatin same dose new prescription sent    Continue rest of the medications same and monitor blood pressure periodically 2 or 3 times daily    Maintain INR good range    Will plan for fasting lipids, CMP labs and also CTA of abdomen pelvis in April 2024 then followed by visit a week later.

## 2023-04-25 NOTE — PROGRESS NOTES
Charles is a 83 year old who is being evaluated via a billable telephone visit.      What phone number would you like to be contacted at? [phone]  How would you like to obtain your AVS? Theresa    Distant Location (provider location):  On-site      Objective       Vitals:  No vitals were obtained today due to virtual visit.    Kylie Shah    Provider visit note:    Chief complaint:  Follow-up visit  Review of recent labs and imaging studies  History of bilateral iliac artery aneurysms asymptomatic  History of paroxysmal atrial fibrillation on warfarin  Atherosclerosis of aorta  He spends time in [place] from November to April every year  Medication refills    History of present illness:  For full details please see my initial consult note on October 14, 2022    Charles Nogueira is a 83 year old very pleasant male with past medical history of hypertension, hyperlipidemia last visit initiated atorvastatin 10 mg daily tolerating without any problems and improved lipids ,sick sinus syndrome status post pacemaker in place and also paroxysmal atrial fibrillation, atherosclerosis of abdominal aorta taking warfarin for A. fib and blood pressure is well controlled with medications developed hematuria underwent work-up including the CT urogram and cystoscopy and CT urogram revealed incidental finding of right iliac artery dilatation at 1.8 cm and left 1.7 cm and he is asymptomatic.  He denies any abdominal pain or back pain no history of claudication  CT scan did not reveal any abdominal aorta aneurysms.  There was extensive atherosclerosis of aorta    Reviewed recent lipid panel LDL 77 triglycerides 84 and HDL 39  Arterial duplex no other peripheral aneurysms      Review of systems: Reviewed all 12 point review of systems as per HPI otherwise unremarkable    Physical exam:( no physical exam done this is virtual visit)    Reviewed recent laboratory tests, imaging studies in the epic and updated chart  US LOWER EXTREMITY  ARTERIAL DUPLEX BILATERAL  10/26/2022 1:54 PM      HISTORY:  Iliac artery aneurysm. Peripheral arterial disease.     COMPARISON: None     FINDINGS: Color Doppler and spectral waveform analysis performed.     There is scattered calcified plaque in the sampled arteries of the  lower extremities right greater than left.     The following peak systolic velocities are measured in cm/s.      RIGHT     CFA: 128  PFA: 140  SFA, proximal: 135  SFA, mid: 99  SFA, distal: 108  Popliteal: 108  Anterior tibial artery: 89  Posterior tibial artery: 125  Peroneal artery: 52     Waveforms: Normal high resistant triphasic waveforms throughout.     LEFT     CFA: 127  PFA: 63  SFA, proximal: 90  SFA, mid: 87  SFA, distal: 75  Popliteal: 71  Anterior tibial artery: 42  Posterior tibial artery: 127  Peroneal artery: 53     Waveforms: No definite significant stenoses identified in the sampled  arteries of the lower extremities.     NEFTALI STARKS MD       Assessment and plan:    1. Iliac artery aneurysm (H) Rt 1.8 cm and left 1.7 Cm on CT urogram 9/27/2022     2. Atherosclerosis of abdominal aorta (H)     3. Benign essential hypertension     4. Paroxysmal A-fib (H)     5. Cardiac pacemaker in situ sec to SSS     6. Hyperlipidemia LDL goal <70     7.  Former smoker        This is a very pleasant male with history of multiple medical problems as delineated above incidentally found dilatation of the bilateral iliac arteries right 1.8 and left 1.7 cm and he is asymptomatic but he has extensive atherosclerosis of abdominal aorta and also paroxysmal A. fib on warfarin.  Reviewed imaging studies with the patient.  Tolerating low-dose atorvastatin and improved lipids  Blood pressure is well controlled and taking appropriate medications  Recently returned from [place] and again he will leave in November of this year and spends 4 to 5 months in [place] then comes back in April 2020 for  He is a former smoker  Reviewed all the results and imaging  studies with the patient      At present my recommendations,    Continue atorvastatin 10 mg daily new prescription sent to pharmacy 90 days with 3 refills  Monitor blood pressure at home  Maintain INR good range  Continue current blood pressure medications and other medications  Walk as much as he can  Virtual or office follow-up with me in April/May 2024  Fasting lipids before visit order placed    Phone visit start time 10:35 AM:  Location of the patient; at his home  Location of the provider: [facility]    30 minutes spent on the date of the encounter doing chart review, review of recent labs, imaging studies, previous evaluation, history, documentation and further activities as noted above    AVS with written instructions done    This visit is being conducted as a virtual visit due to the emphasis on mitigation of the COVID-19 virus pandemic. The clinician has decided that the risk of an in-office visit outweighs the benefit for this patient.     This note was dictated by utilizing Dragon software    Copy of this note to primary care provider       Zach Finnegan MD, KEVIN, SUSAN, FNLA  Vascular medicine  Clinical Lipidologist  Clinical hypertension specialist

## 2023-04-28 ENCOUNTER — OFFICE VISIT (OUTPATIENT)
Dept: INTERNAL MEDICINE | Facility: CLINIC | Age: 83
End: 2023-04-28
Payer: COMMERCIAL

## 2023-04-28 VITALS
TEMPERATURE: 98.3 F | SYSTOLIC BLOOD PRESSURE: 131 MMHG | HEART RATE: 72 BPM | RESPIRATION RATE: 20 BRPM | OXYGEN SATURATION: 98 % | WEIGHT: 152.1 LBS | DIASTOLIC BLOOD PRESSURE: 70 MMHG | BODY MASS INDEX: 23.87 KG/M2 | HEIGHT: 67 IN

## 2023-04-28 DIAGNOSIS — Z00.00 ENCOUNTER FOR ANNUAL WELLNESS EXAM IN MEDICARE PATIENT: Primary | ICD-10-CM

## 2023-04-28 DIAGNOSIS — I50.32 CHRONIC DIASTOLIC CONGESTIVE HEART FAILURE (H): ICD-10-CM

## 2023-04-28 DIAGNOSIS — I10 ESSENTIAL HYPERTENSION: ICD-10-CM

## 2023-04-28 DIAGNOSIS — C43.59 MALIGNANT MELANOMA OF SKIN OF TRUNK, EXCEPT SCROTUM (H): ICD-10-CM

## 2023-04-28 DIAGNOSIS — Z12.5 SCREENING FOR PROSTATE CANCER: ICD-10-CM

## 2023-04-28 PROBLEM — I24.9 ACUTE ISCHEMIC HEART DISEASE (H): Status: RESOLVED | Noted: 2018-08-24 | Resolved: 2023-04-28

## 2023-04-28 PROBLEM — R68.83 CHILLS: Status: RESOLVED | Noted: 2022-04-26 | Resolved: 2023-04-28

## 2023-04-28 PROBLEM — R53.83 FATIGUE: Status: RESOLVED | Noted: 2022-04-26 | Resolved: 2023-04-28

## 2023-04-28 PROCEDURE — G0439 PPPS, SUBSEQ VISIT: HCPCS | Performed by: INTERNAL MEDICINE

## 2023-04-28 PROCEDURE — 99214 OFFICE O/P EST MOD 30 MIN: CPT | Mod: 25 | Performed by: INTERNAL MEDICINE

## 2023-04-28 ASSESSMENT — ENCOUNTER SYMPTOMS
MYALGIAS: 1
PALPITATIONS: 0
FREQUENCY: 0
CONSTIPATION: 0
NERVOUS/ANXIOUS: 0
HEMATURIA: 1
CHILLS: 1
HEMATOCHEZIA: 0
WEAKNESS: 1
SORE THROAT: 0
JOINT SWELLING: 1
HEADACHES: 0
PARESTHESIAS: 0
EYE PAIN: 0
ABDOMINAL PAIN: 0
DIZZINESS: 0
FEVER: 0
SHORTNESS OF BREATH: 0
ARTHRALGIAS: 1
DYSURIA: 0
DIARRHEA: 0
COUGH: 1
NAUSEA: 0
HEARTBURN: 0

## 2023-04-28 ASSESSMENT — ACTIVITIES OF DAILY LIVING (ADL): CURRENT_FUNCTION: NO ASSISTANCE NEEDED

## 2023-04-28 NOTE — PROGRESS NOTES
"SUBJECTIVE:   Charles is a 83 year old who presents for Preventive Visit.      4/28/2023     9:03 AM   Additional Questions   Roomed by Samantha De Oliveira MA   Accompanied by Himself     Patient has been advised of split billing requirements and indicates understanding: Yes  Are you in the first 12 months of your Medicare coverage?  No    Patient is an 83-year-old  male who presents to the clinic for his annual wellness check.  He has no acute concerns or complaints at this time.  Patient reports a stable appetite.  He is stooling and voiding without issue.  Patient sleeps well at night.  He is not fasting for lab work today. Patient does have a history of hypertension, and he currently takes losartan 100 mg daily for management of his blood pressure.  Patient does not check his blood pressure outside the clinic setting.  He does have a complicated cardiac history that includes CHF, atrial fibrillation, and mitral valve disease.  Patient is followed closely by his cardiologist.  He is on chronic anticoagulation as part of his regimen for management of his atrial fibrillation.  Patient is tolerating all of his medications without issue.    Healthy Habits:     In general, how would you rate your overall health?  Good    Frequency of exercise:  4-5 days/week    Duration of exercise:  30-45 minutes    Do you usually eat at least 4 servings of fruit and vegetables a day, include whole grains    & fiber and avoid regularly eating high fat or \"junk\" foods?  Yes    Taking medications regularly:  Yes    Medication side effects:  None    Ability to successfully perform activities of daily living:  No assistance needed    Home Safety:  No safety concerns identified    Hearing Impairment:  No hearing concerns    In the past 6 months, have you been bothered by leaking of urine?  No    In general, how would you rate your overall mental or emotional health?  Good      PHQ-2 Total Score: 0    Additional concerns today:  " Yes      Have you ever done Advance Care Planning? (For example, a Health Directive, POLST, or a discussion with a medical provider or your loved ones about your wishes): Yes, patient states has an Advance Care Planning document and will bring a copy to the clinic.     Fall risk  Fallen 2 or more times in the past year?: No  Any fall with injury in the past year?: No    Cognitive Screening   1) Repeat 3 items (Leader, Season, Table)    2) Clock draw: NORMAL  3) 3 item recall: Recalls 3 objects  Results: 3 items recalled: COGNITIVE IMPAIRMENT LESS LIKELY    Mini-CogTM Copyright S Little. Licensed by the author for use in [facility]; reprinted with permission (soob@[facility]). All rights reserved.      Do you have sleep apnea, excessive snoring or daytime drowsiness?: no    Reviewed and updated as needed this visit by clinical staff   Tobacco  Allergies  Meds              Reviewed and updated as needed this visit by Provider                 Social History     Tobacco Use     Smoking status: Former     Packs/day: 1.00     Years: 4.00     Pack years: 4.00     Types: Cigarettes     Quit date: 1967     Years since quittin.3     Smokeless tobacco: Never   Vaping Use     Vaping status: Never Used   Substance Use Topics     Alcohol use: No     Alcohol/week: 0.0 standard drinks of alcohol             2023     8:59 AM   Alcohol Use   Prescreen: >3 drinks/day or >7 drinks/week? Not Applicable          View : No data to display.              Do you have a current opioid prescription? No  Do you use any other controlled substances or medications that are not prescribed by a provider? None            Current providers sharing in care for this patient include:   Patient Care Team:  No Ref-Primary, Physician as PCP - General  Flako Foreman MD as Assigned Surgical Provider  Ana Dean MD as Assigned Heart and Vascular Provider  Ruy Burns MD as Assigned PCP    The following health  maintenance items are reviewed in Epic and correct as of today:  Health Maintenance   Topic Date Due     MIGRAINE ACTION PLAN  Never done     ZOSTER IMMUNIZATION (1 of 2) Never done     COVID-19 Vaccine (4 - Booster for Pfizer series) 12/24/2021     INFLUENZA VACCINE (1) 09/01/2022     ANNUAL REVIEW OF HM ORDERS  04/26/2023     TSH W/FREE T4 REFLEX  04/26/2023     CBC  05/12/2023     BMP  05/15/2023     ALT  11/15/2023     LIPID  11/15/2023     CREATININE  11/15/2023     MEDICARE ANNUAL WELLNESS VISIT  04/28/2024     FALL RISK ASSESSMENT  04/28/2024     HF ACTION PLAN  04/26/2025     COLORECTAL CANCER SCREENING  11/20/2025     ADVANCE CARE PLANNING  04/26/2027     DTAP/TDAP/TD IMMUNIZATION (5 - Td or Tdap) 11/13/2029     PHQ-2 (once per calendar year)  Completed     Pneumococcal Vaccine: 65+ Years  Completed     IPV IMMUNIZATION  Aged Out     MENINGITIS IMMUNIZATION  Aged Out         Review of Systems   Constitutional: Positive for chills. Negative for fever.   HENT: Positive for congestion. Negative for ear pain, hearing loss and sore throat.    Eyes: Negative for pain and visual disturbance.   Respiratory: Positive for cough. Negative for shortness of breath.    Cardiovascular: Positive for peripheral edema. Negative for chest pain and palpitations.   Gastrointestinal: Negative for abdominal pain, constipation, diarrhea, heartburn, hematochezia and nausea.   Genitourinary: Positive for hematuria. Negative for dysuria, frequency, genital sores, impotence, penile discharge and urgency.   Musculoskeletal: Positive for arthralgias, joint swelling and myalgias.   Skin: Negative for rash.   Neurological: Positive for weakness. Negative for dizziness, headaches and paresthesias.   Psychiatric/Behavioral: Negative for mood changes. The patient is not nervous/anxious.        OBJECTIVE:   /70 (BP Location: Right arm, Patient Position: Sitting, Cuff Size: Adult Regular)   Pulse 72   Temp 98.3  F (36.8  C) (Oral)    "Resp 20   Ht 1.702 m (5' 7\")   Wt 69 kg (152 lb 1.6 oz)   SpO2 98%   BMI 23.82 kg/m   Estimated body mass index is 23.82 kg/m  as calculated from the following:    Height as of this encounter: 1.702 m (5' 7\").    Weight as of this encounter: 69 kg (152 lb 1.6 oz).  Physical Exam  Vitals reviewed.   HENT:      Head: Normocephalic and atraumatic.      Right Ear: Tympanic membrane, ear canal and external ear normal.      Left Ear: Tympanic membrane, ear canal and external ear normal.      Mouth/Throat:      Mouth: Mucous membranes are moist.      Pharynx: Oropharynx is clear.   Eyes:      Extraocular Movements: Extraocular movements intact.      Conjunctiva/sclera: Conjunctivae normal.      Pupils: Pupils are equal, round, and reactive to light.   Cardiovascular:      Rate and Rhythm: Normal rate. Rhythm irregular.      Pulses: Normal pulses.      Heart sounds: Normal heart sounds.   Pulmonary:      Effort: Pulmonary effort is normal.      Breath sounds: Normal breath sounds.   Abdominal:      General: Bowel sounds are normal.      Palpations: Abdomen is soft.   Musculoskeletal:         General: Normal range of motion.      Cervical back: Normal range of motion and neck supple.   Skin:     General: Skin is warm and dry.      Capillary Refill: Capillary refill takes less than 2 seconds.   Neurological:      General: No focal deficit present.      Mental Status: He is alert and oriented to person, place, and time.       Diagnostic Test Results: Future lab orders for CMP, CBC, FLP, TSH, PSA, and urinalysis have been placed.    ASSESSMENT / PLAN:   (Z00.00) Encounter for annual wellness exam in Medicare patient  (primary encounter diagnosis)  Comment: Adult wellness plan reviewed.    (I50.32) Chronic diastolic congestive heart failure (H)  Comment: Followed by cardiology.  Patient will continue metoprolol and atorvastatin as currently prescribed.  Follow-up fasting labs have been ordered.    (I10) Essential " hypertension  Comment: At this time, patient's blood pressure does appear to be under good control.  He also appears to be tolerating his losartan prescription without issue.  Assuming no abnormalities on his outstanding metabolic panel, we will continue his losartan at 100 mg daily.  Side effects of ARB use were reviewed.  Patient was encouraged to monitor his blood pressure outside the clinic setting.    (C43.59) Malignant melanoma of skin of trunk, except scrotum (H)  Comment: Followed by dermatology.    (Z12.5) Screening for prostate cancer  Comment: PSA, screen is pending.      Patient has been advised of split billing requirements and indicates understanding: Yes      COUNSELING:  Reviewed preventive health counseling, as reflected in patient instructions        He reports that he quit smoking about 56 years ago. His smoking use included cigarettes. He has a 4.00 pack-year smoking history. He has never used smokeless tobacco.      Appropriate preventive services were discussed with this patient, including applicable screening as appropriate for cardiovascular disease, diabetes, osteopenia/osteoporosis, and glaucoma.  As appropriate for age/gender, discussed screening for colorectal cancer, prostate cancer, breast cancer, and cervical cancer. Checklist reviewing preventive services available has been given to the patient.    Reviewed patients plan of care and provided an AVS. The Basic Care Plan (routine screening as documented in Health Maintenance) for Charles meets the Care Plan requirement. This Care Plan has been established and reviewed with the Patient.          Fer Mora MD  [facility]    Identified Health Risks:    I have reviewed Opioid Use Disorder and Substance Use Disorder risk factors and made any needed referrals.

## 2023-04-28 NOTE — PATIENT INSTRUCTIONS
Patient Education     Health Screening Guidelines, Men Ages 65 and Older   Screening tests and health counseling are a key part of managing your health. A screening test is done to find disorders or diseases in people who don't have any symptoms. Screening tests are not used to diagnose. They are used to find out if more testing is needed. The goal may be to find a disease early so it can be treated with more success. Or the goal may be to find a disease early so you can make lifestyle changes. You may need regular checkups to help you reduce your risk of disease.   Below are guidelines for men ages 65 and older. Talk with your healthcare provider. Make sure you re up-to-date on what you need.   We understand gender is a spectrum. We may use gendered terms to talk about anatomy and health risk. Please use this information in a way that works best for you and your provider as you talk about your care.   Screening Who needs it How often   Abdominal aortic aneurysm Men ages 65 to 75 who have ever smoked. Men in this age group who have never smoked could still be screened. This depends on their family history or other risk factors they may have.  1-time ultrasound   Unhealthy alcohol use All men in this age group At routine exams   Blood pressure All men in this age group Once a year if your blood pressure is normal. Normal blood pressure is less than 120/80 mm Hg. If your blood pressure is higher than this, follow the advice of your healthcare provider.    Colorectal cancer All men at average risk in this age group through age 75. For men ages 76 to 85, ask your healthcare provider if you need to keep screening. For men older than 85, screening is not advised.  Talk with your healthcare provider about which test below is right for you:     Colonoscopy every 10 years    Flexible sigmoidoscopy every 5 years (or every 10 years with yearly fecal immunochemical test (FIT) stool test)    CT colonography (virtual colonoscopy)  every 5 years    Yearly fecal occult blood test    Yearly FIT    Stool DNA test every 1 to 3 years  If you have a test that is not a colonoscopy and have an abnormal test result, you will need a colonoscopy.   You may need to be screened more or less often. This is based on personal or family health history. Talk with your healthcare provider.    Depression All men in this age group At routine exams   Type 2 diabetes or prediabetes All men up to age 70 who are overweight or obese  At least every 3 years (yearly if your blood sugar has already begun to rise)    Type 2 diabetes All men with prediabetes Every year   Hepatitis C All men ages 18 to 79 At routine exams. Ask your healthcare provider about how often you need to be screened based on your risk factors.    High cholesterol or triglycerides All men in this age group At least every 5 years. Ask your healthcare provider about your risk factors.    HIV Men at higher risk of infection At routine exams. Talk with your healthcare provider.    Lung cancer Men between the ages 50 and 80 who are in fairly good health and who:     Smoke or quit in the past 15 years    Have a 20-pack per year smoking history (1 pack a day for 20 years or 2 packs a day for 10 years)  Expert groups vary in their advice. Talk with your healthcare provider.  Yearly lung cancer screening with low-dose CT scan (LDCT). Talk with your healthcare provider about your risk factors.    Obesity All men in this age group At yearly routine exams   Prostate cancer All men in this age group, talk to your healthcare provider about the risks and benefits of a digital rectal exam (ARMAAN) and prostate-specific antigen (PSA) screening1  At routine exams, if you decide to be tested   Syphilis Men at higher risk of infection At routine exams. Talk with your healthcare provider.    Tuberculosis Men at higher risk of infection  Talk with your healthcare provider   Vision All men in this age group Every 1 to 2 years.  If you have a chronic health condition, ask your healthcare provider if you need exams more often.    Health counseling Who needs it How often   Diet and exercise All men in this age group At routine exams   Fall prevention (exercise, vitamin D supplements)  All men in this age group At yearly routine exams   Sexually transmitted infection (STI) prevention  Men at higher risk for infection  At routine exams. Talk with your healthcare provider.    Use of daily aspirin Men up to age 70 who are at high risk for cardiovascular problems and not at a higher risk for bleeding. Talk with your healthcare provider.  When your risk is known. Talk with your healthcare provider.    Use of tobacco and the health effects it can cause  All men in this age group Every visit   [employer] reviewed this educational content on 7/1/2022 2000-2022 [employer]. All rights reserved. This information is not intended as a substitute for professional medical care. Always follow your healthcare professional's instructions.             Patient Education   Personalized Prevention Plan  You are due for the preventive services outlined below.  Your care team is available to assist you in scheduling these services.  If you have already completed any of these items, please share that information with your care team to update in your medical record.  Health Maintenance Due   Topic Date Due     Migraine Action Plan  Never done     Zoster (Shingles) Vaccine (1 of 2) Never done     COVID-19 Vaccine (4 - Booster for Pfizer series) 12/24/2021     Flu Vaccine (1) 09/01/2022     PHQ-2 (once per calendar year)  01/01/2023     ANNUAL REVIEW OF HM ORDERS  04/26/2023     FALL RISK ASSESSMENT  04/26/2023     Thyroid Function Lab  04/26/2023     Complete Blood Count  05/12/2023     Basic Metabolic Panel  05/15/2023        Patient Education   Personalized Prevention Plan  You are due for the preventive services outlined below.  Your care team is  available to assist you in scheduling these services.  If you have already completed any of these items, please share that information with your care team to update in your medical record.  Health Maintenance Due   Topic Date Due     Migraine Action Plan  Never done     Zoster (Shingles) Vaccine (1 of 2) Never done     COVID-19 Vaccine (4 - Booster for Pfizer series) 12/24/2021     Flu Vaccine (1) 09/01/2022     ANNUAL REVIEW OF HM ORDERS  04/26/2023     Thyroid Function Lab  04/26/2023     Complete Blood Count  05/12/2023     Basic Metabolic Panel  05/15/2023

## 2023-05-01 ENCOUNTER — LAB (OUTPATIENT)
Dept: LAB | Facility: CLINIC | Age: 83
End: 2023-05-01
Payer: COMMERCIAL

## 2023-05-01 DIAGNOSIS — I50.32 CHRONIC DIASTOLIC CONGESTIVE HEART FAILURE (H): ICD-10-CM

## 2023-05-01 DIAGNOSIS — Z00.00 ENCOUNTER FOR ANNUAL WELLNESS EXAM IN MEDICARE PATIENT: ICD-10-CM

## 2023-05-01 DIAGNOSIS — Z12.5 SCREENING FOR PROSTATE CANCER: ICD-10-CM

## 2023-05-01 LAB
ALBUMIN SERPL BCG-MCNC: 4.1 G/DL (ref 3.5–5.2)
ALBUMIN UR-MCNC: 100 MG/DL
ALP SERPL-CCNC: 110 U/L (ref 40–129)
ALT SERPL W P-5'-P-CCNC: 38 U/L (ref 10–50)
ANION GAP SERPL CALCULATED.3IONS-SCNC: 12 MMOL/L (ref 7–15)
APPEARANCE UR: CLEAR
AST SERPL W P-5'-P-CCNC: 39 U/L (ref 10–50)
BACTERIA #/AREA URNS HPF: ABNORMAL /HPF
BILIRUB SERPL-MCNC: 0.7 MG/DL
BILIRUB UR QL STRIP: NEGATIVE
BUN SERPL-MCNC: 20.7 MG/DL (ref 8–23)
CALCIUM SERPL-MCNC: 9.2 MG/DL (ref 8.8–10.2)
CHLORIDE SERPL-SCNC: 104 MMOL/L (ref 98–107)
CHOLEST SERPL-MCNC: 130 MG/DL
COLOR UR AUTO: YELLOW
CREAT SERPL-MCNC: 1.1 MG/DL (ref 0.67–1.17)
DEPRECATED HCO3 PLAS-SCNC: 26 MMOL/L (ref 22–29)
ERYTHROCYTE [DISTWIDTH] IN BLOOD BY AUTOMATED COUNT: 13 % (ref 10–15)
GFR SERPL CREATININE-BSD FRML MDRD: 67 ML/MIN/1.73M2
GLUCOSE SERPL-MCNC: 85 MG/DL (ref 70–99)
GLUCOSE UR STRIP-MCNC: NEGATIVE MG/DL
HCT VFR BLD AUTO: 46.7 % (ref 40–53)
HDLC SERPL-MCNC: 40 MG/DL
HGB BLD-MCNC: 15 G/DL (ref 13.3–17.7)
HGB UR QL STRIP: ABNORMAL
KETONES UR STRIP-MCNC: NEGATIVE MG/DL
LDLC SERPL CALC-MCNC: 75 MG/DL
LEUKOCYTE ESTERASE UR QL STRIP: NEGATIVE
MCH RBC QN AUTO: 29.4 PG (ref 26.5–33)
MCHC RBC AUTO-ENTMCNC: 32.1 G/DL (ref 31.5–36.5)
MCV RBC AUTO: 91 FL (ref 78–100)
NITRATE UR QL: NEGATIVE
NONHDLC SERPL-MCNC: 90 MG/DL
PH UR STRIP: 5 [PH] (ref 5–7)
PLATELET # BLD AUTO: 167 10E3/UL (ref 150–450)
POTASSIUM SERPL-SCNC: 3.7 MMOL/L (ref 3.4–5.3)
PROT SERPL-MCNC: 7.1 G/DL (ref 6.4–8.3)
PSA SERPL DL<=0.01 NG/ML-MCNC: 7.67 NG/ML
RBC # BLD AUTO: 5.11 10E6/UL (ref 4.4–5.9)
RBC #/AREA URNS AUTO: ABNORMAL /HPF
SODIUM SERPL-SCNC: 142 MMOL/L (ref 136–145)
SP GR UR STRIP: 1.02 (ref 1–1.03)
SQUAMOUS #/AREA URNS AUTO: ABNORMAL /LPF
TRIGL SERPL-MCNC: 75 MG/DL
TSH SERPL DL<=0.005 MIU/L-ACNC: 3.31 UIU/ML (ref 0.3–4.2)
UROBILINOGEN UR STRIP-ACNC: 0.2 E.U./DL
WBC # BLD AUTO: 7.9 10E3/UL (ref 4–11)
WBC #/AREA URNS AUTO: ABNORMAL /HPF

## 2023-05-01 PROCEDURE — 85027 COMPLETE CBC AUTOMATED: CPT | Performed by: INTERNAL MEDICINE

## 2023-05-01 PROCEDURE — G0103 PSA SCREENING: HCPCS | Performed by: INTERNAL MEDICINE

## 2023-05-01 PROCEDURE — 80061 LIPID PANEL: CPT | Performed by: INTERNAL MEDICINE

## 2023-05-01 PROCEDURE — 81001 URINALYSIS AUTO W/SCOPE: CPT | Performed by: INTERNAL MEDICINE

## 2023-05-01 PROCEDURE — 36415 COLL VENOUS BLD VENIPUNCTURE: CPT | Performed by: INTERNAL MEDICINE

## 2023-05-01 PROCEDURE — 84443 ASSAY THYROID STIM HORMONE: CPT | Performed by: INTERNAL MEDICINE

## 2023-05-01 PROCEDURE — 80053 COMPREHEN METABOLIC PANEL: CPT | Performed by: INTERNAL MEDICINE

## 2023-05-12 ENCOUNTER — DOCUMENTATION ONLY (OUTPATIENT)
Dept: ANTICOAGULATION | Facility: CLINIC | Age: 83
End: 2023-05-12

## 2023-05-12 DIAGNOSIS — I48.0 PAROXYSMAL ATRIAL FIBRILLATION (H): Primary | ICD-10-CM

## 2023-05-12 NOTE — PROGRESS NOTES
ANTICOAGULATION CLINIC REFERRAL RENEWAL REQUEST       An annual renewal order is required for all patients referred to [facility].?  Please review and sign the pended referral order for Charles Nogueira.       ANTICOAGULATION SUMMARY      Warfarin indication(s)   Atrial Fibrillation    Mechanical heart valve present?  NO       Current goal range   INR: 2.0-3.0     Goal appropriate for indication? Goal INR 2-3, standard for indication(s) above     Time in Therapeutic Range (TTR)  (Goal > 60%) 64.4%       Office visit with referring provider's group within last year yes on 9/20/22       Jessika Yoo RN  [facility]

## 2023-05-15 ENCOUNTER — ANTICOAGULATION THERAPY VISIT (OUTPATIENT)
Dept: ANTICOAGULATION | Facility: CLINIC | Age: 83
End: 2023-05-15

## 2023-05-15 ENCOUNTER — DOCUMENTATION ONLY (OUTPATIENT)
Dept: ANTICOAGULATION | Facility: CLINIC | Age: 83
End: 2023-05-15

## 2023-05-15 ENCOUNTER — LAB (OUTPATIENT)
Dept: LAB | Facility: CLINIC | Age: 83
End: 2023-05-15
Payer: COMMERCIAL

## 2023-05-15 DIAGNOSIS — I48.0 PAROXYSMAL ATRIAL FIBRILLATION (H): Primary | ICD-10-CM

## 2023-05-15 DIAGNOSIS — I48.0 PAROXYSMAL ATRIAL FIBRILLATION (H): ICD-10-CM

## 2023-05-15 DIAGNOSIS — I47.29 PAROXYSMAL VENTRICULAR TACHYCARDIA (H): ICD-10-CM

## 2023-05-15 DIAGNOSIS — I49.5 SICK SINUS SYNDROME (H): Primary | ICD-10-CM

## 2023-05-15 DIAGNOSIS — I05.9 MITRAL VALVE DISEASE: ICD-10-CM

## 2023-05-15 LAB — INR BLD: 1.5 (ref 0.9–1.1)

## 2023-05-15 PROCEDURE — 36416 COLLJ CAPILLARY BLOOD SPEC: CPT

## 2023-05-15 PROCEDURE — 85610 PROTHROMBIN TIME: CPT

## 2023-05-15 NOTE — PROGRESS NOTES
ANTICOAGULATION MANAGEMENT     Charles Nogueira 83 year old male is on warfarin with subtherapeutic INR result. (Goal INR 2.0-3.0)    Recent labs: (last 7 days)     05/15/23  1009   INR 1.5*       ASSESSMENT       Source(s): Chart Review and Patient/Caregiver Call       Warfarin doses taken: Warfarin taken as instructed and He got Wed and Thur doses backwards so took smaller dose 3 days in a row-could be causing decrease in INR today.    Diet: No new diet changes identified    Medication/supplement changes: None noted    New illness, injury, or hospitalization: No    Signs or symptoms of bleeding or clotting: No    Previous result: Therapeutic last 2(+) visits    Additional findings: None         PLAN     Recommended plan for temporary change(s) affecting INR     Dosing Instructions: booster dose then continue your current warfarin dose with next INR in 1 week       Summary  As of 5/15/2023    Full warfarin instructions:  5/15: 2.5 mg; Otherwise 2.5 mg every Sun, Wed; 1.25 mg all other days   Next INR check:  5/22/2023             Telephone call with  Tabitha who verbalizes understanding and agrees to plan and who agrees to plan and repeated back plan correctly    Lab visit scheduled    Education provided:     Goal range and lab monitoring: goal range and significance of current result and Importance of following up at instructed interval    Symptom monitoring: monitoring for clotting signs and symptoms    Contact [phone] with any changes, questions or concerns.     Plan made per [facility] anticoagulation protocol    Melissa Grady RN  Anticoagulation Clinic  5/15/2023    _______________________________________________________________________     Anticoagulation Episode Summary     Current INR goal:  2.0-3.0   TTR:  62.3 % (11.9 mo)   Target end date:  Indefinite   Send INR reminders to:  [facility] HEART INR NURSE    Indications    Sick sinus syndrome (H) [I49.5]  Paroxysmal atrial fibrillation (H)  [I48.0]  Mitral valve disease [I05.9]           Comments:           Anticoagulation Care Providers     Provider Role Specialty Phone number    Ana Dean MD Referring Cardiovascular Disease [phone]

## 2023-05-15 NOTE — PROGRESS NOTES
ANTICOAGULATION CLINIC REFERRAL RENEWAL REQUEST       An annual renewal order is required for all patients referred to [facility].?  Please review and sign the pended referral order for Charles Nogueira.       ANTICOAGULATION SUMMARY      Warfarin indication(s)   Atrial Fibrillation and V Tach    Mechanical heart valve present?  NO       Current goal range   INR: 2.0-3.0     Goal appropriate for indication? Goal INR 2-3, standard for indication(s) above     Time in Therapeutic Range (TTR)  (Goal > 60%) 62.3%       Office visit with referring provider's group within last year yes on 9/20/22       Melissa Grady RN  [facility]

## 2023-05-22 ENCOUNTER — ANTICOAGULATION THERAPY VISIT (OUTPATIENT)
Dept: ANTICOAGULATION | Facility: CLINIC | Age: 83
End: 2023-05-22

## 2023-05-22 ENCOUNTER — LAB (OUTPATIENT)
Dept: LAB | Facility: CLINIC | Age: 83
End: 2023-05-22
Payer: COMMERCIAL

## 2023-05-22 DIAGNOSIS — I48.0 PAROXYSMAL ATRIAL FIBRILLATION (H): ICD-10-CM

## 2023-05-22 DIAGNOSIS — I05.9 MITRAL VALVE DISEASE: ICD-10-CM

## 2023-05-22 DIAGNOSIS — I49.5 SICK SINUS SYNDROME (H): Primary | ICD-10-CM

## 2023-05-22 LAB — INR BLD: 2.2 (ref 0.9–1.1)

## 2023-05-22 PROCEDURE — 36416 COLLJ CAPILLARY BLOOD SPEC: CPT

## 2023-05-22 PROCEDURE — 85610 PROTHROMBIN TIME: CPT

## 2023-05-22 NOTE — PROGRESS NOTES
"ANTICOAGULATION MANAGEMENT     Charles Nogueira 83 year old male is on warfarin with therapeutic INR result. (Goal INR 2.0-3.0)    Recent labs: (last 7 days)     05/22/23  1008   INR 2.2*       ASSESSMENT     Warfarin Lab Questionnaire    Dosing not completed in tablet assessment during check-in.  Paula reports the following dosing over the past 7 days:    Sun Mon Tues Wed Thur Fri Sat   2.5 mg 1.25 mg 1.25 mg 2.5 mg 1.25 mg 1.25 mg 1.25 mg           5/22/2023   Warfarin Lab Questionnaire   Missed doses within past 14 days? No   Changes in diet or alcohol within past 14 days? No   Medication changes since last result? No   Injuries or illness since last result? No   New shortness of breath, severe headaches or sudden changes in vision since last result? No   Abnormal bleeding since last result? Yes   If yes, please explain: left hand and arm-\"lots of bruising\" but \"pretty normal for summer\". Not concerned.   Upcoming surgery, procedure? No   Best number to call with results? [phone]        Previous result: Subtherapeutic  Additional findings: None       PLAN     Recommended plan for no diet, medication or health factor changes affecting INR     Dosing Instructions: Continue your current warfarin dose with next INR in 2 weeks       Summary  As of 5/22/2023    Full warfarin instructions:  2.5 mg every Sun, Wed; 1.25 mg all other days   Next INR check:  6/5/2023             Telephone call with  wife, Paula who verbalizes understanding and agrees to plan    Patient offered & declined to schedule next visit    Education provided:     Goal range and lab monitoring: goal range and significance of current result    Symptom monitoring: monitoring for bleeding signs and symptoms    Contact [phone] with any changes, questions or concerns.     Plan made per [facility] anticoagulation protocol    Melissa Grady RN  Anticoagulation Clinic  5/22/2023    _______________________________________________________________________ "     Anticoagulation Episode Summary     Current INR goal:  2.0-3.0   TTR:  61.6 % (1 y)   Target end date:  Indefinite   Send INR reminders to:  [facility] HEART INR NURSE    Indications    Sick sinus syndrome (H) [I49.5]  Paroxysmal atrial fibrillation (H) [I48.0]  Mitral valve disease [I05.9]           Comments:           Anticoagulation Care Providers     Provider Role Specialty Phone number    Ana Dean MD Referring Cardiovascular Disease [phone]

## 2023-05-23 ENCOUNTER — ANCILLARY PROCEDURE (OUTPATIENT)
Dept: CARDIOLOGY | Facility: CLINIC | Age: 83
End: 2023-05-23
Payer: COMMERCIAL

## 2023-05-23 DIAGNOSIS — Z95.0 CARDIAC PACEMAKER IN SITU: ICD-10-CM

## 2023-05-23 DIAGNOSIS — I49.5 SICK SINUS SYNDROME (H): ICD-10-CM

## 2023-05-23 PROCEDURE — 93296 REM INTERROG EVL PM/IDS: CPT | Performed by: INTERNAL MEDICINE

## 2023-05-23 PROCEDURE — 93294 REM INTERROG EVL PM/LDLS PM: CPT | Performed by: INTERNAL MEDICINE

## 2023-06-01 ENCOUNTER — TELEPHONE (OUTPATIENT)
Dept: CARDIOLOGY | Facility: CLINIC | Age: 83
End: 2023-06-01
Payer: COMMERCIAL

## 2023-06-01 DIAGNOSIS — I49.5 SICK SINUS SYNDROME (H): ICD-10-CM

## 2023-06-01 DIAGNOSIS — I47.29 NONSUSTAINED VENTRICULAR TACHYCARDIA (H): Primary | ICD-10-CM

## 2023-06-01 NOTE — TELEPHONE ENCOUNTER
Pt called into Triage today, 6/1/23, and states he feels tired and weak when walking around and doing any type of slight exertion.  After sitting down for a few minutes, he feels back to himself.  Called pt to discuss symptoms.  Pt stated they started months ago and feels they are getting progressively worse.  He reports he walks 40-60 minutes, 5-7 days/week.  He feels like his is walking slower than usual over the past few months.  He can't tell when he is in Afib, other than by checking his pulse.  He states his pulse is always irregular.  His HR at rest is 60s-70s, and he does not check it with activity.  His BP today was 159/77, but does not check it regularly.  He denies lower extremity swelling.  Of note, pt is scheduled for a device check 8/29/23.    Pt last saw Dr Dean 9/20/22 with an echo done prior to the visit.  Follow-up ordered for 1 year, and echo planned for 1-2 years.  Routing to Dr Dean for review and recommendations.

## 2023-06-01 NOTE — TELEPHONE ENCOUNTER
[facility] Call Center    Phone Message    May a detailed message be left on voicemail: yes     Reason for Call: Symptoms or Concerns     If patient has red-flag symptoms, warm transfer to triage line    Current symptom or concern: Tired when he walks around or does any type of slight exertion     He said he sits down for a few minutes and then he feels back to himself       Action Taken: Other: Cardiology    Travel Screening: Not Applicable     Thank you!  [facility]

## 2023-06-01 NOTE — PROGRESS NOTES
"ANTICOAGULATION MANAGEMENT     Charles Nogueira 82 year old male is on warfarin with therapeutic INR result. (Goal INR 2.0-3.0)    Recent labs: (last 7 days)     09/29/22  0914   INR 2.4*       ASSESSMENT       Source(s): Chart Review and Patient/Caregiver Call       Warfarin doses taken: Warfarin taken as instructed    Diet: No new diet changes identified    New illness, injury, or hospitalization: No    Medication/supplement changes: Amlodipine stopped on 9/20/22 No interaction anticipated    Signs or symptoms of bleeding or clotting: No    Previous INR: Therapeutic last visit; previously outside of goal range    Additional findings: Before pt leaves for winter vacation they need directions changed on warfarin Rx to \"one tab daily\" and refill put in for 90 tabs. Pt wife will call when she needs this done so they can leave with a supply of med. We discussed home monitor and to hopefully expect call from company in several weeks. Pt has cystoscopy next week and they will ask Urology if pt is to stop warfarin before       PLAN     Recommended plan for no diet, medication or health factor changes affecting INR     Dosing Instructions: Continue your current warfarin dose with next INR in 4 weeks       Summary  As of 9/29/2022    Full warfarin instructions:  2.5 mg every Sun, Wed, Fri; 1.25 mg all other days   Next INR check:  10/27/2022             Telephone call with  wife who verbalizes understanding and agrees to plan    Patient to recheck with home meter if available    Education provided: Goal range and significance of current result and Contact [phone] with any changes, questions or concerns.     Plan made per [facility] anticoagulation protocol    Melissa Grady RN  Anticoagulation Clinic  9/29/2022    _______________________________________________________________________     Anticoagulation Episode Summary     Current INR goal:  2.0-3.0   TTR:  80.0 % (4.3 mo)   Target end date:  Indefinite   Send INR reminders " to:  [facility] HEART INR NURSE    Indications    Sick sinus syndrome (H) [I49.5]  Paroxysmal atrial fibrillation (H) [I48.0]  Mitral valve disease [I05.9]           Comments:           Anticoagulation Care Providers     Provider Role Specialty Phone number    Ana Dean MD Referring Cardiovascular Disease [phone]           rectal bleeding

## 2023-06-02 LAB
MDC_IDC_LEAD_IMPLANT_DT: NORMAL
MDC_IDC_LEAD_IMPLANT_DT: NORMAL
MDC_IDC_LEAD_LOCATION: NORMAL
MDC_IDC_LEAD_LOCATION: NORMAL
MDC_IDC_LEAD_MFG: NORMAL
MDC_IDC_LEAD_MFG: NORMAL
MDC_IDC_LEAD_MODEL: NORMAL
MDC_IDC_LEAD_MODEL: NORMAL
MDC_IDC_LEAD_POLARITY_TYPE: NORMAL
MDC_IDC_LEAD_POLARITY_TYPE: NORMAL
MDC_IDC_LEAD_SERIAL: NORMAL
MDC_IDC_LEAD_SERIAL: NORMAL
MDC_IDC_MSMT_BATTERY_DTM: NORMAL
MDC_IDC_MSMT_BATTERY_IMPEDANCE: 1058 OHM
MDC_IDC_MSMT_BATTERY_REMAINING_LONGEVITY: 72 MO
MDC_IDC_MSMT_BATTERY_STATUS: NORMAL
MDC_IDC_MSMT_BATTERY_VOLTAGE: 2.77 V
MDC_IDC_MSMT_LEADCHNL_RA_IMPEDANCE_VALUE: 555 OHM
MDC_IDC_MSMT_LEADCHNL_RA_PACING_THRESHOLD_AMPLITUDE: 0.5 V
MDC_IDC_MSMT_LEADCHNL_RA_PACING_THRESHOLD_PULSEWIDTH: 0.4 MS
MDC_IDC_MSMT_LEADCHNL_RV_IMPEDANCE_VALUE: 623 OHM
MDC_IDC_MSMT_LEADCHNL_RV_PACING_THRESHOLD_AMPLITUDE: 0.75 V
MDC_IDC_MSMT_LEADCHNL_RV_PACING_THRESHOLD_PULSEWIDTH: 0.4 MS
MDC_IDC_PG_IMPLANT_DTM: NORMAL
MDC_IDC_PG_MFG: NORMAL
MDC_IDC_PG_MODEL: NORMAL
MDC_IDC_PG_SERIAL: NORMAL
MDC_IDC_PG_TYPE: NORMAL
MDC_IDC_SESS_CLINIC_NAME: NORMAL
MDC_IDC_SESS_DTM: NORMAL
MDC_IDC_SESS_TYPE: NORMAL
MDC_IDC_SET_BRADY_AT_MODE_SWITCH_MODE: NORMAL
MDC_IDC_SET_BRADY_AT_MODE_SWITCH_RATE: 175 {BEATS}/MIN
MDC_IDC_SET_BRADY_LOWRATE: 60 {BEATS}/MIN
MDC_IDC_SET_BRADY_MAX_SENSOR_RATE: 130 {BEATS}/MIN
MDC_IDC_SET_BRADY_MAX_TRACKING_RATE: 130 {BEATS}/MIN
MDC_IDC_SET_BRADY_MODE: NORMAL
MDC_IDC_SET_BRADY_PAV_DELAY_LOW: 350 MS
MDC_IDC_SET_BRADY_SAV_DELAY_LOW: 350 MS
MDC_IDC_SET_LEADCHNL_RA_PACING_AMPLITUDE: 1.5 V
MDC_IDC_SET_LEADCHNL_RA_PACING_CAPTURE_MODE: NORMAL
MDC_IDC_SET_LEADCHNL_RA_PACING_POLARITY: NORMAL
MDC_IDC_SET_LEADCHNL_RA_PACING_PULSEWIDTH: 0.4 MS
MDC_IDC_SET_LEADCHNL_RA_SENSING_POLARITY: NORMAL
MDC_IDC_SET_LEADCHNL_RA_SENSING_SENSITIVITY: 0.5 MV
MDC_IDC_SET_LEADCHNL_RV_PACING_AMPLITUDE: 2 V
MDC_IDC_SET_LEADCHNL_RV_PACING_CAPTURE_MODE: NORMAL
MDC_IDC_SET_LEADCHNL_RV_PACING_POLARITY: NORMAL
MDC_IDC_SET_LEADCHNL_RV_PACING_PULSEWIDTH: 0.4 MS
MDC_IDC_SET_LEADCHNL_RV_SENSING_POLARITY: NORMAL
MDC_IDC_SET_LEADCHNL_RV_SENSING_SENSITIVITY: 2.8 MV
MDC_IDC_SET_ZONE_DETECTION_INTERVAL: 333.33 MS
MDC_IDC_SET_ZONE_DETECTION_INTERVAL: 342.86 MS
MDC_IDC_SET_ZONE_TYPE: NORMAL
MDC_IDC_SET_ZONE_TYPE: NORMAL
MDC_IDC_STAT_AT_BURDEN_PERCENT: 0 %
MDC_IDC_STAT_AT_DTM_END: NORMAL
MDC_IDC_STAT_AT_DTM_START: NORMAL
MDC_IDC_STAT_AT_MODE_SW_COUNT: 16
MDC_IDC_STAT_BRADY_AP_VP_PERCENT: 1 %
MDC_IDC_STAT_BRADY_AP_VS_PERCENT: 64 %
MDC_IDC_STAT_BRADY_AS_VP_PERCENT: 0 %
MDC_IDC_STAT_BRADY_AS_VS_PERCENT: 34 %
MDC_IDC_STAT_BRADY_DTM_END: NORMAL
MDC_IDC_STAT_BRADY_DTM_START: NORMAL
MDC_IDC_STAT_EPISODE_RECENT_COUNT: 2
MDC_IDC_STAT_EPISODE_RECENT_COUNT: 7
MDC_IDC_STAT_EPISODE_RECENT_COUNT_DTM_END: NORMAL
MDC_IDC_STAT_EPISODE_RECENT_COUNT_DTM_END: NORMAL
MDC_IDC_STAT_EPISODE_RECENT_COUNT_DTM_START: NORMAL
MDC_IDC_STAT_EPISODE_RECENT_COUNT_DTM_START: NORMAL
MDC_IDC_STAT_EPISODE_TYPE: NORMAL
MDC_IDC_STAT_EPISODE_TYPE: NORMAL

## 2023-06-05 NOTE — TELEPHONE ENCOUNTER
Called pt to review Dr Dean's recommendations below.  Left brief message, then pt's wife Sharon called back quickly and conveyed the recommendations to her.  Paula is agreeable to the recommendations.  Paula also shared that she checked Charles's BP and HR over the weekend:    6/3/23: 106/53, HR 45, asymptomatic  6/4/23: HR 48-52 at rest, asymptomatic    Pt and wife decided to have him only take a 1/2 pill of metoprolol starting 6/4/23 and again on 6/5/23 due to his low BP and HR.  His ordered dose is 50 mg Toprol XL daily.  Today, 6/5/23, his BP was 154/76 and HR 64.    Of concern is that his PPM lower limit is 60.  Wife requesting an appointment this week.  Routing to Dr Dean for further review and recommendations.       Ana Dean MD  [facility] Heart Team 1  Caller: Unspecified (4 days ago, 10:53 AM)  Short episodes of NSVT noted on last device check. Paroxysms of afib are rate-controlled.     Recommend exercise stress nuc. Ok to hold BB.   He should track is BP at home and schedule earlier follow up with me.     Ana Dean MD on 6/2/2023 at 4:26 PM

## 2023-06-05 NOTE — TELEPHONE ENCOUNTER
Called pt's wife back.  Order placed for EP NOE visit.  Wife to call for appt.  Conveyed Dr Dean's recommendations below.  Wife and pt planning to take 1/2 dose beta-blocker for the time being.  NM Stress Test scheduled for 6/8/23, and planning to hold dose 24 hours prior.  Wife stated there are no appts w/Dr Dean prior to November as of last Thursday.  Held appt for 8/10/23 at 9:30AM with Dr Dean in [place] and confirmed time with pt's wife.  Messaged scheduling to confirm that timeslot.  Pt also scheduled 7/14/23 with AWILDA Adame in [place].      Ana Dean MD  [facility] Heart Team 1  Caller: Unspecified (4 days ago, 10:53 AM)  HR should not be less than 60 with PPM: PVCs may cause inaccurate pulse check (are they checking manually?).  Ok to take lower dose of metoprolol for now given that BP also running low, but sometimes more beta-blocker actually helps regulate pulse and pulse will appear to be higher if PVCs are suppressed.     MD or EP NOE visit to look at device settings would be most appropriate.       Ana Dean MD on 6/5/2023 at 10:34 AM

## 2023-06-06 ENCOUNTER — ANTICOAGULATION THERAPY VISIT (OUTPATIENT)
Dept: ANTICOAGULATION | Facility: CLINIC | Age: 83
End: 2023-06-06

## 2023-06-06 ENCOUNTER — LAB (OUTPATIENT)
Dept: LAB | Facility: CLINIC | Age: 83
End: 2023-06-06
Payer: COMMERCIAL

## 2023-06-06 DIAGNOSIS — I49.5 SICK SINUS SYNDROME (H): Primary | ICD-10-CM

## 2023-06-06 DIAGNOSIS — I05.9 MITRAL VALVE DISEASE: ICD-10-CM

## 2023-06-06 DIAGNOSIS — I48.0 PAROXYSMAL ATRIAL FIBRILLATION (H): ICD-10-CM

## 2023-06-06 LAB — INR BLD: 1.9 (ref 0.9–1.1)

## 2023-06-06 PROCEDURE — 85610 PROTHROMBIN TIME: CPT

## 2023-06-06 PROCEDURE — 36416 COLLJ CAPILLARY BLOOD SPEC: CPT

## 2023-06-06 NOTE — PROGRESS NOTES
ANTICOAGULATION MANAGEMENT     Charles Nogueira 83 year old male is on warfarin with subtherapeutic INR result. (Goal INR 2.0-3.0)    Recent labs: (last 7 days)     06/06/23  0854   INR 1.9*       ASSESSMENT     Warfarin Lab Questionnaire    Warfarin Doses Last 7 Days      6/6/2023     8:51 AM   Dose in Tablet or Mg   TAB or MG? tablet (tab)     Pt Rptd Dose SUNDAY MONDAY TUESDAY WED THURS FRIDAY SATURDAY 6/6/2023   8:51 AM 1 0.5 0.5 1 0.5 0.5 0.5         6/6/2023   Warfarin Lab Questionnaire   Missed doses within past 14 days? No   Changes in diet or alcohol within past 14 days? No   Medication changes since last result? Yes   Please list: dropped one med fom 750mg to 500mg and metropolol down to half a tablet-should not affect INR. Has stress test 6/8/23.    Injuries or illness since last result? No   New shortness of breath, severe headaches or sudden changes in vision since last result? No   Abnormal bleeding since last result? No   Upcoming surgery, procedure? No   Best number to call with results? [phone]        Previous result: Therapeutic last visit; previously outside of goal range  Additional findings: Has been tired/easily fatigued lately. Per cardiology BB cut in half and stress test being done this week. Concerns about low HR.       PLAN     Recommended plan for no diet, medication or health factor changes affecting INR     Dosing Instructions: Continue your current warfarin dose with next INR in 1-2 weeks       Summary  As of 6/6/2023    Full warfarin instructions:  2.5 mg every Sun, Wed; 1.25 mg all other days   Next INR check:  6/20/2023             Telephone call with  wife, Paula who verbalizes understanding and agrees to plan    Patient offered & declined to schedule next visit, needs to see what other appointments are coming up    Education provided:     Goal range and lab monitoring: goal range and significance of current result and Importance of following up at instructed  interval    Dietary considerations: impact of vitamin K foods on INR    Symptom monitoring: monitoring for bleeding signs and symptoms and monitoring for clotting signs and symptoms    Contact [phone] with any changes, questions or concerns.     Plan made per [facility] anticoagulation protocol    Melissa Grady RN  Anticoagulation Clinic  6/6/2023    _______________________________________________________________________     Anticoagulation Episode Summary     Current INR goal:  2.0-3.0   TTR:  62.6 % (1 y)   Target end date:  Indefinite   Send INR reminders to:  [facility] HEART INR NURSE    Indications    Sick sinus syndrome (H) [I49.5]  Paroxysmal atrial fibrillation (H) [I48.0]  Mitral valve disease [I05.9]           Comments:           Anticoagulation Care Providers     Provider Role Specialty Phone number    Ana Dean MD Referring Cardiovascular Disease [phone]

## 2023-06-08 ENCOUNTER — HOSPITAL ENCOUNTER (OUTPATIENT)
Dept: NUCLEAR MEDICINE | Facility: CLINIC | Age: 83
Setting detail: NUCLEAR MEDICINE
Discharge: HOME | End: 2023-06-08
Attending: INTERNAL MEDICINE
Payer: COMMERCIAL

## 2023-06-08 ENCOUNTER — HOSPITAL ENCOUNTER (OUTPATIENT)
Dept: CARDIOLOGY | Facility: CLINIC | Age: 83
Discharge: HOME | End: 2023-06-08
Attending: INTERNAL MEDICINE
Payer: COMMERCIAL

## 2023-06-08 DIAGNOSIS — I47.29 NONSUSTAINED VENTRICULAR TACHYCARDIA (H): ICD-10-CM

## 2023-06-08 LAB
CV BLOOD PRESSURE: 46 MMHG
CV STRESS MAX HR HE: 122
RATE PRESSURE PRODUCT: NORMAL
STRESS ANGINA INDEX: 0
STRESS ECHO BASELINE DIASTOLIC HE: 82
STRESS ECHO BASELINE HR: 61 BPM
STRESS ECHO BASELINE SYSTOLIC BP: 140
STRESS ECHO CALCULATED PERCENT HR: 88 %
STRESS ECHO LAST STRESS DIASTOLIC BP: 70
STRESS ECHO LAST STRESS SYSTOLIC BP: 170
STRESS ECHO POST ESTIMATED WORKLOAD: 7 METS
STRESS ECHO POST EXERCISE DUR MIN: 6 MIN
STRESS ECHO POST EXERCISE DUR SEC: 0 SEC
STRESS ECHO TARGET HR: 138

## 2023-06-08 PROCEDURE — A9502 TC99M TETROFOSMIN: HCPCS | Performed by: INTERNAL MEDICINE

## 2023-06-08 PROCEDURE — 93017 CV STRESS TEST TRACING ONLY: CPT

## 2023-06-08 PROCEDURE — 93016 CV STRESS TEST SUPVJ ONLY: CPT | Performed by: INTERNAL MEDICINE

## 2023-06-08 PROCEDURE — 93018 CV STRESS TEST I&R ONLY: CPT | Performed by: INTERNAL MEDICINE

## 2023-06-08 PROCEDURE — 343N000001 HC RX 343: Performed by: INTERNAL MEDICINE

## 2023-06-08 PROCEDURE — 78452 HT MUSCLE IMAGE SPECT MULT: CPT | Mod: 26 | Performed by: INTERNAL MEDICINE

## 2023-06-08 PROCEDURE — 78452 HT MUSCLE IMAGE SPECT MULT: CPT

## 2023-06-08 RX ADMIN — TETROFOSMIN 33 MILLICURIE: 1.38 INJECTION, POWDER, LYOPHILIZED, FOR SOLUTION INTRAVENOUS at 10:45

## 2023-06-08 RX ADMIN — TETROFOSMIN 10 MILLICURIE: 1.38 INJECTION, POWDER, LYOPHILIZED, FOR SOLUTION INTRAVENOUS at 09:03

## 2023-06-09 ENCOUNTER — TELEPHONE (OUTPATIENT)
Dept: CARDIOLOGY | Facility: CLINIC | Age: 83
End: 2023-06-09
Payer: COMMERCIAL

## 2023-06-09 DIAGNOSIS — R94.39 ABNORMAL CARDIOVASCULAR STRESS TEST: Primary | ICD-10-CM

## 2023-06-09 DIAGNOSIS — R93.1 ABNORMAL FINDINGS ON DIAGNOSTIC IMAGING OF HEART AND CORONARY CIRCULATION: ICD-10-CM

## 2023-06-09 DIAGNOSIS — I47.29 NONSUSTAINED VENTRICULAR TACHYCARDIA (H): ICD-10-CM

## 2023-06-09 DIAGNOSIS — I34.0 NONRHEUMATIC MITRAL VALVE REGURGITATION: ICD-10-CM

## 2023-06-09 NOTE — TELEPHONE ENCOUNTER
Nuclear stress test done on 6/8/23, per result note from Dr. Dean:     Ana Dean MD  P [facility] Heart Team 1  Please schedule patient for an echo due to abnormal stress test with reduced EF and infarct     He should also have a coronary angiogram with possible PCI.  He will need a pre-procedure visit.     Ana Dean MD on 6/8/2023 at 5:08 PM     Called pt, no answer. Left  requesting call back to Team 1.     Addendum 6/9/23 - Spoke with pt and wife. Reviewed nuclear stress test results and Dr. Dean's recommendations. Orders placed for echo and coronary angio. Reviewed pt will need pre-procedure NOE visit to discuss risks and benefits. Pt and wife verbalized understanding and agreement with plan. Message sent to scheduling.

## 2023-06-14 ENCOUNTER — ANTICOAGULATION THERAPY VISIT (OUTPATIENT)
Dept: ANTICOAGULATION | Facility: CLINIC | Age: 83
End: 2023-06-14

## 2023-06-14 ENCOUNTER — LAB (OUTPATIENT)
Dept: LAB | Facility: CLINIC | Age: 83
End: 2023-06-14
Payer: COMMERCIAL

## 2023-06-14 ENCOUNTER — TELEPHONE (OUTPATIENT)
Dept: ANTICOAGULATION | Facility: CLINIC | Age: 83
End: 2023-06-14

## 2023-06-14 DIAGNOSIS — I49.5 SICK SINUS SYNDROME (H): Primary | ICD-10-CM

## 2023-06-14 DIAGNOSIS — I05.9 MITRAL VALVE DISEASE: ICD-10-CM

## 2023-06-14 DIAGNOSIS — I48.0 PAROXYSMAL ATRIAL FIBRILLATION (H): ICD-10-CM

## 2023-06-14 LAB — INR BLD: 2.3 (ref 0.9–1.1)

## 2023-06-14 PROCEDURE — 36416 COLLJ CAPILLARY BLOOD SPEC: CPT

## 2023-06-14 PROCEDURE — 85610 PROTHROMBIN TIME: CPT

## 2023-06-14 NOTE — PROGRESS NOTES
ANTICOAGULATION MANAGEMENT     Charles Nogueira 83 year old male is on warfarin with therapeutic INR result. (Goal INR 2.0-3.0)    Recent labs: (last 7 days)     06/14/23  0941   INR 2.3*       ASSESSMENT     Warfarin Lab Questionnaire    Warfarin Doses Last 7 Days      6/14/2023     9:33 AM   Dose in Tablet or Mg   TAB or MG? tablet (tab)     Pt Rptd Dose SUNDAY MONDAY TUESDAY WED THURS FRIDAY SATURDAY 6/14/2023   9:33 AM 1 0.5 0.5 1 0.5 0.5 0.5         6/14/2023   Warfarin Lab Questionnaire   Missed doses within past 14 days? No   Changes in diet or alcohol within past 14 days? No   Medication changes since last result? No   Injuries or illness since last result? No   New shortness of breath, severe headaches or sudden changes in vision since last result? No   Abnormal bleeding since last result? No   Upcoming surgery, procedure? Yes-heart cath on 6/28/23, wife said they were told to hold warfarin for 4 days, will send message to [facility] for official hold plan   Best number to call with results? [phone]        Previous result: Subtherapeutic  Additional findings: None       PLAN     Recommended plan for temporary change(s) affecting INR     Dosing Instructions: Continue your current warfarin dose and start 4 day hold on 6/24.  Resume Warfarin on 6/28 and check INR 1 week later.        Summary  As of 6/14/2023    Full warfarin instructions:  6/24: Hold; 6/25: Hold; 6/26: Hold; 6/27: Hold; Otherwise 2.5 mg every Sun, Wed; 1.25 mg all other days   Next INR check:  7/6/2023             Telephone call with  Paula who verbalizes understanding and agrees to plan    Lab visit scheduled 7/6    Education provided:     Goal range and lab monitoring: goal range and significance of current result    Contact [phone] with any changes, questions or concerns.     Plan made per [facility] anticoagulation protocol    Jessika Yoo RN  Anticoagulation  Clinic  6/14/2023    _______________________________________________________________________     Anticoagulation Episode Summary     Current INR goal:  2.0-3.0   TTR:  61.9 % (1 y)   Target end date:  Indefinite   Send INR reminders to:  [facility] INR NURSE    Indications    Sick sinus syndrome (H) [I49.5]  Paroxysmal atrial fibrillation (H) [I48.0]  Mitral valve disease [I05.9]           Comments:           Anticoagulation Care Providers     Provider Role Specialty Phone number    Ana Dean MD Referring Cardiovascular Disease [phone]

## 2023-06-14 NOTE — TELEPHONE ENCOUNTER
Patient scheduled for heart cath on 6/28/23.  Will send message to [facility] for Warfarin hold plan.  Manoj YADAV

## 2023-06-23 ENCOUNTER — HOSPITAL ENCOUNTER (OUTPATIENT)
Dept: CARDIOLOGY | Facility: CLINIC | Age: 83
Discharge: HOME | End: 2023-06-23
Attending: INTERNAL MEDICINE | Admitting: INTERNAL MEDICINE
Payer: COMMERCIAL

## 2023-06-23 DIAGNOSIS — I47.29 NONSUSTAINED VENTRICULAR TACHYCARDIA (H): ICD-10-CM

## 2023-06-23 DIAGNOSIS — R94.39 ABNORMAL CARDIOVASCULAR STRESS TEST: ICD-10-CM

## 2023-06-23 DIAGNOSIS — R94.39 ABNORMAL CARDIOVASCULAR STRESS TEST: Primary | ICD-10-CM

## 2023-06-23 DIAGNOSIS — I34.0 NONRHEUMATIC MITRAL VALVE REGURGITATION: ICD-10-CM

## 2023-06-23 LAB — LVEF ECHO: NORMAL

## 2023-06-23 PROCEDURE — 93306 TTE W/DOPPLER COMPLETE: CPT

## 2023-06-23 PROCEDURE — 93306 TTE W/DOPPLER COMPLETE: CPT | Mod: 26 | Performed by: INTERNAL MEDICINE

## 2023-06-23 RX ORDER — SODIUM CHLORIDE 9 MG/ML
INJECTION, SOLUTION INTRAVENOUS CONTINUOUS
Status: CANCELLED | OUTPATIENT
Start: 2023-06-23

## 2023-06-23 RX ORDER — ASPIRIN 325 MG
325 TABLET ORAL ONCE
Status: CANCELLED | OUTPATIENT
Start: 2023-06-23 | End: 2023-06-23

## 2023-06-23 RX ORDER — LIDOCAINE 40 MG/G
CREAM TOPICAL
Status: CANCELLED | OUTPATIENT
Start: 2023-06-23

## 2023-06-23 RX ORDER — POTASSIUM CHLORIDE 1500 MG/1
20 TABLET, EXTENDED RELEASE ORAL
Status: CANCELLED | OUTPATIENT
Start: 2023-06-23

## 2023-06-23 RX ORDER — ASPIRIN 81 MG/1
243 TABLET, CHEWABLE ORAL ONCE
Status: CANCELLED | OUTPATIENT
Start: 2023-06-23

## 2023-06-23 NOTE — PROGRESS NOTES
Coronary angiogram/PCI/Right Heart Cath prep instructions.     Patient is scheduled for a Coronary Angio w/possible PCI at [facility] - [address] [place] [address] - Main Entrance of the Hospital on 6/28/23.  Check in time is at 8:30am and procedure to follow.    Patient instructed to remain NPO for solid foods 8 hours prior to arrival and may have clear liquids up to 2 hours prior to arrival.    Patient Patient does not require extra fluids prior to procedure.    Patient is not diabetic.    Patient is on warfarin/coumadin and has been instructed to hold for 4 days prior to procedure, starting 6/24/23.  Patient may resume warfarin/coumadin after the procedure.    Patient is not on diuretics.     Patient is not currently taking ASA and has been advised to take one 325 mg tablet the day prior and morning of the procedure.    Pt is not on a SGLT2 inhibitor.    Patient advised to take their other daily medications the morning of the procedure with small sips of water.     Verified patient does not have a contrast allergy.    Verified patient has someone available to drive them home from the hospital and can stay with them for 24 hours after the procedure.     Patient advised to notify care team with any new COVID like symptoms prior to procedure.    Patient will check their temperature the morning of procedure and call [facility] at [phone] if temp is >100.0.    Patient is aware of visitor policy.    Patient's wife, Paula, expresses understanding of above instructions and denies further questions at this time.       Sumi, Team 1 VICENTA  [facility]

## 2023-06-26 ENCOUNTER — OFFICE VISIT (OUTPATIENT)
Dept: CARDIOLOGY | Facility: CLINIC | Age: 83
End: 2023-06-26
Payer: COMMERCIAL

## 2023-06-26 VITALS
HEART RATE: 64 BPM | WEIGHT: 148 LBS | DIASTOLIC BLOOD PRESSURE: 71 MMHG | SYSTOLIC BLOOD PRESSURE: 133 MMHG | HEIGHT: 69 IN | BODY MASS INDEX: 21.92 KG/M2 | OXYGEN SATURATION: 98 %

## 2023-06-26 DIAGNOSIS — R93.1 ABNORMAL FINDINGS ON DIAGNOSTIC IMAGING OF HEART AND CORONARY CIRCULATION: ICD-10-CM

## 2023-06-26 DIAGNOSIS — I47.29 NONSUSTAINED VENTRICULAR TACHYCARDIA (H): ICD-10-CM

## 2023-06-26 DIAGNOSIS — R94.39 ABNORMAL CARDIOVASCULAR STRESS TEST: ICD-10-CM

## 2023-06-26 PROCEDURE — 99214 OFFICE O/P EST MOD 30 MIN: CPT | Performed by: INTERNAL MEDICINE

## 2023-06-26 NOTE — PATIENT INSTRUCTIONS
Call the nurse for any questions or concerns at [phone]     Plan:  1. Medication changes: None    2. Coronary angiogram as scheduled on Wednesday.    -you will need a ride to and from your procedure as well as someone with you x 24 hours   -nothing to eat or drink after midnight the night prior to the procedure    It was great seeing you today!    Kaia Gibbs PA-C  Physician Assistant  [facility]

## 2023-06-26 NOTE — LETTER
6/26/2023    Physician No Ref-Primary  No address on file    RE: Charles Nogueira       Dear Colleague,     I had the pleasure of seeing Charles Nogueira in the [facility].  Cardiology Clinic Progress Note  Charles Nogueira MRN# [id number]   YOB: 1940 Age: 83 year old   Primary Cardiologist: Dr. Dean Reason for visit: H&P            Assessment and Plan:   Charles Nogueira is a very pleasant 83 year old male who is here today for H&P.      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  -Coronary angiogram +/- PCI scheduled for 6/28/2023  -On warfarin, Toprol, losartan, atorvastatin  Hyperlipidemia  -LDL 75 5/2023  -On atorvastatin 20 mg once daily; may need to increase pending outcome of coronary angiogram  Hypertension  -BP well controlled on current regimen  -BP in clinic today 133/71  Paroxysmal atrial fibrillation  -On warfarin for cardioembolic risk reduction   -[id number] score is 3 (agex2, HTN)  Sinus node dysfunction status post dual-chamber permanent pacemaker implant  -implanted 2014  Moderate mitral regurgitation, stable      Proceed with coronary angiogram +/- PCI as scheduled 6/28/2023.  No history of contrast dye allergy.  Normal renal function.  Last dose of warfarin was Friday, 6/23/2023.  Patient is aware he needs transportation to and from his procedure as well as 24 hours postprocedure monitoring.  He will remain NPO 8 hours prior to his procedure.    Risks and benefits of coronary angiogram +/- percutaneous coronary intervention (PCI) were discussed with the patient including but not limited to bleeding, bruising, infection, allergic reaction, kidney damage (including need for dialysis), stroke, heart attack, vascular damage, emergency open heart surgery, and death.  Patient verbalized understanding and wishes to proceed.      Kaia  HALEY Gibbs  [facility]  Pager: [phone]          History of Presenting Illness:    Charles Nogueira is a very pleasant 83 year old male with a history of paroxysmal atrial fibrillation rate controlled with metoprolol and anticoagulated with warfarin, sinus node dysfunction status post dual-chamber permanent pacemaker implant 2014,  to moderate mitral regurgitation, hypertension, dyslipidemia, and history of frequent PVCs.    Patient was last seen 9/2022.  At that time, he was doing very well from a cardiac perspective.  His blood pressures have been running on the lower end, so his amlodipine was discontinued.  He was advised to follow-up in 1 year.    Patient contacted the clinic at the beginning of June 2023 complaining of fatigue and weakness with minimal exertion.  A few months ago, he could walk 40 to 60 minutes, 5 to 7 days/week without symptoms.  Additionally, he noted elevated blood pressure readings intermittently with -160.  Nuclear stress test was recommended for further evaluation.  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  LVEF at rest was estimated at 46%.  Echocardiogram as well as invasive coronary angiogram +/- PCI was recommended.    Echocardiogram was completed 6/23/2023 and revealed normal LVEF 55 to 60%, septal wall motion abnormality consistent with pacemaker activation, normal RV size and function, mitral valve prolapse with mild to moderate MR, and trace AI.    Patient is here today for H&P for upcoming coronary angiogram +/- PCI.  Overall, patient has remained stable over the past 1 month.  He continues to have weakness and fatigue with minimal exertion.  Nonetheless, he has continued to go to the [facility] 4 days/week, walking 30 to 40 minutes each time.  Afterwards, he feels significantly drained.  Denies  shortness of breath, orthopnea and PND. Denies chest  pain, palpitations, lightheadedness, dizziness, near syncope and syncope.     Taking all of his medications daily as prescribed.  He has started monitoring his blood pressure more frequently at home and is getting readings with SBP 130s.      Social History       Social History     Socioeconomic History    Marital status:      Spouse name: Paula    Number of children: 3    Years of education: Not on file    Highest education level: Not on file   Occupational History     Employer: RETIRED    Occupation: engineering managment     Comment: [employer]   Tobacco Use    Smoking status: Former     Packs/day: 1.00     Years: 4.00     Pack years: 4.00     Types: Cigarettes     Quit date: 1967     Years since quittin.5    Smokeless tobacco: Never   Vaping Use    Vaping Use: Never used   Substance and Sexual Activity    Alcohol use: No     Alcohol/week: 0.0 standard drinks of alcohol    Drug use: No    Sexual activity: Yes     Partners: Female   Other Topics Concern     Service Yes    Blood Transfusions No    Caffeine Concern Yes     Comment: decaff coffee    Occupational Exposure No    Hobby Hazards No    Sleep Concern No    Stress Concern No    Weight Concern No    Special Diet No    Back Care No    Exercise Yes     Comment: Excercises about 4-5 days per week    Bike Helmet Yes    Seat Belt Yes    Self-Exams No    Parent/sibling w/ CABG, MI or angioplasty before 65F 55M? Not Asked   Social History Narrative    Not on file     Social Determinants of Health     Financial Resource Strain: Low Risk  (2022)    Overall Financial Resource Strain (CARDIA)     Difficulty of Paying Living Expenses: Not hard at all   Food Insecurity: No Food Insecurity (2022)    Hunger Vital Sign     Worried About Running Out of Food in the Last Year: Never true     Ran Out of Food in the Last Year: Never true   Transportation Needs: No Transportation Needs (2022)    PRAPARE - Transportation     Lack of  "Transportation (Medical): No     Lack of Transportation (Non-Medical): No   Physical Activity: Sufficiently Active (4/26/2022)    Exercise Vital Sign     Days of Exercise per Week: 5 days     Minutes of Exercise per Session: 50 min   Stress: Not on file   Social Connections: Socially Integrated (4/26/2022)    Social Connection and Isolation Panel [NHANES]     Frequency of Communication with Friends and Family: Twice a week     Frequency of Social Gatherings with Friends and Family: Twice a week     Attends Episcopal Services: More than 4 times per year     Active Member of Clubs or Organizations: Yes     Attends Club or Organization Meetings: Not on file     Marital Status:    Intimate Partner Violence: Not on file   Housing Stability: High Risk (4/26/2022)    Housing Stability Vital Sign     Unable to Pay for Housing in the Last Year: No     Number of Places Lived in the Last Year: 3     Unstable Housing in the Last Year: No            Review of Systems:   Please see HPI         Physical Exam:   Vitals: /71 (BP Location: Right arm, Patient Position: Sitting, Cuff Size: Adult Regular)   Pulse 64   Ht 1.753 m (5' 9\")   Wt 67.1 kg (148 lb)   SpO2 98%   BMI 21.86 kg/m     Wt Readings from Last 4 Encounters:   04/28/23 69 kg (152 lb 1.6 oz)   10/14/22 68 kg (150 lb)   10/12/22 67.1 kg (148 lb)   09/20/22 69.8 kg (153 lb 12.8 oz)     GEN: well nourished, in no acute distress.  HEENT:  Pupils equal, round. Sclerae nonicteric.   NECK: Supple, no masses appreciated. No JVD.  C/V:  Regular rate and rhythm, soft 1/6 apical systolic murmur  RESP: Respirations are unlabored. Clear to auscultation bilaterally without wheezing, rales, or rhonchi.  GI: Abdomen soft, nontender.  EXTREM: no LE edema.  NEURO: Alert and oriented, cooperative.  SKIN: Warm and dry.        Data:   LIPID RESULTS:  Lab Results   Component Value Date    CHOL 130 05/01/2023    CHOL 179 12/17/2020    HDL 40 05/01/2023    HDL 39 (L) " 12/17/2020    LDL 75 05/01/2023     (H) 12/17/2020    TRIG 75 05/01/2023    TRIG 97 12/17/2020    CHOLHDLRATIO 4.5 11/10/2015     LIVER ENZYME RESULTS:  Lab Results   Component Value Date    AST 39 05/01/2023    AST 24 12/17/2020    ALT 38 05/01/2023    ALT 26 12/17/2020     CBC RESULTS:  Lab Results   Component Value Date    WBC 7.9 05/01/2023    WBC 9.7 09/21/2018    RBC 5.11 05/01/2023    RBC 5.19 09/21/2018    HGB 15.0 05/01/2023    HGB 15.4 09/21/2018    HCT 46.7 05/01/2023    HCT 47.7 09/21/2018    MCV 91 05/01/2023    MCV 92 09/21/2018    MCH 29.4 05/01/2023    MCH 29.7 09/21/2018    MCHC 32.1 05/01/2023    MCHC 32.3 09/21/2018    RDW 13.0 05/01/2023    RDW 13.5 09/21/2018     05/01/2023     09/21/2018     BMP RESULTS:  Lab Results   Component Value Date     05/01/2023     12/17/2020    POTASSIUM 3.7 05/01/2023    POTASSIUM 4.2 05/12/2022    POTASSIUM 3.8 12/17/2020    CHLORIDE 104 05/01/2023    CHLORIDE 108 05/12/2022    CHLORIDE 107 12/17/2020    CO2 26 05/01/2023    CO2 29 05/12/2022    CO2 32 12/17/2020    ANIONGAP 12 05/01/2023    ANIONGAP 2 (L) 05/12/2022    ANIONGAP <1 (L) 12/17/2020    GLC 85 05/01/2023    GLC 93 05/12/2022     (H) 12/17/2020    BUN 20.7 05/01/2023    BUN 27 05/12/2022    BUN 19 12/17/2020    CR 1.10 05/01/2023    CR 1.00 12/17/2020    GFRESTIMATED 67 05/01/2023    GFRESTIMATED >60 09/27/2022    GFRESTIMATED 71 12/17/2020    GFRESTBLACK 83 12/17/2020    MANUELA 9.2 05/01/2023    MANUELA 9.0 12/17/2020      A1C RESULTS:  No results found for: A1C  INR RESULTS:  Lab Results   Component Value Date    INR 2.3 (H) 06/14/2023    INR 1.9 (H) 06/06/2023    INR 2.2 (A) 03/30/2023    INR 1.7 (A) 03/17/2023            Medications     Current Outpatient Medications   Medication Sig Dispense Refill    atorvastatin (LIPITOR) 20 MG tablet Take 1 tablet (20 mg) by mouth daily 90 tablet 3    levETIRAcetam (KEPPRA) 750 MG tablet Take 1 tablet (750 mg) by mouth 2 times  daily      losartan (COZAAR) 100 MG tablet Take 1 tablet (100 mg) by mouth daily 90 tablet 3    metoprolol succinate ER (TOPROL XL) 50 MG 24 hr tablet Take 1 tablet (50 mg) by mouth daily 90 tablet 3    Multiple Vitamins-Minerals (ZINC PO)       warfarin ANTICOAGULANT (COUMADIN) 2.5 MG tablet Take 1 tablet (2.5 mg) by mouth daily OR as directed by INR clinic. 90 tablet 1          Past Medical History     Past Medical History:   Diagnosis Date    Acute ischemic heart disease (H) 8/24/2018    Allergic rhinitis, cause unspecified     Cardiac pacemaker in situ 8/14/2017    Cervicalgia 4/26/2022    Chills 4/26/2022    Chronic diastolic congestive heart failure (H) 7/26/2021    Dysphagia, oropharyngeal phase 06/14/2018    Mild per video swallow study. 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 Brother         Just had angioplasty  in 2006    Colon Cancer No family hx of             Allergies   Sulfa antibiotics      30 minutes spent on the date of the encounter doing chart review, history and exam, documentation and further activities as noted above    HALEY Patricia [facility]  Pager: [phone]        Thank you for allowing me to participate in the care of your patient.      Sincerely,     HALEY Patricia [facility]  cc:   Ana Dean MD  [address] [id number]

## 2023-06-26 NOTE — PROGRESS NOTES
Cardiology Clinic Progress Note  Charles Nogueira MRN# [id number]   YOB: 1940 Age: 83 year old   Primary Cardiologist: Dr. Dean Reason for visit: H&P            Assessment and Plan:   Charles Nogueira is a very pleasant 83 year old male who is here today for H&P.      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  -Coronary angiogram +/- PCI scheduled for 6/28/2023  -On warfarin, Toprol, losartan, atorvastatin  Hyperlipidemia  -LDL 75 5/2023  -On atorvastatin 20 mg once daily; may need to increase pending outcome of coronary angiogram  Hypertension  -BP well controlled on current regimen  -BP in clinic today 133/71  Paroxysmal atrial fibrillation  -On warfarin for cardioembolic risk reduction   -[id number] score is 3 (agex2, HTN)  Sinus node dysfunction status post dual-chamber permanent pacemaker implant  -implanted 2014  Moderate mitral regurgitation, stable      Proceed with coronary angiogram +/- PCI as scheduled 6/28/2023.  No history of contrast dye allergy.  Normal renal function.  Last dose of warfarin was Friday, 6/23/2023.  Patient is aware he needs transportation to and from his procedure as well as 24 hours postprocedure monitoring.  He will remain NPO 8 hours prior to his procedure.    Risks and benefits of coronary angiogram +/- percutaneous coronary intervention (PCI) were discussed with the patient including but not limited to bleeding, bruising, infection, allergic reaction, kidney damage (including need for dialysis), stroke, heart attack, vascular damage, emergency open heart surgery, and death.  Patient verbalized understanding and wishes to proceed.      Kaia Gibbs PA-C  [facility]  Pager: [phone]          History of Presenting Illness:    Charles Nogueira is a very pleasant 83 year old male with a history of paroxysmal  atrial fibrillation rate controlled with metoprolol and anticoagulated with warfarin, sinus node dysfunction status post dual-chamber permanent pacemaker implant 2014,  to moderate mitral regurgitation, hypertension, dyslipidemia, and history of frequent PVCs.    Patient was last seen 9/2022.  At that time, he was doing very well from a cardiac perspective.  His blood pressures have been running on the lower end, so his amlodipine was discontinued.  He was advised to follow-up in 1 year.    Patient contacted the clinic at the beginning of June 2023 complaining of fatigue and weakness with minimal exertion.  A few months ago, he could walk 40 to 60 minutes, 5 to 7 days/week without symptoms.  Additionally, he noted elevated blood pressure readings intermittently with -160.  Nuclear stress test was recommended for further evaluation.  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  LVEF at rest was estimated at 46%.  Echocardiogram as well as invasive coronary angiogram +/- PCI was recommended.    Echocardiogram was completed 6/23/2023 and revealed normal LVEF 55 to 60%, septal wall motion abnormality consistent with pacemaker activation, normal RV size and function, mitral valve prolapse with mild to moderate MR, and trace AI.    Patient is here today for H&P for upcoming coronary angiogram +/- PCI.  Overall, patient has remained stable over the past 1 month.  He continues to have weakness and fatigue with minimal exertion.  Nonetheless, he has continued to go to the [facility] 4 days/week, walking 30 to 40 minutes each time.  Afterwards, he feels significantly drained.  Denies  shortness of breath, orthopnea and PND. Denies chest pain, palpitations, lightheadedness, dizziness, near syncope and syncope.     Taking all of his medications daily as prescribed.  He has started monitoring his blood pressure more frequently  at home and is getting readings with SBP 130s.      Social History       Social History     Socioeconomic History     Marital status:      Spouse name: Paula     Number of children: 3     Years of education: Not on file     Highest education level: Not on file   Occupational History     Employer: RETIRED     Occupation: engineering managment     Comment: [employer]   Tobacco Use     Smoking status: Former     Packs/day: 1.00     Years: 4.00     Pack years: 4.00     Types: Cigarettes     Quit date: 1967     Years since quittin.5     Smokeless tobacco: Never   Vaping Use     Vaping Use: Never used   Substance and Sexual Activity     Alcohol use: No     Alcohol/week: 0.0 standard drinks of alcohol     Drug use: No     Sexual activity: Yes     Partners: Female   Other Topics Concern      Service Yes     Blood Transfusions No     Caffeine Concern Yes     Comment: decaff coffee     Occupational Exposure No     Hobby Hazards No     Sleep Concern No     Stress Concern No     Weight Concern No     Special Diet No     Back Care No     Exercise Yes     Comment: Excercises about 4-5 days per week     Bike Helmet Yes     Seat Belt Yes     Self-Exams No     Parent/sibling w/ CABG, MI or angioplasty before 65F 55M? Not Asked   Social History Narrative     Not on file     Social Determinants of Health     Financial Resource Strain: Low Risk  (2022)    Overall Financial Resource Strain (CARDIA)      Difficulty of Paying Living Expenses: Not hard at all   Food Insecurity: No Food Insecurity (2022)    Hunger Vital Sign      Worried About Running Out of Food in the Last Year: Never true      Ran Out of Food in the Last Year: Never true   Transportation Needs: No Transportation Needs (2022)    PRAPARE - Transportation      Lack of Transportation (Medical): No      Lack of Transportation (Non-Medical): No   Physical Activity: Sufficiently Active (2022)    Exercise Vital Sign      Days of Exercise  "per Week: 5 days      Minutes of Exercise per Session: 50 min   Stress: Not on file   Social Connections: Socially Integrated (4/26/2022)    Social Connection and Isolation Panel [NHANES]      Frequency of Communication with Friends and Family: Twice a week      Frequency of Social Gatherings with Friends and Family: Twice a week      Attends Christian Services: More than 4 times per year      Active Member of Clubs or Organizations: Yes      Attends Club or Organization Meetings: Not on file      Marital Status:    Intimate Partner Violence: Not on file   Housing Stability: High Risk (4/26/2022)    Housing Stability Vital Sign      Unable to Pay for Housing in the Last Year: No      Number of Places Lived in the Last Year: 3      Unstable Housing in the Last Year: No            Review of Systems:   Please see HPI         Physical Exam:   Vitals: /71 (BP Location: Right arm, Patient Position: Sitting, Cuff Size: Adult Regular)   Pulse 64   Ht 1.753 m (5' 9\")   Wt 67.1 kg (148 lb)   SpO2 98%   BMI 21.86 kg/m     Wt Readings from Last 4 Encounters:   04/28/23 69 kg (152 lb 1.6 oz)   10/14/22 68 kg (150 lb)   10/12/22 67.1 kg (148 lb)   09/20/22 69.8 kg (153 lb 12.8 oz)     GEN: well nourished, in no acute distress.  HEENT:  Pupils equal, round. Sclerae nonicteric.   NECK: Supple, no masses appreciated. No JVD.  C/V:  Regular rate and rhythm, soft 1/6 apical systolic murmur  RESP: Respirations are unlabored. Clear to auscultation bilaterally without wheezing, rales, or rhonchi.  GI: Abdomen soft, nontender.  EXTREM: no LE edema.  NEURO: Alert and oriented, cooperative.  SKIN: Warm and dry.        Data:   LIPID RESULTS:  Lab Results   Component Value Date    CHOL 130 05/01/2023    CHOL 179 12/17/2020    HDL 40 05/01/2023    HDL 39 (L) 12/17/2020    LDL 75 05/01/2023     (H) 12/17/2020    TRIG 75 05/01/2023    TRIG 97 12/17/2020    CHOLHDLRATIO 4.5 11/10/2015     LIVER ENZYME RESULTS:  Lab " Results   Component Value Date    AST 39 05/01/2023    AST 24 12/17/2020    ALT 38 05/01/2023    ALT 26 12/17/2020     CBC RESULTS:  Lab Results   Component Value Date    WBC 7.9 05/01/2023    WBC 9.7 09/21/2018    RBC 5.11 05/01/2023    RBC 5.19 09/21/2018    HGB 15.0 05/01/2023    HGB 15.4 09/21/2018    HCT 46.7 05/01/2023    HCT 47.7 09/21/2018    MCV 91 05/01/2023    MCV 92 09/21/2018    MCH 29.4 05/01/2023    MCH 29.7 09/21/2018    MCHC 32.1 05/01/2023    MCHC 32.3 09/21/2018    RDW 13.0 05/01/2023    RDW 13.5 09/21/2018     05/01/2023     09/21/2018     BMP RESULTS:  Lab Results   Component Value Date     05/01/2023     12/17/2020    POTASSIUM 3.7 05/01/2023    POTASSIUM 4.2 05/12/2022    POTASSIUM 3.8 12/17/2020    CHLORIDE 104 05/01/2023    CHLORIDE 108 05/12/2022    CHLORIDE 107 12/17/2020    CO2 26 05/01/2023    CO2 29 05/12/2022    CO2 32 12/17/2020    ANIONGAP 12 05/01/2023    ANIONGAP 2 (L) 05/12/2022    ANIONGAP <1 (L) 12/17/2020    GLC 85 05/01/2023    GLC 93 05/12/2022     (H) 12/17/2020    BUN 20.7 05/01/2023    BUN 27 05/12/2022    BUN 19 12/17/2020    CR 1.10 05/01/2023    CR 1.00 12/17/2020    GFRESTIMATED 67 05/01/2023    GFRESTIMATED >60 09/27/2022    GFRESTIMATED 71 12/17/2020    GFRESTBLACK 83 12/17/2020    MANUELA 9.2 05/01/2023    MANUELA 9.0 12/17/2020      A1C RESULTS:  No results found for: A1C  INR RESULTS:  Lab Results   Component Value Date    INR 2.3 (H) 06/14/2023    INR 1.9 (H) 06/06/2023    INR 2.2 (A) 03/30/2023    INR 1.7 (A) 03/17/2023            Medications     Current Outpatient Medications   Medication Sig Dispense Refill     atorvastatin (LIPITOR) 20 MG tablet Take 1 tablet (20 mg) by mouth daily 90 tablet 3     levETIRAcetam (KEPPRA) 750 MG tablet Take 1 tablet (750 mg) by mouth 2 times daily       losartan (COZAAR) 100 MG tablet Take 1 tablet (100 mg) by mouth daily 90 tablet 3     metoprolol succinate ER (TOPROL XL) 50 MG 24 hr tablet Take 1  tablet (50 mg) by mouth daily 90 tablet 3     Multiple Vitamins-Minerals (ZINC PO)        warfarin ANTICOAGULANT (COUMADIN) 2.5 MG tablet Take 1 tablet (2.5 mg) by mouth daily OR as directed by INR clinic. 90 tablet 1          Past Medical History     Past Medical History:   Diagnosis Date     Acute ischemic heart disease (H) 8/24/2018     Allergic rhinitis, cause unspecified      Cardiac pacemaker in situ 8/14/2017     Cervicalgia 4/26/2022     Chills 4/26/2022     Chronic diastolic congestive heart failure (H) 7/26/2021     Dysphagia, oropharyngeal phase 06/14/2018    Mild per video swallow study. 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     PVC (premature ventricular contraction) 5/10/2011     Sick sinus syndrome (H) 05/03/2016    s/p PPM implant 7/2/14     Squamous cell carcinoma      Syncope      Weakness of both upper extremities 4/26/2022     Past Surgical History:   Procedure Laterality Date     APPENDECTOMY OPEN       BIOPSY OF SKIN LESION       COLONOSCOPY N/A 11/20/2015    Procedure: COLONOSCOPY;  Surgeon: David Us MD;  Location: [facility]     IMPLANT PACEMAKER  7/2004     MOHS MICROGRAPHIC PROCEDURE       Family History   Problem Relation Age of Onset     Heart Disease Mother         MI     Heart Disease Father         MI     C.A.D. Brother         Just had angioplasty in 2006     Colon Cancer No family hx of             Allergies   Sulfa antibiotics      30 minutes spent on the date of  the encounter doing chart review, history and exam, documentation and further activities as noted above    Kaia Gibbs PA-C  [facility]  Pager: [phone]

## 2023-06-26 NOTE — TELEPHONE ENCOUNTER
Called and spoke to wife Paula, reviewed instructions as below and she verbalized understanding.   Post procedure INR is scheduled, tracking calendar is updated.

## 2023-06-28 ENCOUNTER — HOSPITAL ENCOUNTER (OUTPATIENT)
Facility: CLINIC | Age: 83
Discharge: HOME | End: 2023-06-29
Admitting: INTERNAL MEDICINE
Payer: COMMERCIAL

## 2023-06-28 DIAGNOSIS — I47.29 NONSUSTAINED VENTRICULAR TACHYCARDIA (H): ICD-10-CM

## 2023-06-28 DIAGNOSIS — R94.39 ABNORMAL CARDIOVASCULAR STRESS TEST: ICD-10-CM

## 2023-06-28 DIAGNOSIS — R93.1 ABNORMAL FINDINGS ON DIAGNOSTIC IMAGING OF HEART AND CORONARY CIRCULATION: ICD-10-CM

## 2023-06-28 PROBLEM — Z98.890 STATUS POST CORONARY ANGIOGRAM: Status: ACTIVE | Noted: 2023-06-28

## 2023-06-28 LAB
ANION GAP SERPL CALCULATED.3IONS-SCNC: 9 MMOL/L (ref 7–15)
APTT PPP: 30 SECONDS (ref 22–38)
BUN SERPL-MCNC: 20.8 MG/DL (ref 8–23)
CALCIUM SERPL-MCNC: 9 MG/DL (ref 8.8–10.2)
CHLORIDE SERPL-SCNC: 105 MMOL/L (ref 98–107)
CREAT SERPL-MCNC: 1.04 MG/DL (ref 0.67–1.17)
DEPRECATED HCO3 PLAS-SCNC: 26 MMOL/L (ref 22–29)
ERYTHROCYTE [DISTWIDTH] IN BLOOD BY AUTOMATED COUNT: 13.2 % (ref 10–15)
GFR SERPL CREATININE-BSD FRML MDRD: 72 ML/MIN/1.73M2
GLUCOSE SERPL-MCNC: 97 MG/DL (ref 70–99)
HCT VFR BLD AUTO: 42.6 % (ref 40–53)
HGB BLD-MCNC: 13.8 G/DL (ref 13.3–17.7)
INR PPP: 1.07 (ref 0.85–1.15)
MCH RBC QN AUTO: 29.9 PG (ref 26.5–33)
MCHC RBC AUTO-ENTMCNC: 32.4 G/DL (ref 31.5–36.5)
MCV RBC AUTO: 92 FL (ref 78–100)
PLATELET # BLD AUTO: 144 10E3/UL (ref 150–450)
POTASSIUM SERPL-SCNC: 4.2 MMOL/L (ref 3.4–5.3)
RBC # BLD AUTO: 4.61 10E6/UL (ref 4.4–5.9)
SODIUM SERPL-SCNC: 140 MMOL/L (ref 136–145)
WBC # BLD AUTO: 6.7 10E3/UL (ref 4–11)

## 2023-06-28 PROCEDURE — 272N000001 HC OR GENERAL SUPPLY STERILE: Performed by: INTERNAL MEDICINE

## 2023-06-28 PROCEDURE — 999N000054 HC STATISTIC EKG NON-CHARGEABLE

## 2023-06-28 PROCEDURE — C1894 INTRO/SHEATH, NON-LASER: HCPCS | Performed by: INTERNAL MEDICINE

## 2023-06-28 PROCEDURE — 999N000071 HC STATISTIC HEART CATH LAB OR EP LAB

## 2023-06-28 PROCEDURE — 93005 ELECTROCARDIOGRAM TRACING: CPT

## 2023-06-28 PROCEDURE — 250N000009 HC RX 250: Performed by: INTERNAL MEDICINE

## 2023-06-28 PROCEDURE — 82310 ASSAY OF CALCIUM: CPT | Performed by: INTERNAL MEDICINE

## 2023-06-28 PROCEDURE — 999N000184 HC STATISTIC TELEMETRY

## 2023-06-28 PROCEDURE — 85027 COMPLETE CBC AUTOMATED: CPT | Performed by: INTERNAL MEDICINE

## 2023-06-28 PROCEDURE — C1887 CATHETER, GUIDING: HCPCS | Performed by: INTERNAL MEDICINE

## 2023-06-28 PROCEDURE — 99152 MOD SED SAME PHYS/QHP 5/>YRS: CPT | Mod: GC | Performed by: INTERNAL MEDICINE

## 2023-06-28 PROCEDURE — 85610 PROTHROMBIN TIME: CPT | Performed by: INTERNAL MEDICINE

## 2023-06-28 PROCEDURE — 99153 MOD SED SAME PHYS/QHP EA: CPT | Performed by: INTERNAL MEDICINE

## 2023-06-28 PROCEDURE — 250N000013 HC RX MED GY IP 250 OP 250 PS 637: Performed by: INTERNAL MEDICINE

## 2023-06-28 PROCEDURE — 36591 DRAW BLOOD OFF VENOUS DEVICE: CPT

## 2023-06-28 PROCEDURE — 93454 CORONARY ARTERY ANGIO S&I: CPT | Performed by: INTERNAL MEDICINE

## 2023-06-28 PROCEDURE — 93454 CORONARY ARTERY ANGIO S&I: CPT | Mod: 26 | Performed by: INTERNAL MEDICINE

## 2023-06-28 PROCEDURE — 250N000011 HC RX IP 250 OP 636: Performed by: INTERNAL MEDICINE

## 2023-06-28 PROCEDURE — 250N000011 HC RX IP 250 OP 636: Mod: JZ | Performed by: INTERNAL MEDICINE

## 2023-06-28 PROCEDURE — 258N000003 HC RX IP 258 OP 636: Performed by: INTERNAL MEDICINE

## 2023-06-28 PROCEDURE — 99152 MOD SED SAME PHYS/QHP 5/>YRS: CPT | Performed by: INTERNAL MEDICINE

## 2023-06-28 PROCEDURE — 85730 THROMBOPLASTIN TIME PARTIAL: CPT | Performed by: INTERNAL MEDICINE

## 2023-06-28 PROCEDURE — 36415 COLL VENOUS BLD VENIPUNCTURE: CPT | Performed by: INTERNAL MEDICINE

## 2023-06-28 PROCEDURE — C1769 GUIDE WIRE: HCPCS | Performed by: INTERNAL MEDICINE

## 2023-06-28 RX ORDER — LEVETIRACETAM 500 MG/1
500 TABLET ORAL AT BEDTIME
Status: DISCONTINUED | OUTPATIENT
Start: 2023-06-28 | End: 2023-06-29

## 2023-06-28 RX ORDER — ATROPINE SULFATE 0.1 MG/ML
0.5 INJECTION INTRAVENOUS
Status: ACTIVE | OUTPATIENT
Start: 2023-06-28 | End: 2023-06-28

## 2023-06-28 RX ORDER — HEPARIN SODIUM 1000 [USP'U]/ML
INJECTION, SOLUTION INTRAVENOUS; SUBCUTANEOUS
Status: DISCONTINUED | OUTPATIENT
Start: 2023-06-28 | End: 2023-06-28 | Stop reason: HOSPADM

## 2023-06-28 RX ORDER — ACETAMINOPHEN 325 MG/1
650 TABLET ORAL EVERY 4 HOURS PRN
Status: DISCONTINUED | OUTPATIENT
Start: 2023-06-28 | End: 2023-06-29 | Stop reason: HOSPADM

## 2023-06-28 RX ORDER — METOPROLOL SUCCINATE 25 MG/1
25 TABLET, EXTENDED RELEASE ORAL DAILY
Status: ON HOLD | COMMUNITY
End: 2023-06-29

## 2023-06-28 RX ORDER — NALOXONE HYDROCHLORIDE 0.4 MG/ML
0.2 INJECTION, SOLUTION INTRAMUSCULAR; INTRAVENOUS; SUBCUTANEOUS
Status: ACTIVE | OUTPATIENT
Start: 2023-06-28 | End: 2023-06-28

## 2023-06-28 RX ORDER — FENTANYL CITRATE 50 UG/ML
25 INJECTION, SOLUTION INTRAMUSCULAR; INTRAVENOUS
Status: DISCONTINUED | OUTPATIENT
Start: 2023-06-28 | End: 2023-06-29 | Stop reason: HOSPADM

## 2023-06-28 RX ORDER — SODIUM CHLORIDE 9 MG/ML
INJECTION, SOLUTION INTRAVENOUS CONTINUOUS
Status: ACTIVE | OUTPATIENT
Start: 2023-06-28 | End: 2023-06-28

## 2023-06-28 RX ORDER — FLUMAZENIL 0.1 MG/ML
0.2 INJECTION, SOLUTION INTRAVENOUS
Status: ACTIVE | OUTPATIENT
Start: 2023-06-28 | End: 2023-06-28

## 2023-06-28 RX ORDER — NITROGLYCERIN 5 MG/ML
VIAL (ML) INTRAVENOUS
Status: DISCONTINUED | OUTPATIENT
Start: 2023-06-28 | End: 2023-06-28 | Stop reason: HOSPADM

## 2023-06-28 RX ORDER — LEVETIRACETAM 500 MG/1
500 TABLET ORAL 2 TIMES DAILY
COMMUNITY

## 2023-06-28 RX ORDER — LIDOCAINE 40 MG/G
CREAM TOPICAL
Status: DISCONTINUED | OUTPATIENT
Start: 2023-06-28 | End: 2023-06-28 | Stop reason: HOSPADM

## 2023-06-28 RX ORDER — POTASSIUM CHLORIDE 1500 MG/1
20 TABLET, EXTENDED RELEASE ORAL
Status: DISCONTINUED | OUTPATIENT
Start: 2023-06-28 | End: 2023-06-28 | Stop reason: HOSPADM

## 2023-06-28 RX ORDER — ASPIRIN 81 MG/1
243 TABLET, CHEWABLE ORAL ONCE
Status: COMPLETED | OUTPATIENT
Start: 2023-06-28 | End: 2023-06-28

## 2023-06-28 RX ORDER — SODIUM CHLORIDE 9 MG/ML
INJECTION, SOLUTION INTRAVENOUS CONTINUOUS
Status: DISCONTINUED | OUTPATIENT
Start: 2023-06-28 | End: 2023-06-28 | Stop reason: HOSPADM

## 2023-06-28 RX ORDER — VERAPAMIL HYDROCHLORIDE 2.5 MG/ML
INJECTION, SOLUTION INTRAVENOUS
Status: DISCONTINUED | OUTPATIENT
Start: 2023-06-28 | End: 2023-06-28 | Stop reason: HOSPADM

## 2023-06-28 RX ORDER — NALOXONE HYDROCHLORIDE 0.4 MG/ML
0.4 INJECTION, SOLUTION INTRAMUSCULAR; INTRAVENOUS; SUBCUTANEOUS
Status: ACTIVE | OUTPATIENT
Start: 2023-06-28 | End: 2023-06-28

## 2023-06-28 RX ORDER — WARFARIN SODIUM 2.5 MG/1
2.5 TABLET ORAL
COMMUNITY
End: 2024-05-06

## 2023-06-28 RX ORDER — LIDOCAINE 40 MG/G
CREAM TOPICAL
Status: DISCONTINUED | OUTPATIENT
Start: 2023-06-28 | End: 2023-06-29 | Stop reason: HOSPADM

## 2023-06-28 RX ORDER — WARFARIN SODIUM 2.5 MG/1
1.25 TABLET ORAL
COMMUNITY

## 2023-06-28 RX ORDER — FENTANYL CITRATE 50 UG/ML
INJECTION, SOLUTION INTRAMUSCULAR; INTRAVENOUS
Status: DISCONTINUED | OUTPATIENT
Start: 2023-06-28 | End: 2023-06-28 | Stop reason: HOSPADM

## 2023-06-28 RX ORDER — IOPAMIDOL 755 MG/ML
INJECTION, SOLUTION INTRAVASCULAR
Status: DISCONTINUED | OUTPATIENT
Start: 2023-06-28 | End: 2023-06-28 | Stop reason: HOSPADM

## 2023-06-28 RX ORDER — ASPIRIN 325 MG
325 TABLET ORAL ONCE
Status: COMPLETED | OUTPATIENT
Start: 2023-06-28 | End: 2023-06-28

## 2023-06-28 RX ADMIN — SODIUM CHLORIDE: 9 INJECTION, SOLUTION INTRAVENOUS at 09:15

## 2023-06-28 RX ADMIN — LEVETIRACETAM 500 MG: 500 TABLET, FILM COATED ORAL at 22:11

## 2023-06-28 ASSESSMENT — ACTIVITIES OF DAILY LIVING (ADL)
ADLS_ACUITY_SCORE: 35

## 2023-06-28 NOTE — DISCHARGE INSTRUCTIONS
Cardiac Angiogram Discharge Instructions - Radial    After you go home:    Have an adult stay with you until tomorrow.  Drink extra fluids for 2 days.  You may resume your normal diet.  No smoking       For 24 hours - due to the sedation you received:  Relax and take it easy.  Do NOT make any important or legal decisions.  Do NOT drive or operate machines at home or at work.  Do NOT drink alcohol.    Care of Wrist Puncture Site:    For the first 24 hrs - check the puncture site every 1-2 hours while awake.  It is normal to have soreness at the puncture site and mild tingling in your hand for up to 3 days.  Remove the bandaid after 24 hours. If there is minor oozing, apply another bandaid and remove it after 12 hours.  You may shower tomorrow.  Do NOT take a bath, or use a hot tub or pool for at least 3 days. Do NOT scrub the site. Do not use lotion or powder near the puncture site.           Activity:        For 2 days:   do not use your hand or arm to support your weight (such as rising from a chair)   do not bend your wrist (such as lifting a garage door).  do not lift more than 5 pounds or exercise your arm (such as tennis, golf or bowling).  Do NOT do any heavy activity such as exercise, lifting, or straining.     Bleeding:    If you start bleeding from the site in your wrist, sit down and press firmly on/above the site for 10 minutes.   Once bleeding stops, keep arm still for 2 hours.   Call [facility] as soon as you can.       Call 911 right away if you have heavy bleeding or bleeding that does not stop.      Medicines:    Take your medications, including blood thinners, unless your provider tells you not to.    If you take Coumadin (Warfarin), have your INR checked by your provider in  3-5 days. Call your clinic to schedule this.  If you have stopped any medicines, check with your provider about when to restart them.    Follow Up Appointments:    Follow up with [facility] Nurse Practitioner at [facility]  of patient preference in 7-10 days.    Call the clinic if:    You have a large or growing hard lump around the site.  The site is red, swollen, hot or tender.  Blood or fluid is draining from the site.  You have chills or a fever greater than 101 F (38 C).  Your arm feels numb, cool or changes color.  You have hives, a rash or unusual itching.  Any questions or concerns.          [facility] [place]:    [phone] UMP (7 days a week)

## 2023-06-28 NOTE — PROGRESS NOTES
Care Suites Post Procedure Note    Patient Information  Name: Charles Nogueira  Age: 83 year old    Post Procedure  Time patient returned to Care Suites: 1330  Concerns/abnormal assessment: no  If abnormal assessment, provider notified: N/A  Plan/Other: monitor.    Zachary Nuñez RN

## 2023-06-28 NOTE — Clinical Note
Hemodynamic equipment used: 5 lead ECG, BABL MediaK With 3 Leads, Machine BP Cuff and pulse oximeter probe.

## 2023-06-28 NOTE — PROGRESS NOTES
1637 Report received from Ulises Nuñez RN.  1645 Pt A/O. TR band intact to left wrist. Area proximal to TR band very bruised, but soft. Area distal to TR band bruised, feels thicker, but soft & appears somewhat puffy. No oozing noted. Armboard intact. Left arm elevated on pillows. Pt denies pain. Pt instructed on activity restrictions with left wrist/arm. Verbal understanding received from pt. Pt's wife at bedside. Detailed update given. Dr Nguyen at bedside to assess left forearm.  1650 Manual pressure held by Dr Nguyen at this time.  1715 Air released from TR band per protocol.  1745 OOB - steady on feet. Ambulated in halls to bathroom with good tiffany. No change in puncture site assessment with activity.  1800 Attempted to release air on TR band. Oozing noted within 30 sec. Air replaced.  1835 Detailed report called to VICENTA Khalil in [facility].  1900 Dr Buckley paged for overnight orders.  1915 Air released from TR band - successfully. Sites unchanged.  1933 Call received from Dr Buckley. Detailed update given. Orders obtained.  1935 Pt transferred to [facility] rm 259. All personal belongings sent w/ pt. Left radial puncture site & forearm assessed with receiving nurse - Allison PITTMAN RN.

## 2023-06-28 NOTE — Clinical Note
Potential access sites were evaluated for patency using ultrasound.   The left radial artery was selected. Access was obtained under with Sonosite and Fluoroscopic guidance using a micropuncture 21 gauge needle with direct visualization of needle entry.

## 2023-06-28 NOTE — PROGRESS NOTES
Care Suites Admission Nursing Note    Patient Information  Name: Charles Nogueira  Age: 83 year old  Reason for admission: Heart cath  Care Suites arrival time: 0840    Visitor Information  Name: Paula     Patient Admission/Assessment   Pre-procedure assessment complete: Yes  If abnormal assessment/labs, provider notified: N/A  NPO: Yes  Medications held per instructions/orders: Yes  Consent: pending  If applicable, pregnancy test status: deferred  Patient oriented to room: Yes  Education/questions answered: Yes  Plan/other: proceed    Discharge Planning  Discharge name/phone number: Paula augustine   Overnight post sedation caregiver: same  Discharge location: home    Zachary Nuñez RN

## 2023-06-28 NOTE — PROGRESS NOTES
1400 Swelling distal to TR band. Laura from CL notified and checked. Will recheck  1515 No change to Lt radial site. Dr. Reed to see. 2 ml of air added per Dr. Reed. Will monitor.  1430 No change in swelling.  1500 more swelling. 2cc of air placed per Dr. Nguyen.  1615 Pt had some bleeding.7cc air added per Dr. Nguyen.

## 2023-06-29 ENCOUNTER — TELEPHONE (OUTPATIENT)
Dept: CARDIOLOGY | Facility: CLINIC | Age: 83
End: 2023-06-29
Payer: COMMERCIAL

## 2023-06-29 ENCOUNTER — DOCUMENTATION ONLY (OUTPATIENT)
Dept: ANTICOAGULATION | Facility: CLINIC | Age: 83
End: 2023-06-29
Payer: COMMERCIAL

## 2023-06-29 VITALS
TEMPERATURE: 98.4 F | SYSTOLIC BLOOD PRESSURE: 133 MMHG | DIASTOLIC BLOOD PRESSURE: 68 MMHG | RESPIRATION RATE: 16 BRPM | OXYGEN SATURATION: 98 % | HEART RATE: 60 BPM | WEIGHT: 149.2 LBS | HEIGHT: 69 IN | BODY MASS INDEX: 22.1 KG/M2

## 2023-06-29 PROCEDURE — 250N000013 HC RX MED GY IP 250 OP 250 PS 637: Performed by: PHYSICIAN ASSISTANT

## 2023-06-29 RX ORDER — METOPROLOL SUCCINATE 25 MG/1
50 TABLET, EXTENDED RELEASE ORAL AT BEDTIME
COMMUNITY
Start: 2023-06-29 | End: 2023-07-14

## 2023-06-29 RX ORDER — LEVETIRACETAM 500 MG/1
500 TABLET ORAL 2 TIMES DAILY
Status: DISCONTINUED | OUTPATIENT
Start: 2023-06-29 | End: 2023-06-29 | Stop reason: HOSPADM

## 2023-06-29 RX ORDER — ATORVASTATIN CALCIUM 20 MG/1
20 TABLET, FILM COATED ORAL DAILY
Status: DISCONTINUED | OUTPATIENT
Start: 2023-06-29 | End: 2023-06-29 | Stop reason: HOSPADM

## 2023-06-29 RX ORDER — NALOXONE HYDROCHLORIDE 0.4 MG/ML
0.4 INJECTION, SOLUTION INTRAMUSCULAR; INTRAVENOUS; SUBCUTANEOUS
Status: DISCONTINUED | OUTPATIENT
Start: 2023-06-29 | End: 2023-06-29 | Stop reason: HOSPADM

## 2023-06-29 RX ORDER — NALOXONE HYDROCHLORIDE 0.4 MG/ML
0.2 INJECTION, SOLUTION INTRAMUSCULAR; INTRAVENOUS; SUBCUTANEOUS
Status: DISCONTINUED | OUTPATIENT
Start: 2023-06-29 | End: 2023-06-29 | Stop reason: HOSPADM

## 2023-06-29 RX ORDER — METOPROLOL SUCCINATE 25 MG/1
25 TABLET, EXTENDED RELEASE ORAL DAILY
Status: DISCONTINUED | OUTPATIENT
Start: 2023-06-29 | End: 2023-06-29

## 2023-06-29 RX ORDER — WARFARIN SODIUM 2.5 MG/1
2.5 TABLET ORAL
Status: DISCONTINUED | OUTPATIENT
Start: 2023-06-29 | End: 2023-06-29 | Stop reason: HOSPADM

## 2023-06-29 RX ORDER — LOSARTAN POTASSIUM 100 MG/1
100 TABLET ORAL DAILY
Status: DISCONTINUED | OUTPATIENT
Start: 2023-06-29 | End: 2023-06-29 | Stop reason: HOSPADM

## 2023-06-29 RX ADMIN — ATORVASTATIN CALCIUM 20 MG: 20 TABLET, FILM COATED ORAL at 09:12

## 2023-06-29 RX ADMIN — LOSARTAN POTASSIUM 100 MG: 100 TABLET, FILM COATED ORAL at 09:12

## 2023-06-29 RX ADMIN — LEVETIRACETAM 500 MG: 500 TABLET, FILM COATED ORAL at 09:12

## 2023-06-29 ASSESSMENT — ACTIVITIES OF DAILY LIVING (ADL)
ADLS_ACUITY_SCORE: 35

## 2023-06-29 NOTE — PROGRESS NOTES
ANTICOAGULATION  MANAGEMENT: Discharge Review    Charles Nogueira chart reviewed for anticoagulation continuity of care    Outpatient surgery/procedure on 6/28/23 for coronary angiogram.    Discharge disposition: Home    Results:  Recent labs: (last 7 days)     06/28/23  0916   INR 1.07     Anticoagulation inpatient management:     not applicable     Anticoagulation discharge instructions:     Warfarin dosing: home regimen continued   Bridging: No   INR goal change: No      Medication changes affecting anticoagulation: No    Additional factors affecting anticoagulation: No     PLAN     No adjustment to anticoagulation plan needed    Recommended follow up is scheduled  Patient not contacted    No adjustment to Anticoagulation Calendar was required       Mary Arguelles RN

## 2023-06-29 NOTE — TELEPHONE ENCOUNTER
Per Silvia Gonzáles, Pt struggling with exercise intolerance. Rate response on but histograms show HR primarily 60-70.     Call placed to pt to arrange in clinic device check for fine tuning of rate response. Awaiting return call. SH/RN

## 2023-06-29 NOTE — PLAN OF CARE
Goal Outcome Evaluation:    Pt. Discharged per order to home with spouse. Discharge instructions,AVS reviewed with pt. And spouse at bedside.

## 2023-06-29 NOTE — PLAN OF CARE
Goal Outcome Evaluation:    Plan of Care Reviewed With: patient, spouse    Overall Patient Progress: no changeOverall Patient Progress: no change    Assumed care for patient from 1945 to 2300:    Pt here after angiogram to monitor TR band bleeding. A&O x4 but forgetful. CMS intact. VSS on room air. Regular diet, thin liquids. Takes pills whole with waqter. Up with SBA. Denies pain. Pt scoring yellow on the Aggression Stop Light Tool for forgetfulness. Plan to keep deflating TR band and monitoring for bleeding. Patient came to floor at 1945 with swelling and bruising above and below TR band - no change from [id number]. Discharge tomorrow pending stability of angio site.

## 2023-06-29 NOTE — PLAN OF CARE
Goal Outcome Evaluation:    VSS. Monitor shows Sinus rhythm with BBB. Pt. Has been painfree. Left radial site stable, ecchymotic. Plan for discharge to home today.

## 2023-06-29 NOTE — PROGRESS NOTES
[facility]  Cardiology Progress Note  Date of Service: 06/29/2023  Primary Cardiologist: Dr. Dean    Assessment & Plan    Charles Nogueira is a 83 year old male with past medical history significant for paroxysmal atrial fibrillation rate controlled with metoprolol and anticoagulated with warfarin, sinus node dysfunction status post dual-chamber permanent pacemaker implant 2014,  to moderate mitral regurgitation, hypertension, dyslipidemia, and history of frequent PVCs who had concerns of exertion fatigue with + stress test admitted on 6/28/2023 after angiogram given oozing from radial access site.      Assessment:  1.  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  Hyperlipidemia   -on atorvastatin 20, will continue    3.  Hypertension   -intermittently elevated here but in the context of post procedure   - wife had decreased toprol to 12.5 daily as pt was having lower hrs on in the 40s (checked for 1 full min), device is set for lower rate of 60 so likely non sustained PVCs.      4.  Paroxysmal atrial fibrillation   -On warfarin for cardioembolic risk reduction              -[id number] score is 3 (agex2, HTN)    5.  Sinus node dysfunction status post dual-chamber permanent pacemaker implant   -implanted 2014    6.  Mild to moderate mitral regurgitation, stable    Plan:   1. I have reached out to the EP device nurse for the possibility of adjusting his rate responsiveness and on his pacemaker.  They will call if that is a possibility.  2. Increase Toprol to 50 mg and moved to at bedtime, I am hopeful this will suppress likely nonconducting's PVCs and limit A-fib as well as control blood pressure a bit better.  If he feels worse on this  certainly could cut back to 25 mg but would keep it at at bedtime.  3. Restart Coumadin tonight, we will not add aspirin given patient's hematuria on UAs are ready.  Discussed with patient and family who are in agreement.  4. Okay to discharge today with standard radial access precautions, they will call if any worsening hematoma bruising or bleeding.  5. Follow-up as scheduled.    Silvia Barboza PA-C  [facility] Heart  Pager: [phone]     I spent  45 minutes face-to-face and/or coordinating care of Charles Nogueira. Over 50% of our time on the unit was spent counseling the patient and/or coordinating care regarding his or her cardiac condition.    Interval History   Patient presented with profound fatigue after minimal exertion which is quite outside his norm.  Stress test was abnormal so he was sent for angiogram.  Over the last several weeks he has not done a lot of exertion so is not sure if he feels better or worse.  But he overall has a lot of fatigue.  He has been checked for TSH which is normal his blood counts have been normal, his seizure medicine has been decreased and he is not sure if any of it is helped.  Wife is present who is an RN, she notes that she has counted lower heart rates in the 40s doing this counting for 1 minute with both his radial and carotid pulse.  Because of this she discussed with our nursing team and cut Toprol back to 12.5 mg daily.    Physical Exam   Temp: 98.4  F (36.9  C) Temp src: Oral BP: 133/68 Pulse: 60   Resp: 16 SpO2: 98 % O2 Device: None (Room air) Oxygen Delivery: 2 LPM  Vitals:    06/28/23 0836 06/29/23 0247   Weight: 67.8 kg (149 lb 6.4 oz) 67.7 kg (149 lb 3.2 oz)     Well-developed well-nourished gentleman in no acute distress.  Normocephalic atraumatic.  Heart is regular in rate and rhythm with 3 out of 6 systolic murmur heard best lower left sternal border.  Lungs are clear without wheezes rales or rhonchi left radial access is ecchymotic from the wrist to elbow on the  forearm.  2+ radial pulse without bruit.  Soft and nontender no evidence of compartment syndrome.    Clinically Significant Risk Factors Present on Admission               # Drug Induced Coagulation Defect: home medication list includes an anticoagulant medication    # Hypertension: Noted on problem list              Cardiac Arrhythmia: Atrial fibrillation: Paroxysmal      Medications       atorvastatin  20 mg Oral Daily     levETIRAcetam  500 mg Oral BID     losartan  100 mg Oral Daily     metoprolol succinate ER  25 mg Oral Daily     sodium chloride (PF)  3 mL Intracatheter Q8H     warfarin ANTICOAGULANT  1.25 mg Oral Once per day on Mon Tue Wed Thu Fri     warfarin ANTICOAGULANT  2.5 mg Oral Once per day on Mon Thu       Data   Last 24 hours labs reviewed

## 2023-06-29 NOTE — PLAN OF CARE
VS:  Stable  Assist by:  SB      Cardiac: RRR /  CP free overnight   Neuro: A&Ox4; forgetful  CMS: Denies paresthesias   Respi: clear to auscultation; on RA    : Continent; voiding freely   GI: Abdo soft, non - tender; BS audible;      Strip/Tele:     Pressure areas/Skin:     - L Radial access -> Unchanged from previous (ecchymotic, soft, radial and brachial pulses +2; pain-free, no paresthesias reported) Gradually deflated; complete deflation achieved at 0205h       LDA's:     PIVC to L  arm SL       Plans:    >  Discharge in am pending stability of angio site.        Problem: Plan of Care - These are the overarching goals to be used throughout the patient stay.    Goal: Absence of Hospital-Acquired Illness or Injury  Intervention: Identify and Manage Fall Risk  Recent Flowsheet Documentation  Taken 6/29/2023 0139 by Regnia Harris RN  Safety Promotion/Fall Prevention: safety round/check completed  Taken 6/28/2023 2300 by Regina Harris RN  Safety Promotion/Fall Prevention:    activity supervised    clutter free environment maintained    nonskid shoes/slippers when out of bed    room near nurse's station    safety round/check completed  Intervention: Prevent Skin Injury  Recent Flowsheet Documentation  Taken 6/29/2023 0135 by Regina Harris RN  Body Position:    left    side-lying 90 degrees    position changed independently  Taken 6/28/2023 2300 by Regina Harris RN  Body Position: supine, head elevated     Problem: Cardiac Catheterization (Diagnostic/Interventional)  Goal: Anesthesia/Sedation Recovery  Intervention: Optimize Anesthesia Recovery  Recent Flowsheet Documentation  Taken 6/29/2023 0139 by Regina Harris RN  Safety Promotion/Fall Prevention: safety round/check completed  Taken 6/28/2023 2300 by Regina Harris RN  Safety Promotion/Fall Prevention:    activity supervised    clutter free environment maintained    nonskid  shoes/slippers when out of bed    room near nurse's station    safety round/check completed  Goal: Absence of Vascular Access Complication  Intervention: Prevent Access Site Complications  Recent Flowsheet Documentation  Taken 6/29/2023 0135 by Regina Harris, RN  Head of Bed (HOB) Positioning: HOB at 20 degrees  Taken 6/28/2023 2300 by Regina Harris, RN  Head of Bed (HOB) Positioning: HOB at 20 degrees

## 2023-06-29 NOTE — PROGRESS NOTES
1945 - 2 mls air out for total of 6 mls in  2036 - patient bleeding from left radial TR band site - 2 mls air put back in for a total of 8 mls  2200 - 1 ml air out for a total of 7 mls left  2230 - 1 ml air out for a total of 6 mls left

## 2023-06-30 ENCOUNTER — TELEPHONE (OUTPATIENT)
Dept: CARDIOLOGY | Facility: CLINIC | Age: 83
End: 2023-06-30
Payer: COMMERCIAL

## 2023-06-30 DIAGNOSIS — Z95.0 CARDIAC PACEMAKER IN SITU: Primary | ICD-10-CM

## 2023-06-30 LAB
ATRIAL RATE - MUSE: 62 BPM
DIASTOLIC BLOOD PRESSURE - MUSE: NORMAL MMHG
INTERPRETATION ECG - MUSE: NORMAL
P AXIS - MUSE: 54 DEGREES
PR INTERVAL - MUSE: 224 MS
QRS DURATION - MUSE: 140 MS
QT - MUSE: 438 MS
QTC - MUSE: 444 MS
R AXIS - MUSE: 261 DEGREES
SYSTOLIC BLOOD PRESSURE - MUSE: NORMAL MMHG
T AXIS - MUSE: 41 DEGREES
VENTRICULAR RATE- MUSE: 62 BPM

## 2023-06-30 NOTE — TELEPHONE ENCOUNTER
2nd Called pt. To arrange and in clinic device check for fine tuning of rate response. Did schedule him for Wednesday 7/5/23 in [place]. Instructed pt to call if he couldn't make this work. gave direct number. SH/RN

## 2023-07-05 ENCOUNTER — ANCILLARY PROCEDURE (OUTPATIENT)
Dept: CARDIOLOGY | Facility: CLINIC | Age: 83
End: 2023-07-05
Attending: INTERNAL MEDICINE
Payer: COMMERCIAL

## 2023-07-05 DIAGNOSIS — Z95.0 CARDIAC PACEMAKER IN SITU: ICD-10-CM

## 2023-07-06 ENCOUNTER — LAB (OUTPATIENT)
Dept: LAB | Facility: CLINIC | Age: 83
End: 2023-07-06
Payer: COMMERCIAL

## 2023-07-06 ENCOUNTER — ANTICOAGULATION THERAPY VISIT (OUTPATIENT)
Dept: ANTICOAGULATION | Facility: CLINIC | Age: 83
End: 2023-07-06

## 2023-07-06 DIAGNOSIS — I05.9 MITRAL VALVE DISEASE: ICD-10-CM

## 2023-07-06 DIAGNOSIS — I49.5 SICK SINUS SYNDROME (H): Primary | ICD-10-CM

## 2023-07-06 DIAGNOSIS — I48.0 PAROXYSMAL ATRIAL FIBRILLATION (H): ICD-10-CM

## 2023-07-06 LAB — INR BLD: 1.3 (ref 0.9–1.1)

## 2023-07-06 PROCEDURE — 36415 COLL VENOUS BLD VENIPUNCTURE: CPT

## 2023-07-06 PROCEDURE — 85610 PROTHROMBIN TIME: CPT

## 2023-07-06 NOTE — PROGRESS NOTES
ANTICOAGULATION MANAGEMENT     Charles Nogueira 83 year old male is on warfarin with subtherapeutic INR result. (Goal INR 2.0-3.0)    Recent labs: (last 7 days)     07/06/23  0924   INR 1.3*       ASSESSMENT       Source(s): Chart Review and Patient/Caregiver Call       Warfarin doses taken: Held for 4 days prior to Angiogram on 6/28/23  recently which may be affecting INR     Diet: No new diet changes identified    Medication/supplement changes: None noted    New illness, injury, or hospitalization: No    Signs or symptoms of bleeding or clotting: No    Previous result: Subtherapeutic after holding for angiogram, prior to that pt was therapeutic x1.    Additional findings: None         PLAN     Recommended plan for temporary change(s) affecting INR     Dosing Instructions: booster dose then continue your current warfarin dose with next INR in 5-6 days       Summary  As of 7/6/2023    Full warfarin instructions:  7/6: 2.5 mg; Otherwise 2.5 mg every Sun, Wed; 1.25 mg all other days   Next INR check:  7/12/2023             Telephone call with  wife Paula who verbalizes understanding and agrees to plan    Lab visit scheduled    Education provided:     Goal range and lab monitoring: goal range and significance of current result and Importance of therapeutic range    Plan made per [facility] anticoagulation protocol    Quan Sood RN  Anticoagulation Clinic  7/6/2023    _______________________________________________________________________     Anticoagulation Episode Summary     Current INR goal:  2.0-3.0   TTR:  60.0 % (1 y)   Target end date:  Indefinite   Send INR reminders to:  [facility] INR NURSE    Indications    Sick sinus syndrome (H) [I49.5]  Paroxysmal atrial fibrillation (H) [I48.0]  Mitral valve disease [I05.9]           Comments:           Anticoagulation Care Providers     Provider Role Specialty Phone number    Ana Dean MD Referring Cardiovascular Disease [phone]

## 2023-07-11 DIAGNOSIS — I48.0 PAROXYSMAL ATRIAL FIBRILLATION (H): ICD-10-CM

## 2023-07-11 DIAGNOSIS — I10 ESSENTIAL HYPERTENSION: ICD-10-CM

## 2023-07-11 RX ORDER — LOSARTAN POTASSIUM 100 MG/1
100 TABLET ORAL DAILY
Qty: 90 TABLET | Refills: 3 | Status: SHIPPED | OUTPATIENT
Start: 2023-07-11 | End: 2024-09-09

## 2023-07-11 NOTE — TELEPHONE ENCOUNTER
Received refill request from [facility] pharmacy for Losartan 100mg tab    Last OV   6 \ 6 \ 23  With Kaia Gibbs    [facility] Cardiology Refill Guideline reviewed.  Medication meets criteria for refill.     Antonette Clarke RN on 7/11/2023 at 4:56 PM

## 2023-07-12 ENCOUNTER — TELEPHONE (OUTPATIENT)
Dept: CARDIOLOGY | Facility: CLINIC | Age: 83
End: 2023-07-12

## 2023-07-12 ENCOUNTER — ANTICOAGULATION THERAPY VISIT (OUTPATIENT)
Dept: ANTICOAGULATION | Facility: CLINIC | Age: 83
End: 2023-07-12

## 2023-07-12 ENCOUNTER — LAB (OUTPATIENT)
Dept: LAB | Facility: CLINIC | Age: 83
End: 2023-07-12
Payer: COMMERCIAL

## 2023-07-12 DIAGNOSIS — I05.9 MITRAL VALVE DISEASE: ICD-10-CM

## 2023-07-12 DIAGNOSIS — I48.0 PAROXYSMAL ATRIAL FIBRILLATION (H): ICD-10-CM

## 2023-07-12 DIAGNOSIS — I49.5 SICK SINUS SYNDROME (H): Primary | ICD-10-CM

## 2023-07-12 LAB
INR BLD: 1.9 (ref 0.9–1.1)
MDC_IDC_LEAD_IMPLANT_DT: NORMAL
MDC_IDC_LEAD_IMPLANT_DT: NORMAL
MDC_IDC_LEAD_LOCATION: NORMAL
MDC_IDC_LEAD_LOCATION: NORMAL
MDC_IDC_LEAD_MFG: NORMAL
MDC_IDC_LEAD_MFG: NORMAL
MDC_IDC_LEAD_MODEL: NORMAL
MDC_IDC_LEAD_MODEL: NORMAL
MDC_IDC_LEAD_POLARITY_TYPE: NORMAL
MDC_IDC_LEAD_POLARITY_TYPE: NORMAL
MDC_IDC_LEAD_SERIAL: NORMAL
MDC_IDC_LEAD_SERIAL: NORMAL
MDC_IDC_MSMT_BATTERY_DTM: NORMAL
MDC_IDC_MSMT_BATTERY_IMPEDANCE: 1058 OHM
MDC_IDC_MSMT_BATTERY_REMAINING_LONGEVITY: 72 MO
MDC_IDC_MSMT_BATTERY_STATUS: NORMAL
MDC_IDC_MSMT_BATTERY_VOLTAGE: 2.78 V
MDC_IDC_MSMT_LEADCHNL_RA_IMPEDANCE_VALUE: 547 OHM
MDC_IDC_MSMT_LEADCHNL_RA_PACING_THRESHOLD_AMPLITUDE: 0.5 V
MDC_IDC_MSMT_LEADCHNL_RA_PACING_THRESHOLD_PULSEWIDTH: 0.4 MS
MDC_IDC_MSMT_LEADCHNL_RV_IMPEDANCE_VALUE: 612 OHM
MDC_IDC_MSMT_LEADCHNL_RV_PACING_THRESHOLD_AMPLITUDE: 0.88 V
MDC_IDC_MSMT_LEADCHNL_RV_PACING_THRESHOLD_PULSEWIDTH: 0.4 MS
MDC_IDC_PG_IMPLANT_DTM: NORMAL
MDC_IDC_PG_MFG: NORMAL
MDC_IDC_PG_MODEL: NORMAL
MDC_IDC_PG_SERIAL: NORMAL
MDC_IDC_PG_TYPE: NORMAL
MDC_IDC_SESS_CLINIC_NAME: NORMAL
MDC_IDC_SESS_DTM: NORMAL
MDC_IDC_SESS_TYPE: NORMAL
MDC_IDC_SET_BRADY_AT_MODE_SWITCH_MODE: NORMAL
MDC_IDC_SET_BRADY_AT_MODE_SWITCH_RATE: 175 {BEATS}/MIN
MDC_IDC_SET_BRADY_LOWRATE: 60 {BEATS}/MIN
MDC_IDC_SET_BRADY_MAX_SENSOR_RATE: 130 {BEATS}/MIN
MDC_IDC_SET_BRADY_MAX_TRACKING_RATE: 130 {BEATS}/MIN
MDC_IDC_SET_BRADY_MODE: NORMAL
MDC_IDC_SET_BRADY_PAV_DELAY_LOW: 350 MS
MDC_IDC_SET_BRADY_SAV_DELAY_LOW: 350 MS
MDC_IDC_SET_LEADCHNL_RA_PACING_AMPLITUDE: 1.5 V
MDC_IDC_SET_LEADCHNL_RA_PACING_CAPTURE_MODE: NORMAL
MDC_IDC_SET_LEADCHNL_RA_PACING_POLARITY: NORMAL
MDC_IDC_SET_LEADCHNL_RA_PACING_PULSEWIDTH: 0.4 MS
MDC_IDC_SET_LEADCHNL_RA_SENSING_POLARITY: NORMAL
MDC_IDC_SET_LEADCHNL_RA_SENSING_SENSITIVITY: 0.5 MV
MDC_IDC_SET_LEADCHNL_RV_PACING_AMPLITUDE: 2 V
MDC_IDC_SET_LEADCHNL_RV_PACING_CAPTURE_MODE: NORMAL
MDC_IDC_SET_LEADCHNL_RV_PACING_POLARITY: NORMAL
MDC_IDC_SET_LEADCHNL_RV_PACING_PULSEWIDTH: 0.4 MS
MDC_IDC_SET_LEADCHNL_RV_SENSING_POLARITY: NORMAL
MDC_IDC_SET_LEADCHNL_RV_SENSING_SENSITIVITY: 2.8 MV
MDC_IDC_SET_ZONE_DETECTION_INTERVAL: 333.33 MS
MDC_IDC_SET_ZONE_DETECTION_INTERVAL: 342.86 MS
MDC_IDC_SET_ZONE_TYPE: NORMAL
MDC_IDC_SET_ZONE_TYPE: NORMAL
MDC_IDC_STAT_AT_BURDEN_PERCENT: 0.1 %
MDC_IDC_STAT_AT_DTM_END: NORMAL
MDC_IDC_STAT_AT_DTM_START: NORMAL
MDC_IDC_STAT_AT_MODE_SW_COUNT: 37
MDC_IDC_STAT_BRADY_AP_VP_PERCENT: 1 %
MDC_IDC_STAT_BRADY_AP_VS_PERCENT: 63 %
MDC_IDC_STAT_BRADY_AS_VP_PERCENT: 0 %
MDC_IDC_STAT_BRADY_AS_VS_PERCENT: 36 %
MDC_IDC_STAT_BRADY_DTM_END: NORMAL
MDC_IDC_STAT_BRADY_DTM_START: NORMAL
MDC_IDC_STAT_EPISODE_RECENT_COUNT: 19
MDC_IDC_STAT_EPISODE_RECENT_COUNT: 2
MDC_IDC_STAT_EPISODE_RECENT_COUNT_DTM_END: NORMAL
MDC_IDC_STAT_EPISODE_RECENT_COUNT_DTM_END: NORMAL
MDC_IDC_STAT_EPISODE_RECENT_COUNT_DTM_START: NORMAL
MDC_IDC_STAT_EPISODE_RECENT_COUNT_DTM_START: NORMAL
MDC_IDC_STAT_EPISODE_TYPE: NORMAL
MDC_IDC_STAT_EPISODE_TYPE: NORMAL

## 2023-07-12 PROCEDURE — 36416 COLLJ CAPILLARY BLOOD SPEC: CPT

## 2023-07-12 PROCEDURE — 85610 PROTHROMBIN TIME: CPT

## 2023-07-12 RX ORDER — WARFARIN SODIUM 2.5 MG/1
1.25-2.5 TABLET ORAL DAILY
Qty: 65 TABLET | Refills: 1 | Status: SHIPPED | OUTPATIENT
Start: 2023-07-12 | End: 2023-11-07

## 2023-07-12 NOTE — PROGRESS NOTES
"ANTICOAGULATION MANAGEMENT     Charles Nogueira 83 year old male is on warfarin with subtherapeutic INR result. (Goal INR 2.0-3.0)    Recent labs: (last 7 days)     07/12/23  1140   INR 1.9*       ASSESSMENT     Warfarin Lab Questionnaire    Warfarin Doses Last 7 Days      7/12/2023    11:32 AM   Dose in Tablet or Mg   TAB or MG? tablet (tab)     Pt Rptd Dose SUNDAY MONDAY TUESDAY WED THURS FRIDAY SATURDAY 7/12/2023  11:32 AM 1 0.5 0.5 1 0.5 0.5 0.5         7/12/2023   Warfarin Lab Questionnaire   Missed doses within past 14 days? No   Changes in diet or alcohol within past 14 days? No-- had spinach last night at dinner; wife reports \"I fixed something for dinner with a lot of spinach\"   Medication changes since last result? No   Injuries or illness since last result? No   New shortness of breath, severe headaches or sudden changes in vision since last result? No   Abnormal bleeding since last result? No   Upcoming surgery, procedure? No   Best number to call with results? [phone]     Previous result: Subtherapeutic  Additional findings: None       PLAN     Recommended plan for temporary change(s) affecting INR     Dosing Instructions: Continue your current warfarin dose with next INR in 1 week       Summary  As of 7/12/2023    Full warfarin instructions:  2.5 mg every Sun, Wed; 1.25 mg all other days   Next INR check:  7/19/2023             Telephone call with  wife, Paula who verbalizes understanding and agrees to plan    Check at provider office visit    Education provided:     Contact [phone] with any changes, questions or concerns.     Plan made per [facility] anticoagulation protocol    Mary Arguelles, RN  Anticoagulation Clinic  7/12/2023    _______________________________________________________________________     Anticoagulation Episode Summary     Current INR goal:  2.0-3.0   TTR:  58.3 % (1 y)   Target end date:  Indefinite   Send INR reminders to:  [facility] NURSE    Indications    Sick " sinus syndrome (H) [I49.5]  Paroxysmal atrial fibrillation (H) [I48.0]  Mitral valve disease [I05.9]           Comments:           Anticoagulation Care Providers     Provider Role Specialty Phone number    Ana Dean MD Referring Cardiovascular Disease [phone]

## 2023-07-12 NOTE — TELEPHONE ENCOUNTER
VM received : Pt states he was calling with update that he feels great since he had device changes last week  No return call needed per pt. (Just an FYI call)  Inga YADAV

## 2023-07-12 NOTE — TELEPHONE ENCOUNTER
ANTICOAGULATION MANAGEMENT:  Medication Refill    Anticoagulation Summary  As of 7/6/2023    Warfarin maintenance plan:  2.5 mg (2.5 mg x 1) every Sun, Wed; 1.25 mg (2.5 mg x 0.5) all other days   Next INR check:  7/12/2023   Target end date:  Indefinite    Indications    Sick sinus syndrome (H) [I49.5]  Paroxysmal atrial fibrillation (H) [I48.0]  Mitral valve disease [I05.9]             Anticoagulation Care Providers     Provider Role Specialty Phone number    Ana Dean MD Referring Cardiovascular Disease [phone]          Visit with referring provider/group within last year: Yes    [facility] referral signed last signed: 05/15/2023; within last year: Yes    Charles meets all criteria for refill (current [facility] referral, office visit with referring provider/group in last year, lab monitoring up to date or not exceeding 2 weeks overdue). Rx instructions and quantity supplied updated to match patient's current dosing plan. Warfarin 90 day supply with 1 refill granted per [facility] protocol     Nani Russ RN  Anticoagulation Clinic

## 2023-07-13 NOTE — PROGRESS NOTES
Cardiology Clinic Progress Note  Charles Nogueira MRN# [id number]   YOB: 1940 Age: 83 year old   Primary Cardiologist: Dr. Dean Reason for visit: Post-angiogram follow up             Assessment and Plan:       1.  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  Hypertension, controlled  -Continue metoprolol and losartan    3. Hyperlipidemia  -LDL 75 5/2023, now not at goal considering moderate coronary disease on angiogram  -On atorvastatin 10 mg once daily, will increase to atorvastatin to 20mg daily     4.  Paroxysmal atrial fibrillation, [id number] 3 (age x2, hypertension)  -Warfarin for thromboembolic prophylaxis    5.  Sinus node dysfunction s/p dual-chamber pacemaker placement implant 2014  -7/2023     6.  Moderate mitral regurgitation, stable on 6/2023 transthoracic echocardiogram      Patient continues to feel fatigued despite greatest activity threshold pacemaker last week and change in timing of metoprolol to evening.  His recent hemoglobin in late June was normal at 13.8 so I do not suspect anemia as a contributing factor.  His recent angiography did not show any obstructive coronary disease.  His ejection fraction is normal on his most recent echocardiogram without any significant valvular abnormalities that could be causing his symptoms.  We discussed the option of a trial reduction of his metoprolol given his low blood pressure today, although asymptomatic, typically his afternoon readings are up in the 130 range and his wife who is a nurse preferred to keep an eye on this at home.  I advised her to contact us in between now and her next visit if his blood pressure continues to run low later in the day and we would like to reduce his metoprolol.  I also advised him to increase his regular exercise as I suspect there  is a component of deconditioning as he is overall less physically active than he was a few years ago..    Changes today: Medication changes made today    Follow up plan: Patient to follow-up with me in 3 months        History of Presenting Illness:    Charles Nogueira is a very pleasant 83 year old male with a history of paroxysmal atrial fibrillation, rate controlled with metoprolol and anticoagulated with warfarin, sinus node dysfunction, s/p dual-chamber pacemaker placement (2014) mild to moderate mitral regurgitation, hypertension, dyslipidemia, and history of frequent PVCs.    Patient was seen in the beginning of June 2023 reporting fatigue, and weakness with minimal exertion a nuclear stress test was ordered which revealed a small area of transmural infarction in the distal anterior and apical segments and LVEF at rest estimated at 46%.  A subsequent echocardiogram revealed normal LVEF 55 to 60%, septal wall motion abnormality consistent with pacemaker activation, normal RV size and function, mitral valve prolapse with mild/moderate MR, and trace AI.  Dr. Dean recommended coronary angiography.    Patient underwent coronary angiography which showed mild to moderate nonobstructive coronary artery disease with the following lesions: Distal left main and ostial LAD 30%, ramus lesion 60%, ostial to proximal circumflex 20%, second marginal-1 30%, second marginal-2 40%, mid LAD-1 25%, second diagonal lesion 50%, mid LAD-2 40%.    On 7/5/23 patient's pacemaker activity threshold was decreased from medium low to low.  Nursing contacted him 7/12/2023 at which point he said he felt great.    Patient is here today follow-up after his angiogram. Patient reports feeling good, but still generally fatigued. [place] and had to stop due to feeling fatigued when pushing her up the hill but had no chest pain or chest tightness. 5 months out of the year he walks two miles, 5 days  per week when he's living in [place]. Walks 2 days per week, approximately 1 mile at the [facility].     Patient denies chest pain or chest tightness. Denies dizziness, lightheadedness or other presyncopal symptoms. Denies tachycardia or palpitations. Denies shortness of breath, orthopnea, PND.     Labs from 2023 140, potassium 4.2, creatinine 1.04, GFR 72, hemoglobin 13.8, platelet 144.    Blood pressure 98/61 and HR 61 in clinic today.        Recent Hospitalizations   None in [id number]          Social History      Social History     Socioeconomic History     Marital status:      Spouse name: Paula     Number of children: 3     Years of education: Not on file     Highest education level: Not on file   Occupational History     Employer: RETIRED     Occupation: engineering managment     Comment: [employer]   Tobacco Use     Smoking status: Former     Packs/day: 1.00     Years: 4.00     Pack years: 4.00     Types: Cigarettes     Quit date: 1967     Years since quittin.5     Smokeless tobacco: Never   Vaping Use     Vaping Use: Never used   Substance and Sexual Activity     Alcohol use: No     Alcohol/week: 0.0 standard drinks of alcohol     Drug use: No     Sexual activity: Yes     Partners: Female   Other Topics Concern      Service Yes     Blood Transfusions No     Caffeine Concern Yes     Comment: decaff coffee     Occupational Exposure No     Hobby Hazards No     Sleep Concern No     Stress Concern No     Weight Concern No     Special Diet No     Back Care No     Exercise Yes     Comment: Excercises about 4-5 days per week     Bike Helmet Yes     Seat Belt Yes     Self-Exams No     Parent/sibling w/ CABG, MI or angioplasty before 65F 55M? Not Asked   Social History Narrative     Not on file     Social Determinants of Health     Financial Resource Strain: Low Risk  (2022)    Overall Financial Resource Strain (CARDIA)      Difficulty of Paying Living Expenses: Not hard at all   Food  "Insecurity: No Food Insecurity (4/26/2022)    Hunger Vital Sign      Worried About Running Out of Food in the Last Year: Never true      Ran Out of Food in the Last Year: Never true   Transportation Needs: No Transportation Needs (4/26/2022)    PRAPARE - Transportation      Lack of Transportation (Medical): No      Lack of Transportation (Non-Medical): No   Physical Activity: Sufficiently Active (4/26/2022)    Exercise Vital Sign      Days of Exercise per Week: 5 days      Minutes of Exercise per Session: 50 min   Stress: Not on file   Social Connections: Socially Integrated (4/26/2022)    Social Connection and Isolation Panel [NHANES]      Frequency of Communication with Friends and Family: Twice a week      Frequency of Social Gatherings with Friends and Family: Twice a week      Attends [employer] Services: More than 4 times per year      Active Member of Clubs or Organizations: Yes      Attends Club or Organization Meetings: Not on file      Marital Status:    Intimate Partner Violence: Not on file   Housing Stability: High Risk (4/26/2022)    Housing Stability Vital Sign      Unable to Pay for Housing in the Last Year: No      Number of Places Lived in the Last Year: 3      Unstable Housing in the Last Year: No            Review of Systems:   Skin:  not assessed     Eyes:  Positive for glasses  ENT:  not assessed    Respiratory:  Negative    Cardiovascular:  Negative    Gastroenterology: not assessed    Genitourinary:  not assessed    Musculoskeletal:  not assessed    Neurologic:  not assessed    Psychiatric:  not assessed    Heme/Lymph/Imm:  not assessed    Endocrine:  not assessed           Physical Exam:   Vitals: BP 98/61 (BP Location: Right arm, Patient Position: Sitting, Cuff Size: Adult Regular)   Pulse 61   Ht 1.753 m (5' 9\")   Wt 68.8 kg (151 lb 9.6 oz)   SpO2 98%   BMI 22.39 kg/m     Wt Readings from Last 4 Encounters:   07/14/23 68.8 kg (151 lb 9.6 oz)   06/29/23 67.7 kg (149 lb 3.2 oz) "   06/26/23 67.1 kg (148 lb)   04/28/23 69 kg (152 lb 1.6 oz)     GEN: well nourished, in no acute distress.  HEENT:  Pupils equal, round. Sclerae nonicteric.   NECK: Supple, no masses appreciated.   C/V:  Regular rate and rhythm, no murmur, rub or gallop.   RESP: Respirations are unlabored. Clear to auscultation bilaterally without wheezing, rales, or rhonchi.  GI: Abdomen soft, nontender.  EXTREM: No LE edema.  NEURO: Alert and oriented, cooperative.  SKIN: Warm and dry. Right wrist puncture site without tenderness, no bruit       Data:     LIPID RESULTS:  Lab Results   Component Value Date    CHOL 130 05/01/2023    CHOL 179 12/17/2020    HDL 40 05/01/2023    HDL 39 (L) 12/17/2020    LDL 75 05/01/2023     (H) 12/17/2020    TRIG 75 05/01/2023    TRIG 97 12/17/2020    CHOLHDLRATIO 4.5 11/10/2015     LIVER ENZYME RESULTS:  Lab Results   Component Value Date    AST 39 05/01/2023    AST 24 12/17/2020    ALT 38 05/01/2023    ALT 26 12/17/2020     CBC RESULTS:  Lab Results   Component Value Date    WBC 6.7 06/28/2023    WBC 9.7 09/21/2018    RBC 4.61 06/28/2023    RBC 5.19 09/21/2018    HGB 13.8 06/28/2023    HGB 15.4 09/21/2018    HCT 42.6 06/28/2023    HCT 47.7 09/21/2018    MCV 92 06/28/2023    MCV 92 09/21/2018    MCH 29.9 06/28/2023    MCH 29.7 09/21/2018    MCHC 32.4 06/28/2023    MCHC 32.3 09/21/2018    RDW 13.2 06/28/2023    RDW 13.5 09/21/2018     (L) 06/28/2023     09/21/2018     BMP RESULTS:  Lab Results   Component Value Date     06/28/2023     12/17/2020    POTASSIUM 4.2 06/28/2023    POTASSIUM 4.2 05/12/2022    POTASSIUM 3.8 12/17/2020    CHLORIDE 105 06/28/2023    CHLORIDE 108 05/12/2022    CHLORIDE 107 12/17/2020    CO2 26 06/28/2023    CO2 29 05/12/2022    CO2 32 12/17/2020    ANIONGAP 9 06/28/2023    ANIONGAP 2 (L) 05/12/2022    ANIONGAP <1 (L) 12/17/2020    GLC 97 06/28/2023    GLC 93 05/12/2022     (H) 12/17/2020    BUN 20.8 06/28/2023    BUN 27 05/12/2022    BUN  19 12/17/2020    CR 1.04 06/28/2023    CR 1.00 12/17/2020    GFRESTIMATED 72 06/28/2023    GFRESTIMATED >60 09/27/2022    GFRESTIMATED 71 12/17/2020    GFRESTBLACK 83 12/17/2020    MANUELA 9.0 06/28/2023    MANUELA 9.0 12/17/2020      A1C RESULTS:  No results found for: A1C  INR RESULTS:  Lab Results   Component Value Date    INR 1.9 (H) 07/12/2023    INR 1.3 (H) 07/06/2023    INR 1.07 06/28/2023    INR 2.2 (A) 03/30/2023    INR 1.7 (A) 03/17/2023            Medications     Current Outpatient Medications   Medication Sig Dispense Refill     atorvastatin (LIPITOR) 20 MG tablet Take 1 tablet (20 mg) by mouth daily 90 tablet 3     levETIRAcetam (KEPPRA) 500 MG tablet Take 500 mg by mouth 2 times daily       losartan (COZAAR) 100 MG tablet Take 1 tablet (100 mg) by mouth daily 90 tablet 3     metoprolol succinate ER (TOPROL XL) 25 MG 24 hr tablet Take 2 tablets (50 mg) by mouth At Bedtime 180 tablet 3     Multiple Vitamins-Minerals (ZINC PO)        warfarin ANTICOAGULANT (COUMADIN) 2.5 MG tablet Take 0.5-1 tablets (1.25-2.5 mg) by mouth daily Adjust dose based on INR results as directed. 65 tablet 1     warfarin ANTICOAGULANT (COUMADIN) 2.5 MG tablet Take 1.25 mg by mouth five times a week Mon, Tues, Thurs, Fri, & Sat       warfarin ANTICOAGULANT (COUMADIN) 2.5 MG tablet Take 2.5 mg by mouth twice a week Wed & Sun            Past Medical History     Past Medical History:   Diagnosis Date     Acute ischemic heart disease (H) 08/24/2018     Allergic rhinitis, cause unspecified      Cardiac pacemaker in situ 08/14/2017     Cervicalgia 04/26/2022     Chronic diastolic congestive heart failure (H) 07/26/2021     Dysphagia, oropharyngeal phase 06/14/2018    Mild per video swallow study. 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     PVC (premature ventricular contraction) 05/10/2011     Sick sinus syndrome (H) 05/03/2016    s/p PPM implant 7/2/14     Squamous cell carcinoma      Syncope      Weakness of both upper extremities 04/26/2022     Past Surgical History:   Procedure Laterality Date     APPENDECTOMY OPEN       BIOPSY OF SKIN LESION       COLONOSCOPY N/A 11/20/2015    Procedure: COLONOSCOPY;  Surgeon: David Us MD;  Location:  GI     CV CORONARY ANGIOGRAM N/A 6/28/2023    Procedure: Coronary Angiogram;  Surgeon: Carola Reed MD;  Location:  HEART CARDIAC CATH LAB     IMPLANT PACEMAKER  7/2004     MOHS MICROGRAPHIC PROCEDURE       Family History   Problem Relation Age of Onset     Heart Disease Mother         MI     Heart Disease Father         MI     C.A.D. Brother         Just had angioplasty in 2006     Colon Cancer No family hx of             Allergies   Sulfa antibiotics        Shirley Spaulding NP  [facility]  Pager: [phone]

## 2023-07-14 ENCOUNTER — OFFICE VISIT (OUTPATIENT)
Dept: CARDIOLOGY | Facility: CLINIC | Age: 83
End: 2023-07-14
Attending: INTERNAL MEDICINE
Payer: COMMERCIAL

## 2023-07-14 VITALS
HEART RATE: 61 BPM | WEIGHT: 151.6 LBS | HEIGHT: 69 IN | OXYGEN SATURATION: 98 % | SYSTOLIC BLOOD PRESSURE: 98 MMHG | BODY MASS INDEX: 22.45 KG/M2 | DIASTOLIC BLOOD PRESSURE: 61 MMHG

## 2023-07-14 DIAGNOSIS — E78.5 HYPERLIPIDEMIA LDL GOAL <70: ICD-10-CM

## 2023-07-14 DIAGNOSIS — I25.10 CORONARY ARTERY DISEASE INVOLVING NATIVE CORONARY ARTERY OF NATIVE HEART WITHOUT ANGINA PECTORIS: ICD-10-CM

## 2023-07-14 DIAGNOSIS — I10 ESSENTIAL HYPERTENSION: Primary | ICD-10-CM

## 2023-07-14 DIAGNOSIS — I47.29 NONSUSTAINED VENTRICULAR TACHYCARDIA (H): ICD-10-CM

## 2023-07-14 DIAGNOSIS — I49.5 SICK SINUS SYNDROME (H): ICD-10-CM

## 2023-07-14 PROCEDURE — 99214 OFFICE O/P EST MOD 30 MIN: CPT | Performed by: NURSE PRACTITIONER

## 2023-07-14 RX ORDER — ATORVASTATIN CALCIUM 20 MG/1
20 TABLET, FILM COATED ORAL DAILY
Qty: 90 TABLET | Refills: 3 | Status: SHIPPED | OUTPATIENT
Start: 2023-07-14 | End: 2023-10-09

## 2023-07-14 RX ORDER — METOPROLOL SUCCINATE 25 MG/1
50 TABLET, EXTENDED RELEASE ORAL AT BEDTIME
Qty: 180 TABLET | Refills: 3 | Status: SHIPPED | OUTPATIENT
Start: 2023-07-14 | End: 2023-10-09 | Stop reason: DRUGHIGH

## 2023-07-14 NOTE — PATIENT INSTRUCTIONS
Today's Recommendations    I would like you to increase your atorvastatin to 20mg daily   Let us know if your blood pressure continues to run low after taking your medications or your have any dizziness/lightheadedness  Please follow up with Dr. Dean in 3 month.    Please send a [employer] message or call [phone] to the RN team with questions or concerns.     Scheduling number [phone]  CORBIN Sarmiento, CNP

## 2023-07-14 NOTE — LETTER
7/14/2023    Physician No Ref-Primary  No address on file    RE: Charles Nogueira       Dear Colleague,     I had the pleasure of seeing Charles Nogueira in the [facility].  Cardiology Clinic Progress Note  Charles Nogueira MRN# [id number]   YOB: 1940 Age: 83 year old   Primary Cardiologist: Dr. Dean Reason for visit: Post-angiogram follow up             Assessment and Plan:       1.  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  Hypertension, controlled  -Continue metoprolol and losartan    3. Hyperlipidemia  -LDL 75 5/2023, now not at goal considering moderate coronary disease on angiogram  -On atorvastatin 10 mg once daily, will increase to atorvastatin to 20mg daily     4.  Paroxysmal atrial fibrillation, [id number] 3 (age x2, hypertension)  -Warfarin for thromboembolic prophylaxis    5.  Sinus node dysfunction s/p dual-chamber pacemaker placement implant 2014  -7/2023     6.  Moderate mitral regurgitation, stable on 6/2023 transthoracic echocardiogram      Patient continues to feel fatigued despite greatest activity threshold pacemaker last week and change in timing of metoprolol to evening.  His recent hemoglobin in late June was normal at 13.8 so I do not suspect anemia as a contributing factor.  His recent angiography did not show any obstructive coronary disease.  His ejection fraction is normal on his most recent echocardiogram without any significant valvular abnormalities that could be causing his symptoms.  We discussed the option of a trial reduction of his metoprolol given his low blood pressure today, although asymptomatic, typically his afternoon readings are up in the 130 range and his wife who is a nurse preferred to keep an eye on this at home.  I advised her to contact us in between now and  her next visit if his blood pressure continues to run low later in the day and we would like to reduce his metoprolol.  I also advised him to increase his regular exercise as I suspect there is a component of deconditioning as he is overall less physically active than he was a few years ago..    Changes today: Medication changes made today    Follow up plan: Patient to follow-up with me in 3 months        History of Presenting Illness:    Charles Nogueira is a very pleasant 83 year old male with a history of paroxysmal atrial fibrillation, rate controlled with metoprolol and anticoagulated with warfarin, sinus node dysfunction, s/p dual-chamber pacemaker placement (2014) mild to moderate mitral regurgitation, hypertension, dyslipidemia, and history of frequent PVCs.    Patient was seen in the beginning of June 2023 reporting fatigue, and weakness with minimal exertion a nuclear stress test was ordered which revealed a small area of transmural infarction in the distal anterior and apical segments and LVEF at rest estimated at 46%.  A subsequent echocardiogram revealed normal LVEF 55 to 60%, septal wall motion abnormality consistent with pacemaker activation, normal RV size and function, mitral valve prolapse with mild/moderate MR, and trace AI.  Dr. Dean recommended coronary angiography.    Patient underwent coronary angiography which showed mild to moderate nonobstructive coronary artery disease with the following lesions: Distal left main and ostial LAD 30%, ramus lesion 60%, ostial to proximal circumflex 20%, second marginal-1 30%, second marginal-2 40%, mid LAD-1 25%, second diagonal lesion 50%, mid LAD-2 40%.    On 7/5/23 patient's pacemaker activity threshold was decreased from medium low to low.  Nursing contacted him 7/12/2023 at which point he said he felt great.    Patient is here today follow-up after his angiogram. Patient reports feeling good, but still generally fatigued. He [place]  [place] and had to stop due to feeling fatigued when pushing her up the hill but had no chest pain or chest tightness. 5 months out of the year he walks two miles, 5 days per week when he's living in [place]. Walks 2 days per week, approximately 1 mile at the [facility].     Patient denies chest pain or chest tightness. Denies dizziness, lightheadedness or other presyncopal symptoms. Denies tachycardia or palpitations. Denies shortness of breath, orthopnea, PND.     Labs from 2023 140, potassium 4.2, creatinine 1.04, GFR 72, hemoglobin 13.8, platelet 144.    Blood pressure 98/61 and HR 61 in clinic today.        Recent Hospitalizations   None in [id number]          Social History      Social History     Socioeconomic History    Marital status:      Spouse name: Paula    Number of children: 3    Years of education: Not on file    Highest education level: Not on file   Occupational History     Employer: RETIRED    Occupation: engineering managment     Comment: [employer]   Tobacco Use    Smoking status: Former     Packs/day: 1.00     Years: 4.00     Pack years: 4.00     Types: Cigarettes     Quit date: 1967     Years since quittin.5    Smokeless tobacco: Never   Vaping Use    Vaping Use: Never used   Substance and Sexual Activity    Alcohol use: No     Alcohol/week: 0.0 standard drinks of alcohol    Drug use: No    Sexual activity: Yes     Partners: Female   Other Topics Concern     Service Yes    Blood Transfusions No    Caffeine Concern Yes     Comment: decaff coffee    Occupational Exposure No    Hobby Hazards No    Sleep Concern No    Stress Concern No    Weight Concern No    Special Diet No    Back Care No    Exercise Yes     Comment: Excercises about 4-5 days per week    Bike Helmet Yes    Seat Belt Yes    Self-Exams No    Parent/sibling w/ CABG, MI or angioplasty before 65F 55M? Not Asked   Social History Narrative    Not on file     Social Determinants of Health  "    Financial Resource Strain: Low Risk  (4/26/2022)    Overall Financial Resource Strain (CARDIA)     Difficulty of Paying Living Expenses: Not hard at all   Food Insecurity: No Food Insecurity (4/26/2022)    Hunger Vital Sign     Worried About Running Out of Food in the Last Year: Never true     Ran Out of Food in the Last Year: Never true   Transportation Needs: No Transportation Needs (4/26/2022)    PRAPARE - Transportation     Lack of Transportation (Medical): No     Lack of Transportation (Non-Medical): No   Physical Activity: Sufficiently Active (4/26/2022)    Exercise Vital Sign     Days of Exercise per Week: 5 days     Minutes of Exercise per Session: 50 min   Stress: Not on file   Social Connections: Socially Integrated (4/26/2022)    Social Connection and Isolation Panel [NHANES]     Frequency of Communication with Friends and Family: Twice a week     Frequency of Social Gatherings with Friends and Family: Twice a week     Attends Rastafari Services: More than 4 times per year     Active Member of Clubs or Organizations: Yes     Attends Club or Organization Meetings: Not on file     Marital Status:    Intimate Partner Violence: Not on file   Housing Stability: High Risk (4/26/2022)    Housing Stability Vital Sign     Unable to Pay for Housing in the Last Year: No     Number of Places Lived in the Last Year: 3     Unstable Housing in the Last Year: No            Review of Systems:   Skin:  not assessed     Eyes:  Positive for glasses  ENT:  not assessed    Respiratory:  Negative    Cardiovascular:  Negative    Gastroenterology: not assessed    Genitourinary:  not assessed    Musculoskeletal:  not assessed    Neurologic:  not assessed    Psychiatric:  not assessed    Heme/Lymph/Imm:  not assessed    Endocrine:  not assessed           Physical Exam:   Vitals: BP 98/61 (BP Location: Right arm, Patient Position: Sitting, Cuff Size: Adult Regular)   Pulse 61   Ht 1.753 m (5' 9\")   Wt 68.8 kg (151 lb " 9.6 oz)   SpO2 98%   BMI 22.39 kg/m     Wt Readings from Last 4 Encounters:   07/14/23 68.8 kg (151 lb 9.6 oz)   06/29/23 67.7 kg (149 lb 3.2 oz)   06/26/23 67.1 kg (148 lb)   04/28/23 69 kg (152 lb 1.6 oz)     GEN: well nourished, in no acute distress.  HEENT:  Pupils equal, round. Sclerae nonicteric.   NECK: Supple, no masses appreciated.   C/V:  Regular rate and rhythm, no murmur, rub or gallop.   RESP: Respirations are unlabored. Clear to auscultation bilaterally without wheezing, rales, or rhonchi.  GI: Abdomen soft, nontender.  EXTREM: No LE edema.  NEURO: Alert and oriented, cooperative.  SKIN: Warm and dry. Right wrist puncture site without tenderness, no bruit       Data:     LIPID RESULTS:  Lab Results   Component Value Date    CHOL 130 05/01/2023    CHOL 179 12/17/2020    HDL 40 05/01/2023    HDL 39 (L) 12/17/2020    LDL 75 05/01/2023     (H) 12/17/2020    TRIG 75 05/01/2023    TRIG 97 12/17/2020    CHOLHDLRATIO 4.5 11/10/2015     LIVER ENZYME RESULTS:  Lab Results   Component Value Date    AST 39 05/01/2023    AST 24 12/17/2020    ALT 38 05/01/2023    ALT 26 12/17/2020     CBC RESULTS:  Lab Results   Component Value Date    WBC 6.7 06/28/2023    WBC 9.7 09/21/2018    RBC 4.61 06/28/2023    RBC 5.19 09/21/2018    HGB 13.8 06/28/2023    HGB 15.4 09/21/2018    HCT 42.6 06/28/2023    HCT 47.7 09/21/2018    MCV 92 06/28/2023    MCV 92 09/21/2018    MCH 29.9 06/28/2023    MCH 29.7 09/21/2018    MCHC 32.4 06/28/2023    MCHC 32.3 09/21/2018    RDW 13.2 06/28/2023    RDW 13.5 09/21/2018     (L) 06/28/2023     09/21/2018     BMP RESULTS:  Lab Results   Component Value Date     06/28/2023     12/17/2020    POTASSIUM 4.2 06/28/2023    POTASSIUM 4.2 05/12/2022    POTASSIUM 3.8 12/17/2020    CHLORIDE 105 06/28/2023    CHLORIDE 108 05/12/2022    CHLORIDE 107 12/17/2020    CO2 26 06/28/2023    CO2 29 05/12/2022    CO2 32 12/17/2020    ANIONGAP 9 06/28/2023    ANIONGAP 2 (L) 05/12/2022     ANIONGAP <1 (L) 12/17/2020    GLC 97 06/28/2023    GLC 93 05/12/2022     (H) 12/17/2020    BUN 20.8 06/28/2023    BUN 27 05/12/2022    BUN 19 12/17/2020    CR 1.04 06/28/2023    CR 1.00 12/17/2020    GFRESTIMATED 72 06/28/2023    GFRESTIMATED >60 09/27/2022    GFRESTIMATED 71 12/17/2020    GFRESTBLACK 83 12/17/2020    MANUELA 9.0 06/28/2023    MANUELA 9.0 12/17/2020      A1C RESULTS:  No results found for: A1C  INR RESULTS:  Lab Results   Component Value Date    INR 1.9 (H) 07/12/2023    INR 1.3 (H) 07/06/2023    INR 1.07 06/28/2023    INR 2.2 (A) 03/30/2023    INR 1.7 (A) 03/17/2023            Medications     Current Outpatient Medications   Medication Sig Dispense Refill    atorvastatin (LIPITOR) 20 MG tablet Take 1 tablet (20 mg) by mouth daily 90 tablet 3    levETIRAcetam (KEPPRA) 500 MG tablet Take 500 mg by mouth 2 times daily      losartan (COZAAR) 100 MG tablet Take 1 tablet (100 mg) by mouth daily 90 tablet 3    metoprolol succinate ER (TOPROL XL) 25 MG 24 hr tablet Take 2 tablets (50 mg) by mouth At Bedtime 180 tablet 3    Multiple Vitamins-Minerals (ZINC PO)       warfarin ANTICOAGULANT (COUMADIN) 2.5 MG tablet Take 0.5-1 tablets (1.25-2.5 mg) by mouth daily Adjust dose based on INR results as directed. 65 tablet 1    warfarin ANTICOAGULANT (COUMADIN) 2.5 MG tablet Take 1.25 mg by mouth five times a week Mon, Tues, Thurs, Fri, & Sat      warfarin ANTICOAGULANT (COUMADIN) 2.5 MG tablet Take 2.5 mg by mouth twice a week Wed & Sun            Past Medical History     Past Medical History:   Diagnosis Date    Acute ischemic heart disease (H) 08/24/2018    Allergic rhinitis, cause unspecified     Cardiac pacemaker in situ 08/14/2017    Cervicalgia 04/26/2022    Chronic diastolic congestive heart failure (H) 07/26/2021    Dysphagia, oropharyngeal phase 06/14/2018    Mild per video swallow study. 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    PVC (premature ventricular contraction) 05/10/2011    Sick sinus syndrome (H) 05/03/2016    s/p PPM implant 7/2/14    Squamous cell carcinoma     Syncope     Weakness of both upper extremities 04/26/2022     Past Surgical History:   Procedure Laterality Date    APPENDECTOMY OPEN      BIOPSY OF SKIN LESION      COLONOSCOPY N/A 11/20/2015    Procedure: COLONOSCOPY;  Surgeon: David Us MD;  Location:  GI    CV CORONARY ANGIOGRAM N/A 6/28/2023    Procedure: Coronary Angiogram;  Surgeon: Carola Reed MD;  Location:  HEART CARDIAC CATH LAB    IMPLANT PACEMAKER  7/2004    MOHS MICROGRAPHIC PROCEDURE       Family History   Problem Relation Age of Onset    Heart Disease Mother         MI    Heart Disease Father         MI    C.A.D. Brother         Just had angioplasty in 2006    Colon Cancer No family hx of             Allergies   Sulfa antibiotics        Shirley Spaulding NP  [facility]  Pager: [phone]        Thank you for allowing me to participate in the care of your patient.      Sincerely,     Shirley Spaulding NP     [facility]  cc:   Ana Dean MD  [id number] [id number]

## 2023-07-19 ENCOUNTER — OFFICE VISIT (OUTPATIENT)
Dept: FAMILY MEDICINE | Facility: CLINIC | Age: 83
End: 2023-07-19
Payer: COMMERCIAL

## 2023-07-19 VITALS
HEIGHT: 69 IN | BODY MASS INDEX: 22.33 KG/M2 | TEMPERATURE: 98 F | WEIGHT: 150.8 LBS | SYSTOLIC BLOOD PRESSURE: 106 MMHG | HEART RATE: 79 BPM | RESPIRATION RATE: 18 BRPM | DIASTOLIC BLOOD PRESSURE: 72 MMHG | OXYGEN SATURATION: 99 %

## 2023-07-19 DIAGNOSIS — I48.0 PAROXYSMAL ATRIAL FIBRILLATION (H): ICD-10-CM

## 2023-07-19 DIAGNOSIS — L57.0 ACTINIC KERATOSIS: Primary | ICD-10-CM

## 2023-07-19 PROCEDURE — 36416 COLLJ CAPILLARY BLOOD SPEC: CPT | Performed by: NURSE PRACTITIONER

## 2023-07-19 PROCEDURE — 85610 PROTHROMBIN TIME: CPT | Performed by: NURSE PRACTITIONER

## 2023-07-19 PROCEDURE — 99213 OFFICE O/P EST LOW 20 MIN: CPT | Performed by: NURSE PRACTITIONER

## 2023-07-19 ASSESSMENT — PAIN SCALES - GENERAL: PAINLEVEL: NO PAIN (0)

## 2023-07-19 NOTE — PROGRESS NOTES
"  Assessment & Plan     Paroxysmal atrial fibrillation (H)   Check INR and then INR nurses monitor  - INR point of care    Actinic keratosis  - 4 lesions frozen, return to clinic if lesions return with possible referral back to dermatology for further eval.      Alize Carlson NP  [facility] KANWAL Soto is a 83 year old, presenting for the following health issues:  Derm Problem        7/19/2023    11:49 AM   Additional Questions   Roomed by Rohini BONDS     History of Present Illness       Reason for visit:  Moles on face and INR check    He eats 2-3 servings of fruits and vegetables daily.He consumes 0 sweetened beverage(s) daily.He exercises with enough effort to increase his heart rate 9 or less minutes per day.  He exercises with enough effort to increase his heart rate 4 days per week.   He is taking medications regularly.     He is here today for moles on his face, he states he has had many moles frozen and he used to see dermatology and then his provider retired. He does have a history of SCC and BCC and melanoma.     He needs to have his INR checked, he is on anti-coagulation for A fib.   He is feeling fatigued.           Review of Systems         Objective    /72 (BP Location: Right arm, Patient Position: Sitting, Cuff Size: Adult Regular)   Pulse 79   Temp 98  F (36.7  C) (Oral)   Resp 18   Ht 1.753 m (5' 9\")   Wt 68.4 kg (150 lb 12.8 oz)   SpO2 99%   BMI 22.27 kg/m    Body mass index is 22.27 kg/m .  Physical Exam   GENERAL: healthy, alert and no distress  NECK: no adenopathy, no asymmetry, masses, or scars and thyroid normal to palpation  RESP: lungs clear to auscultation - no rales, rhonchi or wheezes  CV: regular rate and rhythm, normal S1 S2, no S3 or S4, no murmur, click or rub, no peripheral edema and peripheral pulses strong  ABDOMEN: soft, nontender, no hepatosplenomegaly, no masses and bowel sounds normal  MS: no gross musculoskeletal defects " noted, no edema  SKIN: no suspicious lesions or rashes and hyperpigmentation - face

## 2023-07-20 ENCOUNTER — TELEPHONE (OUTPATIENT)
Dept: INTERNAL MEDICINE | Facility: CLINIC | Age: 83
End: 2023-07-20
Payer: COMMERCIAL

## 2023-07-20 ENCOUNTER — ANTICOAGULATION THERAPY VISIT (OUTPATIENT)
Dept: ANTICOAGULATION | Facility: CLINIC | Age: 83
End: 2023-07-20
Payer: COMMERCIAL

## 2023-07-20 DIAGNOSIS — I48.0 PAROXYSMAL ATRIAL FIBRILLATION (H): ICD-10-CM

## 2023-07-20 DIAGNOSIS — I49.5 SICK SINUS SYNDROME (H): Primary | ICD-10-CM

## 2023-07-20 DIAGNOSIS — I05.9 MITRAL VALVE DISEASE: ICD-10-CM

## 2023-07-20 LAB — INR BLD: 2 (ref 0.9–1.1)

## 2023-07-20 NOTE — TELEPHONE ENCOUNTER
Reason for Call:  Other call back    Detailed comments: INR Questions    Phone Number Patient can be reached at: Home number on file - [phone]    Best Time: any    Can we leave a detailed message on this number? YES    Call taken on 7/20/2023 at 9:31 AM by Jenn Whittington

## 2023-07-20 NOTE — PROGRESS NOTES
ANTICOAGULATION MANAGEMENT     Charles Nogueira 83 year old male is on warfarin with therapeutic INR result. (Goal INR 2.0-3.0)    Recent labs: (last 7 days)     07/19/23  0922   INR 2.0*       ASSESSMENT       Source(s): Chart Review    Previous INR was Subtherapeutic    Medication, diet, health changes since last INR chart reviewed; none identified. Atorvastatin increased - no interaction.     PLAN     Recommended plan for no diet, medication or health factor changes affecting INR     Dosing Instructions: Continue your current warfarin dose with next INR in 2 weeks       Summary  As of 7/20/2023    Full warfarin instructions:  2.5 mg every Sun, Wed; 1.25 mg all other days   Next INR check:  8/3/2023             Detailed voice message left for Charles and wife  with dosing instructions and follow up date.   Sent Bswift message with dosing and follow up instructions    Contact [phone] to schedule and with any changes, questions or concerns.     Education provided:     Please call back if any changes to your diet, medications or how you've been taking warfarin    Goal range and lab monitoring: goal range and significance of current result    Written instructions provided    Contact [phone] with any changes, questions or concerns.     Plan made per [facility] anticoagulation protocol    Melissa Grady RN  Anticoagulation Clinic  7/20/2023    _______________________________________________________________________     Anticoagulation Episode Summary     Current INR goal:  2.0-3.0   TTR:  56.3 % (1 y)   Target end date:  Indefinite   Send INR reminders to:  [facility] INR NURSE    Indications    Sick sinus syndrome (H) [I49.5]  Paroxysmal atrial fibrillation (H) [I48.0]  Mitral valve disease [I05.9]           Comments:           Anticoagulation Care Providers     Provider Role Specialty Phone number    Ana Dean MD Referring Cardiovascular Disease [phone]

## 2023-08-02 ENCOUNTER — ANTICOAGULATION THERAPY VISIT (OUTPATIENT)
Dept: ANTICOAGULATION | Facility: CLINIC | Age: 83
End: 2023-08-02

## 2023-08-02 ENCOUNTER — LAB (OUTPATIENT)
Dept: LAB | Facility: CLINIC | Age: 83
End: 2023-08-02
Payer: COMMERCIAL

## 2023-08-02 DIAGNOSIS — I48.0 PAROXYSMAL ATRIAL FIBRILLATION (H): ICD-10-CM

## 2023-08-02 DIAGNOSIS — I49.5 SICK SINUS SYNDROME (H): Primary | ICD-10-CM

## 2023-08-02 DIAGNOSIS — I05.9 MITRAL VALVE DISEASE: ICD-10-CM

## 2023-08-02 LAB — INR BLD: 1.8 (ref 0.9–1.1)

## 2023-08-02 PROCEDURE — 36416 COLLJ CAPILLARY BLOOD SPEC: CPT

## 2023-08-02 PROCEDURE — 85610 PROTHROMBIN TIME: CPT

## 2023-08-02 NOTE — PROGRESS NOTES
ANTICOAGULATION MANAGEMENT       Additional findings: None       PLAN     Recommended plan for no diet, medication or health factor changes affecting INR     Dosing Instructions: Increase your warfarin dose (11% change) with next INR in 2 weeks       Summary  As of 8/2/2023      Full warfarin instructions:  2.5 mg every Mon, Wed, Fri; 1.25 mg all other days   Next INR check:  8/16/2023               Telephone call with Charles who verbalizes understanding and agrees to plan and who agrees to plan and repeated back plan correctly    Lab visit scheduled    Education provided:   Goal range and lab monitoring: goal range and significance of current result, Importance of therapeutic range, and Importance of following up at instructed interval  Contact [phone] with any changes, questions or concerns.     Plan made per [facility] anticoagulation protocol and per [facility] anticoagulation protocol; closest % change with current tablet size made per protocol for for INR 1.7-1.9 (goal 2-3)    Melissa Grady RN  Anticoagulation Clinic  8/2/2023    _______________________________________________________________________     Anticoagulation Episode Summary       Current INR goal:  2.0-3.0   TTR:  52.6 % (1 y)   Target end date:  Indefinite   Send INR reminders to:  [facility] IN[facility] NURSE    Indications    Sick sinus syndrome (H) [I49.5]  Paroxysmal atrial fibrillation (H) [I48.0]  Mitral valve disease [I05.9]             Comments:               Anticoagulation Care Providers       Provider Role Specialty Phone number    Ana Dean MD Referring Cardiovascular Disease [phone]

## 2023-08-02 NOTE — PROGRESS NOTES
ANTICOAGULATION MANAGEMENT     Charles Nogueira 83 year old male is on warfarin with subtherapeutic INR result. (Goal INR 2.0-3.0)    Recent labs: (last 7 days)     08/02/23  0753   INR 1.8*       ASSESSMENT     Warfarin Lab Questionnaire    Warfarin Doses Last 7 Days      8/2/2023     7:51 AM   Dose in Tablet or Mg   TAB or MG? tablet (tab)     Pt Rptd Dose SUNDAY MONDAY TUESDAY WED THURS FRIDAY SATURDAY 8/2/2023   7:51 AM 1 0.5 0.5 1 0.5 0.5 0.5         8/2/2023   Warfarin Lab Questionnaire   Missed doses within past 14 days? No   Changes in diet or alcohol within past 14 days? No   Medication changes since last result? No   Injuries or illness since last result? No   New shortness of breath, severe headaches or sudden changes in vision since last result? No   Abnormal bleeding since last result? No   Upcoming surgery, procedure? No   Best number to call with results? [phone]     Previous result: Therapeutic last visit; previously outside of goal range  Additional findings: None       PLAN     Unable to reach Paula, wife today.    Left message to continue current dose of warfarin 2.5 mg tonight. Request call back for assessment.    Follow up required to discuss out of range result  and discuss dosing instructions and confirm understanding of instructions    Melissa Grady RN  Anticoagulation Clinic  8/2/2023

## 2023-08-02 NOTE — CONFIDENTIAL NOTE
General Call    Contacts         Type Contact Phone/Fax    08/02/2023 09:43 AM CDT Phone (Outgoing) Paula Nogueira (Emergency Contact) [phone]    Left Message -  Melissa [phone]    08/02/2023 09:54 AM CDT Phone (Incoming) Paula Nogueira (Emergency Contact) [phone]     Anticoag          Reason for Call:  Anticoag    What are your questions or concerns:  Patient spouse Paula is retuning a call to [facility] regarding today's INR.    Date of last appointment with provider: n/a    [facility] or would you prefer to receive a phone call?:   Patient would prefer a phone call   Okay to leave a detailed message?: Yes at Home number on file [phone] (home)

## 2023-08-17 ENCOUNTER — ANTICOAGULATION THERAPY VISIT (OUTPATIENT)
Dept: ANTICOAGULATION | Facility: CLINIC | Age: 83
End: 2023-08-17

## 2023-08-17 ENCOUNTER — LAB (OUTPATIENT)
Dept: LAB | Facility: CLINIC | Age: 83
End: 2023-08-17
Payer: COMMERCIAL

## 2023-08-17 DIAGNOSIS — I48.0 PAROXYSMAL ATRIAL FIBRILLATION (H): ICD-10-CM

## 2023-08-17 DIAGNOSIS — I49.5 SICK SINUS SYNDROME (H): Primary | ICD-10-CM

## 2023-08-17 DIAGNOSIS — I05.9 MITRAL VALVE DISEASE: ICD-10-CM

## 2023-08-17 LAB — INR BLD: 2.6 (ref 0.9–1.1)

## 2023-08-17 PROCEDURE — 36416 COLLJ CAPILLARY BLOOD SPEC: CPT

## 2023-08-17 PROCEDURE — 85610 PROTHROMBIN TIME: CPT

## 2023-08-17 NOTE — PROGRESS NOTES
ANTICOAGULATION MANAGEMENT     Charles Nogueira 83 year old male is on warfarin with therapeutic INR result. (Goal INR 2.0-3.0)    Recent labs: (last 7 days)     08/17/23  0906   INR 2.6*       ASSESSMENT     Source(s): Chart Review and Patient/Caregiver Call     Warfarin doses taken: Warfarin taken as instructed   Weekly warfarin dose increased on 8/2/23.  Diet: No new diet changes identified  Medication/supplement changes: None noted   Atorvastatin increased from 10mg to 20mg daily on 7/14/23.  New illness, injury, or hospitalization: No  Signs or symptoms of bleeding or clotting: No  Previous result: Subtherapeutic at 1.8 on 8/2/23.  Additional findings: None       PLAN     Recommended plan for no diet, medication or health factor changes affecting INR     Dosing Instructions: Continue your current warfarin dose with next INR in 2 weeks       Summary  As of 8/17/2023      Full warfarin instructions:  2.5 mg every Mon, Wed, Fri; 1.25 mg all other days   Next INR check:  8/31/2023               Telephone call with wife, Nicky who verbalizes understanding and agrees to plan    Lab visit scheduled - INR on 8/30/23 @ [place]    Education provided:   Taking warfarin: Importance of taking warfarin as instructed  Goal range and lab monitoring: goal range and significance of current result    Plan made per [facility] anticoagulation protocol    Maribell Okeefe RN  Anticoagulation Clinic  8/17/2023    _______________________________________________________________________     Anticoagulation Episode Summary       Current INR goal:  2.0-3.0   TTR:  51.6 % (1 y)   Target end date:  Indefinite   Send INR reminders to:  [facility] HEART INR NURSE    Indications    Sick sinus syndrome (H) [I49.5]  Paroxysmal atrial fibrillation (H) [I48.0]  Mitral valve disease [I05.9]             Comments:               Anticoagulation Care Providers       Provider Role Specialty Phone number    Ana Dean MD Referring Cardiovascular  Disease [phone]

## 2023-08-29 ENCOUNTER — ANCILLARY PROCEDURE (OUTPATIENT)
Dept: CARDIOLOGY | Facility: CLINIC | Age: 83
End: 2023-08-29
Payer: COMMERCIAL

## 2023-08-29 DIAGNOSIS — I49.5 SICK SINUS SYNDROME (H): ICD-10-CM

## 2023-08-29 DIAGNOSIS — Z95.0 CARDIAC PACEMAKER IN SITU: ICD-10-CM

## 2023-08-29 PROCEDURE — 93294 REM INTERROG EVL PM/LDLS PM: CPT | Performed by: INTERNAL MEDICINE

## 2023-08-29 PROCEDURE — 93296 REM INTERROG EVL PM/IDS: CPT | Performed by: INTERNAL MEDICINE

## 2023-08-30 ENCOUNTER — ANTICOAGULATION THERAPY VISIT (OUTPATIENT)
Dept: ANTICOAGULATION | Facility: CLINIC | Age: 83
End: 2023-08-30

## 2023-08-30 ENCOUNTER — LAB (OUTPATIENT)
Dept: LAB | Facility: CLINIC | Age: 83
End: 2023-08-30
Payer: COMMERCIAL

## 2023-08-30 DIAGNOSIS — I49.5 SICK SINUS SYNDROME (H): Primary | ICD-10-CM

## 2023-08-30 DIAGNOSIS — I48.0 PAROXYSMAL ATRIAL FIBRILLATION (H): ICD-10-CM

## 2023-08-30 DIAGNOSIS — I05.9 MITRAL VALVE DISEASE: ICD-10-CM

## 2023-08-30 LAB — INR BLD: 3.4 (ref 0.9–1.1)

## 2023-08-30 PROCEDURE — 36416 COLLJ CAPILLARY BLOOD SPEC: CPT

## 2023-08-30 PROCEDURE — 85610 PROTHROMBIN TIME: CPT

## 2023-08-30 NOTE — PROGRESS NOTES
ANTICOAGULATION MANAGEMENT     Charles Nogueira 83 year old male is on warfarin with supratherapeutic INR result. (Goal INR 2.0-3.0)    Recent labs: (last 7 days)     08/30/23  0922   INR 3.4*       ASSESSMENT     Warfarin Lab Questionnaire    Warfarin Doses Last 7 Days      8/30/2023     9:18 AM   Dose in Tablet or Mg   TAB or MG? tablet (tab)     Pt Rptd Dose SUNDAY MONDAY TUESDAY WED THURS FRIDAY SATURDAY 8/30/2023   9:18 AM 0.5 1 0.5 1 0.5 1 0.5         8/30/2023   Warfarin Lab Questionnaire   Missed doses within past 14 days? No   Changes in diet or alcohol within past 14 days? No   Medication changes since last result? No   Injuries or illness since last result? No   New shortness of breath, severe headaches or sudden changes in vision since last result? No   Abnormal bleeding since last result? No   Upcoming surgery, procedure? No   Best number to call with results? [phone]     Previous result: Therapeutic last visit; previously outside of goal range  Additional findings: None       PLAN     Unable to reach Paula today.    Left message to take reduced dose of warfarin, 1.25 mg tonight. Request call back for assessment. MyChart Message sent also    Follow up required to assess for changes , discuss out of range result , and discuss dosing instructions and confirm understanding of instructions    Melissa Grady RN  Anticoagulation Clinic  8/30/2023

## 2023-08-30 NOTE — PROGRESS NOTES
ANTICOAGULATION MANAGEMENT         ASSESSMENT     Warfarin Lab Questionnaire    Warfarin Doses Last 7 Days      8/30/2023     9:18 AM   Dose in Tablet or Mg   TAB or MG? tablet (tab)     Pt Rptd Dose SUNDAY MONDAY TUESDAY WED THURS FRIDAY SATURDAY 8/30/2023   9:18 AM 0.5 1 0.5 1 0.5 1 0.5         8/30/2023   Warfarin Lab Questionnaire   Missed doses within past 14 days? No   Changes in diet or alcohol within past 14 days? No   Medication changes since last result? No   Injuries or illness since last result? No   New shortness of breath, severe headaches or sudden changes in vision since last result? No   Abnormal bleeding since last result? No   Upcoming surgery, procedure? No   Best number to call with results? [phone]          PLAN     Recommended plan for no diet, medication or health factor changes affecting INR     Dosing Instructions: decrease your warfarin dose (10% change) with next INR in 2 weeks       Summary  As of 8/30/2023      Full warfarin instructions:  2.5 mg every Mon, Fri; 1.25 mg all other days   Next INR check:  9/13/2023               Telephone call with wifePaula who verbalizes understanding and agrees to plan    Lab visit scheduled    Education provided:   Goal range and lab monitoring: goal range and significance of current result  Contact [phone] with any changes, questions or concerns.     Plan made per [facility] anticoagulation protocol    Melissa Grady RN  Anticoagulation Clinic  8/30/2023    _______________________________________________________________________     Anticoagulation Episode Summary       Current INR goal:  2.0-3.0   TTR:  51.7 % (1 y)   Target end date:  Indefinite   Send INR reminders to:  [facility] HEART INR NURSE    Indications    Sick sinus syndrome (H) [I49.5]  Paroxysmal atrial fibrillation (H) [I48.0]  Mitral valve disease [I05.9]             Comments:               Anticoagulation Care Providers       Provider Role Specialty Phone number    Ana Dean,  MD Referring Cardiovascular Disease [phone]

## 2023-09-05 LAB
MDC_IDC_LEAD_IMPLANT_DT: NORMAL
MDC_IDC_LEAD_IMPLANT_DT: NORMAL
MDC_IDC_LEAD_LOCATION: NORMAL
MDC_IDC_LEAD_LOCATION: NORMAL
MDC_IDC_LEAD_MFG: NORMAL
MDC_IDC_LEAD_MFG: NORMAL
MDC_IDC_LEAD_MODEL: NORMAL
MDC_IDC_LEAD_MODEL: NORMAL
MDC_IDC_LEAD_POLARITY_TYPE: NORMAL
MDC_IDC_LEAD_POLARITY_TYPE: NORMAL
MDC_IDC_LEAD_SERIAL: NORMAL
MDC_IDC_LEAD_SERIAL: NORMAL
MDC_IDC_MSMT_BATTERY_DTM: NORMAL
MDC_IDC_MSMT_BATTERY_IMPEDANCE: 1165 OHM
MDC_IDC_MSMT_BATTERY_REMAINING_LONGEVITY: 68 MO
MDC_IDC_MSMT_BATTERY_STATUS: NORMAL
MDC_IDC_MSMT_BATTERY_VOLTAGE: 2.77 V
MDC_IDC_MSMT_LEADCHNL_RA_IMPEDANCE_VALUE: 555 OHM
MDC_IDC_MSMT_LEADCHNL_RA_PACING_THRESHOLD_AMPLITUDE: 0.62 V
MDC_IDC_MSMT_LEADCHNL_RA_PACING_THRESHOLD_PULSEWIDTH: 0.4 MS
MDC_IDC_MSMT_LEADCHNL_RV_IMPEDANCE_VALUE: 588 OHM
MDC_IDC_MSMT_LEADCHNL_RV_PACING_THRESHOLD_AMPLITUDE: 0.88 V
MDC_IDC_MSMT_LEADCHNL_RV_PACING_THRESHOLD_PULSEWIDTH: 0.4 MS
MDC_IDC_PG_IMPLANT_DTM: NORMAL
MDC_IDC_PG_MFG: NORMAL
MDC_IDC_PG_MODEL: NORMAL
MDC_IDC_PG_SERIAL: NORMAL
MDC_IDC_PG_TYPE: NORMAL
MDC_IDC_SESS_CLINIC_NAME: NORMAL
MDC_IDC_SESS_DTM: NORMAL
MDC_IDC_SESS_TYPE: NORMAL
MDC_IDC_SET_BRADY_AT_MODE_SWITCH_MODE: NORMAL
MDC_IDC_SET_BRADY_AT_MODE_SWITCH_RATE: 175 {BEATS}/MIN
MDC_IDC_SET_BRADY_LOWRATE: 60 {BEATS}/MIN
MDC_IDC_SET_BRADY_MAX_SENSOR_RATE: 130 {BEATS}/MIN
MDC_IDC_SET_BRADY_MAX_TRACKING_RATE: 130 {BEATS}/MIN
MDC_IDC_SET_BRADY_MODE: NORMAL
MDC_IDC_SET_BRADY_PAV_DELAY_LOW: 350 MS
MDC_IDC_SET_BRADY_SAV_DELAY_LOW: 350 MS
MDC_IDC_SET_LEADCHNL_RA_PACING_AMPLITUDE: 1.5 V
MDC_IDC_SET_LEADCHNL_RA_PACING_CAPTURE_MODE: NORMAL
MDC_IDC_SET_LEADCHNL_RA_PACING_POLARITY: NORMAL
MDC_IDC_SET_LEADCHNL_RA_PACING_PULSEWIDTH: 0.4 MS
MDC_IDC_SET_LEADCHNL_RA_SENSING_POLARITY: NORMAL
MDC_IDC_SET_LEADCHNL_RA_SENSING_SENSITIVITY: 0.5 MV
MDC_IDC_SET_LEADCHNL_RV_PACING_AMPLITUDE: 2 V
MDC_IDC_SET_LEADCHNL_RV_PACING_CAPTURE_MODE: NORMAL
MDC_IDC_SET_LEADCHNL_RV_PACING_POLARITY: NORMAL
MDC_IDC_SET_LEADCHNL_RV_PACING_PULSEWIDTH: 0.4 MS
MDC_IDC_SET_LEADCHNL_RV_SENSING_POLARITY: NORMAL
MDC_IDC_SET_LEADCHNL_RV_SENSING_SENSITIVITY: 2.8 MV
MDC_IDC_SET_ZONE_DETECTION_INTERVAL: 333.33 MS
MDC_IDC_SET_ZONE_DETECTION_INTERVAL: 342.86 MS
MDC_IDC_SET_ZONE_TYPE: NORMAL
MDC_IDC_SET_ZONE_TYPE: NORMAL
MDC_IDC_STAT_AT_BURDEN_PERCENT: 0 %
MDC_IDC_STAT_AT_DTM_END: NORMAL
MDC_IDC_STAT_AT_DTM_START: NORMAL
MDC_IDC_STAT_AT_MODE_SW_COUNT: 1
MDC_IDC_STAT_BRADY_AP_VP_PERCENT: 1 %
MDC_IDC_STAT_BRADY_AP_VS_PERCENT: 63 %
MDC_IDC_STAT_BRADY_AS_VP_PERCENT: 0 %
MDC_IDC_STAT_BRADY_AS_VS_PERCENT: 35 %
MDC_IDC_STAT_BRADY_DTM_END: NORMAL
MDC_IDC_STAT_BRADY_DTM_START: NORMAL
MDC_IDC_STAT_EPISODE_RECENT_COUNT: 0
MDC_IDC_STAT_EPISODE_RECENT_COUNT: 1
MDC_IDC_STAT_EPISODE_RECENT_COUNT_DTM_END: NORMAL
MDC_IDC_STAT_EPISODE_RECENT_COUNT_DTM_END: NORMAL
MDC_IDC_STAT_EPISODE_RECENT_COUNT_DTM_START: NORMAL
MDC_IDC_STAT_EPISODE_RECENT_COUNT_DTM_START: NORMAL
MDC_IDC_STAT_EPISODE_TYPE: NORMAL
MDC_IDC_STAT_EPISODE_TYPE: NORMAL

## 2023-09-13 ENCOUNTER — LAB (OUTPATIENT)
Dept: LAB | Facility: CLINIC | Age: 83
End: 2023-09-13
Payer: COMMERCIAL

## 2023-09-13 ENCOUNTER — ANTICOAGULATION THERAPY VISIT (OUTPATIENT)
Dept: ANTICOAGULATION | Facility: CLINIC | Age: 83
End: 2023-09-13

## 2023-09-13 DIAGNOSIS — I49.5 SICK SINUS SYNDROME (H): Primary | ICD-10-CM

## 2023-09-13 DIAGNOSIS — I48.0 PAROXYSMAL ATRIAL FIBRILLATION (H): ICD-10-CM

## 2023-09-13 DIAGNOSIS — I05.9 MITRAL VALVE DISEASE: ICD-10-CM

## 2023-09-13 LAB — INR BLD: 2.6 (ref 0.9–1.1)

## 2023-09-13 PROCEDURE — 85610 PROTHROMBIN TIME: CPT

## 2023-09-13 PROCEDURE — 36416 COLLJ CAPILLARY BLOOD SPEC: CPT

## 2023-09-13 NOTE — PROGRESS NOTES
ANTICOAGULATION MANAGEMENT     Charles Nogueira 83 year old male is on warfarin with therapeutic INR result. (Goal INR 2.0-3.0)    Recent labs: (last 7 days)     09/13/23  0931   INR 2.6*       ASSESSMENT     Warfarin Lab Questionnaire    Warfarin Doses Last 7 Days      9/13/2023     9:26 AM   Dose in Tablet or Mg   TAB or MG? tablet (tab)     Pt Rptd Dose SUNDAY MONDAY TUESDAY WED THURS FRIDAY SATURDAY 9/13/2023   9:26 AM 0.5 1 0.5 0.5 0.5 1 0.5         9/13/2023   Warfarin Lab Questionnaire   Missed doses within past 14 days? No   Changes in diet or alcohol within past 14 days? No   Medication changes since last result? No   Injuries or illness since last result? No   New shortness of breath, severe headaches or sudden changes in vision since last result? No   Abnormal bleeding since last result? No   Upcoming surgery, procedure? No   Best number to call with results? [phone]     Previous result: Supratherapeutic  Additional findings: None       PLAN     Recommended plan for no diet, medication or health factor changes affecting INR     Dosing Instructions: Continue your current warfarin dose with next INR in 3 weeks       Summary  As of 9/13/2023      Full warfarin instructions:  2.5 mg every Mon, Fri; 1.25 mg all other days   Next INR check:  10/4/2023               Detailed voice message left for wife Paula with dosing instructions and follow up date.     Contact [phone] to schedule and with any changes, questions or concerns.     Education provided:   Please call back if any changes to your diet, medications or how you've been taking warfarin    Plan made per [facility] anticoagulation protocol    Quan Sood RN  Anticoagulation Clinic  9/13/2023    _______________________________________________________________________     Anticoagulation Episode Summary       Current INR goal:  2.0-3.0   TTR:  49.8 % (1 y)   Target end date:  Indefinite   Send INR reminders to:  [facility] HEART INR NURSE    Indications     Sick sinus syndrome (H) [I49.5]  Paroxysmal atrial fibrillation (H) [I48.0]  Mitral valve disease [I05.9]             Comments:               Anticoagulation Care Providers       Provider Role Specialty Phone number    Ana Dean MD Referring Cardiovascular Disease [phone]

## 2023-10-04 ENCOUNTER — ANTICOAGULATION THERAPY VISIT (OUTPATIENT)
Dept: ANTICOAGULATION | Facility: CLINIC | Age: 83
End: 2023-10-04

## 2023-10-04 ENCOUNTER — LAB (OUTPATIENT)
Dept: LAB | Facility: CLINIC | Age: 83
End: 2023-10-04
Payer: COMMERCIAL

## 2023-10-04 DIAGNOSIS — I48.0 PAROXYSMAL ATRIAL FIBRILLATION (H): ICD-10-CM

## 2023-10-04 DIAGNOSIS — I05.9 MITRAL VALVE DISEASE: ICD-10-CM

## 2023-10-04 DIAGNOSIS — I49.5 SICK SINUS SYNDROME (H): Primary | ICD-10-CM

## 2023-10-04 LAB — INR BLD: 2.5 (ref 0.9–1.1)

## 2023-10-04 PROCEDURE — 36416 COLLJ CAPILLARY BLOOD SPEC: CPT

## 2023-10-04 PROCEDURE — 85610 PROTHROMBIN TIME: CPT

## 2023-10-04 NOTE — PROGRESS NOTES
ANTICOAGULATION MANAGEMENT     Charles Nogueira 83 year old male is on warfarin with therapeutic INR result. (Goal INR 2.0-3.0)    Recent labs: (last 7 days)     10/04/23  0836   INR 2.5*       ASSESSMENT     Warfarin Lab Questionnaire    Warfarin Doses Last 7 Days      10/4/2023     8:32 AM   Dose in Tablet or Mg   TAB or MG? tablet (tab)           10/4/2023   Warfarin Lab Questionnaire   Missed doses within past 14 days? No   Changes in diet or alcohol within past 14 days? No   Medication changes since last result? No   Injuries or illness since last result? No   New shortness of breath, severe headaches or sudden changes in vision since last result? No   Abnormal bleeding since last result? No   Upcoming surgery, procedure? No   Best number to call with results? [phone]     Previous result: Therapeutic last visit; previously outside of goal range  Additional findings: None       PLAN     Recommended plan for no diet, medication or health factor changes affecting INR     Dosing Instructions: Continue your current warfarin dose with next INR in 4 weeks       Summary  As of 10/4/2023      Full warfarin instructions:  2.5 mg every Mon, Fri; 1.25 mg all other days   Next INR check:  11/1/2023               Detailed voice message left for wifePaula with dosing instructions and follow up date.     Contact [phone] to schedule and with any changes, questions or concerns.     Education provided:   Please call back if any changes to your diet, medications or how you've been taking warfarin  Goal range and lab monitoring: goal range and significance of current result  Contact [phone] with any changes, questions or concerns.     Plan made per [facility] anticoagulation protocol    Melissa Grady RN  Anticoagulation Clinic  10/4/2023    _______________________________________________________________________     Anticoagulation Episode Summary       Current INR goal:  2.0-3.0   TTR:  49.8 % (1 y)   Target end date:   Indefinite   Send INR reminders to:  [facility] HEART INR NURSE    Indications    Sick sinus syndrome (H) [I49.5]  Paroxysmal atrial fibrillation (H) [I48.0]  Mitral valve disease [I05.9]             Comments:               Anticoagulation Care Providers       Provider Role Specialty Phone number    Ana Dean MD Referring Cardiovascular Disease [phone]

## 2023-10-09 ENCOUNTER — OFFICE VISIT (OUTPATIENT)
Dept: CARDIOLOGY | Facility: CLINIC | Age: 83
End: 2023-10-09
Payer: COMMERCIAL

## 2023-10-09 VITALS
SYSTOLIC BLOOD PRESSURE: 102 MMHG | DIASTOLIC BLOOD PRESSURE: 60 MMHG | OXYGEN SATURATION: 99 % | HEART RATE: 62 BPM | WEIGHT: 154 LBS | HEIGHT: 69 IN | BODY MASS INDEX: 22.81 KG/M2

## 2023-10-09 DIAGNOSIS — E78.5 HYPERLIPIDEMIA LDL GOAL <70: ICD-10-CM

## 2023-10-09 DIAGNOSIS — I25.10 ATHEROSCLEROSIS OF NATIVE CORONARY ARTERY OF NATIVE HEART WITHOUT ANGINA PECTORIS: Primary | ICD-10-CM

## 2023-10-09 PROCEDURE — 99214 OFFICE O/P EST MOD 30 MIN: CPT | Performed by: INTERNAL MEDICINE

## 2023-10-09 RX ORDER — ATORVASTATIN CALCIUM 20 MG/1
20 TABLET, FILM COATED ORAL DAILY
Qty: 90 TABLET | Refills: 3 | Status: SHIPPED | OUTPATIENT
Start: 2023-10-09

## 2023-10-09 RX ORDER — METOPROLOL SUCCINATE 50 MG/1
50 TABLET, EXTENDED RELEASE ORAL DAILY
COMMUNITY
End: 2023-10-09

## 2023-10-09 RX ORDER — VIT C/B6/B5/MAGNESIUM/HERB 173 50-5-6-5MG
CAPSULE ORAL
COMMUNITY

## 2023-10-09 RX ORDER — METOPROLOL SUCCINATE 50 MG/1
50 TABLET, EXTENDED RELEASE ORAL DAILY
Qty: 90 TABLET | Refills: 3 | Status: SHIPPED | OUTPATIENT
Start: 2023-10-09

## 2023-10-09 NOTE — PROGRESS NOTES
[facility] Progress note    Assessment:  1.  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  Hypertension, controlled  -Continue metoprolol and losartan    3. Hyperlipidemia  -LDL 75 5/2023, now not at goal considering moderate coronary disease on angiogram  -He did not increase his atorvastatin as recommended after his last visit, but will plan to increase it to 20mg daily after today's visit.     4.  Paroxysmal atrial fibrillation  -Warfarin for thromboembolic prophylaxis. He prefers warfarin.DOACs too expensive.     5.  Sinus node dysfunction s/p dual-chamber pacemaker placement implant 2014  -7/2023     6.  Moderate mitral regurgitation, stable on 6/2023 transthoracic echocardiogram. Murmur may be more prominent on exam today than at my last visit, but most echo stable and he does not have clear onset or worsening of  failure symptoms.    Plan:  Increase atorvastatin to 20mg PO daily.   Continue routine device checks  Continue current medications. We had a lengthy discussion today about consideration of decreasing antihypertensives, but his SBP is usually 120s at home and percent paced has been stable around 65%, so he would like to continue his current doses of metoprolol and losartan.   Follow up in about 6 months when they get back from [place] with echo prior to the visit.     HPI:     Charles Nogueira is a very nice 83-year-old gentleman with history of sick sinus syndrome status post dual-chamber pacemaker, paroxysmal atrial fibrillation noted on multiple device checks, PVCs and NSVT, mitral valve prolapse with moderate mitral regurgitation here for routine follow-up.    He had an angiogram 6/2023 after an abnormal stress test which showed small area of transmural infarction in the distal anterior and apical segment(s) of the left  ventricle and EF 46%. Stress test was ordered for June 2023 sx of fatigue, and weakness with minimal exertion. Today he says he takes a short nap every day and that his energy has been lower for a few yrs.  Follow up Angio showed mild to moderate nonobstructive CAD and echo showed EF 55-60%. His pacemaker was programmed to have ADL response and activity threshold adjusted and he also changed the time of day he took his metoprolol. These changes did not significantly impact his fatigue.  He is a rather poor historian because it is not clear that he had any real change in symptoms or concerns in June. He is not having chest pain or abnormal dyspnea.     Last device check was 10/5/22. A paced 65%, Vpaced 1.5%. 5.5 yrs battery life left. 2 mode switches.     CURRENT MEDICATIONS:  Current Outpatient Medications   Medication Sig Dispense Refill    atorvastatin (LIPITOR) 20 MG tablet Take 1 tablet (20 mg) by mouth daily (Patient taking differently: Take 10 mg by mouth daily Pt takes 1/2 tablet daily (10 mg)) 90 tablet 3    levETIRAcetam (KEPPRA) 500 MG tablet Take 500 mg by mouth 2 times daily      losartan (COZAAR) 100 MG tablet Take 1 tablet (100 mg) by mouth daily 90 tablet 3    metoprolol succinate ER (TOPROL XL) 50 MG 24 hr tablet Take 50 mg by mouth daily      Multiple Vitamins-Minerals (ZINC PO)       Turmeric 500 MG CAPS       warfarin ANTICOAGULANT (COUMADIN) 2.5 MG tablet Take 0.5-1 tablets (1.25-2.5 mg) by mouth daily Adjust dose based on INR results as directed. 65 tablet 1    warfarin ANTICOAGULANT (COUMADIN) 2.5 MG tablet Take 1.25 mg by mouth five times a week Mon, Tues, Thurs, Fri, & Sat      warfarin ANTICOAGULANT (COUMADIN) 2.5 MG tablet Take 2.5 mg by mouth twice a week Wed & Sun         ALLERGIES     Allergies   Allergen Reactions    Sulfa Antibiotics      rash       PAST MEDICAL, SURGICAL, FAMILY, SOCIAL HISTORY:  History was reviewed and updated as needed, see medical record.    REVIEW OF  SYSTEMS:  Skin:  not assessed     Eyes:  not assessed    ENT:  not assessed    Respiratory:  Negative    Cardiovascular:  Negative    Gastroenterology: not assessed    Genitourinary:  not assessed    Musculoskeletal:  not assessed    Neurologic:  not assessed    Psychiatric:  not assessed    Heme/Lymph/Imm:  not assessed    Endocrine:  not assessed      PHYSICAL EXAM:      BP: 102/60 Pulse: 62     SpO2: 99 %      Vital Signs with Ranges  Pulse:  [62] 62  BP: (102)/(60) 102/60  SpO2:  [99 %] 99 %  154 lbs 0 oz    Constitutional: awake, alert, no distress  Eyes: sclera nonicteric  ENT: trachea midline  Respiratory: clear to auscultation bilaterally  Cardiovascular: regular rate and rhythm II-III/VI murmur  GI: nondistended, nontender, bowel sounds present  Skin: dry, no rash no edema  Musculoskeletal: grossly normal muscle bulk and tone  Neurologic: no  gross focal deficits  Neuropsychiatric: normal affect    Recent Lab Results:  LIPID RESULTS:  Lab Results   Component Value Date    CHOL 130 05/01/2023    CHOL 179 12/17/2020    HDL 40 05/01/2023    HDL 39 (L) 12/17/2020    LDL 75 05/01/2023     (H) 12/17/2020    TRIG 75 05/01/2023    TRIG 97 12/17/2020    CHOLHDLRATIO 4.5 11/10/2015       LIVER ENZYME RESULTS:  Lab Results   Component Value Date    AST 39 05/01/2023    AST 24 12/17/2020    ALT 38 05/01/2023    ALT 26 12/17/2020       CBC RESULTS:  Lab Results   Component Value Date    WBC 6.7 06/28/2023    WBC 9.7 09/21/2018    RBC 4.61 06/28/2023    RBC 5.19 09/21/2018    HGB 13.8 06/28/2023    HGB 15.4 09/21/2018    HCT 42.6 06/28/2023    HCT 47.7 09/21/2018    MCV 92 06/28/2023    MCV 92 09/21/2018    MCH 29.9 06/28/2023    MCH 29.7 09/21/2018    MCHC 32.4 06/28/2023    MCHC 32.3 09/21/2018    RDW 13.2 06/28/2023    RDW 13.5 09/21/2018     (L) 06/28/2023     09/21/2018       BMP RESULTS:  Lab Results   Component Value Date     06/28/2023     12/17/2020    POTASSIUM 4.2 06/28/2023     POTASSIUM 4.2 05/12/2022    POTASSIUM 3.8 12/17/2020    CHLORIDE 105 06/28/2023    CHLORIDE 108 05/12/2022    CHLORIDE 107 12/17/2020    CO2 26 06/28/2023    CO2 29 05/12/2022    CO2 32 12/17/2020    ANIONGAP 9 06/28/2023    ANIONGAP 2 (L) 05/12/2022    ANIONGAP <1 (L) 12/17/2020    GLC 97 06/28/2023    GLC 93 05/12/2022     (H) 12/17/2020    BUN 20.8 06/28/2023    BUN 27 05/12/2022    BUN 19 12/17/2020    CR 1.04 06/28/2023    CR 1.00 12/17/2020    GFRESTIMATED 72 06/28/2023    GFRESTIMATED >60 09/27/2022    GFRESTIMATED 71 12/17/2020    GFRESTBLACK 83 12/17/2020    MANUELA 9.0 06/28/2023    MANUELA 9.0 12/17/2020        A1C RESULTS:  No results found for: A1C    INR RESULTS:  Lab Results   Component Value Date    INR 2.5 (H) 10/04/2023    INR 2.6 (H) 09/13/2023    INR 1.07 06/28/2023    INR 2.2 (A) 03/30/2023    INR 1.7 (A) 03/17/2023         TSH   Date Value Ref Range Status   05/01/2023 3.31 0.30 - 4.20 uIU/mL Final   04/26/2022 2.29 0.40 - 4.00 mU/L Final   09/30/2009 1.42 0.4 - 5.0 mU/L Final

## 2023-10-09 NOTE — LETTER
10/9/2023    Physician No Ref-Primary  No address on file    RE: Charles Nogueira       Dear Colleague,     I had the pleasure of seeing [facility] in the [facility].  [facility] Progress note    Assessment:  1.  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  Hypertension, controlled  -Continue metoprolol and losartan    3. Hyperlipidemia  -LDL 75 5/2023, now not at goal considering moderate coronary disease on angiogram  -He did not increase his atorvastatin as recommended after his last visit, but will plan to increase it to 20mg daily after today's visit.     4.  Paroxysmal atrial fibrillation  -Warfarin for thromboembolic prophylaxis. He prefers warfarin.DOACs too expensive.     5.  Sinus node dysfunction s/p dual-chamber pacemaker placement implant 2014  -7/2023     6.  Moderate mitral regurgitation, stable on 6/2023 transthoracic echocardiogram. Murmur may be more prominent on exam today than at my last visit, but most echo stable and he does not have clear onset or worsening of  failure symptoms.    Plan:  Increase atorvastatin to 20mg PO daily.   Continue routine device checks  Continue current medications. We had a lengthy discussion today about consideration of decreasing antihypertensives, but his SBP is usually 120s at home and percent paced has been stable around 65%, so he would like to continue his current doses of metoprolol and losartan.   Follow up in about 6 months when they get back from [place] with echo prior to the visit.     HPI:     Charles Nogueira is a very nice 83-year-old gentleman with history of sick sinus syndrome status post dual-chamber pacemaker, paroxysmal atrial fibrillation noted on multiple device checks, PVCs and NSVT, mitral valve prolapse with moderate mitral regurgitation here for  routine follow-up.    He had an angiogram 6/2023 after an abnormal stress test which showed small area of transmural infarction in the distal anterior and apical segment(s) of the left ventricle and EF 46%. Stress test was ordered for June 2023 sx of fatigue, and weakness with minimal exertion. Today he says he takes a short nap every day and that his energy has been lower for a few yrs.  Follow up Angio showed mild to moderate nonobstructive CAD and echo showed EF 55-60%. His pacemaker was programmed to have ADL response and activity threshold adjusted and he also changed the time of day he took his metoprolol. These changes did not significantly impact his fatigue.  He is a rather poor historian because it is not clear that he had any real change in symptoms or concerns in June. He is not having chest pain or abnormal dyspnea.     Last device check was 10/5/22. A paced 65%, Vpaced 1.5%. 5.5 yrs battery life left. 2 mode switches.     CURRENT MEDICATIONS:  Current Outpatient Medications   Medication Sig Dispense Refill    atorvastatin (LIPITOR) 20 MG tablet Take 1 tablet (20 mg) by mouth daily (Patient taking differently: Take 10 mg by mouth daily Pt takes 1/2 tablet daily (10 mg)) 90 tablet 3    levETIRAcetam (KEPPRA) 500 MG tablet Take 500 mg by mouth 2 times daily      losartan (COZAAR) 100 MG tablet Take 1 tablet (100 mg) by mouth daily 90 tablet 3    metoprolol succinate ER (TOPROL XL) 50 MG 24 hr tablet Take 50 mg by mouth daily      Multiple Vitamins-Minerals (ZINC PO)       Turmeric 500 MG CAPS       warfarin ANTICOAGULANT (COUMADIN) 2.5 MG tablet Take 0.5-1 tablets (1.25-2.5 mg) by mouth daily Adjust dose based on INR results as directed. 65 tablet 1    warfarin ANTICOAGULANT (COUMADIN) 2.5 MG tablet Take 1.25 mg by mouth five times a week Mon, Tues, Thurs, Fri, & Sat      warfarin ANTICOAGULANT (COUMADIN) 2.5 MG tablet Take 2.5 mg by mouth twice a week Wed & Sun         ALLERGIES     Allergies   Allergen  Reactions    Sulfa Antibiotics      rash       PAST MEDICAL, SURGICAL, FAMILY, SOCIAL HISTORY:  History was reviewed and updated as needed, see medical record.    REVIEW OF SYSTEMS:  Skin:  not assessed     Eyes:  not assessed    ENT:  not assessed    Respiratory:  Negative    Cardiovascular:  Negative    Gastroenterology: not assessed    Genitourinary:  not assessed    Musculoskeletal:  not assessed    Neurologic:  not assessed    Psychiatric:  not assessed    Heme/Lymph/Imm:  not assessed    Endocrine:  not assessed      PHYSICAL EXAM:      BP: 102/60 Pulse: 62     SpO2: 99 %      Vital Signs with Ranges  Pulse:  [62] 62  BP: (102)/(60) 102/60  SpO2:  [99 %] 99 %  154 lbs 0 oz    Constitutional: awake, alert, no distress  Eyes: sclera nonicteric  ENT: trachea midline  Respiratory: clear to auscultation bilaterally  Cardiovascular: regular rate and rhythm II-III/VI murmur  GI: nondistended, nontender, bowel sounds present  Skin: dry, no rash no edema  Musculoskeletal: grossly normal muscle bulk and tone  Neurologic: no  gross focal deficits  Neuropsychiatric: normal affect    Recent Lab Results:  LIPID RESULTS:  Lab Results   Component Value Date    CHOL 130 05/01/2023    CHOL 179 12/17/2020    HDL 40 05/01/2023    HDL 39 (L) 12/17/2020    LDL 75 05/01/2023     (H) 12/17/2020    TRIG 75 05/01/2023    TRIG 97 12/17/2020    CHOLHDLRATIO 4.5 11/10/2015       LIVER ENZYME RESULTS:  Lab Results   Component Value Date    AST 39 05/01/2023    AST 24 12/17/2020    ALT 38 05/01/2023    ALT 26 12/17/2020       CBC RESULTS:  Lab Results   Component Value Date    WBC 6.7 06/28/2023    WBC 9.7 09/21/2018    RBC 4.61 06/28/2023    RBC 5.19 09/21/2018    HGB 13.8 06/28/2023    HGB 15.4 09/21/2018    HCT 42.6 06/28/2023    HCT 47.7 09/21/2018    MCV 92 06/28/2023    MCV 92 09/21/2018    MCH 29.9 06/28/2023    MCH 29.7 09/21/2018    MCHC 32.4 06/28/2023    MCHC 32.3 09/21/2018    RDW 13.2 06/28/2023    RDW 13.5 09/21/2018      (L) 06/28/2023     09/21/2018       BMP RESULTS:  Lab Results   Component Value Date     06/28/2023     12/17/2020    POTASSIUM 4.2 06/28/2023    POTASSIUM 4.2 05/12/2022    POTASSIUM 3.8 12/17/2020    CHLORIDE 105 06/28/2023    CHLORIDE 108 05/12/2022    CHLORIDE 107 12/17/2020    CO2 26 06/28/2023    CO2 29 05/12/2022    CO2 32 12/17/2020    ANIONGAP 9 06/28/2023    ANIONGAP 2 (L) 05/12/2022    ANIONGAP <1 (L) 12/17/2020    GLC 97 06/28/2023    GLC 93 05/12/2022     (H) 12/17/2020    BUN 20.8 06/28/2023    BUN 27 05/12/2022    BUN 19 12/17/2020    CR 1.04 06/28/2023    CR 1.00 12/17/2020    GFRESTIMATED 72 06/28/2023    GFRESTIMATED >60 09/27/2022    GFRESTIMATED 71 12/17/2020    GFRESTBLACK 83 12/17/2020    MANUELA 9.0 06/28/2023    MANUELA 9.0 12/17/2020        A1C RESULTS:  No results found for: A1C    INR RESULTS:  Lab Results   Component Value Date    INR 2.5 (H) 10/04/2023    INR 2.6 (H) 09/13/2023    INR 1.07 06/28/2023    INR 2.2 (A) 03/30/2023    INR 1.7 (A) 03/17/2023         TSH   Date Value Ref Range Status   05/01/2023 3.31 0.30 - 4.20 uIU/mL Final   04/26/2022 2.29 0.40 - 4.00 mU/L Final   09/30/2009 1.42 0.4 - 5.0 mU/L Final       Thank you for allowing me to participate in the care of your patient.      Sincerely,     Ana Dean MD     [facility]  cc:   No referring provider defined for this encounter.

## 2023-11-02 ENCOUNTER — TELEPHONE (OUTPATIENT)
Dept: CARDIOLOGY | Facility: CLINIC | Age: 83
End: 2023-11-02
Payer: COMMERCIAL

## 2023-11-02 DIAGNOSIS — Z95.0 CARDIAC PACEMAKER IN SITU: Primary | ICD-10-CM

## 2023-11-02 NOTE — TELEPHONE ENCOUNTER
Called pt and he stated that he already had appointment for an in-clinic device check on 11/15, so I did not need to make an appointment for him. Will call back with further concerns.

## 2023-11-02 NOTE — TELEPHONE ENCOUNTER
Health Call Center    Phone Message    May a detailed message be left on voicemail: yes     Reason for Call: Other: Please place orders for in clinic device check. Call patient to scheduled for May. Patient said to schedule and call with dates. Please reach out.       Action Taken: Other: Cardiology     Travel Screening: Not Applicable     Thank you!  [facility]

## 2023-11-07 DIAGNOSIS — I48.0 PAROXYSMAL ATRIAL FIBRILLATION (H): ICD-10-CM

## 2023-11-07 RX ORDER — WARFARIN SODIUM 2.5 MG/1
1.25-2.5 TABLET ORAL DAILY
Qty: 90 TABLET | Refills: 1 | Status: SHIPPED | OUTPATIENT
Start: 2023-11-07

## 2023-11-07 NOTE — TELEPHONE ENCOUNTER
ANTICOAGULATION MANAGEMENT:  Medication Refill    Anticoagulation Summary  As of 10/4/2023      Warfarin maintenance plan:  2.5 mg (2.5 mg x 1) every Mon, Fri; 1.25 mg (2.5 mg x 0.5) all other days   Next INR check:  11/1/2023   Target end date:  Indefinite    Indications    Sick sinus syndrome (H) [I49.5]  Paroxysmal atrial fibrillation (H) [I48.0]  Mitral valve disease [I05.9]                 Anticoagulation Care Providers       Provider Role Specialty Phone number    Ana Dean MD Referring Cardiovascular Disease [phone]            Refill Criteria    Visit with referring provider/group: Meets criteria: office visit within referring provider group in the last 1 year on 10/9/23    [facility] referral signed last signed: 05/15/2023; within last year: Yes    Lab monitoring not exceeding 2 weeks overdue: Yes    Charles meets all criteria for refill. Rx instructions and quantity supplied updated to match patient's current dosing plan. Warfarin 90 day supply with 1 refill granted per [facility] protocol     Mary Arguelles RN  Anticoagulation Clinic

## 2023-11-08 ENCOUNTER — ANTICOAGULATION THERAPY VISIT (OUTPATIENT)
Dept: ANTICOAGULATION | Facility: CLINIC | Age: 83
End: 2023-11-08

## 2023-11-08 ENCOUNTER — LAB (OUTPATIENT)
Dept: LAB | Facility: CLINIC | Age: 83
End: 2023-11-08
Payer: COMMERCIAL

## 2023-11-08 DIAGNOSIS — I48.0 PAROXYSMAL ATRIAL FIBRILLATION (H): ICD-10-CM

## 2023-11-08 DIAGNOSIS — I49.5 SICK SINUS SYNDROME (H): Primary | ICD-10-CM

## 2023-11-08 DIAGNOSIS — I05.9 MITRAL VALVE DISEASE: ICD-10-CM

## 2023-11-08 LAB — INR BLD: 2.6 (ref 0.9–1.1)

## 2023-11-08 PROCEDURE — 85610 PROTHROMBIN TIME: CPT

## 2023-11-08 PROCEDURE — 36416 COLLJ CAPILLARY BLOOD SPEC: CPT

## 2023-11-08 NOTE — PROGRESS NOTES
ANTICOAGULATION MANAGEMENT     Charles Nogueira 83 year old male is on warfarin with therapeutic INR result. (Goal INR 2.0-3.0)    Recent labs: (last 7 days)     11/08/23  0914   INR 2.6*       ASSESSMENT     Source(s): Chart Review  Previous INR was Therapeutic last 2(+) visits  Medication, diet, health changes since last INR chart reviewed; none identified  Last year, patient went to [place] for the winter, unsure if this is the plan again this year       PLAN     Recommended plan for no diet, medication or health factor changes affecting INR     Dosing Instructions: Continue your current warfarin dose with next INR in 5 weeks       Summary  As of 11/8/2023      Full warfarin instructions:  2.5 mg every Mon, Fri; 1.25 mg all other days   Next INR check:  12/13/2023               Detailed voice message left for wifePaula with dosing instructions and follow up date.  Requested she contact [facility] if they are planning on leaving for the winter again.    Contact [phone] to schedule and with any changes, questions or concerns.     Education provided:   Please call back if any changes to your diet, medications or how you've been taking warfarin  Goal range and lab monitoring: goal range and significance of current result    Plan made per [facility] anticoagulation protocol    Mary Arguelles RN  Anticoagulation Clinic  11/8/2023    _______________________________________________________________________     Anticoagulation Episode Summary       Current INR goal:  2.0-3.0   TTR:  49.8% (1 y)   Target end date:  Indefinite   Send INR reminders to:  [facility] HEART INR NURSE    Indications    Sick sinus syndrome (H) [I49.5]  Paroxysmal atrial fibrillation (H) [I48.0]  Mitral valve disease [I05.9]             Comments:               Anticoagulation Care Providers       Provider Role Specialty Phone number    Ana Dean MD Referring Cardiovascular Disease [phone]

## 2023-11-15 ENCOUNTER — ANCILLARY PROCEDURE (OUTPATIENT)
Dept: CARDIOLOGY | Facility: CLINIC | Age: 83
End: 2023-11-15
Attending: INTERNAL MEDICINE
Payer: COMMERCIAL

## 2023-11-15 ENCOUNTER — TELEPHONE (OUTPATIENT)
Dept: ANTICOAGULATION | Facility: CLINIC | Age: 83
End: 2023-11-15

## 2023-11-15 DIAGNOSIS — Z95.0 CARDIAC PACEMAKER IN SITU: ICD-10-CM

## 2023-11-15 LAB
MDC_IDC_LEAD_CONNECTION_STATUS: NORMAL
MDC_IDC_LEAD_CONNECTION_STATUS: NORMAL
MDC_IDC_LEAD_IMPLANT_DT: NORMAL
MDC_IDC_LEAD_IMPLANT_DT: NORMAL
MDC_IDC_LEAD_LOCATION: NORMAL
MDC_IDC_LEAD_LOCATION: NORMAL
MDC_IDC_LEAD_MFG: NORMAL
MDC_IDC_LEAD_MFG: NORMAL
MDC_IDC_LEAD_MODEL: NORMAL
MDC_IDC_LEAD_MODEL: NORMAL
MDC_IDC_LEAD_POLARITY_TYPE: NORMAL
MDC_IDC_LEAD_POLARITY_TYPE: NORMAL
MDC_IDC_LEAD_SERIAL: NORMAL
MDC_IDC_LEAD_SERIAL: NORMAL
MDC_IDC_MSMT_BATTERY_DTM: NORMAL
MDC_IDC_MSMT_BATTERY_IMPEDANCE: 1243 OHM
MDC_IDC_MSMT_BATTERY_REMAINING_LONGEVITY: 65 MO
MDC_IDC_MSMT_BATTERY_STATUS: NORMAL
MDC_IDC_MSMT_BATTERY_VOLTAGE: 2.77 V
MDC_IDC_MSMT_LEADCHNL_RA_IMPEDANCE_VALUE: 564 OHM
MDC_IDC_MSMT_LEADCHNL_RA_PACING_THRESHOLD_AMPLITUDE: 0.5 V
MDC_IDC_MSMT_LEADCHNL_RA_PACING_THRESHOLD_AMPLITUDE: 0.5 V
MDC_IDC_MSMT_LEADCHNL_RA_PACING_THRESHOLD_PULSEWIDTH: 0.4 MS
MDC_IDC_MSMT_LEADCHNL_RA_PACING_THRESHOLD_PULSEWIDTH: 0.4 MS
MDC_IDC_MSMT_LEADCHNL_RA_SENSING_INTR_AMPL: 4 MV
MDC_IDC_MSMT_LEADCHNL_RA_SENSING_INTR_AMPL: 5.6 MV
MDC_IDC_MSMT_LEADCHNL_RV_IMPEDANCE_VALUE: 651 OHM
MDC_IDC_MSMT_LEADCHNL_RV_PACING_THRESHOLD_AMPLITUDE: 0.5 V
MDC_IDC_MSMT_LEADCHNL_RV_PACING_THRESHOLD_AMPLITUDE: 0.88 V
MDC_IDC_MSMT_LEADCHNL_RV_PACING_THRESHOLD_PULSEWIDTH: 0.4 MS
MDC_IDC_MSMT_LEADCHNL_RV_PACING_THRESHOLD_PULSEWIDTH: 0.4 MS
MDC_IDC_MSMT_LEADCHNL_RV_SENSING_INTR_AMPL: 8 MV
MDC_IDC_PG_IMPLANT_DTM: NORMAL
MDC_IDC_PG_MFG: NORMAL
MDC_IDC_PG_MODEL: NORMAL
MDC_IDC_PG_SERIAL: NORMAL
MDC_IDC_PG_TYPE: NORMAL
MDC_IDC_SESS_CLINIC_NAME: NORMAL
MDC_IDC_SESS_DTM: NORMAL
MDC_IDC_SESS_TYPE: NORMAL
MDC_IDC_SET_BRADY_AT_MODE_SWITCH_MODE: NORMAL
MDC_IDC_SET_BRADY_AT_MODE_SWITCH_RATE: 175 {BEATS}/MIN
MDC_IDC_SET_BRADY_LOWRATE: 60 {BEATS}/MIN
MDC_IDC_SET_BRADY_MAX_SENSOR_RATE: 130 {BEATS}/MIN
MDC_IDC_SET_BRADY_MAX_TRACKING_RATE: 130 {BEATS}/MIN
MDC_IDC_SET_BRADY_MODE: NORMAL
MDC_IDC_SET_BRADY_PAV_DELAY_LOW: 350 MS
MDC_IDC_SET_BRADY_SAV_DELAY_LOW: 350 MS
MDC_IDC_SET_LEADCHNL_RA_PACING_AMPLITUDE: 1.5 V
MDC_IDC_SET_LEADCHNL_RA_PACING_CAPTURE_MODE: NORMAL
MDC_IDC_SET_LEADCHNL_RA_PACING_POLARITY: NORMAL
MDC_IDC_SET_LEADCHNL_RA_PACING_PULSEWIDTH: 0.4 MS
MDC_IDC_SET_LEADCHNL_RA_SENSING_POLARITY: NORMAL
MDC_IDC_SET_LEADCHNL_RA_SENSING_SENSITIVITY: 0.5 MV
MDC_IDC_SET_LEADCHNL_RV_PACING_AMPLITUDE: 2 V
MDC_IDC_SET_LEADCHNL_RV_PACING_CAPTURE_MODE: NORMAL
MDC_IDC_SET_LEADCHNL_RV_PACING_POLARITY: NORMAL
MDC_IDC_SET_LEADCHNL_RV_PACING_PULSEWIDTH: 0.4 MS
MDC_IDC_SET_LEADCHNL_RV_SENSING_POLARITY: NORMAL
MDC_IDC_SET_LEADCHNL_RV_SENSING_SENSITIVITY: 2.8 MV
MDC_IDC_SET_ZONE_DETECTION_INTERVAL: 333.33 MS
MDC_IDC_SET_ZONE_DETECTION_INTERVAL: 342.86 MS
MDC_IDC_SET_ZONE_STATUS: NORMAL
MDC_IDC_SET_ZONE_STATUS: NORMAL
MDC_IDC_SET_ZONE_TYPE: NORMAL
MDC_IDC_SET_ZONE_TYPE: NORMAL
MDC_IDC_SET_ZONE_VENDOR_TYPE: NORMAL
MDC_IDC_SET_ZONE_VENDOR_TYPE: NORMAL
MDC_IDC_STAT_AT_BURDEN_PERCENT: 0 %
MDC_IDC_STAT_AT_DTM_END: NORMAL
MDC_IDC_STAT_AT_DTM_START: NORMAL
MDC_IDC_STAT_AT_MODE_SW_COUNT: 41
MDC_IDC_STAT_BRADY_AP_VP_PERCENT: 1 %
MDC_IDC_STAT_BRADY_AP_VS_PERCENT: 65 %
MDC_IDC_STAT_BRADY_AS_VP_PERCENT: 0 %
MDC_IDC_STAT_BRADY_AS_VS_PERCENT: 33 %
MDC_IDC_STAT_BRADY_DTM_END: NORMAL
MDC_IDC_STAT_BRADY_DTM_START: NORMAL
MDC_IDC_STAT_EPISODE_RECENT_COUNT: 0
MDC_IDC_STAT_EPISODE_RECENT_COUNT: 9
MDC_IDC_STAT_EPISODE_RECENT_COUNT_DTM_END: NORMAL
MDC_IDC_STAT_EPISODE_RECENT_COUNT_DTM_END: NORMAL
MDC_IDC_STAT_EPISODE_RECENT_COUNT_DTM_START: NORMAL
MDC_IDC_STAT_EPISODE_RECENT_COUNT_DTM_START: NORMAL
MDC_IDC_STAT_EPISODE_TYPE: NORMAL
MDC_IDC_STAT_EPISODE_TYPE: NORMAL

## 2023-11-15 PROCEDURE — 93280 PM DEVICE PROGR EVAL DUAL: CPT | Performed by: INTERNAL MEDICINE

## 2023-11-15 NOTE — TELEPHONE ENCOUNTER
"He is having one tooth pulled tomorrow. No need to hold Warfarin.  I am not able to make an INR appointment for tomorrow as they are all full. I advised Paula to call the clinic tomorrow morning and they can given him a \"same day\" appointment.    As long as INR is between 2 & 3 the dentist said Charles can have tooth pulled.    Melissa Grady RN   "

## 2023-11-15 NOTE — TELEPHONE ENCOUNTER
Patient wife left  asking for a call back to schedule an INR lab check for 11/16/23 morning as patient is having a tooth extraction tomorrow and dentist is requesting INR prior to procedure      Nelly Anand RN, BSN, PHN  Anticoagulation Nurse  [phone]

## 2023-11-16 ENCOUNTER — ANTICOAGULATION THERAPY VISIT (OUTPATIENT)
Dept: ANTICOAGULATION | Facility: CLINIC | Age: 83
End: 2023-11-16

## 2023-11-16 ENCOUNTER — LAB (OUTPATIENT)
Dept: LAB | Facility: CLINIC | Age: 83
End: 2023-11-16
Payer: COMMERCIAL

## 2023-11-16 DIAGNOSIS — I48.0 PAROXYSMAL ATRIAL FIBRILLATION (H): ICD-10-CM

## 2023-11-16 DIAGNOSIS — I49.5 SICK SINUS SYNDROME (H): Primary | ICD-10-CM

## 2023-11-16 DIAGNOSIS — I05.9 MITRAL VALVE DISEASE: ICD-10-CM

## 2023-11-16 LAB — INR BLD: 2.3 (ref 0.9–1.1)

## 2023-11-16 PROCEDURE — 85610 PROTHROMBIN TIME: CPT

## 2023-11-16 PROCEDURE — 36416 COLLJ CAPILLARY BLOOD SPEC: CPT

## 2023-11-16 NOTE — PROGRESS NOTES
ANTICOAGULATION MANAGEMENT     Charles Nogueira 83 year old male is on warfarin with therapeutic INR result. (Goal INR 2.0-3.0)    Recent labs: (last 7 days)     11/16/23  0911   INR 2.3*       ASSESSMENT     Warfarin Lab Questionnaire    Warfarin Doses Last 7 Days      11/16/2023     9:05 AM   Dose in Tablet or Mg   TAB or MG? milligram (mg)     Pt Rptd Dose SUNDAY MONDAY TUESDAY WED THURS FRIDAY SATURDAY 11/16/2023   9:05 AM 1.25 2.5 1.25 1.25 1.25 2.5 1.25         11/16/2023   Warfarin Lab Questionnaire   Missed doses within past 14 days? No   Changes in diet or alcohol within past 14 days? No   Medication changes since last result? No   Injuries or illness since last result? No   New shortness of breath, severe headaches or sudden changes in vision since last result? No   Abnormal bleeding since last result? No   Upcoming surgery, procedure? Yes   Please explain, date scheduled? tooth extraction today, no warfarin hold, needs INR between 2-3   Best number to call with results? [phone]     Previous result: Therapeutic last 2(+) visits  Additional findings:  Unsure if patient is going to [place] for the winter       PLAN     Recommended plan for temporary change(s) affecting INR     Dosing Instructions: Continue your current warfarin dose with next INR in 4 weeks or sooner if needed.       Summary  As of 11/16/2023      Full warfarin instructions:  2.5 mg every Mon, Fri; 1.25 mg all other days   Next INR check:  12/14/2023               Detailed voice message left for Paula with dosing instructions and follow up date.   Recommended checking the INR sooner if patient needs to take pain meds or antibiotics or if diet is significantly different after tooth extraction.      Contact [phone] to schedule and with any changes, questions or concerns.     Education provided:   Goal range and lab monitoring: goal range and significance of current result  Contact [phone] with any changes, questions or concerns.      Plan made per [facility] anticoagulation protocol    Jessika Yoo, RN  Anticoagulation Clinic  11/16/2023    _______________________________________________________________________     Anticoagulation Episode Summary       Current INR goal:  2.0-3.0   TTR:  49.8% (1 y)   Target end date:  Indefinite   Send INR reminders to:  [facility] HEART INR NURSE    Indications    Sick sinus syndrome (H) [I49.5]  Paroxysmal atrial fibrillation (H) [I48.0]  Mitral valve disease [I05.9]             Comments:               Anticoagulation Care Providers       Provider Role Specialty Phone number    Ana Dean MD Referring Cardiovascular Disease [phone]             No

## 2023-11-27 ENCOUNTER — DOCUMENTATION ONLY (OUTPATIENT)
Dept: OTHER | Facility: CLINIC | Age: 83
End: 2023-11-27
Payer: COMMERCIAL

## 2023-12-14 ENCOUNTER — MYC MEDICAL ADVICE (OUTPATIENT)
Dept: ANTICOAGULATION | Facility: CLINIC | Age: 83
End: 2023-12-14
Payer: COMMERCIAL

## 2023-12-14 ENCOUNTER — TELEPHONE (OUTPATIENT)
Dept: INTERNAL MEDICINE | Facility: CLINIC | Age: 83
End: 2023-12-14
Payer: COMMERCIAL

## 2023-12-14 DIAGNOSIS — I48.0 PAROXYSMAL ATRIAL FIBRILLATION (H): ICD-10-CM

## 2023-12-14 DIAGNOSIS — I49.5 SICK SINUS SYNDROME (H): Primary | ICD-10-CM

## 2023-12-14 DIAGNOSIS — I05.9 MITRAL VALVE DISEASE: ICD-10-CM

## 2023-12-14 LAB — INR (EXTERNAL): 4.1 (ref 0.9–1.1)

## 2023-12-14 NOTE — TELEPHONE ENCOUNTER
Reason for Call: Results call. Patient had his INR done in [facility] Todday 12/14-wife informed me that they call in the result, and an INR nurse calls back to help with dosing instructions.     Result is 4.1 would like a nurse call back to give instructions       Call taken on 12/14/2023 at 5:01 PM by Antonette Wong      Test Results        Who ordered the test:  Hospital in [facility] did the INR did not know the name of the  That orders it. Wife of patient says they go in there and call us to let know the result.    Type of test: Lab and INR     Date of test: Today    Where was the test performed:  [facility]     What are your questions/concerns?:  High    Could we send this information to you in [facility] or would you prefer to receive a phone call?:   No preference   Okay to leave a detailed message?: Yes at Cell number on file:    Telephone Information:   [phone] Paula

## 2023-12-14 NOTE — TELEPHONE ENCOUNTER
Spoke with Paula who said she will send a screen shot of the lab result through [employer]. In the meanwhile Charles will hold warfarin tonight and connect with [facility] again tomorrow for plan.

## 2023-12-15 ENCOUNTER — ANTICOAGULATION THERAPY VISIT (OUTPATIENT)
Dept: ANTICOAGULATION | Facility: CLINIC | Age: 83
End: 2023-12-15
Payer: COMMERCIAL

## 2023-12-15 DIAGNOSIS — I48.0 PAROXYSMAL ATRIAL FIBRILLATION (H): ICD-10-CM

## 2023-12-15 DIAGNOSIS — I05.9 MITRAL VALVE DISEASE: ICD-10-CM

## 2023-12-15 DIAGNOSIS — I49.5 SICK SINUS SYNDROME (H): Primary | ICD-10-CM

## 2023-12-15 NOTE — TELEPHONE ENCOUNTER
See [facility] visit started today for dosing/follow-up.    Mary Arguelles RN  [facility]  [phone]

## 2023-12-15 NOTE — TELEPHONE ENCOUNTER
Noted result.  AC visit started and will dose in that encounter.    Mary Arguelles, RN  [facility]  [phone]

## 2023-12-15 NOTE — PROGRESS NOTES
ANTICOAGULATION MANAGEMENT     Charles Nogueira 83 year old male is on warfarin with supratherapeutic INR result. (Goal INR 2.0-3.0)    Recent labs: (last 7 days)     12/14/23  0000   INR 4.1*       ASSESSMENT     Source(s): Chart Review  Previous INR was Therapeutic last 2(+) visits  Medication, diet, health changes since last INR chart reviewed; none identified  Currently in [place]         PLAN     Unable to reach wife, Paula today.    [facility] and sent [employer] message to continue current maintenance dose  this weekend. Request call back for assessment.    Follow up required to confirm warfarin dose taken and assess for changes and discuss out of range result       Mary Arguelles RN  Anticoagulation Clinic  12/15/2023

## 2023-12-18 NOTE — PROGRESS NOTES
ANTICOAGULATION MANAGEMENT     Charles Nogueira 83 year old male is on warfarin with supratherapeutic INR result. (Goal INR 2.0-3.0)    Recent labs: (last 7 days)     12/14/23  0000   INR 4.1*       ASSESSMENT     Source(s): Chart Review and Patient/Caregiver Call     Warfarin doses taken: Warfarin taken as instructed  Diet: No new diet changes identified  Medication/supplement changes: None noted  New illness, injury, or hospitalization: No  Signs or symptoms of bleeding or clotting: No  Previous result: Therapeutic last 2(+) visits  Additional findings: patient is currently in [place]; last year first check after getting down to [place] was over 5.0-- wife reports no major changes however there must be some change to have INR go high two years in a row     PLAN     Recommended plan for ongoing change(s) affecting INR     Dosing Instructions: decrease your warfarin dose (11.1% change) with next INR in 10 days       Summary  As of 12/15/2023      Full warfarin instructions:  2.5 mg every Fri; 1.25 mg all other days   Next INR check:  12/27/2023               Telephone call with wife, Paula who agrees to plan and repeated back plan correctly    Patient using outside facility for labs    Education provided:   Contact [phone] with any changes, questions or concerns.     Plan made per [facility] anticoagulation protocol    Mary Arguelles RN  Anticoagulation Clinic  12/18/2023    _______________________________________________________________________     Anticoagulation Episode Summary       Current INR goal:  2.0-3.0   TTR:  51.1% (1 y)   Target end date:  Indefinite   Send INR reminders to:  [facility] HEART INR NURSE    Indications    Sick sinus syndrome (H) [I49.5]  Paroxysmal atrial fibrillation (H) [I48.0]  Mitral valve disease [I05.9]             Comments:               Anticoagulation Care Providers       Provider Role Specialty Phone number    Ana Dean MD Referring Cardiovascular Disease [phone]

## 2023-12-26 ENCOUNTER — TELEPHONE (OUTPATIENT)
Dept: ANTICOAGULATION | Facility: CLINIC | Age: 83
End: 2023-12-26
Payer: COMMERCIAL

## 2023-12-26 ENCOUNTER — ANTICOAGULATION THERAPY VISIT (OUTPATIENT)
Dept: ANTICOAGULATION | Facility: CLINIC | Age: 83
End: 2023-12-26
Payer: COMMERCIAL

## 2023-12-26 DIAGNOSIS — I49.5 SICK SINUS SYNDROME (H): Primary | ICD-10-CM

## 2023-12-26 DIAGNOSIS — I05.9 MITRAL VALVE DISEASE: ICD-10-CM

## 2023-12-26 DIAGNOSIS — I48.0 PAROXYSMAL ATRIAL FIBRILLATION (H): ICD-10-CM

## 2023-12-26 LAB — INR (EXTERNAL): 2.5 (ref 0.9–1.1)

## 2023-12-26 NOTE — PROGRESS NOTES
ANTICOAGULATION MANAGEMENT     Charles Nogueira 83 year old male is on warfarin with therapeutic INR result. (Goal INR 2.0-3.0)    Recent labs: (last 7 days)     12/26/23  0000   INR 2.5*       ASSESSMENT     Source(s): Chart Review  Previous INR was Supratherapeutic-11% dose reduction  Medication, diet, health changes since last INR chart reviewed; none identified         PLAN     Recommended plan for no diet, medication or health factor changes affecting INR     Dosing Instructions: Continue your current warfarin dose with next INR in 3 weeks       Summary  As of 12/26/2023      Full warfarin instructions:  2.5 mg every Fri; 1.25 mg all other days   Next INR check:  1/16/2024               Sent [employer] message with dosing and follow up instructions    Patient using outside facility for labs    Education provided:   Please call back if any changes to your diet, medications or how you've been taking warfarin  Goal range and lab monitoring: goal range and significance of current result  Written instructions provided  Contact [phone] with any changes, questions or concerns.     Plan made per [facility] anticoagulation protocol    Melissa Grady RN  Anticoagulation Clinic  12/26/2023    _______________________________________________________________________     Anticoagulation Episode Summary       Current INR goal:  2.0-3.0   TTR:  51.5% (1 y)   Target end date:  Indefinite   Send INR reminders to:  [facility] HEART INR NURSE    Indications    Sick sinus syndrome (H) [I49.5]  Paroxysmal atrial fibrillation (H) [I48.0]  Mitral valve disease [I05.9]             Comments:               Anticoagulation Care Providers       Provider Role Specialty Phone number    Ana Dean MD Referring Cardiovascular Disease [phone]

## 2023-12-26 NOTE — TELEPHONE ENCOUNTER
Voicemail on the [place] line at 3:04 pm noting an INR result of 2.5. Paula (wife) notes to text her or email her.    Ana Martínez RN    [facility] Anticoagulation Clinic

## 2023-12-28 NOTE — PROGRESS NOTES
Patient's wife replied to [employer] message stating that patient has been taking Warfarin 2.5mg on Mon/Fri and 1.25mg ROW and didn't decrease dosing as planned per 12/15 [facility] note.  Since INR is in range we will continue the 2.5mg Mon/Fri and 1.25mg ROW and recheck INR in 2 weeks.  [employer] message sent.  Manoj YADAV

## 2024-01-10 ENCOUNTER — MYC MEDICAL ADVICE (OUTPATIENT)
Dept: ANTICOAGULATION | Facility: CLINIC | Age: 84
End: 2024-01-10
Payer: COMMERCIAL

## 2024-01-11 ENCOUNTER — ANTICOAGULATION THERAPY VISIT (OUTPATIENT)
Dept: ANTICOAGULATION | Facility: CLINIC | Age: 84
End: 2024-01-11
Payer: COMMERCIAL

## 2024-01-11 DIAGNOSIS — I49.5 SICK SINUS SYNDROME (H): Primary | ICD-10-CM

## 2024-01-11 DIAGNOSIS — I48.0 PAROXYSMAL ATRIAL FIBRILLATION (H): ICD-10-CM

## 2024-01-11 DIAGNOSIS — I05.9 MITRAL VALVE DISEASE: ICD-10-CM

## 2024-01-11 LAB — INR (EXTERNAL): 3.2 (ref 0.9–1.1)

## 2024-01-11 NOTE — PROGRESS NOTES
ANTICOAGULATION MANAGEMENT     Charles Nogueira 83 year old male is on warfarin with supratherapeutic INR result. (Goal INR 2.0-3.0)    Recent labs: (last 7 days)     01/11/24  0800   INR 3.2*       ASSESSMENT     Source(s): Chart Review and Patient/Caregiver Call     Warfarin doses taken: Warfarin taken as instructed  Diet: No new diet changes identified  Medication/supplement changes: None noted  New illness, injury, or hospitalization: No  Signs or symptoms of bleeding or clotting: No  Previous result: Therapeutic last visit; previously outside of goal range  Additional findings: None       PLAN     Recommended plan for no diet, medication or health factor changes affecting INR     Dosing Instructions: decrease your warfarin dose (11% change) with next INR in 2 weeks       Summary  As of 1/11/2024      Full warfarin instructions:  2.5 mg every Mon; 1.25 mg all other days   Next INR check:  1/25/2024               Telephone call with Paula who verbalizes understanding and agrees to plan.  Paula will send copy of lab report via [employer].      Check INR in [place] in 2 weeks    Education provided:   Goal range and lab monitoring: goal range and significance of current result  Contact [phone] with any changes, questions or concerns.     Plan made per [facility] anticoagulation protocol    Jessika Yoo, RN  Anticoagulation Clinic  1/11/2024    _______________________________________________________________________     Anticoagulation Episode Summary       Current INR goal:  2.0-3.0   TTR:  50.4% (1 y)   Target end date:  Indefinite   Send INR reminders to:  [email or web address] HEART INR NURSE    Indications    Sick sinus syndrome (H) [I49.5]  Paroxysmal atrial fibrillation (H) [I48.0]  Mitral valve disease [I05.9]             Comments:               Anticoagulation Care Providers       Provider Role Specialty Phone number    Ana Dean MD Referring Cardiovascular Disease [phone]

## 2024-01-25 ENCOUNTER — ANTICOAGULATION THERAPY VISIT (OUTPATIENT)
Dept: ANTICOAGULATION | Facility: CLINIC | Age: 84
End: 2024-01-25
Payer: COMMERCIAL

## 2024-01-25 DIAGNOSIS — I49.5 SICK SINUS SYNDROME (H): Primary | ICD-10-CM

## 2024-01-25 DIAGNOSIS — I48.0 PAROXYSMAL ATRIAL FIBRILLATION (H): ICD-10-CM

## 2024-01-25 DIAGNOSIS — I05.9 MITRAL VALVE DISEASE: ICD-10-CM

## 2024-01-25 LAB — INR (EXTERNAL): 2.1 (ref 0.9–1.1)

## 2024-01-25 NOTE — PROGRESS NOTES
Wife Paula left a message on the home care line Charles's INR today was 2.1.    Routing to [facility] heart INR    RAO BentleyN, RN  Anticoagulation Clinic

## 2024-01-25 NOTE — PROGRESS NOTES
ANTICOAGULATION MANAGEMENT     Charles Nogueira 83 year old male is on warfarin with therapeutic INR result. (Goal INR 2.0-3.0)    Recent labs: (last 7 days)     01/25/24  1410   INR 2.1*       ASSESSMENT     Source(s): Chart Review  Previous INR was Supratherapeutic  Medication, diet, health changes since last INR chart reviewed; none identified         PLAN     Recommended plan for no diet, medication or health factor changes affecting INR     Dosing Instructions: Continue your current warfarin dose with next INR in 3 weeks       Summary  As of 1/25/2024      Full warfarin instructions:  2.5 mg every Mon; 1.25 mg all other days   Next INR check:  2/15/2024               Detailed voice message left for [employer] with dosing instructions and follow up date.   Sent [employer] message with dosing and follow up instructions    Patient using outside facility for labs    Education provided:   Please call back if any changes to your diet, medications or how you've been taking warfarin  Goal range and lab monitoring: goal range and significance of current result    Plan made per [facility] anticoagulation protocol    Helena Brambila RN  Anticoagulation Clinic  1/25/2024    _______________________________________________________________________     Anticoagulation Episode Summary       Current INR goal:  2.0-3.0   TTR:  49.8% (1 y)   Target end date:  Indefinite   Send INR reminders to:  [facility] HEART INR NURSE    Indications    Sick sinus syndrome (H) [I49.5]  Paroxysmal atrial fibrillation (H) [I48.0]  Mitral valve disease [I05.9]             Comments:               Anticoagulation Care Providers       Provider Role Specialty Phone number    Ana Dean MD Referring Cardiovascular Disease [phone]

## 2024-02-14 ENCOUNTER — ANTICOAGULATION THERAPY VISIT (OUTPATIENT)
Dept: ANTICOAGULATION | Facility: CLINIC | Age: 84
End: 2024-02-14
Payer: COMMERCIAL

## 2024-02-14 ENCOUNTER — TELEPHONE (OUTPATIENT)
Dept: INTERNAL MEDICINE | Facility: CLINIC | Age: 84
End: 2024-02-14
Payer: COMMERCIAL

## 2024-02-14 DIAGNOSIS — I05.9 MITRAL VALVE DISEASE: ICD-10-CM

## 2024-02-14 DIAGNOSIS — I48.0 PAROXYSMAL ATRIAL FIBRILLATION (H): ICD-10-CM

## 2024-02-14 DIAGNOSIS — I49.5 SICK SINUS SYNDROME (H): Primary | ICD-10-CM

## 2024-02-14 LAB — INR (EXTERNAL): 2.2 (ref 0.9–1.1)

## 2024-02-14 NOTE — PROGRESS NOTES
ANTICOAGULATION MANAGEMENT     Charles Nogueira 84 year old male is on warfarin with therapeutic INR result. (Goal INR 2.0-3.0)    Recent labs: (last 7 days)     02/14/24  0000   INR 2.2*       ASSESSMENT     Source(s): Chart Review and Patient/Caregiver Call     Warfarin doses taken: Warfarin taken as instructed  Diet: No new diet changes identified  Medication/supplement changes: None noted  New illness, injury, or hospitalization: No  Signs or symptoms of bleeding or clotting: No  Previous result: Therapeutic last visit; previously outside of goal range  Additional findings: None       PLAN     Recommended plan for no diet, medication or health factor changes affecting INR     Dosing Instructions: Continue your current warfarin dose with next INR in 4 weeks       Summary  As of 2/14/2024      Full warfarin instructions:  2.5 mg every Mon; 1.25 mg all other days   Next INR check:  3/13/2024               Telephone call with wife, Paula who verbalizes understanding and agrees to plan    Patient using outside facility for labs    Education provided:   Please call back if any changes to your diet, medications or how you've been taking warfarin  Goal range and lab monitoring: goal range and significance of current result  Importance of notifying anticoagulation clinic for: changes in medications; a sooner lab recheck maybe needed and diarrhea, nausea/vomiting, reduced intake, cold/flu, and/or infections; a sooner lab recheck maybe needed  Contact [phone] with any changes, questions or concerns.     Plan made per [facility] anticoagulation protocol    Melissa Grady RN  Anticoagulation Clinic  2/14/2024    _______________________________________________________________________     Anticoagulation Episode Summary       Current INR goal:  2.0-3.0   TTR:  55.1% (1 y)   Target end date:  Indefinite   Send INR reminders to:  [facility] HEART INR NURSE    Indications    Sick sinus syndrome (H) [I49.5]  Paroxysmal atrial  fibrillation (H) [I48.0]  Mitral valve disease [I05.9]             Comments:               Anticoagulation Care Providers       Provider Role Specialty Phone number    Ana Dean MD Referring Cardiovascular Disease [phone]

## 2024-02-14 NOTE — TELEPHONE ENCOUNTER
General Call    Contacts         Type Contact Phone/Fax    02/14/2024 01:32 PM CST Phone (Incoming) Paula Nogueira (Emergency Contact) [phone]     David          Reason for Call:  David    What are your questions or concerns:  Patient spouse is calling regarding today's I2/14/24 NR in [facility]. She expressed it was 2.2. Please advise at the number provided.    Date of last appointment with provider: 10.9.23 Cardiology    Could we send this information to you in [facility] or would you prefer to receive a phone call?:   Patient would prefer a phone call   Okay to leave a detailed message?: Yes at Other phone number:  spouse Paula [phone]

## 2024-02-20 ENCOUNTER — TELEPHONE (OUTPATIENT)
Dept: INTERNAL MEDICINE | Facility: CLINIC | Age: 84
End: 2024-02-20
Payer: COMMERCIAL

## 2024-02-20 NOTE — TELEPHONE ENCOUNTER
Patient Returning Call    Reason for call:  Please call back regarding patient's upcoming procedure to have a dental implant placed. Paula, spouse, requesting a call back    Information relayed to patient:  message placed, await call back    Patient has additional questions:  No      Could we send this information to you in [facility] or would you prefer to receive a phone call?:   Patient would prefer a phone call   Okay to leave a detailed message?: Yes at Other phone number: [phone]

## 2024-02-20 NOTE — TELEPHONE ENCOUNTER
Spoke with Paula. She is going to be making an appointment for Charles to have one tooth implant done and wanted to see what the directions would be for Warfarin. I told her to speak to the dentist first to see if they have their own protocol to follow or recommend and to let us know what they say. Paula will call the dentist and get info from them and then let [facility] know.    Melissa Grady RN

## 2024-02-23 ENCOUNTER — TELEPHONE (OUTPATIENT)
Dept: ANTICOAGULATION | Facility: CLINIC | Age: 84
End: 2024-02-23
Payer: COMMERCIAL

## 2024-02-23 DIAGNOSIS — I05.9 MITRAL VALVE DISEASE: ICD-10-CM

## 2024-02-23 DIAGNOSIS — I48.0 PAROXYSMAL ATRIAL FIBRILLATION (H): ICD-10-CM

## 2024-02-23 DIAGNOSIS — I49.5 SICK SINUS SYNDROME (H): Primary | ICD-10-CM

## 2024-02-23 NOTE — TELEPHONE ENCOUNTER
Spoke to patient's wife.  Patient is scheduled for dental implant on 3/1/24.  Will send message to [facility] to review.  Manoj YADAV

## 2024-02-23 NOTE — TELEPHONE ENCOUNTER
Reason for Call:  Other call back    Detailed comments: Wants to know what to do about upcoming dental procedure & his warfin?    Phone Number Patient can be reached at: Cell number on file:    Telephone Information:   Mobile [phone]       Best Time: any    Can we leave a detailed message on this number? YES    Call taken on 2/23/2024 at 12:51 PM by Feli Hernandez

## 2024-02-23 NOTE — TELEPHONE ENCOUNTER
Patient scheduled for dental implant on 3/1/24.  He already had the tooth pulled without holding Warfarin.  Wife said the dentist is going to stitch up the area and was told everything would be very intact and Warfarin hold isn't required but up to INR clinic.  INR last week was in range at 2.2.  They are still in [place] until April.  Sending message to [facility] to review.  Manoj YADAV

## 2024-02-26 NOTE — TELEPHONE ENCOUNTER
"[facility] ANTICOAGULATION  MANAGEMENT    ASSESSMENT     Warfarin plan for dental implant on 3/1/24.    Indication for Anticoagulation: Atrial Fibrillation    [id number] = 3 (Hypertension and Age >= 75)    Dental provider does not require hold for procedure; tolerated recent extraction while continuing warfarin.    DENTAL: 2022 CHEST Perioperative Management guidelines suggest continuing warfarin over stopping in patients who require a minor dental procedure and coadministration of an oral prohemostatic agent unless considerable oral bleeding is expected (extraction of multiple teeth, poor gingival health, etc)    RECOMMENDATION     Continue warfarin  Check INR prior to procedure( 2/28-3/1 as able) and ensure INR < 3 for procedure  ?   Massiel Florentino, [facility]    SUBJECTIVE/OBJECTIVE     Charles Nogueira, a 84 year old male    Goal INR Range: 2.0-3.0     Wt Readings from Last 3 Encounters:   10/09/23 69.9 kg (154 lb)   07/19/23 68.4 kg (150 lb 12.8 oz)   07/14/23 68.8 kg (151 lb 9.6 oz)      Ideal body weight: 70.7 kg (155 lb 13.8 oz)     Estimated body mass index is 22.74 kg/m  as calculated from the following:    Height as of 10/9/23: 1.753 m (5' 9\").    Weight as of 10/9/23: 69.9 kg (154 lb).    Lab Results   Component Value Date    INR 2.2 (A) 02/14/2024    INR 2.1 (A) 01/25/2024    INR 3.2 (A) 01/11/2024     Lab Results   Component Value Date    HGB 13.8 06/28/2023    HCT 42.6 06/28/2023     (L) 06/28/2023     Lab Results   Component Value Date    CR 1.04 06/28/2023    CR 1.10 05/01/2023    CR 1.12 11/15/2022     Estimated Creatinine Clearance: 53.2 mL/min (based on SCr of 1.04 mg/dL).    "

## 2024-02-26 NOTE — TELEPHONE ENCOUNTER
Called and spoke to patient's wife.  Patient will get INR done in [place] on 2/28/24.  See [facility] note for plan.  Continue Warfarin but check INR prior to dental implant to ensure INR <3.  Paula said she will call right away with the INR reading on 2/28/24.  Manoj YADAV

## 2024-02-28 ENCOUNTER — ANTICOAGULATION THERAPY VISIT (OUTPATIENT)
Dept: ANTICOAGULATION | Facility: CLINIC | Age: 84
End: 2024-02-28
Payer: COMMERCIAL

## 2024-02-28 DIAGNOSIS — I48.0 PAROXYSMAL ATRIAL FIBRILLATION (H): ICD-10-CM

## 2024-02-28 DIAGNOSIS — I05.9 MITRAL VALVE DISEASE: ICD-10-CM

## 2024-02-28 DIAGNOSIS — I49.5 SICK SINUS SYNDROME (H): Primary | ICD-10-CM

## 2024-02-28 LAB — INR (EXTERNAL): 2.3 (ref 0.9–1.1)

## 2024-02-28 NOTE — PROGRESS NOTES
ANTICOAGULATION MANAGEMENT     Charles Nogueira 84 year old male is on warfarin with therapeutic INR result. (Goal INR 2.0-3.0)    Recent labs: (last 7 days)     02/28/24  1435   INR 2.3*       ASSESSMENT     Source(s): Chart Review and Patient/Caregiver Call     Warfarin doses taken: Warfarin taken as instructed  Diet: No new diet changes identified  Medication/supplement changes: None noted  New illness, injury, or hospitalization: No  Signs or symptoms of bleeding or clotting: No  Previous result: Therapeutic last 2(+) visits  Additional findings: None and patient having dental implants - see 2/23/24 TE for instructions        PLAN     Recommended plan for no diet, medication or health factor changes affecting INR     Dosing Instructions: Continue your current warfarin dose with next INR in 3 weeks       Summary  As of 2/28/2024      Full warfarin instructions:  2.5 mg every Mon; 1.25 mg all other days   Next INR check:  3/21/2024               Telephone call with wife charles who verbalizes understanding and agrees to plan and who agrees to plan and repeated back plan correctly    Patient using outside facility for labs    Education provided:   Contact [phone]  with any changes, questions or concerns.     Plan made per [facility] anticoagulation protocol    Nelly Santiago RN  Anticoagulation Clinic  2/28/2024    _______________________________________________________________________     Anticoagulation Episode Summary       Current INR goal:  2.0-3.0   TTR:  59.0% (1 y)   Target end date:  Indefinite   Send INR reminders to:  [facility] HEART INR NURSE    Indications    Sick sinus syndrome (H) [I49.5]  Paroxysmal atrial fibrillation (H) [I48.0]  Mitral valve disease [I05.9]             Comments:               Anticoagulation Care Providers       Provider Role Specialty Phone number    Ana Dean MD Referring Cardiovascular Disease [phone]

## 2024-03-21 ENCOUNTER — TELEPHONE (OUTPATIENT)
Dept: INTERNAL MEDICINE | Facility: CLINIC | Age: 84
End: 2024-03-21

## 2024-03-21 ENCOUNTER — ANTICOAGULATION THERAPY VISIT (OUTPATIENT)
Dept: ANTICOAGULATION | Facility: CLINIC | Age: 84
End: 2024-03-21

## 2024-03-21 ENCOUNTER — ANCILLARY PROCEDURE (OUTPATIENT)
Dept: CARDIOLOGY | Facility: CLINIC | Age: 84
End: 2024-03-21
Attending: INTERNAL MEDICINE
Payer: COMMERCIAL

## 2024-03-21 DIAGNOSIS — Z95.0 CARDIAC PACEMAKER IN SITU: ICD-10-CM

## 2024-03-21 DIAGNOSIS — I05.9 MITRAL VALVE DISEASE: ICD-10-CM

## 2024-03-21 DIAGNOSIS — I48.0 PAROXYSMAL ATRIAL FIBRILLATION (H): ICD-10-CM

## 2024-03-21 DIAGNOSIS — I49.5 SICK SINUS SYNDROME (H): Primary | ICD-10-CM

## 2024-03-21 LAB — INR (EXTERNAL): 2.5 (ref 0.9–1.1)

## 2024-03-21 PROCEDURE — 93296 REM INTERROG EVL PM/IDS: CPT | Performed by: INTERNAL MEDICINE

## 2024-03-21 PROCEDURE — 93294 REM INTERROG EVL PM/LDLS PM: CPT | Performed by: INTERNAL MEDICINE

## 2024-03-21 NOTE — TELEPHONE ENCOUNTER
General Call    Contacts         Type Contact Phone/Fax    03/21/2024 02:12 PM CDT Phone (Incoming) Paula Jero (Emergency Contact) [phone]     Anticoag INR          Reason for Call:  INR Anticoag     What are your questions or concerns:  Spouse is calling in patient INR from [place]. The INR lab Johanna was reported as 2.5 today 3/21/24. Please advise at the number provided.    Date of last appointment with provider: 10.9.23    Could we send this information to you in [place] or would you prefer to receive a phone call?:   Patient would prefer a phone call   Okay to leave a detailed message?: Yes at Other phone number:  [phone] spouse Paula

## 2024-03-21 NOTE — PROGRESS NOTES
ANTICOAGULATION MANAGEMENT     Charles Nogueira 84 year old male is on warfarin with therapeutic INR result. (Goal INR 2.0-3.0)    Recent labs: (last 7 days)     03/21/24  1000   INR 2.5*       ASSESSMENT     Source(s): Chart Review and Patient/Caregiver Call     Warfarin doses taken: Warfarin taken as instructed  Diet: No new diet changes identified  Medication/supplement changes: None noted  New illness, injury, or hospitalization: No  Signs or symptoms of bleeding or clotting: No  Previous result: Therapeutic last 2(+) visits  Additional findings:  No issues with recent dental work.  Returning to [place] in April.       PLAN     Recommended plan for no diet, medication or health factor changes affecting INR     Dosing Instructions: Continue your current warfarin dose with next INR in 4 weeks       Summary  As of 3/21/2024      Full warfarin instructions:  2.5 mg every Mon; 1.25 mg all other days   Next INR check:  4/22/2024               Telephone call with Paula who verbalizes understanding and agrees to plan    Lab visit scheduled    Education provided:   Goal range and lab monitoring: goal range and significance of current result  Contact [phone] with any changes, questions or concerns.     Plan made per [facility] anticoagulation protocol    Jessika Yoo, RN  Anticoagulation Clinic  3/21/2024    _______________________________________________________________________     Anticoagulation Episode Summary       Current INR goal:  2.0-3.0   TTR:  65.0% (1 y)   Target end date:  Indefinite   Send INR reminders to:  [facility] HEART INR NURSE    Indications    Sick sinus syndrome (H) [I49.5]  Paroxysmal atrial fibrillation (H) [I48.0]  Mitral valve disease [I05.9]             Comments:               Anticoagulation Care Providers       Provider Role Specialty Phone number    Ana Dean MD Referring Cardiovascular Disease [phone]

## 2024-03-22 LAB
MDC_IDC_LEAD_CONNECTION_STATUS: NORMAL
MDC_IDC_LEAD_CONNECTION_STATUS: NORMAL
MDC_IDC_LEAD_IMPLANT_DT: NORMAL
MDC_IDC_LEAD_IMPLANT_DT: NORMAL
MDC_IDC_LEAD_LOCATION: NORMAL
MDC_IDC_LEAD_LOCATION: NORMAL
MDC_IDC_LEAD_MFG: NORMAL
MDC_IDC_LEAD_MFG: NORMAL
MDC_IDC_LEAD_MODEL: NORMAL
MDC_IDC_LEAD_MODEL: NORMAL
MDC_IDC_LEAD_POLARITY_TYPE: NORMAL
MDC_IDC_LEAD_POLARITY_TYPE: NORMAL
MDC_IDC_LEAD_SERIAL: NORMAL
MDC_IDC_LEAD_SERIAL: NORMAL
MDC_IDC_MSMT_BATTERY_DTM: NORMAL
MDC_IDC_MSMT_BATTERY_IMPEDANCE: 1351 OHM
MDC_IDC_MSMT_BATTERY_REMAINING_LONGEVITY: 60 MO
MDC_IDC_MSMT_BATTERY_STATUS: NORMAL
MDC_IDC_MSMT_BATTERY_VOLTAGE: 2.77 V
MDC_IDC_MSMT_LEADCHNL_RA_IMPEDANCE_VALUE: 532 OHM
MDC_IDC_MSMT_LEADCHNL_RA_PACING_THRESHOLD_AMPLITUDE: 0.5 V
MDC_IDC_MSMT_LEADCHNL_RA_PACING_THRESHOLD_PULSEWIDTH: 0.4 MS
MDC_IDC_MSMT_LEADCHNL_RV_IMPEDANCE_VALUE: 527 OHM
MDC_IDC_MSMT_LEADCHNL_RV_PACING_THRESHOLD_AMPLITUDE: 0.88 V
MDC_IDC_MSMT_LEADCHNL_RV_PACING_THRESHOLD_PULSEWIDTH: 0.4 MS
MDC_IDC_PG_IMPLANT_DTM: NORMAL
MDC_IDC_PG_MFG: NORMAL
MDC_IDC_PG_MODEL: NORMAL
MDC_IDC_PG_SERIAL: NORMAL
MDC_IDC_PG_TYPE: NORMAL
MDC_IDC_SESS_CLINIC_NAME: NORMAL
MDC_IDC_SESS_DTM: NORMAL
MDC_IDC_SESS_TYPE: NORMAL
MDC_IDC_SET_BRADY_AT_MODE_SWITCH_MODE: NORMAL
MDC_IDC_SET_BRADY_AT_MODE_SWITCH_RATE: 175 {BEATS}/MIN
MDC_IDC_SET_BRADY_LOWRATE: 60 {BEATS}/MIN
MDC_IDC_SET_BRADY_MAX_SENSOR_RATE: 130 {BEATS}/MIN
MDC_IDC_SET_BRADY_MAX_TRACKING_RATE: 130 {BEATS}/MIN
MDC_IDC_SET_BRADY_MODE: NORMAL
MDC_IDC_SET_BRADY_PAV_DELAY_LOW: 350 MS
MDC_IDC_SET_BRADY_SAV_DELAY_LOW: 350 MS
MDC_IDC_SET_LEADCHNL_RA_PACING_AMPLITUDE: 1.5 V
MDC_IDC_SET_LEADCHNL_RA_PACING_CAPTURE_MODE: NORMAL
MDC_IDC_SET_LEADCHNL_RA_PACING_POLARITY: NORMAL
MDC_IDC_SET_LEADCHNL_RA_PACING_PULSEWIDTH: 0.4 MS
MDC_IDC_SET_LEADCHNL_RA_SENSING_POLARITY: NORMAL
MDC_IDC_SET_LEADCHNL_RA_SENSING_SENSITIVITY: 0.5 MV
MDC_IDC_SET_LEADCHNL_RV_PACING_AMPLITUDE: 2 V
MDC_IDC_SET_LEADCHNL_RV_PACING_CAPTURE_MODE: NORMAL
MDC_IDC_SET_LEADCHNL_RV_PACING_POLARITY: NORMAL
MDC_IDC_SET_LEADCHNL_RV_PACING_PULSEWIDTH: 0.4 MS
MDC_IDC_SET_LEADCHNL_RV_SENSING_POLARITY: NORMAL
MDC_IDC_SET_LEADCHNL_RV_SENSING_SENSITIVITY: 2.8 MV
MDC_IDC_SET_ZONE_DETECTION_INTERVAL: 333.33 MS
MDC_IDC_SET_ZONE_DETECTION_INTERVAL: 342.86 MS
MDC_IDC_SET_ZONE_STATUS: NORMAL
MDC_IDC_SET_ZONE_STATUS: NORMAL
MDC_IDC_SET_ZONE_TYPE: NORMAL
MDC_IDC_SET_ZONE_TYPE: NORMAL
MDC_IDC_SET_ZONE_VENDOR_TYPE: NORMAL
MDC_IDC_SET_ZONE_VENDOR_TYPE: NORMAL
MDC_IDC_STAT_AT_BURDEN_PERCENT: 0.1 %
MDC_IDC_STAT_AT_DTM_END: NORMAL
MDC_IDC_STAT_AT_DTM_START: NORMAL
MDC_IDC_STAT_AT_MODE_SW_COUNT: 54
MDC_IDC_STAT_BRADY_AP_VP_PERCENT: 1 %
MDC_IDC_STAT_BRADY_AP_VS_PERCENT: 73 %
MDC_IDC_STAT_BRADY_AS_VP_PERCENT: 0 %
MDC_IDC_STAT_BRADY_AS_VS_PERCENT: 26 %
MDC_IDC_STAT_BRADY_DTM_END: NORMAL
MDC_IDC_STAT_BRADY_DTM_START: NORMAL
MDC_IDC_STAT_EPISODE_RECENT_COUNT: 0
MDC_IDC_STAT_EPISODE_RECENT_COUNT: 24
MDC_IDC_STAT_EPISODE_RECENT_COUNT_DTM_END: NORMAL
MDC_IDC_STAT_EPISODE_RECENT_COUNT_DTM_END: NORMAL
MDC_IDC_STAT_EPISODE_RECENT_COUNT_DTM_START: NORMAL
MDC_IDC_STAT_EPISODE_RECENT_COUNT_DTM_START: NORMAL
MDC_IDC_STAT_EPISODE_TYPE: NORMAL
MDC_IDC_STAT_EPISODE_TYPE: NORMAL

## 2024-04-15 ENCOUNTER — PATIENT OUTREACH (OUTPATIENT)
Dept: INTERNAL MEDICINE | Facility: CLINIC | Age: 84
End: 2024-04-15
Payer: COMMERCIAL

## 2024-04-15 NOTE — TELEPHONE ENCOUNTER
Patient Quality Outreach    Patient is due for the following:       Topic Date Due    Zoster (Shingles) Vaccine (1 of 2) Never done    Polio Vaccine (2 of 3 - Adult catch-up series) 12/25/1995       Next Steps:   Patient has upcoming appointment, these items will be addressed at that time.    Type of outreach:    Chart review performed, no outreach needed.      Questions for provider review:    None           uSmi Blackwell MA

## 2024-04-15 NOTE — TELEPHONE ENCOUNTER
Patient Quality Outreach    Patient is due for the following:       Topic Date Due    Zoster (Shingles) Vaccine (1 of 2) Never done    Polio Vaccine (2 of 3 - Adult catch-up series) 12/25/1995       Next Steps:   Patient has upcoming appointment, these items will be addressed at that time.    Type of outreach:    Chart review performed, no outreach needed.      Questions for provider review:    None           Sumi Blackwell MA

## 2024-04-22 ENCOUNTER — HOSPITAL ENCOUNTER (OUTPATIENT)
Dept: CARDIOLOGY | Facility: CLINIC | Age: 84
Discharge: HOME | End: 2024-04-22
Attending: INTERNAL MEDICINE | Admitting: INTERNAL MEDICINE
Payer: COMMERCIAL

## 2024-04-22 ENCOUNTER — ANTICOAGULATION THERAPY VISIT (OUTPATIENT)
Dept: ANTICOAGULATION | Facility: CLINIC | Age: 84
End: 2024-04-22

## 2024-04-22 ENCOUNTER — LAB (OUTPATIENT)
Dept: LAB | Facility: CLINIC | Age: 84
End: 2024-04-22
Payer: COMMERCIAL

## 2024-04-22 DIAGNOSIS — I05.9 MITRAL VALVE DISEASE: ICD-10-CM

## 2024-04-22 DIAGNOSIS — I49.5 SICK SINUS SYNDROME (H): Primary | ICD-10-CM

## 2024-04-22 DIAGNOSIS — E78.5 HYPERLIPIDEMIA LDL GOAL <70: ICD-10-CM

## 2024-04-22 DIAGNOSIS — I25.10 ATHEROSCLEROSIS OF NATIVE CORONARY ARTERY OF NATIVE HEART WITHOUT ANGINA PECTORIS: ICD-10-CM

## 2024-04-22 DIAGNOSIS — I50.32 CHRONIC DIASTOLIC CONGESTIVE HEART FAILURE (H): ICD-10-CM

## 2024-04-22 DIAGNOSIS — I48.0 PAROXYSMAL ATRIAL FIBRILLATION (H): ICD-10-CM

## 2024-04-22 DIAGNOSIS — I10 ESSENTIAL HYPERTENSION: Primary | ICD-10-CM

## 2024-04-22 LAB
INR BLD: 1.6 (ref 0.9–1.1)
LVEF ECHO: NORMAL

## 2024-04-22 PROCEDURE — 93306 TTE W/DOPPLER COMPLETE: CPT

## 2024-04-22 PROCEDURE — 93306 TTE W/DOPPLER COMPLETE: CPT | Mod: 26 | Performed by: INTERNAL MEDICINE

## 2024-04-22 PROCEDURE — 36415 COLL VENOUS BLD VENIPUNCTURE: CPT

## 2024-04-22 PROCEDURE — 80053 COMPREHEN METABOLIC PANEL: CPT

## 2024-04-22 PROCEDURE — 85610 PROTHROMBIN TIME: CPT

## 2024-04-22 PROCEDURE — 80061 LIPID PANEL: CPT

## 2024-04-22 NOTE — PROGRESS NOTES
ANTICOAGULATION MANAGEMENT     Charles Nogueira 84 year old male is on warfarin with subtherapeutic INR result. (Goal INR 2.0-3.0)    Recent labs: (last 7 days)     04/22/24  1041   INR 1.6*       ASSESSMENT     Warfarin Lab Questionnaire    Warfarin Doses Last 7 Days      4/22/2024    10:34 AM   Dose in Tablet or Mg   TAB or MG? milligram (mg)     Pt Rptd Dose SUNDAY MONDAY TUESDAY WED THURS FRIDAY SATURDAY 4/22/2024  10:34 AM 1.25 2.5 1.25 1.25 1.25 1.25 1.25         4/22/2024   Warfarin Lab Questionnaire   Missed doses within past 14 days? No   Changes in diet or alcohol within past 14 days? No-- has had far less greens this past week than usual   Medication changes since last result? No   Injuries or illness since last result? No   New shortness of breath, severe headaches or sudden changes in vision since last result? No   Abnormal bleeding since last result? No   Upcoming surgery, procedure? No   Best number to call with results? [phone]     Previous result: Therapeutic last 2(+) visits  Additional findings: None       PLAN     Recommended plan for no diet, medication or health factor changes affecting INR     Dosing Instructions: booster dose then Increase your warfarin dose (12.5% change) with next INR in 2 weeks       Summary  As of 4/22/2024      Full warfarin instructions:  4/22: 3.75 mg; Otherwise 2.5 mg every Mon, Fri; 1.25 mg all other days   Next INR check:  5/6/2024               Telephone call with wife, Paula who agrees to plan and repeated back plan correctly    Check at provider office visit    Education provided:   Contact [phone] with any changes, questions or concerns.     Plan made per [facility] anticoagulation protocol    Mary Arguelles, RN  Anticoagulation Clinic  4/22/2024    _______________________________________________________________________     Anticoagulation Episode Summary       Current INR goal:  2.0-3.0   TTR:  62.0% (1 y)   Target end date:  Indefinite   Send INR  reminders to:  [facility] HEART INR NURSE    Indications    Sick sinus syndrome (H) [I49.5]  Paroxysmal atrial fibrillation (H) [I48.0]  Mitral valve disease [I05.9]             Comments:               Anticoagulation Care Providers       Provider Role Specialty Phone number    Ana Dean MD Referring Cardiovascular Disease [phone]

## 2024-04-23 LAB
ALBUMIN SERPL BCG-MCNC: 4.1 G/DL (ref 3.5–5.2)
ALP SERPL-CCNC: 97 U/L (ref 40–150)
ALT SERPL W P-5'-P-CCNC: 22 U/L (ref 0–70)
ANION GAP SERPL CALCULATED.3IONS-SCNC: 9 MMOL/L (ref 7–15)
AST SERPL W P-5'-P-CCNC: 34 U/L (ref 0–45)
BILIRUB SERPL-MCNC: 1 MG/DL
BUN SERPL-MCNC: 23.7 MG/DL (ref 8–23)
CALCIUM SERPL-MCNC: 9.4 MG/DL (ref 8.8–10.2)
CHLORIDE SERPL-SCNC: 104 MMOL/L (ref 98–107)
CHOLEST SERPL-MCNC: 112 MG/DL
CREAT SERPL-MCNC: 1.22 MG/DL (ref 0.67–1.17)
DEPRECATED HCO3 PLAS-SCNC: 28 MMOL/L (ref 22–29)
EGFRCR SERPLBLD CKD-EPI 2021: 59 ML/MIN/1.73M2
FASTING STATUS PATIENT QL REPORTED: NO
GLUCOSE SERPL-MCNC: 86 MG/DL (ref 70–99)
HDLC SERPL-MCNC: 39 MG/DL
LDLC SERPL CALC-MCNC: 58 MG/DL
NONHDLC SERPL-MCNC: 73 MG/DL
POTASSIUM SERPL-SCNC: 4.2 MMOL/L (ref 3.4–5.3)
PROT SERPL-MCNC: 6.9 G/DL (ref 6.4–8.3)
SODIUM SERPL-SCNC: 141 MMOL/L (ref 135–145)
TRIGL SERPL-MCNC: 73 MG/DL

## 2024-04-26 ENCOUNTER — TELEPHONE (OUTPATIENT)
Dept: OTHER | Facility: CLINIC | Age: 84
End: 2024-04-26
Payer: COMMERCIAL

## 2024-04-26 NOTE — TELEPHONE ENCOUNTER
Please see below and call patient to schedule.    Appt note: follow up to discuss labs.    Debi MULTANI, VICENTA    [facility]  [facility]  Office: [phone]  Fax: [phone]

## 2024-04-26 NOTE — TELEPHONE ENCOUNTER
----- Message from Zach Finnegan MD sent at 4/25/2024  3:44 PM CDT -----  He is due for Virtual or office visit , please schedule next available to discus .

## 2024-04-26 NOTE — TELEPHONE ENCOUNTER
Left voicemail for patient to call back to schedule appointment(s), provided telephone number for patient to call back to schedule.     Z Plasty Text: The lesion was extirpated to the level of the fat with a #15 scalpel blade.  Given the location of the defect, shape of the defect and the proximity to free margins a Z-plasty was deemed most appropriate for repair.  Using a sterile surgical marker, the appropriate transposition arms of the Z-plasty were drawn incorporating the defect and placing the expected incisions within the relaxed skin tension lines where possible.    The area thus outlined was incised deep to adipose tissue with a #15 scalpel blade.  The skin margins were undermined to an appropriate distance in all directions utilizing iris scissors.  The opposing transposition arms were then transposed into place in opposite direction and anchored with interrupted buried subcutaneous sutures.

## 2024-04-26 NOTE — TELEPHONE ENCOUNTER
Patient spouse called stating he is seeing another provider regarding care and does not need to follow-up with Dr Finnegan at this time.    Routing to RN to review, encounter can be signed.

## 2024-05-02 SDOH — HEALTH STABILITY: PHYSICAL HEALTH: ON AVERAGE, HOW MANY DAYS PER WEEK DO YOU ENGAGE IN MODERATE TO STRENUOUS EXERCISE (LIKE A BRISK WALK)?: 2 DAYS

## 2024-05-02 SDOH — HEALTH STABILITY: PHYSICAL HEALTH: ON AVERAGE, HOW MANY MINUTES DO YOU ENGAGE IN EXERCISE AT THIS LEVEL?: 30 MIN

## 2024-05-02 ASSESSMENT — SOCIAL DETERMINANTS OF HEALTH (SDOH): HOW OFTEN DO YOU GET TOGETHER WITH FRIENDS OR RELATIVES?: THREE TIMES A WEEK

## 2024-05-06 ENCOUNTER — OFFICE VISIT (OUTPATIENT)
Dept: INTERNAL MEDICINE | Facility: CLINIC | Age: 84
End: 2024-05-06
Attending: INTERNAL MEDICINE
Payer: COMMERCIAL

## 2024-05-06 VITALS
OXYGEN SATURATION: 98 % | HEIGHT: 69 IN | HEART RATE: 71 BPM | WEIGHT: 147 LBS | TEMPERATURE: 97.9 F | DIASTOLIC BLOOD PRESSURE: 63 MMHG | RESPIRATION RATE: 18 BRPM | BODY MASS INDEX: 21.77 KG/M2 | SYSTOLIC BLOOD PRESSURE: 94 MMHG

## 2024-05-06 DIAGNOSIS — Z00.00 ENCOUNTER FOR ANNUAL WELLNESS EXAM IN MEDICARE PATIENT: Primary | ICD-10-CM

## 2024-05-06 DIAGNOSIS — C43.59 MALIGNANT MELANOMA OF SKIN OF TRUNK, EXCEPT SCROTUM (H): ICD-10-CM

## 2024-05-06 DIAGNOSIS — I48.0 PAROXYSMAL ATRIAL FIBRILLATION (H): ICD-10-CM

## 2024-05-06 DIAGNOSIS — I50.32 CHRONIC DIASTOLIC CONGESTIVE HEART FAILURE (H): ICD-10-CM

## 2024-05-06 DIAGNOSIS — I10 ESSENTIAL HYPERTENSION: ICD-10-CM

## 2024-05-06 PROBLEM — R29.898 WEAKNESS OF BOTH UPPER EXTREMITIES: Status: RESOLVED | Noted: 2022-04-26 | Resolved: 2024-05-06

## 2024-05-06 PROCEDURE — G0439 PPPS, SUBSEQ VISIT: HCPCS | Performed by: INTERNAL MEDICINE

## 2024-05-06 PROCEDURE — 99214 OFFICE O/P EST MOD 30 MIN: CPT | Mod: 25 | Performed by: INTERNAL MEDICINE

## 2024-05-06 RX ORDER — RESPIRATORY SYNCYTIAL VIRUS VACCINE 120MCG/0.5
0.5 KIT INTRAMUSCULAR ONCE
Qty: 1 EACH | Refills: 0 | Status: CANCELLED | OUTPATIENT
Start: 2024-05-06 | End: 2024-05-06

## 2024-05-06 NOTE — PROGRESS NOTES
05/06/24 0954   Mini Cog Assessment   Clock Draw Score 2 Normal   3 Item Recall 3 objects recalled   Mini Cog Total Score 5

## 2024-05-06 NOTE — PROGRESS NOTES
Preventive Care Visit  [facility]  Fer Mora MD, Internal Medicine  May 6, 2024      Assessment & Plan     Encounter for annual wellness exam in Medicare patient  Adult wellness plan reviewed.    Chronic diastolic congestive heart failure (H)  Patient will continue metoprolol, statin, and losartan as currently prescribed.  He does have a follow-up appointment with his cardiologist on May 13, 2024.    Essential hypertension  His blood pressure is on the lower side today at 94/63.  He is currently asymptomatic.  Patient did not wish to discuss his blood pressure medications with his cardiologist before any changes are made.    Paroxysmal atrial fibrillation (H)  Chronic condition.  Managed by cardiologist.  He will continue his metoprolol and his warfarin as currently prescribed.    Malignant melanoma of skin of trunk, except scrotum (H)  Followed by dermatology.    Patient has been advised of split billing requirements and indicates understanding: Yes    30 minutes spent by me on the date of the encounter doing chart review, history and exam, documentation and further activities per the note      Counseling  Appropriate preventive services were discussed with this patient, including applicable screening as appropriate for fall prevention, nutrition, physical activity, Tobacco-use cessation, weight loss and cognition.  Checklist reviewing preventive services available has been given to the patient.  Reviewed patient's diet, addressing concerns and/or questions.   He is at risk for lack of exercise and has been provided with information to increase physical activity for the benefit of his well-being.   He is at risk for psychosocial distress and has been provided with information to reduce risk.   Discussed possible causes of fatigue. The patient was provided with written information regarding signs of hearing loss.       See Patient Instructions    Do Soto is a 84 year  old, presenting for the following:  Medicare Visit        5/6/2024     9:24 AM   Additional Questions   Roomed by ALEXI Sales       Health Care Directive  Patient has a Health Care Directive on file  Advance care planning document is on file and is current.    Patient is an 84-year-old  male who presents to the clinic for his annual wellness check.  He has no acute concerns or complaints at this time.  Patient reports a stable appetite.  He is stooling and voiding without issue.  Patient sleeps well at night.  Patient did previously have fasting labs performed for his cardiologist.   Patient does have a history of hypertension, and he currently takes losartan 100 mg daily for management of his blood pressure.  Patient does not check his blood pressure outside the clinic setting.  He does have a complicated cardiac history that includes CHF, atrial fibrillation, and mitral valve disease.  His warfarin and metoprolol prescription and are being managed by his cardiologist.  His blood pressure is noted to be slightly low today at 94/63, but he is not reporting any symptoms associated with low blood pressure.  Patient did wish to discuss his blood pressure medications with his cardiologist.  He is on chronic anticoagulation as part of his regimen for management of his atrial fibrillation.  Patient is tolerating all of his medications without issue.      Hypertension Follow-up    Do you check your blood pressure regularly outside of the clinic? Yes   Are you following a low salt diet? Yes  Are your blood pressures ever more than 140 on the top number (systolic) OR more   than 90 on the bottom number (diastolic), for example 140/90? No        5/2/2024   General Health   How would you rate your overall physical health? Good   Feel stress (tense, anxious, or unable to sleep) Only a little   (!) STRESS CONCERN      5/2/2024   Nutrition   Diet: Regular (no restrictions)         5/2/2024   Exercise   Days per week of  moderate/strenous exercise 2 days   Average minutes spent exercising at this level 30 min   (!) EXERCISE CONCERN      5/2/2024   Social Factors   Frequency of gathering with friends or relatives Three times a week   Worry food won't last until get money to buy more No   Food not last or not have enough money for food? No   Do you have housing?  Yes   Are you worried about losing your housing? No   Lack of transportation? No   Unable to get utilities (heat,electricity)? No         5/6/2024   Fall Risk   Fallen 2 or more times in the past year? No   Trouble with walking or balance? No          5/2/2024   Activities of Daily Living- Home Safety   Needs help with the following daily activites None of the above   Safety concerns in the home None of the above         5/2/2024   Dental   Dentist two times every year? Yes         5/2/2024   Hearing Screening   Hearing concerns? (!) I NEED TO ASK PEOPLE TO SPEAK UP OR REPEAT THEMSELVES.         5/2/2024   Driving Risk Screening   Patient/family members have concerns about driving No         5/2/2024   General Alertness/Fatigue Screening   Have you been more tired than usual lately? (!) YES         5/2/2024   Urinary Incontinence Screening   Bothered by leaking urine in past 6 months No         5/2/2024   TB Screening   Were you born outside of the [place]? No         Today's PHQ-2 Score:       5/6/2024     9:16 AM   PHQ-2 ( 1999 [employer])   Q1: Little interest or pleasure in doing things 0   Q2: Feeling down, depressed or hopeless 1   PHQ-2 Score 1   Q1: Little interest or pleasure in doing things Not at all   Q2: Feeling down, depressed or hopeless Several days   PHQ-2 Score 1           5/2/2024   Substance Use   Alcohol more than 3/day or more than 7/wk No   Do you have a current opioid prescription? No   How severe/bad is pain from 1 to 10? 0/10 (No Pain)   Do you use any other substances recreationally? No     Social History     Tobacco Use    Smoking status: Former     Current  packs/day: 0.00     Average packs/day: 1 pack/day for 4.0 years (4.0 ttl pk-yrs)     Types: Cigarettes     Start date: 1963     Quit date: 1967     Years since quittin.3    Smokeless tobacco: Never   Vaping Use    Vaping status: Never Used   Substance Use Topics    Alcohol use: No     Alcohol/week: 0.0 standard drinks of alcohol    Drug use: No       Reviewed and updated as needed this visit by Provider                    Labs reviewed in EPIC  Current providers sharing in care for this patient include:  Patient Care Team:  No Ref-Primary, Physician as PCP - General  Fer Mora MD as Assigned PCP  Ana Dean MD as Assigned Heart and Vascular Provider    The following health maintenance items are reviewed in Epic and correct as of today:  Health Maintenance   Topic Date Due    MIGRAINE ACTION PLAN  Never done    ZOSTER IMMUNIZATION (1 of 2) Never done    IPV IMMUNIZATION (2 of 3 - Adult catch-up series) 1995    RSV VACCINE (Pregnancy & 60+) (1 - 1-dose 60+ series) Never done    ANNUAL REVIEW OF HM ORDERS  2024    MEDICARE ANNUAL WELLNESS VISIT  2024    CBC  2024    BMP  10/22/2024    ALT  2025    LIPID  2025    CREATININE  2025    HF ACTION PLAN  2025    FALL RISK ASSESSMENT  2025    COLORECTAL CANCER SCREENING  2025    ADVANCE CARE PLANNING  2028    DTAP/TDAP/TD IMMUNIZATION (5 - Td or Tdap) 2029    TSH W/FREE T4 REFLEX  Completed    PHQ-2 (once per calendar year)  Completed    INFLUENZA VACCINE  Completed    Pneumococcal Vaccine: 65+ Years  Completed    HPV IMMUNIZATION  Aged Out    MENINGITIS IMMUNIZATION  Aged Out    RSV MONOCLONAL ANTIBODY  Aged Out    COVID-19 Vaccine  Discontinued         Review of Systems  Constitutional, HEENT, cardiovascular, pulmonary, gi and gu systems are negative, except as otherwise noted.     Objective    Exam  Blood pressure 94/63, pulse 71, temperature 97.9  F (36.6  C), resp.  "rate 18, height 1.753 m (5' 9\"), weight 66.7 kg (147 lb), SpO2 98%.      Physical Exam  Vitals reviewed.   Constitutional:       Appearance: Normal appearance.   HENT:      Head: Normocephalic and atraumatic.      Mouth/Throat:      Mouth: Mucous membranes are moist.      Pharynx: Oropharynx is clear.   Eyes:      Extraocular Movements: Extraocular movements intact.      Conjunctiva/sclera: Conjunctivae normal.      Pupils: Pupils are equal, round, and reactive to light.   Cardiovascular:      Rate and Rhythm: Normal rate and regular rhythm.      Pulses: Normal pulses.      Heart sounds: Normal heart sounds.   Pulmonary:      Effort: Pulmonary effort is normal.      Breath sounds: Normal breath sounds.   Musculoskeletal:      Cervical back: Normal range of motion and neck supple.   Skin:     General: Skin is warm and dry.      Capillary Refill: Capillary refill takes less than 2 seconds.   Neurological:      Mental Status: He is alert.                No data to display                  Signed Electronically by: Fer Mora MD    "

## 2024-05-06 NOTE — PATIENT INSTRUCTIONS
Patient Education   Well Visit, Over 65: Care Instructions  Well visits can help you stay healthy. Your doctor has checked your overall health and may have suggested ways to take good care of yourself. Your doctor also may have recommended tests. You can help prevent illness with healthy eating, good sleep, vaccinations, regular exercise, and other steps.    Get the tests that you and your doctor decide on. Depending on your age and risks, examples might include hearing tests as well as screening for colon, breast, and lung cancer. Screening helps find diseases before any symptoms appear.   Eat healthy foods. Choose fruits, vegetables, whole grains, lean protein, and low-fat dairy foods. Limit saturated fat, and reduce salt.     Limit alcohol. Men should have no more than 2 drinks a day. Women should have no more than 1. For some people, no alcohol is the best choice.   Exercise. It can help prevent falls. Get at least 30 minutes of exercise on most days of the week. Walking, yoga, and lee chi can be good choices.     Reach and stay at your healthy weight. This will lower your risk for many health problems.   Take care of your mental health. Try to stay connected with friends, family, and community, and find ways to manage stress.     If you're feeling depressed or hopeless, talk to someone. A counselor can help. If you don't have a counselor, talk to your doctor.   Talk to your doctor if you think you may have a problem with alcohol or drug use. This includes prescription medicines and illegal drugs.     Avoid tobacco and nicotine: Don't smoke, vape, or chew. If you need help quitting, talk to your doctor.   Practice safer sex. Getting tested, using condoms or dental dams, and limiting sex partners can help prevent STIs.     Make an advance directive. This is a legal way to tell your family and doctor what you want to happen at the end of your life or when you can't speak for yourself.   Prevent problems where you  "can. Protect your skin from too much sun, wash your hands, brush your teeth twice a day, and wear a seat belt in the car.   Where can you learn more?  Go to https://www.[employer].net/patiented  Enter K859 in the search box to learn more about \"Well Visit, Over 65: Care Instructions.\"  Current as of: August 6, 2023               Content Version: 14.0    7136-7235 [employer].   Care instructions adapted under license by your healthcare professional. If you have questions about a medical condition or this instruction, always ask your healthcare professional. [employer] disclaims any warranty or liability for your use of this information.         "

## 2024-05-13 ENCOUNTER — OFFICE VISIT (OUTPATIENT)
Dept: CARDIOLOGY | Facility: CLINIC | Age: 84
End: 2024-05-13
Attending: INTERNAL MEDICINE
Payer: COMMERCIAL

## 2024-05-13 ENCOUNTER — MYC MEDICAL ADVICE (OUTPATIENT)
Dept: ANTICOAGULATION | Facility: CLINIC | Age: 84
End: 2024-05-13

## 2024-05-13 VITALS
WEIGHT: 148.5 LBS | HEART RATE: 75 BPM | OXYGEN SATURATION: 99 % | BODY MASS INDEX: 22 KG/M2 | DIASTOLIC BLOOD PRESSURE: 80 MMHG | HEIGHT: 69 IN | SYSTOLIC BLOOD PRESSURE: 114 MMHG

## 2024-05-13 DIAGNOSIS — E78.5 HYPERLIPIDEMIA LDL GOAL <70: ICD-10-CM

## 2024-05-13 DIAGNOSIS — I25.10 ATHEROSCLEROSIS OF NATIVE CORONARY ARTERY OF NATIVE HEART WITHOUT ANGINA PECTORIS: ICD-10-CM

## 2024-05-13 PROCEDURE — G2211 COMPLEX E/M VISIT ADD ON: HCPCS | Performed by: INTERNAL MEDICINE

## 2024-05-13 PROCEDURE — 99214 OFFICE O/P EST MOD 30 MIN: CPT | Performed by: INTERNAL MEDICINE

## 2024-05-13 NOTE — PROGRESS NOTES
[facility] Progress note    Assessment:  1.  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  Hypertension, controlled  -Continue metoprolol and losartan.   3. Hyperlipidemia LDL 58 on 20 mg atorvastatin  4.  Paroxysmal atrial fibrillation  -Warfarin for thromboembolic prophylaxis. Historically DOACs have been prohibitively expensive. He would like to have price re-evaluated  5.  Sinus node dysfunction s/p dual-chamber pacemaker placement implant 2014  -7/2023   6.  History of Moderate mitral regurgitation. Mild on most recent echo  7.  Mild CKD. GFR 59.       Plan:  No change in metoprolol dose. We discussed whether to decrease his metoprolol dose, but ultimately his energy level is quite good and he tolerates his low-normal BP and has occasional ventricular high rates noted on device checks.   He is interested changing to a DOAC, but is concerned about the cost. He would like to check again on cost of apixaban or rivaroxaban and will call his insurance to check on the cost.   Follow up in one year with echo prior to followup EF and on mitral regurgitation. Follow up sooner if needed.     The longitudinal plan of care for the diagnosis(es)/condition(s) as documented were addressed during this visit. Due to the added complexity in care, I will continue to support Charles in the subsequent management and with ongoing continuity of care.      HPI:     Charles Nogueira  is a very nice 84 year old M patient here with his wife for follow up.  He has h/o paroxyasmal afib, SSS s/p PPM, moderate mitral regurgitation.      Last visit one year ago we increased statin and recommended 6 month follow up with echo. EF was 50-55% on recent echo. Was 55-60% last yr, but 46% on a previous stress test, so EF most likely is stable.     He had a jennifer winter for 5 months near [facility].  He started napping regularly a few yrs ago. He is not otherwise  fatigued.  He has no abnormal dyspnea, no chest pain.  He exercises regularly.  He goes to the gym 2-4 days per week and walks everywhere when he is in [place].  They walk to the grocery store and ride home on the bus with groceries. He has no physical limitations due to any cardiovascular symptoms. He tolerates his warfarin and has not had any abnormal bleeding. He would like to be on a DOAC and would like to get options priced out again. He will check with his insurance about this.    We reviewed his recent test results. They were concerned about his Cr 1.2, but this is within range of his previous BMPs for several yrs.  BP well-controlled as noted. We considered whether to decrease his metoprolol, but ultimatey he is doing quite well and very active and we decided that we will keep the current dose to prevent afib w/RVR and PVCs, and we can consider decreasing the dose if he feels fatigued or has lower BP or pacer checks show more pacing and no ventricular fast rates.         Echo 4/22/24  Echo result w/o MOPS: Interpretation Summary The visual ejection fraction is 50-55%.There are regional wall motion abnormalities as specified.There is mild (1+) mitral regurgitation.Contrast would enhance regional wall motion analysis. The study wastechnically difficult.      CURRENT MEDICATIONS:  Current Outpatient Medications   Medication Sig Dispense Refill    atorvastatin (LIPITOR) 20 MG tablet Take 1 tablet (20 mg) by mouth daily 90 tablet 3    levETIRAcetam (KEPPRA) 500 MG tablet Take 500 mg by mouth 2 times daily      losartan (COZAAR) 100 MG tablet Take 1 tablet (100 mg) by mouth daily 90 tablet 3    metoprolol succinate ER (TOPROL XL) 50 MG 24 hr tablet Take 1 tablet (50 mg) by mouth daily 90 tablet 3    Multiple Vitamins-Minerals (ZINC PO)       Turmeric 500 MG CAPS       warfarin ANTICOAGULANT (COUMADIN) 2.5 MG tablet Take 0.5-1 tablets (1.25-2.5 mg) by mouth daily Adjust dose based on INR results as directed. 90  tablet 1    warfarin ANTICOAGULANT (COUMADIN) 2.5 MG tablet Take 1.25 mg by mouth five times a week Mon, Tues, Thurs, Fri, & Sat         ALLERGIES     Allergies   Allergen Reactions    Sulfa Antibiotics      rash       PAST MEDICAL, SURGICAL, FAMILY, SOCIAL HISTORY:  History was reviewed and updated as needed, see medical record.    REVIEW OF SYSTEMS:  Skin:        Eyes:       ENT:       Respiratory:  Negative    Cardiovascular:    Positive for;fatigue  Gastroenterology:      Genitourinary:       Musculoskeletal:       Neurologic:       Psychiatric:       Heme/Lymph/Imm:       Endocrine:         PHYSICAL EXAM:      BP: 114/80 Pulse: 75     SpO2: 99 %      Vital Signs with Ranges  Pulse:  [75] 75  BP: (114)/(80) 114/80  SpO2:  [99 %] 99 %  148 lbs 8 oz    Constitutional: awake, alert, no distress  Eyes: sclera nonicteric  ENT: trachea midline  Respiratory: clear bilaterally  Cardiovascular: regular rate and rhythm no murmur  GI: nondistended, nontender, bowel sounds present  Skin: dry, no rash trace bilateral nontender pretibial edema. He was unaware of this trace edema until I pointed it out. Superficial varicosities.   Musculoskeletal: grossly normal muscle bulk and tone  Neurologic: no  gross focal deficits  Neuropsychiatric: normal affect    Recent Lab Results:  LIPID RESULTS:  Lab Results   Component Value Date    CHOL 112 04/22/2024    CHOL 179 12/17/2020    HDL 39 (L) 04/22/2024    HDL 39 (L) 12/17/2020    LDL 58 04/22/2024     (H) 12/17/2020    TRIG 73 04/22/2024    TRIG 97 12/17/2020    CHOLHDLRATIO 4.5 11/10/2015       LIVER ENZYME RESULTS:  Lab Results   Component Value Date    AST 34 04/22/2024    AST 24 12/17/2020    ALT 22 04/22/2024    ALT 26 12/17/2020       CBC RESULTS:  Lab Results   Component Value Date    WBC 6.7 06/28/2023    WBC 9.7 09/21/2018    RBC 4.61 06/28/2023    RBC 5.19 09/21/2018    HGB 13.8 06/28/2023    HGB 15.4 09/21/2018    HCT 42.6 06/28/2023    HCT 47.7 09/21/2018    MCV 92  "06/28/2023    MCV 92 09/21/2018    MCH 29.9 06/28/2023    MCH 29.7 09/21/2018    MCHC 32.4 06/28/2023    MCHC 32.3 09/21/2018    RDW 13.2 06/28/2023    RDW 13.5 09/21/2018     (L) 06/28/2023     09/21/2018       BMP RESULTS:  Lab Results   Component Value Date     04/22/2024     12/17/2020    POTASSIUM 4.2 04/22/2024    POTASSIUM 4.2 05/12/2022    POTASSIUM 3.8 12/17/2020    CHLORIDE 104 04/22/2024    CHLORIDE 108 05/12/2022    CHLORIDE 107 12/17/2020    CO2 28 04/22/2024    CO2 29 05/12/2022    CO2 32 12/17/2020    ANIONGAP 9 04/22/2024    ANIONGAP 2 (L) 05/12/2022    ANIONGAP <1 (L) 12/17/2020    GLC 86 04/22/2024    GLC 93 05/12/2022     (H) 12/17/2020    BUN 23.7 (H) 04/22/2024    BUN 27 05/12/2022    BUN 19 12/17/2020    CR 1.22 (H) 04/22/2024    CR 1.00 12/17/2020    GFRESTIMATED 59 (L) 04/22/2024    GFRESTIMATED >60 09/27/2022    GFRESTIMATED 71 12/17/2020    GFRESTBLACK 83 12/17/2020    MANUELA 9.4 04/22/2024    MANUELA 9.0 12/17/2020        A1C RESULTS:  No results found for: \"A1C\"    INR RESULTS:  Lab Results   Component Value Date    INR 1.6 (H) 04/22/2024    INR 2.5 (A) 03/21/2024    INR 2.3 (A) 02/28/2024                             "

## 2024-05-13 NOTE — LETTER
5/13/2024    Physician No Ref-Primary  No address on file    RE: Charles Nogueira       Dear Colleague,     I had the pleasure of seeing Charles Nogueira in the [facility].  [facility] Progress note    Assessment:  1.  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  Hypertension, controlled  -Continue metoprolol and losartan.   3. Hyperlipidemia LDL 58 on 20 mg atorvastatin  4.  Paroxysmal atrial fibrillation  -Warfarin for thromboembolic prophylaxis. Historically DOACs have been prohibitively expensive. He would like to have price re-evaluated  5.  Sinus node dysfunction s/p dual-chamber pacemaker placement implant 2014  -7/2023   6.  History of Moderate mitral regurgitation. Mild on most recent echo  7.  Mild CKD. GFR 59.       Plan:  No change in metoprolol dose. We discussed whether to decrease his metoprolol dose, but ultimately his energy level is quite good and he tolerates his low-normal BP and has occasional ventricular high rates noted on device checks.   He is interested changing to a DOAC, but is concerned about the cost. He would like to check again on cost of apixaban or rivaroxaban and will call his insurance to check on the cost.   Follow up in one year with echo prior to followup EF and on mitral regurgitation. Follow up sooner if needed.     The longitudinal plan of care for the diagnosis(es)/condition(s) as documented were addressed during this visit. Due to the added complexity in care, I will continue to support Charles in the subsequent management and with ongoing continuity of care.      HPI:     Charles Nogueira  is a very nice 84 year old M patient here with his wife for follow up.  He has h/o paroxyasmal afib, SSS s/p PPM, moderate mitral regurgitation.      Last visit one year ago we increased statin and recommended 6 month follow up with echo. EF was 50-55% on recent echo. Was 55-60% last yr,  but 46% on a previous stress test, so EF most likely is stable.     He had a jennifer winter for 5 months near [facility].  He started napping regularly a few yrs ago. He is not otherwise fatigued.  He has no abnormal dyspnea, no chest pain.  He exercises regularly.  He goes to the gym 2-4 days per week and walks everywhere when he is in [place].  They walk to the grocery store and ride home on the bus with groceries. He has no physical limitations due to any cardiovascular symptoms. He tolerates his warfarin and has not had any abnormal bleeding. He would like to be on a DOAC and would like to get options priced out again. He will check with his insurance about this.    We reviewed his recent test results. They were concerned about his Cr 1.2, but this is within range of his previous BMPs for several yrs.  BP well-controlled as noted. We considered whether to decrease his metoprolol, but ultimatey he is doing quite well and very active and we decided that we will keep the current dose to prevent afib w/RVR and PVCs, and we can consider decreasing the dose if he feels fatigued or has lower BP or pacer checks show more pacing and no ventricular fast rates.         Echo 4/22/24  Echo result w/o MOPS: Interpretation Summary The visual ejection fraction is 50-55%.There are regional wall motion abnormalities as specified.There is mild (1+) mitral regurgitation.Contrast would enhance regional wall motion analysis. The study wastechnically difficult.      CURRENT MEDICATIONS:  Current Outpatient Medications   Medication Sig Dispense Refill    atorvastatin (LIPITOR) 20 MG tablet Take 1 tablet (20 mg) by mouth daily 90 tablet 3    levETIRAcetam (KEPPRA) 500 MG tablet Take 500 mg by mouth 2 times daily      losartan (COZAAR) 100 MG tablet Take 1 tablet (100 mg) by mouth daily 90 tablet 3    metoprolol succinate ER (TOPROL XL) 50 MG 24 hr tablet Take 1 tablet (50 mg) by mouth daily 90 tablet 3    Multiple Vitamins-Minerals  (ZINC PO)       Turmeric 500 MG CAPS       warfarin ANTICOAGULANT (COUMADIN) 2.5 MG tablet Take 0.5-1 tablets (1.25-2.5 mg) by mouth daily Adjust dose based on INR results as directed. 90 tablet 1    warfarin ANTICOAGULANT (COUMADIN) 2.5 MG tablet Take 1.25 mg by mouth five times a week Mon, Tues, Thurs, Fri, & Sat         ALLERGIES     Allergies   Allergen Reactions    Sulfa Antibiotics      rash       PAST MEDICAL, SURGICAL, FAMILY, SOCIAL HISTORY:  History was reviewed and updated as needed, see medical record.    REVIEW OF SYSTEMS:  Skin:        Eyes:       ENT:       Respiratory:  Negative    Cardiovascular:    Positive for;fatigue  Gastroenterology:      Genitourinary:       Musculoskeletal:       Neurologic:       Psychiatric:       Heme/Lymph/Imm:       Endocrine:         PHYSICAL EXAM:      BP: 114/80 Pulse: 75     SpO2: 99 %      Vital Signs with Ranges  Pulse:  [75] 75  BP: (114)/(80) 114/80  SpO2:  [99 %] 99 %  148 lbs 8 oz    Constitutional: awake, alert, no distress  Eyes: sclera nonicteric  ENT: trachea midline  Respiratory: clear bilaterally  Cardiovascular: regular rate and rhythm no murmur  GI: nondistended, nontender, bowel sounds present  Skin: dry, no rash trace bilateral nontender pretibial edema. He was unaware of this trace edema until I pointed it out. Superficial varicosities.   Musculoskeletal: grossly normal muscle bulk and tone  Neurologic: no  gross focal deficits  Neuropsychiatric: normal affect    Recent Lab Results:  LIPID RESULTS:  Lab Results   Component Value Date    CHOL 112 04/22/2024    CHOL 179 12/17/2020    HDL 39 (L) 04/22/2024    HDL 39 (L) 12/17/2020    LDL 58 04/22/2024     (H) 12/17/2020    TRIG 73 04/22/2024    TRIG 97 12/17/2020    CHOLHDLRATIO 4.5 11/10/2015       LIVER ENZYME RESULTS:  Lab Results   Component Value Date    AST 34 04/22/2024    AST 24 12/17/2020    ALT 22 04/22/2024    ALT 26 12/17/2020       CBC RESULTS:  Lab Results   Component Value Date     "WBC 6.7 06/28/2023    WBC 9.7 09/21/2018    RBC 4.61 06/28/2023    RBC 5.19 09/21/2018    HGB 13.8 06/28/2023    HGB 15.4 09/21/2018    HCT 42.6 06/28/2023    HCT 47.7 09/21/2018    MCV 92 06/28/2023    MCV 92 09/21/2018    MCH 29.9 06/28/2023    MCH 29.7 09/21/2018    MCHC 32.4 06/28/2023    MCHC 32.3 09/21/2018    RDW 13.2 06/28/2023    RDW 13.5 09/21/2018     (L) 06/28/2023     09/21/2018       BMP RESULTS:  Lab Results   Component Value Date     04/22/2024     12/17/2020    POTASSIUM 4.2 04/22/2024    POTASSIUM 4.2 05/12/2022    POTASSIUM 3.8 12/17/2020    CHLORIDE 104 04/22/2024    CHLORIDE 108 05/12/2022    CHLORIDE 107 12/17/2020    CO2 28 04/22/2024    CO2 29 05/12/2022    CO2 32 12/17/2020    ANIONGAP 9 04/22/2024    ANIONGAP 2 (L) 05/12/2022    ANIONGAP <1 (L) 12/17/2020    GLC 86 04/22/2024    GLC 93 05/12/2022     (H) 12/17/2020    BUN 23.7 (H) 04/22/2024    BUN 27 05/12/2022    BUN 19 12/17/2020    CR 1.22 (H) 04/22/2024    CR 1.00 12/17/2020    GFRESTIMATED 59 (L) 04/22/2024    GFRESTIMATED >60 09/27/2022    GFRESTIMATED 71 12/17/2020    GFRESTBLACK 83 12/17/2020    MANUELA 9.4 04/22/2024    MANUELA 9.0 12/17/2020        A1C RESULTS:  No results found for: \"A1C\"    INR RESULTS:  Lab Results   Component Value Date    INR 1.6 (H) 04/22/2024    INR 2.5 (A) 03/21/2024    INR 2.3 (A) 02/28/2024       Thank you for allowing me to participate in the care of your patient.      Sincerely,     Ana Dean MD     [facility]  cc:   Ana Dean MD  [id number] [id number]      "

## 2024-05-16 ENCOUNTER — TELEPHONE (OUTPATIENT)
Dept: ANTICOAGULATION | Facility: CLINIC | Age: 84
End: 2024-05-16
Payer: COMMERCIAL

## 2024-05-16 NOTE — TELEPHONE ENCOUNTER
ANTICOAGULATION     Charles Nogueira is overdue for an INR check.     Left message for patient to call and schedule lab appointment as soon as possible. If returning call, please schedule.     Jessika Yoo RN

## 2024-05-20 ENCOUNTER — ANTICOAGULATION THERAPY VISIT (OUTPATIENT)
Dept: ANTICOAGULATION | Facility: CLINIC | Age: 84
End: 2024-05-20

## 2024-05-20 ENCOUNTER — LAB (OUTPATIENT)
Dept: LAB | Facility: CLINIC | Age: 84
End: 2024-05-20
Payer: COMMERCIAL

## 2024-05-20 ENCOUNTER — DOCUMENTATION ONLY (OUTPATIENT)
Dept: LAB | Facility: CLINIC | Age: 84
End: 2024-05-20

## 2024-05-20 DIAGNOSIS — I48.0 PAROXYSMAL ATRIAL FIBRILLATION (H): ICD-10-CM

## 2024-05-20 DIAGNOSIS — I49.5 SICK SINUS SYNDROME (H): Primary | ICD-10-CM

## 2024-05-20 DIAGNOSIS — I05.9 MITRAL VALVE DISEASE: ICD-10-CM

## 2024-05-20 LAB — INR BLD: 1.8 (ref 0.9–1.1)

## 2024-05-20 PROCEDURE — 85610 PROTHROMBIN TIME: CPT

## 2024-05-20 PROCEDURE — 36416 COLLJ CAPILLARY BLOOD SPEC: CPT

## 2024-05-20 NOTE — PROGRESS NOTES
ANTICOAGULATION MANAGEMENT     Charles Nogueira 84 year old male is on warfarin with subtherapeutic INR result. (Goal INR 2.0-3.0)    Recent labs: (last 7 days)     05/20/24  0918   INR 1.8*       ASSESSMENT     Source(s): Chart Review and Patient/Caregiver Call     Warfarin doses taken: Less warfarin taken than planned which may be affecting INR-Was only taking 2.5 mg on Fridays  Diet: No new diet changes identified  Medication/supplement changes: None noted  New illness, injury, or hospitalization: No  Signs or symptoms of bleeding or clotting: Yes: 2 random light spotting nose bleeds then nothing.  Previous result: Subtherapeutic  Additional findings: He is NOT going to be taking a DOAC.       PLAN     Recommended plan for no diet, medication or health factor changes affecting INR     Dosing Instructions: Increase your warfarin dose (11% change) with next INR in 2 weeks       Summary  As of 5/20/2024      Full warfarin instructions:  2.5 mg every Mon, Fri; 1.25 mg all other days   Next INR check:  6/3/2024               Telephone call with wifePaula who verbalizes understanding and agrees to plan and who agrees to plan and repeated back plan correctly    Lab visit scheduled    Education provided:   Please call back if any changes to your diet, medications or how you've been taking warfarin  Goal range and lab monitoring: goal range and significance of current result, Importance of therapeutic range, and Importance of following up at instructed interval  Symptom monitoring: monitoring for bleeding signs and symptoms and monitoring for clotting signs and symptoms  Contact [phone] with any changes, questions or concerns.     Plan made per [facility] anticoagulation protocol and per [facility] anticoagulation protocol; closest % change with current tablet size made per protocol for for INR 1.7-1.9 (goal 2-3)    Melissa Grady RN  Anticoagulation  Clinic  5/20/2024    _______________________________________________________________________     Anticoagulation Episode Summary       Current INR goal:  2.0-3.0   TTR:  60.5% (1 y)   Target end date:  Indefinite   Send INR reminders to:  [facility] INR NURSE    Indications    Sick sinus syndrome (H) [I49.5]  Paroxysmal atrial fibrillation (H) [I48.0]  Mitral valve disease [I05.9]             Comments:               Anticoagulation Care Providers       Provider Role Specialty Phone number    Ana Dean MD Referring Cardiovascular Disease [phone]

## 2024-05-20 NOTE — PROGRESS NOTES
ANTICOAGULATION MANAGEMENT     Charles Nogueira 84 year old male is on warfarin with subtherapeutic INR result. (Goal INR 2.0-3.0)    Recent labs: (last 7 days)     05/20/24  0918   INR 1.8*       ASSESSMENT     Source(s): Chart Review     Warfarin doses taken: Reviewed in chart  Diet: No new diet changes identified  Medication/supplement changes: None noted  New illness, injury, or hospitalization: No  Signs or symptoms of bleeding or clotting: No  Previous result: Subtherapeutic-boost dose and 12.5% increase 4 weeks ago  Additional findings: Cardio visit 5/13/24-discussed looking into DOAC cost again to see if it's more affordable.       PLAN     Unable to reach wife Paula today.    Left message to continue current dose of warfarin 2.5 mg tonight. Request call back for assessment.    Follow up required to confirm warfarin dose taken and assess for changes, discuss dosing instructions and confirm understanding of instructions, and see if DOAC is affordable.    Melissa Grady RN  Anticoagulation Clinic  5/20/2024

## 2024-05-20 NOTE — PROGRESS NOTES
POC INR order has . We extended for today's appointment, but a new standing order will need to be placed. Thank you.

## 2024-05-20 NOTE — PROGRESS NOTES
[facility] will place new referral/order after speaking to Charles about him staying or Warfarin or switching to DOAC.  Melissa Grady RN

## 2024-05-21 ENCOUNTER — DOCUMENTATION ONLY (OUTPATIENT)
Dept: ANTICOAGULATION | Facility: CLINIC | Age: 84
End: 2024-05-21
Payer: COMMERCIAL

## 2024-05-21 DIAGNOSIS — I48.0 PAROXYSMAL ATRIAL FIBRILLATION (H): Primary | ICD-10-CM

## 2024-05-21 NOTE — PROGRESS NOTES
ANTICOAGULATION CLINIC REFERRAL RENEWAL REQUEST       An annual renewal order is required for all patients referred to [facility].?  Please review and sign the pended referral order for Charles Nogueira.       ANTICOAGULATION SUMMARY      Warfarin indication(s)   Atrial Fibrillation    Mechanical heart valve present?  NO       Current goal range   INR: 2.0-3.0     Goal appropriate for indication? Goal INR 2-3, standard for indication(s) above     Time in Therapeutic Range (TTR)  (Goal > 60%) 60.5%       Office visit with referring provider's group within last year yes on 5/13/24       Melissa Grady RN  [facility]

## 2024-06-03 ENCOUNTER — LAB (OUTPATIENT)
Dept: LAB | Facility: CLINIC | Age: 84
End: 2024-06-03
Payer: COMMERCIAL

## 2024-06-03 ENCOUNTER — ANTICOAGULATION THERAPY VISIT (OUTPATIENT)
Dept: ANTICOAGULATION | Facility: CLINIC | Age: 84
End: 2024-06-03

## 2024-06-03 DIAGNOSIS — I50.32 CHRONIC DIASTOLIC CONGESTIVE HEART FAILURE (H): Primary | ICD-10-CM

## 2024-06-03 DIAGNOSIS — I48.0 PAROXYSMAL ATRIAL FIBRILLATION (H): ICD-10-CM

## 2024-06-03 DIAGNOSIS — I49.5 SICK SINUS SYNDROME (H): Primary | ICD-10-CM

## 2024-06-03 DIAGNOSIS — I05.9 MITRAL VALVE DISEASE: ICD-10-CM

## 2024-06-03 LAB
ERYTHROCYTE [DISTWIDTH] IN BLOOD BY AUTOMATED COUNT: 13.2 % (ref 10–15)
HCT VFR BLD AUTO: 47 % (ref 40–53)
HGB BLD-MCNC: 15.3 G/DL (ref 13.3–17.7)
INR BLD: 2.7 (ref 0.9–1.1)
MCH RBC QN AUTO: 30 PG (ref 26.5–33)
MCHC RBC AUTO-ENTMCNC: 32.6 G/DL (ref 31.5–36.5)
MCV RBC AUTO: 92 FL (ref 78–100)
PLATELET # BLD AUTO: 138 10E3/UL (ref 150–450)
RBC # BLD AUTO: 5.1 10E6/UL (ref 4.4–5.9)
WBC # BLD AUTO: 8 10E3/UL (ref 4–11)

## 2024-06-03 PROCEDURE — 36416 COLLJ CAPILLARY BLOOD SPEC: CPT

## 2024-06-03 PROCEDURE — 85610 PROTHROMBIN TIME: CPT

## 2024-06-03 PROCEDURE — 36415 COLL VENOUS BLD VENIPUNCTURE: CPT

## 2024-06-03 PROCEDURE — 85027 COMPLETE CBC AUTOMATED: CPT

## 2024-06-03 NOTE — PROGRESS NOTES
ANTICOAGULATION MANAGEMENT     Charles Nogueira 84 year old male is on warfarin with therapeutic INR result. (Goal INR 2.0-3.0)    Recent labs: (last 7 days)     06/03/24  0943   INR 2.7*       ASSESSMENT     Warfarin Lab Questionnaire    Warfarin Doses Last 7 Days      6/3/2024     9:34 AM   Dose in Tablet or Mg   TAB or MG? milligram (mg)     Pt Rptd Dose KYLE MONDAY TUESDAY WED THURS FRIDAY SATURDAY   6/3/2024   9:34 AM 1.25 2.5 1.25 1.25 1.25 2.5 1.25         6/3/2024   Warfarin Lab Questionnaire   Missed doses within past 14 days? No   Changes in diet or alcohol within past 14 days? No   Medication changes since last result? No   Injuries or illness since last result? No   New shortness of breath, severe headaches or sudden changes in vision since last result? No   Abnormal bleeding since last result? No   Upcoming surgery, procedure? No   Best number to call with results? [phone]     Previous result: Subtherapeutic  increased by 11%  Additional findings: None       PLAN     Recommended plan for no diet, medication or health factor changes affecting INR     Dosing Instructions: Continue your current warfarin dose with next INR in 3 weeks       Summary  As of 6/3/2024      Full warfarin instructions:  2.5 mg every Mon, Fri; 1.25 mg all other days   Next INR check:  6/24/2024               Telephone call with Paula who verbalizes understanding and agrees to plan.  Also left message on patient's cell phone.      Lab visit scheduled    Education provided:   Goal range and lab monitoring: goal range and significance of current result  Contact [phone] with any changes, questions or concerns.     Plan made per [facility] anticoagulation protocol    Jessika Yoo, RN  Anticoagulation Clinic  6/3/2024    _______________________________________________________________________     Anticoagulation Episode Summary       Current INR goal:  2.0-3.0   TTR:  60.4% (1 y)   Target end date:  Indefinite   Send INR  reminders to:  [facility] INR NURSE    Indications    Sick sinus syndrome (H) [I49.5]  Paroxysmal atrial fibrillation (H) [I48.0]  Mitral valve disease [I05.9]             Comments:               Anticoagulation Care Providers       Provider Role Specialty Phone number    Ana Dean MD Referring Cardiovascular Disease [phone]

## 2024-06-24 ENCOUNTER — ANTICOAGULATION THERAPY VISIT (OUTPATIENT)
Dept: ANTICOAGULATION | Facility: CLINIC | Age: 84
End: 2024-06-24

## 2024-06-24 ENCOUNTER — LAB (OUTPATIENT)
Dept: LAB | Facility: CLINIC | Age: 84
End: 2024-06-24
Payer: COMMERCIAL

## 2024-06-24 DIAGNOSIS — I48.0 PAROXYSMAL ATRIAL FIBRILLATION (H): ICD-10-CM

## 2024-06-24 DIAGNOSIS — I49.5 SICK SINUS SYNDROME (H): Primary | ICD-10-CM

## 2024-06-24 DIAGNOSIS — I05.9 MITRAL VALVE DISEASE: ICD-10-CM

## 2024-06-24 LAB — INR BLD: 2.5 (ref 0.9–1.1)

## 2024-06-24 PROCEDURE — 36416 COLLJ CAPILLARY BLOOD SPEC: CPT

## 2024-06-24 PROCEDURE — 85610 PROTHROMBIN TIME: CPT

## 2024-06-24 NOTE — PROGRESS NOTES
ANTICOAGULATION MANAGEMENT     Charles Nogueira 84 year old male is on warfarin with therapeutic INR result. (Goal INR 2.0-3.0)    Recent labs: (last 7 days)     06/24/24  0929   INR 2.5*       ASSESSMENT     Warfarin Lab Questionnaire    Warfarin Doses Last 7 Days      6/24/2024     9:27 AM   Dose in Tablet or Mg   TAB or MG? milligram (mg)     Pt Rptd Dose SUNDAY MONDAY TUESDAY WED THURS FRIDAY SATURDAY 6/24/2024   9:27 AM 1.25 2.5 1.25 1.25 1.25 2.5 1.25         6/24/2024   Warfarin Lab Questionnaire   Missed doses within past 14 days? No   Changes in diet or alcohol within past 14 days? No   Medication changes since last result? No   Injuries or illness since last result? No   New shortness of breath, severe headaches or sudden changes in vision since last result? No   Abnormal bleeding since last result? No   Upcoming surgery, procedure? No   Best number to call with results? [phone]        Previous result: Therapeutic last visit; previously outside of goal range  Additional findings: None       PLAN     Recommended plan for no diet, medication or health factor changes affecting INR     Dosing Instructions: Continue your current warfarin dose with next INR in 4 weeks       Summary  As of 6/24/2024      Full warfarin instructions:  2.5 mg every Mon, Fri; 1.25 mg all other days   Next INR check:  7/22/2024               Telephone call with Paula who verbalizes understanding and agrees to plan    Lab visit scheduled    Education provided:   Please call back if any changes to your diet, medications or how you've been taking warfarin    Plan made per [facility] anticoagulation protocol    Kenia Campbell, RN  Anticoagulation Clinic  6/24/2024    _______________________________________________________________________     Anticoagulation Episode Summary       Current INR goal:  2.0-3.0   TTR:  63.5% (1 y)   Target end date:  Indefinite   Send INR reminders to:  [facility]    Indications    Sick sinus  syndrome (H) [I49.5]  Paroxysmal atrial fibrillation (H) [I48.0]  Mitral valve disease [I05.9]             Comments:               Anticoagulation Care Providers       Provider Role Specialty Phone number    Ana Dean MD Referring Cardiovascular Disease [phone]

## 2024-07-15 ENCOUNTER — ANCILLARY PROCEDURE (OUTPATIENT)
Dept: CARDIOLOGY | Facility: CLINIC | Age: 84
End: 2024-07-15
Attending: INTERNAL MEDICINE
Payer: COMMERCIAL

## 2024-07-15 DIAGNOSIS — Z95.0 CARDIAC PACEMAKER IN SITU: ICD-10-CM

## 2024-07-15 DIAGNOSIS — Z95.0 CARDIAC PACEMAKER IN SITU: Primary | ICD-10-CM

## 2024-07-15 DIAGNOSIS — I49.5 SICK SINUS SYNDROME (H): ICD-10-CM

## 2024-07-15 PROCEDURE — 93294 REM INTERROG EVL PM/LDLS PM: CPT | Performed by: INTERNAL MEDICINE

## 2024-07-15 PROCEDURE — 93296 REM INTERROG EVL PM/IDS: CPT | Performed by: INTERNAL MEDICINE

## 2024-07-16 LAB
MDC_IDC_LEAD_CONNECTION_STATUS: NORMAL
MDC_IDC_LEAD_CONNECTION_STATUS: NORMAL
MDC_IDC_LEAD_IMPLANT_DT: NORMAL
MDC_IDC_LEAD_IMPLANT_DT: NORMAL
MDC_IDC_LEAD_LOCATION: NORMAL
MDC_IDC_LEAD_LOCATION: NORMAL
MDC_IDC_LEAD_MFG: NORMAL
MDC_IDC_LEAD_MFG: NORMAL
MDC_IDC_LEAD_MODEL: NORMAL
MDC_IDC_LEAD_MODEL: NORMAL
MDC_IDC_LEAD_POLARITY_TYPE: NORMAL
MDC_IDC_LEAD_POLARITY_TYPE: NORMAL
MDC_IDC_LEAD_SERIAL: NORMAL
MDC_IDC_LEAD_SERIAL: NORMAL
MDC_IDC_MSMT_BATTERY_DTM: NORMAL
MDC_IDC_MSMT_BATTERY_IMPEDANCE: 1491 OHM
MDC_IDC_MSMT_BATTERY_REMAINING_LONGEVITY: 56 MO
MDC_IDC_MSMT_BATTERY_STATUS: NORMAL
MDC_IDC_MSMT_BATTERY_VOLTAGE: 2.77 V
MDC_IDC_MSMT_LEADCHNL_RA_IMPEDANCE_VALUE: 540 OHM
MDC_IDC_MSMT_LEADCHNL_RA_PACING_THRESHOLD_AMPLITUDE: 0.62 V
MDC_IDC_MSMT_LEADCHNL_RA_PACING_THRESHOLD_PULSEWIDTH: 0.4 MS
MDC_IDC_MSMT_LEADCHNL_RV_IMPEDANCE_VALUE: 500 OHM
MDC_IDC_MSMT_LEADCHNL_RV_PACING_THRESHOLD_AMPLITUDE: 0.88 V
MDC_IDC_MSMT_LEADCHNL_RV_PACING_THRESHOLD_PULSEWIDTH: 0.4 MS
MDC_IDC_PG_IMPLANT_DTM: NORMAL
MDC_IDC_PG_MFG: NORMAL
MDC_IDC_PG_MODEL: NORMAL
MDC_IDC_PG_SERIAL: NORMAL
MDC_IDC_PG_TYPE: NORMAL
MDC_IDC_SESS_CLINIC_NAME: NORMAL
MDC_IDC_SESS_DTM: NORMAL
MDC_IDC_SESS_TYPE: NORMAL
MDC_IDC_SET_BRADY_AT_MODE_SWITCH_MODE: NORMAL
MDC_IDC_SET_BRADY_AT_MODE_SWITCH_RATE: 175 {BEATS}/MIN
MDC_IDC_SET_BRADY_LOWRATE: 60 {BEATS}/MIN
MDC_IDC_SET_BRADY_MAX_SENSOR_RATE: 130 {BEATS}/MIN
MDC_IDC_SET_BRADY_MAX_TRACKING_RATE: 130 {BEATS}/MIN
MDC_IDC_SET_BRADY_MODE: NORMAL
MDC_IDC_SET_BRADY_PAV_DELAY_LOW: 350 MS
MDC_IDC_SET_BRADY_SAV_DELAY_LOW: 350 MS
MDC_IDC_SET_LEADCHNL_RA_PACING_AMPLITUDE: 1.5 V
MDC_IDC_SET_LEADCHNL_RA_PACING_CAPTURE_MODE: NORMAL
MDC_IDC_SET_LEADCHNL_RA_PACING_POLARITY: NORMAL
MDC_IDC_SET_LEADCHNL_RA_PACING_PULSEWIDTH: 0.4 MS
MDC_IDC_SET_LEADCHNL_RA_SENSING_POLARITY: NORMAL
MDC_IDC_SET_LEADCHNL_RA_SENSING_SENSITIVITY: 0.5 MV
MDC_IDC_SET_LEADCHNL_RV_PACING_AMPLITUDE: 2 V
MDC_IDC_SET_LEADCHNL_RV_PACING_CAPTURE_MODE: NORMAL
MDC_IDC_SET_LEADCHNL_RV_PACING_POLARITY: NORMAL
MDC_IDC_SET_LEADCHNL_RV_PACING_PULSEWIDTH: 0.4 MS
MDC_IDC_SET_LEADCHNL_RV_SENSING_POLARITY: NORMAL
MDC_IDC_SET_LEADCHNL_RV_SENSING_SENSITIVITY: 2.8 MV
MDC_IDC_SET_ZONE_DETECTION_INTERVAL: 333.33 MS
MDC_IDC_SET_ZONE_DETECTION_INTERVAL: 342.86 MS
MDC_IDC_SET_ZONE_STATUS: NORMAL
MDC_IDC_SET_ZONE_STATUS: NORMAL
MDC_IDC_SET_ZONE_TYPE: NORMAL
MDC_IDC_SET_ZONE_TYPE: NORMAL
MDC_IDC_SET_ZONE_VENDOR_TYPE: NORMAL
MDC_IDC_SET_ZONE_VENDOR_TYPE: NORMAL
MDC_IDC_STAT_AT_BURDEN_PERCENT: 0.2 %
MDC_IDC_STAT_AT_DTM_END: NORMAL
MDC_IDC_STAT_AT_DTM_START: NORMAL
MDC_IDC_STAT_AT_MODE_SW_COUNT: 178
MDC_IDC_STAT_BRADY_AP_VP_PERCENT: 2 %
MDC_IDC_STAT_BRADY_AP_VS_PERCENT: 75 %
MDC_IDC_STAT_BRADY_AS_VP_PERCENT: 0 %
MDC_IDC_STAT_BRADY_AS_VS_PERCENT: 23 %
MDC_IDC_STAT_BRADY_DTM_END: NORMAL
MDC_IDC_STAT_BRADY_DTM_START: NORMAL
MDC_IDC_STAT_EPISODE_RECENT_COUNT: 0
MDC_IDC_STAT_EPISODE_RECENT_COUNT: 74
MDC_IDC_STAT_EPISODE_RECENT_COUNT_DTM_END: NORMAL
MDC_IDC_STAT_EPISODE_RECENT_COUNT_DTM_END: NORMAL
MDC_IDC_STAT_EPISODE_RECENT_COUNT_DTM_START: NORMAL
MDC_IDC_STAT_EPISODE_RECENT_COUNT_DTM_START: NORMAL
MDC_IDC_STAT_EPISODE_TYPE: NORMAL
MDC_IDC_STAT_EPISODE_TYPE: NORMAL

## 2024-07-22 ENCOUNTER — LAB (OUTPATIENT)
Dept: LAB | Facility: CLINIC | Age: 84
End: 2024-07-22
Payer: COMMERCIAL

## 2024-07-22 ENCOUNTER — ANTICOAGULATION THERAPY VISIT (OUTPATIENT)
Dept: ANTICOAGULATION | Facility: CLINIC | Age: 84
End: 2024-07-22

## 2024-07-22 DIAGNOSIS — I49.5 SICK SINUS SYNDROME (H): Primary | ICD-10-CM

## 2024-07-22 DIAGNOSIS — I05.9 MITRAL VALVE DISEASE: ICD-10-CM

## 2024-07-22 DIAGNOSIS — I48.0 PAROXYSMAL ATRIAL FIBRILLATION (H): ICD-10-CM

## 2024-07-22 LAB — INR BLD: 3.7 (ref 0.9–1.1)

## 2024-07-22 PROCEDURE — 85610 PROTHROMBIN TIME: CPT

## 2024-07-22 PROCEDURE — 36416 COLLJ CAPILLARY BLOOD SPEC: CPT

## 2024-07-22 NOTE — PROGRESS NOTES
ANTICOAGULATION MANAGEMENT     Charles Nogueira 84 year old male is on warfarin with supratherapeutic INR result. (Goal INR 2.0-3.0)    Recent labs: (last 7 days)     07/22/24  0913   INR 3.7*       ASSESSMENT     Warfarin Lab Questionnaire    Warfarin Doses Last 7 Days      7/22/2024     9:10 AM   Dose in Tablet or Mg   TAB or MG? milligram (mg)     Pt Rptd Dose SUNDAY MONDAY TUESDAY WED THURS FRIDAY SATURDAY 7/22/2024   9:10 AM 1.25 2.5 1.25 1.25 1.25 2.5 1.25         7/22/2024   Warfarin Lab Questionnaire   Missed doses within past 14 days? No   Changes in diet or alcohol within past 14 days? No-No greens over the last week or so. Not sure how much can be added back in   Medication changes since last result? No   Injuries or illness since last result? No   New shortness of breath, severe headaches or sudden changes in vision since last result? No   Abnormal bleeding since last result? No-lots of little bruises, scrapes bleed easily   Upcoming surgery, procedure? No   Best number to call with results? [phone]        Previous result: Therapeutic last 2(+) visits  Additional findings: None       PLAN     Recommended plan for ongoing change(s) affecting INR     Dosing Instructions: hold dose then decrease your warfarin dose (11% change) with next INR in 2 weeks       Summary  As of 7/22/2024      Full warfarin instructions:  7/22: Hold; Otherwise 2.5 mg every Mon; 1.25 mg all other days   Next INR check:  8/5/2024               Telephone call with wife, Paula who verbalizes understanding and agrees to plan and who agrees to plan and repeated back plan correctly    Lab visit scheduled    Education provided: Please call back if any changes to your diet, medications or how you've been taking warfarin  Goal range and lab monitoring: goal range and significance of current result  Symptom monitoring: monitoring for bleeding signs and symptoms  Contact [phone] with any changes, questions or concerns.     Plan made  per [facility] anticoagulation protocol    Melissa Grady RN  Anticoagulation Clinic  7/22/2024    _______________________________________________________________________     Anticoagulation Episode Summary       Current INR goal:  2.0-3.0   TTR:  66.7% (1 y)   Target end date:  Indefinite   Send INR reminders to:  [facility] HEART INR NURSE    Indications    Sick sinus syndrome (H) [I49.5]  Paroxysmal atrial fibrillation (H) [I48.0]  Mitral valve disease [I05.9]             Comments:               Anticoagulation Care Providers       Provider Role Specialty Phone number    Ana Dean MD Referring Cardiovascular Disease [phone]

## 2024-08-06 ENCOUNTER — ANTICOAGULATION THERAPY VISIT (OUTPATIENT)
Dept: ANTICOAGULATION | Facility: CLINIC | Age: 84
End: 2024-08-06

## 2024-08-06 ENCOUNTER — LAB (OUTPATIENT)
Dept: LAB | Facility: CLINIC | Age: 84
End: 2024-08-06
Payer: COMMERCIAL

## 2024-08-06 DIAGNOSIS — I48.0 PAROXYSMAL ATRIAL FIBRILLATION (H): ICD-10-CM

## 2024-08-06 DIAGNOSIS — I49.5 SICK SINUS SYNDROME (H): Primary | ICD-10-CM

## 2024-08-06 DIAGNOSIS — I05.9 MITRAL VALVE DISEASE: ICD-10-CM

## 2024-08-06 LAB — INR BLD: 2.2 (ref 0.9–1.1)

## 2024-08-06 PROCEDURE — 85610 PROTHROMBIN TIME: CPT

## 2024-08-06 PROCEDURE — 36416 COLLJ CAPILLARY BLOOD SPEC: CPT

## 2024-08-06 NOTE — PROGRESS NOTES
ANTICOAGULATION MANAGEMENT     Charles Nogueira 84 year old male is on warfarin with therapeutic INR result. (Goal INR 2.0-3.0)    Recent labs: (last 7 days)     08/06/24  0834   INR 2.2*       ASSESSMENT     Warfarin Lab Questionnaire    Warfarin Doses Last 7 Days      8/6/2024     8:30 AM   Dose in Tablet or Mg   TAB or MG? tablet (tab)     Pt Rptd Dose SUNDAY MONDAY TUESDAY WED THURS FRIDAY SATURDAY 8/6/2024   8:30 AM 0.5 0.5 0.5 0.5 0.5 1 0.5         8/6/2024   Warfarin Lab Questionnaire   Missed doses within past 14 days? No   Changes in diet or alcohol within past 14 days? No   Medication changes since last result? No   Injuries or illness since last result? No   New shortness of breath, severe headaches or sudden changes in vision since last result? No   Abnormal bleeding since last result? No   Upcoming surgery, procedure? No   Best number to call with results? [phone]        Previous result: Supratherapeutic  Additional findings: Dosing above shows 2.5 mg tab on Fridays and [facility] calendar shows 2.5 mg on Mondays. Either way, weekly dosing is the same.       PLAN     Recommended plan for no diet, medication or health factor changes affecting INR     Dosing Instructions: Continue your current warfarin dose with next INR in 3 weeks       Summary  As of 8/6/2024      Full warfarin instructions:  2.5 mg every Mon; 1.25 mg all other days   Next INR check:  8/27/2024               Detailed voice message left for Tabitha with dosing instructions and follow up date.   Sent [employer] message with dosing and follow up instructions    Contact [phone] to schedule and with any changes, questions or concerns.     Education provided: Please call back if any changes to your diet, medications or how you've been taking warfarin  Goal range and lab monitoring: goal range and significance of current result and Importance of following up at instructed interval  Written instructions provided  Contact [phone]  with any changes, questions or concerns.     Plan made per [facility] anticoagulation protocol    Melissa Grady RN  Anticoagulation Clinic  8/6/2024    _______________________________________________________________________     Anticoagulation Episode Summary       Current INR goal:  2.0-3.0   TTR:  68.7% (1 y)   Target end date:  Indefinite   Send INR reminders to:  [facility] HEART INR NURSE    Indications    Sick sinus syndrome (H) [I49.5]  Paroxysmal atrial fibrillation (H) [I48.0]  Mitral valve disease [I05.9]             Comments:               Anticoagulation Care Providers       Provider Role Specialty Phone number    Ana Dean MD Referring Cardiovascular Disease [phone]

## 2024-08-07 DIAGNOSIS — G40.909 EPILEPSY (H): Primary | ICD-10-CM

## 2024-08-08 ENCOUNTER — LAB (OUTPATIENT)
Dept: LAB | Facility: CLINIC | Age: 84
End: 2024-08-08
Payer: COMMERCIAL

## 2024-08-08 DIAGNOSIS — G40.909 EPILEPSY (H): ICD-10-CM

## 2024-08-08 PROCEDURE — 80175 DRUG SCREEN QUAN LAMOTRIGINE: CPT

## 2024-08-08 PROCEDURE — 36415 COLL VENOUS BLD VENIPUNCTURE: CPT

## 2024-08-09 LAB — LAMOTRIGINE SERPL-MCNC: 6.7 UG/ML

## 2024-08-27 ENCOUNTER — ANTICOAGULATION THERAPY VISIT (OUTPATIENT)
Dept: ANTICOAGULATION | Facility: CLINIC | Age: 84
End: 2024-08-27

## 2024-08-27 ENCOUNTER — LAB (OUTPATIENT)
Dept: LAB | Facility: CLINIC | Age: 84
End: 2024-08-27
Payer: COMMERCIAL

## 2024-08-27 DIAGNOSIS — I48.0 PAROXYSMAL ATRIAL FIBRILLATION (H): ICD-10-CM

## 2024-08-27 DIAGNOSIS — I05.9 MITRAL VALVE DISEASE: ICD-10-CM

## 2024-08-27 DIAGNOSIS — I49.5 SICK SINUS SYNDROME (H): Primary | ICD-10-CM

## 2024-08-27 LAB — INR BLD: 2.1 (ref 0.9–1.1)

## 2024-08-27 PROCEDURE — 36416 COLLJ CAPILLARY BLOOD SPEC: CPT

## 2024-08-27 PROCEDURE — 85610 PROTHROMBIN TIME: CPT

## 2024-08-27 NOTE — PROGRESS NOTES
ANTICOAGULATION MANAGEMENT     Charles Nogueira 84 year old male is on warfarin with therapeutic INR result. (Goal INR 2.0-3.0)    Recent labs: (last 7 days)     08/27/24  0838   INR 2.1*       ASSESSMENT     Source(s): Chart Review and Patient/Caregiver Call     Warfarin doses taken: Warfarin taken as instructed  Diet: No new diet changes identified  Medication/supplement changes: None noted  New illness, injury, or hospitalization: No  Signs or symptoms of bleeding or clotting: No  Previous result: Therapeutic last visit; previously outside of goal range  Additional findings: None       PLAN     Recommended plan for no diet, medication or health factor changes affecting INR     Dosing Instructions: Continue your current warfarin dose with next INR in 4 weeks       Summary  As of 8/27/2024      Full warfarin instructions:  2.5 mg every Mon; 1.25 mg all other days   Next INR check:  9/24/2024               Telephone call with wife Paula who verbalizes understanding and agrees to plan    Lab visit scheduled    Education provided: Goal range and lab monitoring: goal range and significance of current result and Importance of therapeutic range    Plan made per [facility] anticoagulation protocol    Quan Sood RN  Anticoagulation Clinic  8/27/2024    _______________________________________________________________________     Anticoagulation Episode Summary       Current INR goal:  2.0-3.0   TTR:  69.8% (1 y)   Target end date:  Indefinite   Send INR reminders to:  [facility] HEART INR NURSE    Indications    Sick sinus syndrome (H) [I49.5]  Paroxysmal atrial fibrillation (H) [I48.0]  Mitral valve disease [I05.9]             Comments:               Anticoagulation Care Providers       Provider Role Specialty Phone number    Ana Dean MD Referring Cardiovascular Disease [phone]

## 2024-09-09 DIAGNOSIS — I10 ESSENTIAL HYPERTENSION: ICD-10-CM

## 2024-09-09 RX ORDER — LOSARTAN POTASSIUM 100 MG/1
100 TABLET ORAL DAILY
Qty: 90 TABLET | Refills: 3 | Status: SHIPPED | OUTPATIENT
Start: 2024-09-09

## 2024-09-24 ENCOUNTER — LAB (OUTPATIENT)
Dept: LAB | Facility: CLINIC | Age: 84
End: 2024-09-24
Payer: COMMERCIAL

## 2024-09-24 ENCOUNTER — ANTICOAGULATION THERAPY VISIT (OUTPATIENT)
Dept: ANTICOAGULATION | Facility: CLINIC | Age: 84
End: 2024-09-24

## 2024-09-24 DIAGNOSIS — I48.0 PAROXYSMAL ATRIAL FIBRILLATION (H): ICD-10-CM

## 2024-09-24 DIAGNOSIS — I49.5 SICK SINUS SYNDROME (H): Primary | ICD-10-CM

## 2024-09-24 DIAGNOSIS — I05.9 MITRAL VALVE DISEASE: ICD-10-CM

## 2024-09-24 DIAGNOSIS — I10 ESSENTIAL HYPERTENSION: Primary | ICD-10-CM

## 2024-09-24 LAB
ANION GAP SERPL CALCULATED.3IONS-SCNC: 10 MMOL/L (ref 7–15)
BUN SERPL-MCNC: 25.7 MG/DL (ref 8–23)
CALCIUM SERPL-MCNC: 9 MG/DL (ref 8.8–10.4)
CHLORIDE SERPL-SCNC: 104 MMOL/L (ref 98–107)
CREAT SERPL-MCNC: 1.44 MG/DL (ref 0.67–1.17)
EGFRCR SERPLBLD CKD-EPI 2021: 48 ML/MIN/1.73M2
GLUCOSE SERPL-MCNC: 170 MG/DL (ref 70–99)
HCO3 SERPL-SCNC: 28 MMOL/L (ref 22–29)
INR BLD: 2 (ref 0.9–1.1)
POTASSIUM SERPL-SCNC: 3.9 MMOL/L (ref 3.4–5.3)
SODIUM SERPL-SCNC: 142 MMOL/L (ref 135–145)

## 2024-09-24 PROCEDURE — 36415 COLL VENOUS BLD VENIPUNCTURE: CPT

## 2024-09-24 PROCEDURE — 80048 BASIC METABOLIC PNL TOTAL CA: CPT

## 2024-09-24 PROCEDURE — 85610 PROTHROMBIN TIME: CPT

## 2024-09-24 NOTE — PROGRESS NOTES
ANTICOAGULATION MANAGEMENT     Charles Nogueira 84 year old male is on warfarin with therapeutic INR result. (Goal INR 2.0-3.0)    Recent labs: (last 7 days)     09/24/24  0849   INR 2.0*       ASSESSMENT     Source(s): Chart Review  Previous INR was Therapeutic last 2(+) visits  Medication, diet, health changes since last INR chart reviewed; none identified         PLAN     Recommended plan for no diet, medication or health factor changes affecting INR     Dosing Instructions: Continue your current warfarin dose with next INR in 4-5 weeks       Summary  As of 9/24/2024      Full warfarin instructions:  2.5 mg every Mon; 1.25 mg all other days   Next INR check:  10/22/2024               Detailed voice message left for wife Paula with dosing instructions and follow up date.   Sent [employer] message with dosing and follow up instructions    Contact [phone] to schedule and with any changes, questions or concerns.     Education provided: Please call back or respond to [employer] message if any changes to your diet, medications or how you've been taking warfarin    Plan made per [facility] anticoagulation protocol    Quan Sood RN  9/24/2024  Anticoagulation Clinic  [facility] for routing messages: zoya [facility] HEART INR NURSE  [facility] patient phone line: [phone]        _______________________________________________________________________     Anticoagulation Episode Summary       Current INR goal:  2.0-3.0   TTR:  72.4% (1 y)   Target end date:  Indefinite   Send INR reminders to:  [facility] HEART INR NURSE    Indications    Sick sinus syndrome (H) [I49.5]  Paroxysmal atrial fibrillation (H) [I48.0]  Mitral valve disease [I05.9]             Comments:               Anticoagulation Care Providers       Provider Role Specialty Phone number    Ana Dean MD Referring Cardiovascular Disease [phone]

## 2024-09-26 ENCOUNTER — OFFICE VISIT (OUTPATIENT)
Dept: FAMILY MEDICINE | Facility: CLINIC | Age: 84
End: 2024-09-26
Payer: COMMERCIAL

## 2024-09-26 VITALS
RESPIRATION RATE: 18 BRPM | DIASTOLIC BLOOD PRESSURE: 67 MMHG | WEIGHT: 148.5 LBS | OXYGEN SATURATION: 100 % | TEMPERATURE: 97.5 F | HEIGHT: 69 IN | BODY MASS INDEX: 22 KG/M2 | SYSTOLIC BLOOD PRESSURE: 121 MMHG | HEART RATE: 46 BPM

## 2024-09-26 DIAGNOSIS — L98.9 SKIN LESION: Primary | ICD-10-CM

## 2024-09-26 DIAGNOSIS — R00.1 BRADYCARDIA: ICD-10-CM

## 2024-09-26 LAB
ANION GAP SERPL CALCULATED.3IONS-SCNC: 6 MMOL/L (ref 7–15)
BUN SERPL-MCNC: 23.3 MG/DL (ref 8–23)
CALCIUM SERPL-MCNC: 9.1 MG/DL (ref 8.8–10.4)
CHLORIDE SERPL-SCNC: 108 MMOL/L (ref 98–107)
CREAT SERPL-MCNC: 1.42 MG/DL (ref 0.67–1.17)
EGFRCR SERPLBLD CKD-EPI 2021: 49 ML/MIN/1.73M2
GLUCOSE SERPL-MCNC: 114 MG/DL (ref 70–99)
HCO3 SERPL-SCNC: 29 MMOL/L (ref 22–29)
POTASSIUM SERPL-SCNC: 4.7 MMOL/L (ref 3.4–5.3)
SODIUM SERPL-SCNC: 143 MMOL/L (ref 135–145)

## 2024-09-26 PROCEDURE — 36415 COLL VENOUS BLD VENIPUNCTURE: CPT | Performed by: GENERAL PRACTICE

## 2024-09-26 PROCEDURE — 80048 BASIC METABOLIC PNL TOTAL CA: CPT | Performed by: GENERAL PRACTICE

## 2024-09-26 PROCEDURE — 93000 ELECTROCARDIOGRAM COMPLETE: CPT | Performed by: GENERAL PRACTICE

## 2024-09-26 PROCEDURE — 99213 OFFICE O/P EST LOW 20 MIN: CPT | Performed by: GENERAL PRACTICE

## 2024-09-26 NOTE — PROGRESS NOTES
"  Assessment & Plan     Skin lesion  Referral to derm for possible punch biopsies of intermittent bleeding lesions  - Adult Dermatology  Referral; Future    Bradycardia  HR 65  Asymptomatic  Has Pacemaker  - Basic metabolic panel  (Ca, Cl, CO2, Creat, Gluc, K, Na, BUN); Future  - EKG 12-lead complete w/read - Clinics  - Basic metabolic panel  (Ca, Cl, CO2, Creat, Gluc, K, Na, BUN)                Do Soto is a 84 year old, presenting for the following health issues:  Sore (Behind ear and leg)      9/26/2024     1:11 PM   Additional Questions   Roomed by Kati MEHTA       Skin lesion behind left ear for the past year  On/off bleeding     Right shin skin lesion for the past year  Bleeding when in the sun on/off    History of Present Illness       Reason for visit:  Area on leg and behind ear bleeds.  Symptom onset:  More than a month  Symptoms include:  Area behind ear bleeds. Area pn lower leg crusty and sometimes bleeds.  Symptom intensity:  Moderate  Symptom progression:  Worsening  Had these symptoms before:  No  What makes it worse:  No  What makes it better:  No   He is taking medications regularly.                 Review of Systems  Constitutional, HEENT, cardiovascular, pulmonary, gi and gu systems are negative, except as otherwise noted.      Objective    /67 (BP Location: Right arm, Patient Position: Sitting, Cuff Size: Adult Regular)   Pulse (!) 46   Temp 97.5  F (36.4  C) (Oral)   Resp 18   Ht 1.753 m (5' 9\")   Wt 67.4 kg (148 lb 8 oz)   SpO2 100%   BMI 21.93 kg/m    Body mass index is 21.93 kg/m .  Physical Exam  HENT:      Head:        Comments: Left ear: irregular shape 2-3 mm lesion on the helix.  Feet:      Comments: Right shin: intermittent bleeding when in the sun, no bleeding today.                    Signed Electronically by: Suzanna Damon MD    "

## 2024-09-30 ENCOUNTER — TELEPHONE (OUTPATIENT)
Dept: DERMATOLOGY | Facility: CLINIC | Age: 84
End: 2024-09-30
Payer: COMMERCIAL

## 2024-09-30 ENCOUNTER — MYC MEDICAL ADVICE (OUTPATIENT)
Dept: FAMILY MEDICINE | Facility: CLINIC | Age: 84
End: 2024-09-30
Payer: COMMERCIAL

## 2024-09-30 NOTE — TELEPHONE ENCOUNTER
Patient Contact    Attempt # 1    Was call answered?  No.  Left message on voicemail with information to call nurse back at [phone].    Skin lesion behind left ear for the past year  On/off bleeding      Right shin skin lesion for the past year  Bleeding when in the sun on/off    Flor CRUMP RN  [facility] [place]  [phone]

## 2024-09-30 NOTE — TELEPHONE ENCOUNTER
This encounter is being sent to inform the clinic that this patient has a referral from  Suzanna Damon MD for the diagnoses of skin lesion and has requested that this patient be seen within 1-2 weeks.   Based on the availability of our provider(s), we are unable to accommodate this request.    Were all sites offered this patient?  Yes    Does scheduling algorithm request to schedule next available?  Patient has been scheduled for the first available opening with Dr. Ryan on 6/23/25.  We have informed the patient that the clinic will review their referral and reach out if a sooner appointment is medically necessary.      Please leave a detailed message. Pt's wife will not answer the phone because she won't recognize the number, they will call back.

## 2024-10-01 NOTE — TELEPHONE ENCOUNTER
You can try to call my dermatologist in [place].       I am not concerned about these fluctuating numbers which can happen from day to day.  His kidney function is at baseline.

## 2024-10-02 NOTE — TELEPHONE ENCOUNTER
[facility] Call Center    Phone Message    May a detailed message be left on voicemail: yes     Reason for Call: Other: Paula is asking which phone line the nurse will be calling from, advised I'm not sure, if she knows the line she can enter it as a saved number in her phone, anyway to call from the [phone] phone line?, can try calling again to leave another  for Paula, any phone numbers not saved will be directed straight to  as spam calls. Charles has mychart that is also a potential option for reaching them      Action Taken: Other: TE to [facility]    Travel Screening: Not Applicable     Date of Service:

## 2024-10-02 NOTE — TELEPHONE ENCOUNTER
Patient Contact    Attempt # 2    Was call answered?  No.  Left message on voicemail with information to call nurse back at [phone].    Skin lesion behind left ear for the past year  On/off bleeding      Right shin skin lesion for the past year  Bleeding when in the sun on/off    Flor CRUMP RN  [facility] [place]  [phone]

## 2024-10-03 NOTE — TELEPHONE ENCOUNTER
Patient's spouse Paula returning call  Please send MyChart message to patient as they cannot hear their phone ring when it's called from our number

## 2024-10-04 NOTE — TELEPHONE ENCOUNTER
Called and spoke with spouse- appointment scheduled with / gustavo for a spot only check.  Thank you,    Cecile RODRÍGUEZRN BSN  [facility]- [phone]

## 2024-10-11 DIAGNOSIS — I48.0 PAROXYSMAL ATRIAL FIBRILLATION (H): ICD-10-CM

## 2024-10-11 RX ORDER — WARFARIN SODIUM 2.5 MG/1
TABLET ORAL
Qty: 60 TABLET | Refills: 1 | Status: SHIPPED | OUTPATIENT
Start: 2024-10-11

## 2024-10-11 NOTE — TELEPHONE ENCOUNTER
ANTICOAGULATION MANAGEMENT:  Medication Refill    Anticoagulation Summary  As of 9/24/2024      Warfarin maintenance plan:  2.5 mg (2.5 mg x 1) every Mon; 1.25 mg (2.5 mg x 0.5) all other days   Next INR check:  10/22/2024   Target end date:  Indefinite    Indications    Sick sinus syndrome (H) [I49.5]  Paroxysmal atrial fibrillation (H) [I48.0]  Mitral valve disease [I05.9]                 Anticoagulation Care Providers       Provider Role Specialty Phone number    Ana Dean MD Referring Cardiovascular Disease [phone]            Refill Criteria    Visit with referring provider/group: Meets criteria: visit within referring provider group in the last 15 months on 5/13/24    [facility] referral last signed: 05/21/2024; within last year: Yes    Lab monitoring not exceeding 2 weeks overdue: Yes    Charles meets all criteria for refill. Rx instructions and quantity supplied updated to match patient's current dosing plan. Warfarin 90 day supply with 1 refill granted per [facility] protocol     Jessika Yoo RN  Anticoagulation Clinic

## 2024-10-22 ENCOUNTER — ANTICOAGULATION THERAPY VISIT (OUTPATIENT)
Dept: ANTICOAGULATION | Facility: CLINIC | Age: 84
End: 2024-10-22

## 2024-10-22 ENCOUNTER — LAB (OUTPATIENT)
Dept: LAB | Facility: CLINIC | Age: 84
End: 2024-10-22
Payer: COMMERCIAL

## 2024-10-22 DIAGNOSIS — I48.0 PAROXYSMAL ATRIAL FIBRILLATION (H): ICD-10-CM

## 2024-10-22 DIAGNOSIS — I49.5 SICK SINUS SYNDROME (H): Primary | ICD-10-CM

## 2024-10-22 DIAGNOSIS — I05.9 MITRAL VALVE DISEASE: ICD-10-CM

## 2024-10-22 LAB — INR BLD: 2.7 (ref 0.9–1.1)

## 2024-10-22 PROCEDURE — 85610 PROTHROMBIN TIME: CPT

## 2024-10-22 PROCEDURE — 36416 COLLJ CAPILLARY BLOOD SPEC: CPT

## 2024-10-22 NOTE — PROGRESS NOTES
ANTICOAGULATION MANAGEMENT     Charles Nogueira 84 year old male is on warfarin with therapeutic INR result. (Goal INR 2.0-3.0)    Recent labs: (last 7 days)     10/22/24  0842   INR 2.7*       ASSESSMENT     Source(s): Chart Review  Previous INR was Therapeutic last 2(+) visits  Medication, diet, health changes since last INR chart reviewed; none identified         PLAN     Recommended plan for no diet, medication or health factor changes affecting INR     Dosing Instructions: Continue your current warfarin dose with next INR in 5 weeks       Summary  As of 10/22/2024      Full warfarin instructions:  2.5 mg every Mon; 1.25 mg all other days   Next INR check:  11/26/2024               Detailed voice message left for Charles with dosing instructions and follow up date.     Lab visit scheduled    Education provided: Please call back if any changes to your diet, medications or how you've been taking warfarin    Plan made per [facility] anticoagulation protocol    Jo-Ann Kirkland RN  10/22/2024  Anticoagulation Clinic  [facility] for routing messages: zoya [facility] HEART INR NURSE  [facility] patient phone line: [phone]        _______________________________________________________________________     Anticoagulation Episode Summary       Current INR goal:  2.0-3.0   TTR:  72.4% (1 y)   Target end date:  Indefinite   Send INR reminders to:  [facility] HEART INR NURSE    Indications    Sick sinus syndrome (H) [I49.5]  Paroxysmal atrial fibrillation (H) [I48.0]  Mitral valve disease [I05.9]             Comments:               Anticoagulation Care Providers       Provider Role Specialty Phone number    Ana Dean MD Referring Cardiovascular Disease [phone]

## 2024-10-28 DIAGNOSIS — Z95.0 CARDIAC PACEMAKER IN SITU: Primary | ICD-10-CM

## 2024-10-29 ENCOUNTER — ANCILLARY PROCEDURE (OUTPATIENT)
Dept: CARDIOLOGY | Facility: CLINIC | Age: 84
End: 2024-10-29
Attending: INTERNAL MEDICINE
Payer: COMMERCIAL

## 2024-10-29 DIAGNOSIS — I49.5 SICK SINUS SYNDROME (H): ICD-10-CM

## 2024-10-29 DIAGNOSIS — Z95.0 CARDIAC PACEMAKER IN SITU: ICD-10-CM

## 2024-10-29 DIAGNOSIS — I10 ESSENTIAL HYPERTENSION: ICD-10-CM

## 2024-10-29 DIAGNOSIS — E78.5 HYPERLIPIDEMIA LDL GOAL <70: ICD-10-CM

## 2024-10-29 PROCEDURE — 93280 PM DEVICE PROGR EVAL DUAL: CPT | Performed by: INTERNAL MEDICINE

## 2024-10-29 RX ORDER — LOSARTAN POTASSIUM 100 MG/1
100 TABLET ORAL DAILY
Qty: 90 TABLET | Refills: 3 | Status: SHIPPED | OUTPATIENT
Start: 2024-10-29

## 2024-10-29 RX ORDER — ATORVASTATIN CALCIUM 20 MG/1
20 TABLET, FILM COATED ORAL DAILY
Qty: 90 TABLET | Refills: 3 | Status: SHIPPED | OUTPATIENT
Start: 2024-10-29

## 2024-10-29 NOTE — TELEPHONE ENCOUNTER
Pt presents for annual in clinic device check. Pt and wife request refills with 90 day supply for lipitor and losartan as they are leaving for [place] for 5 months. Refill encounter sent to Dr. Douglas's team. VICENTA Bangura

## 2024-10-31 LAB
MDC_IDC_LEAD_CONNECTION_STATUS: NORMAL
MDC_IDC_LEAD_CONNECTION_STATUS: NORMAL
MDC_IDC_LEAD_IMPLANT_DT: NORMAL
MDC_IDC_LEAD_IMPLANT_DT: NORMAL
MDC_IDC_LEAD_LOCATION: NORMAL
MDC_IDC_LEAD_LOCATION: NORMAL
MDC_IDC_LEAD_MFG: NORMAL
MDC_IDC_LEAD_MFG: NORMAL
MDC_IDC_LEAD_MODEL: NORMAL
MDC_IDC_LEAD_MODEL: NORMAL
MDC_IDC_LEAD_POLARITY_TYPE: NORMAL
MDC_IDC_LEAD_POLARITY_TYPE: NORMAL
MDC_IDC_LEAD_SERIAL: NORMAL
MDC_IDC_LEAD_SERIAL: NORMAL
MDC_IDC_MSMT_BATTERY_DTM: NORMAL
MDC_IDC_MSMT_BATTERY_IMPEDANCE: 1601 OHM
MDC_IDC_MSMT_BATTERY_REMAINING_LONGEVITY: 53 MO
MDC_IDC_MSMT_BATTERY_STATUS: NORMAL
MDC_IDC_MSMT_BATTERY_VOLTAGE: 2.76 V
MDC_IDC_MSMT_LEADCHNL_RA_IMPEDANCE_VALUE: 540 OHM
MDC_IDC_MSMT_LEADCHNL_RA_PACING_THRESHOLD_AMPLITUDE: 0.5 V
MDC_IDC_MSMT_LEADCHNL_RA_PACING_THRESHOLD_AMPLITUDE: 0.62 V
MDC_IDC_MSMT_LEADCHNL_RA_PACING_THRESHOLD_PULSEWIDTH: 0.4 MS
MDC_IDC_MSMT_LEADCHNL_RA_PACING_THRESHOLD_PULSEWIDTH: 0.4 MS
MDC_IDC_MSMT_LEADCHNL_RV_IMPEDANCE_VALUE: 578 OHM
MDC_IDC_MSMT_LEADCHNL_RV_PACING_THRESHOLD_AMPLITUDE: 0.75 V
MDC_IDC_MSMT_LEADCHNL_RV_PACING_THRESHOLD_AMPLITUDE: 0.88 V
MDC_IDC_MSMT_LEADCHNL_RV_PACING_THRESHOLD_PULSEWIDTH: 0.4 MS
MDC_IDC_MSMT_LEADCHNL_RV_PACING_THRESHOLD_PULSEWIDTH: 0.4 MS
MDC_IDC_MSMT_LEADCHNL_RV_SENSING_INTR_AMPL: 8 MV
MDC_IDC_PG_IMPLANT_DTM: NORMAL
MDC_IDC_PG_MFG: NORMAL
MDC_IDC_PG_MODEL: NORMAL
MDC_IDC_PG_SERIAL: NORMAL
MDC_IDC_PG_TYPE: NORMAL
MDC_IDC_SESS_CLINIC_NAME: NORMAL
MDC_IDC_SESS_DTM: NORMAL
MDC_IDC_SESS_TYPE: NORMAL
MDC_IDC_SET_BRADY_AT_MODE_SWITCH_MODE: NORMAL
MDC_IDC_SET_BRADY_AT_MODE_SWITCH_RATE: 175 {BEATS}/MIN
MDC_IDC_SET_BRADY_LOWRATE: 60 {BEATS}/MIN
MDC_IDC_SET_BRADY_MAX_SENSOR_RATE: 130 {BEATS}/MIN
MDC_IDC_SET_BRADY_MAX_TRACKING_RATE: 130 {BEATS}/MIN
MDC_IDC_SET_BRADY_MODE: NORMAL
MDC_IDC_SET_BRADY_PAV_DELAY_LOW: 350 MS
MDC_IDC_SET_BRADY_SAV_DELAY_LOW: 350 MS
MDC_IDC_SET_LEADCHNL_RA_PACING_AMPLITUDE: 1.5 V
MDC_IDC_SET_LEADCHNL_RA_PACING_CAPTURE_MODE: NORMAL
MDC_IDC_SET_LEADCHNL_RA_PACING_POLARITY: NORMAL
MDC_IDC_SET_LEADCHNL_RA_PACING_PULSEWIDTH: 0.4 MS
MDC_IDC_SET_LEADCHNL_RA_SENSING_POLARITY: NORMAL
MDC_IDC_SET_LEADCHNL_RA_SENSING_SENSITIVITY: 0.5 MV
MDC_IDC_SET_LEADCHNL_RV_PACING_AMPLITUDE: 2 V
MDC_IDC_SET_LEADCHNL_RV_PACING_CAPTURE_MODE: NORMAL
MDC_IDC_SET_LEADCHNL_RV_PACING_POLARITY: NORMAL
MDC_IDC_SET_LEADCHNL_RV_PACING_PULSEWIDTH: 0.4 MS
MDC_IDC_SET_LEADCHNL_RV_SENSING_POLARITY: NORMAL
MDC_IDC_SET_LEADCHNL_RV_SENSING_SENSITIVITY: 2.8 MV
MDC_IDC_SET_ZONE_DETECTION_INTERVAL: 333.33 MS
MDC_IDC_SET_ZONE_DETECTION_INTERVAL: 342.86 MS
MDC_IDC_SET_ZONE_STATUS: NORMAL
MDC_IDC_SET_ZONE_STATUS: NORMAL
MDC_IDC_SET_ZONE_TYPE: NORMAL
MDC_IDC_SET_ZONE_TYPE: NORMAL
MDC_IDC_SET_ZONE_VENDOR_TYPE: NORMAL
MDC_IDC_SET_ZONE_VENDOR_TYPE: NORMAL
MDC_IDC_STAT_AT_BURDEN_PERCENT: 0.1 %
MDC_IDC_STAT_AT_DTM_END: NORMAL
MDC_IDC_STAT_AT_DTM_START: NORMAL
MDC_IDC_STAT_AT_MODE_SW_COUNT: 181
MDC_IDC_STAT_BRADY_AP_VP_PERCENT: 2 %
MDC_IDC_STAT_BRADY_AP_VS_PERCENT: 75 %
MDC_IDC_STAT_BRADY_AS_VP_PERCENT: 0 %
MDC_IDC_STAT_BRADY_AS_VS_PERCENT: 23 %
MDC_IDC_STAT_BRADY_DTM_END: NORMAL
MDC_IDC_STAT_BRADY_DTM_START: NORMAL
MDC_IDC_STAT_EPISODE_RECENT_COUNT: 0
MDC_IDC_STAT_EPISODE_RECENT_COUNT: 76
MDC_IDC_STAT_EPISODE_RECENT_COUNT_DTM_END: NORMAL
MDC_IDC_STAT_EPISODE_RECENT_COUNT_DTM_END: NORMAL
MDC_IDC_STAT_EPISODE_RECENT_COUNT_DTM_START: NORMAL
MDC_IDC_STAT_EPISODE_RECENT_COUNT_DTM_START: NORMAL
MDC_IDC_STAT_EPISODE_TYPE: NORMAL
MDC_IDC_STAT_EPISODE_TYPE: NORMAL

## 2024-11-01 DIAGNOSIS — I25.10 ATHEROSCLEROSIS OF NATIVE CORONARY ARTERY OF NATIVE HEART WITHOUT ANGINA PECTORIS: ICD-10-CM

## 2024-11-01 DIAGNOSIS — E78.5 HYPERLIPIDEMIA LDL GOAL <70: ICD-10-CM

## 2024-11-01 RX ORDER — METOPROLOL SUCCINATE 50 MG/1
50 TABLET, EXTENDED RELEASE ORAL DAILY
Qty: 90 TABLET | Refills: 3 | Status: SHIPPED | OUTPATIENT
Start: 2024-11-01

## 2024-11-06 DIAGNOSIS — I48.0 PAROXYSMAL ATRIAL FIBRILLATION (H): ICD-10-CM

## 2024-11-06 RX ORDER — WARFARIN SODIUM 2.5 MG/1
TABLET ORAL
Qty: 60 TABLET | Refills: 1 | Status: CANCELLED | OUTPATIENT
Start: 2024-11-06

## 2024-11-06 RX ORDER — WARFARIN SODIUM 2.5 MG/1
1.25 TABLET ORAL
Status: CANCELLED | OUTPATIENT
Start: 2024-11-06

## 2024-11-06 RX ORDER — WARFARIN SODIUM 2.5 MG/1
TABLET ORAL
Qty: 50 TABLET | Refills: 1 | Status: SHIPPED | OUTPATIENT
Start: 2024-11-06

## 2024-11-06 NOTE — TELEPHONE ENCOUNTER
ANTICOAGULATION MANAGEMENT:  Medication Refill    Anticoagulation Summary  As of 10/22/2024      Warfarin maintenance plan:  2.5 mg (2.5 mg x 1) every Fri; 1.25 mg (2.5 mg x 0.5) all other days   Next INR check:  11/26/2024   Target end date:  Indefinite    Indications    Sick sinus syndrome (H) [I49.5]  Paroxysmal atrial fibrillation (H) [I48.0]  Mitral valve disease [I05.9]                 Anticoagulation Care Providers       Provider Role Specialty Phone number    Ana Dean MD Referring Cardiovascular Disease [phone]            Refill Criteria    Visit with referring provider/group: Meets criteria: visit within referring provider group in the last 15 months on 5/13/24    [facility] referral last signed: 05/21/2024; within last year: Yes    Lab monitoring not exceeding 2 weeks overdue: Yes    Charles meets all criteria for refill. Rx instructions and quantity supplied updated to match patient's current dosing plan. Warfarin 90 day supply with 1 refill granted per [facility] protocol     Melissa Grady RN  Anticoagulation Clinic

## 2024-11-11 ENCOUNTER — OFFICE VISIT (OUTPATIENT)
Dept: DERMATOLOGY | Facility: CLINIC | Age: 84
End: 2024-11-11
Payer: COMMERCIAL

## 2024-11-11 DIAGNOSIS — L70.0 ACNE VULGARIS: ICD-10-CM

## 2024-11-11 DIAGNOSIS — L57.0 ACTINIC KERATOSIS: Primary | ICD-10-CM

## 2024-11-11 DIAGNOSIS — L82.0 INFLAMED SEBORRHEIC KERATOSIS: ICD-10-CM

## 2024-11-11 DIAGNOSIS — D48.9 NEOPLASM OF UNCERTAIN BEHAVIOR: ICD-10-CM

## 2024-11-11 DIAGNOSIS — L57.8 ACTINIC SKIN DAMAGE: ICD-10-CM

## 2024-11-11 RX ORDER — IMIQUIMOD 12.5 MG/.25G
CREAM TOPICAL
Qty: 48 PACKET | Refills: 3 | Status: SHIPPED | OUTPATIENT
Start: 2024-11-11

## 2024-11-11 NOTE — PATIENT INSTRUCTIONS
Wound Care After a Biopsy    What is a skin biopsy?  A skin biopsy allows the doctor to examine a very small piece of tissue under the microscope to determine the diagnosis and the best treatment for the skin condition. A local anesthetic (numbing medicine)  is injected with a very small needle into the skin area to be tested. A small piece of skin is taken from the area. Sometimes a suture (stitch) is used.     What are the risks of a skin biopsy?  I will experience scar, bleeding, swelling, pain, crusting and redness. I may experience incomplete removal or recurrence. Risks of this procedure are excessive bleeding, bruising, infection, nerve damage, numbness, thick (hypertrophic or keloidal) scar and non-diagnostic biopsy.    How should I care for my wound for the first 24 hours?  Keep the wound dry and covered for 24 hours  If it bleeds, hold direct pressure on the area for 15 minutes. If bleeding does not stop then go to the emergency room  Avoid strenuous exercise the first 1-2 days or as your doctor instructs you    How should I care for the wound after 24 hours?  After 24 hours, remove the bandage  You may bathe or shower as normal  If you had a scalp biopsy, you can shampoo as usual and can use shower water to clean the biopsy site daily  Clean the wound twice a day with gentle soap and water  Do not scrub, be gentle  Apply white petroleum/Vaseline after cleaning the wound with a cotton swab or a clean finger, and keep the site covered with a Bandaid /bandage. Bandages are not necessary with a scalp biopsy  If you are unable to cover the site with a Bandaid /bandage, re-apply ointment 2-3 times a day to keep the site moist. Moisture will help with healing  Avoid strenuous activity for first 1-2 days  Avoid lakes, rivers, pools, and oceans until the stitches are removed or the site is healed    How do I clean my wound?  Wash hands thoroughly with soap or use hand  before all wound care  Clean the  wound with gentle soap and water  Apply white petroleum/Vaseline  to wound after it is clean  Replace the Bandaid /bandage to keep the wound covered for the first few days or as instructed by your doctor  If you had a scalp biopsy, warm shower water to the area on a daily basis should suffice    What should I use to clean my wound?   Cotton-tipped applicators (Qtips )  White petroleum jelly (Vaseline ). Use a clean new container and use Q-tips to apply.  Bandaids   as needed  Gentle soap     How should I care for my wound long term?  Do not get your wound dirty  Keep up with wound care for one week or until the area is healed.  A small scab will form and fall off by itself when the area is completely healed. The area will be red and will become pink in color as it heals. Sun protection is very important for how your scar will turn out. Sunscreen with an SPF 30 or greater is recommended once the area is healed.  You should have some soreness but it should be mild and slowly go away over several days. Talk to your doctor about using tylenol for pain,    When should I call my doctor?  If you have increased:   Pain or swelling  Pus or drainage (clear or slightly yellow drainage is ok)  Temperature over 100F  Spreading redness or warmth around wound    When will I hear about my results?  The biopsy results can take 2 weeks to come back.  Your results will automatically release to [facility] before your provider has even reviewed them.  The clinic will call you with the results, send you a [employer] message, or have you schedule a follow-up clinic or phone time to discuss the results.  Contact our clinics if you do not hear from us in 2 weeks. If further treatment is needed for a skin cancer, this will be done at either our [place] or [place] office.    Who should I call with questions?  [facility]: [phone]  [facility]: [phone]  For urgent  needs outside of business hours call the [facility] [[email or web address]] at [phone] and ask for the dermatology resident on call

## 2024-11-11 NOTE — LETTER
11/11/2024      Charles Nogueira  [address]  [facility] [address]      Dear Colleague,    Thank you for referring your patient, Charles Nogueira, to the [facility]. Please see a copy of my visit note below.    [facility] Dermatology Note  Encounter Date: Nov 11, 2024  Office Visit     Reviewed patients past medical history and pertinent chart review prior to patients visit today.     Dermatology Problem List:  # Neoplasm of uncertain behaviorx3: right shin, right forehead, and left postauricular sulcus, s/p shave biopsy 11/11/2024   # Aks, s/p cryo  # ISK, s/p cryo  # Melanoma in situ, periumbilical, s/p re-excision, 2007  # Non melanoma skin cancer  - SCCIS, right mid helix, s/p Mohs, 2013  - SCCIS, right lateral neck, ts/p ED and C, 2019  # Dysplastic nevi, left mid back-2011, right upper abdomen and left cental abdomen- 2009, right lower abdomen-2007    ____________________________________________    Assessment & Plan:     #Lentigines  #Actinic skin damage  - Reviewed the compounding benefits of incremental changes to sun protective clothing behaviors including increased frequency of sunscreen and sun protective clothing like broad brimmed hats and longsleeved UPF containing clothing    # Neoplasm of uncertain behavior x3:  left postauricular sulcus, right forehead, and right shin.  DDx includes BCC. Shave biopsy today.  Photograph obtained.  Procedure Note: Biopsy by shave technique  The risks and benefits of the procedure were described to the patient. These include but are not limited to bleeding, infection, scar, incomplete removal, and non-diagnostic biopsy. Verbal informed consent was obtained. The above site(s) was cleansed with an alcohol pad and injected with 1% lidocaine with epinephrine. Once anesthesia was obtained, a biopsy(ies) was performed with Gilette blade. The tissue(s) was placed in a labeled container(s) with formalin and sent  to pathology. Hemostasis was achieved with aluminum chloride. Vaseline and a bandage were applied to the wound(s). The patient tolerated the procedure well and was given post biopsy care instructions.    As patient is heading to [place] in the next week and will be gone for 5 months, we will treat these presumed basal cell carcinoma sites with Imiquimod 5% cream nightly for 6 weeks. Medication reviewed in detail including side effects such as redness, scaling, and swelling. We will plan to re-evaluate these sites once he returns from [place].    # Actinic keratoses x14  Actinic keratoses are pre-cancerous skin growths caused by sun exposure. Treatment is recommended and medically indicated. Treated with cryotherapy as outlined below.     Procedures performed:   - Cryotherapy procedure note, location(s): See physical exam. After verbal consent and discussion of risks and benefits including, but not limited to, dyspigmentation/scar, blister, and pain, 14 lesion(s) was(were) treated with 1-2 mm freeze border for 1-2 cycles with liquid nitrogen. Post cryotherapy instructions were provided.     # Inflamed Seborrheic Keratoses x2, bilateral anterior neck   -The benign nature of the skin lesion(s) was discussed with the patient. The patient requested treatment intervention due to finding the lesions to be bothersome. Discussed cryotherapy treatment with patient. Patient elects to pursue cryotherapy today. After verbal consent and discussion of risks and benefits including but no limited to dyspigmentation/scar, blister, and pain, 2 was(were) treated with 1-2mm freeze border for 2 cycles with liquid nitrogen. Post cryotherapy instructions were provided.     Follow up: After patient returns from [place] sometime in April of 2025.     All risks, benefits and alternatives were discussed with patient.  Patient is in agreement and understands the assessment and plan.  All questions were answered.  Noni Valdivia PA-C  [facility]  Dermatology  _______________________________________    CC: Derm Problem (Lesion behind the L ear - have been there for 1 yr, non healing, bleeds per pt )    HPI:  Mr. Charles Nogueira is a(n) 84 year old male who presents today as a return patient for evaluation of a skin lesion involving the left postauricular sulcus and right shin. He is accompanied by his wife, Paula. He was last seen at [facility] dermatology on 9/23/2013.  The lesions on his right shin and left postauricular sulcus have been present for at least 1 year or greater.  They have grown in size and do occasionally bleed.    He has a personal history of melanoma in situ, nonmelanoma skin cancer, and dysplastic nevus.    Patient is otherwise feeling well, without additional skin concerns.    Physical Exam:  SKIN: Focused examination of face, neck, right shin, and right wrist only was performed per patient request.  - right preauricular cheek x5, left preauricular cheek x3, right dorsal wrist x3, left upper antihelix, left superior helix, and left mid helix, pink macule(s) with overlying adherent scale consistent with an actinic keratosis   -bilateral anterior neck, waxy, stuck on tan to brown papules  -right shin, 0.9 cm pink, pearly papule(s) with arborizing telangectasias  -right forehead, 0.5 cm skin colored, shiny, pearly papule with arborizing telangectasia's  -left postauricular sulcus, 1.0 cm erythematous pearly papule with hemorrhagic crust  - No other lesions of concern on areas examined.                   Medications:  Current Outpatient Medications   Medication Sig Dispense Refill     atorvastatin (LIPITOR) 20 MG tablet Take 1 tablet (20 mg) by mouth daily. 90 tablet 3     levETIRAcetam (KEPPRA) 500 MG tablet Take 500 mg by mouth 2 times daily       losartan (COZAAR) 100 MG tablet Take 1 tablet (100 mg) by mouth daily. 90 tablet 3     metoprolol succinate ER (TOPROL XL) 50 MG 24 hr tablet Take 1 tablet (50 mg) by mouth daily. 90 tablet 3      Multiple Vitamins-Minerals (ZINC PO)        Turmeric 500 MG CAPS        warfarin ANTICOAGULANT (COUMADIN) 2.5 MG tablet Take 2.5 mg every Friday; Take 1.25 mg all other days. Or as directed by INR clinic. 50 tablet 1     warfarin ANTICOAGULANT (COUMADIN) 2.5 MG tablet 2.5mg Monday and 1.25mg all other days per INR clinic 60 tablet 1     No current facility-administered medications for this visit.      Past Medical History:   Patient Active Problem List   Diagnosis     Impotence of organic origin     Actinic keratosis     Viral warts     Malignant melanoma of skin of trunk, except scrotum (H)     CARDIOVASCULAR SCREENING; LDL GOAL LESS THAN 130     Pacemaker     PVC (premature ventricular contraction)     Heart murmur     SCC (squamous cell carcinoma), face     Syncope     Mitral valve disease     Migraine equivalent     Sick sinus syndrome (H)     Essential hypertension     Cardiac pacemaker in situ     Nonrheumatic mitral valve regurgitation     Paroxysmal atrial fibrillation (H)     Paroxysmal ventricular tachycardia (H)     Chronic diastolic congestive heart failure (H)     Cervicalgia     Episode of transient neurologic symptoms     Presbycusis of both ears     Iliac artery aneurysm (H)     Abnormal cardiovascular stress test     Status post coronary angiogram     Nonsustained ventricular tachycardia (H)     Abnormal findings on diagnostic imaging of heart and coronary circulation     Past Medical History:   Diagnosis Date     Acute ischemic heart disease (H) 08/24/2018     Allergic rhinitis, cause unspecified      Basal cell carcinoma      Cardiac pacemaker in situ 08/14/2017     Cervicalgia 04/26/2022     Chronic diastolic congestive heart failure (H) 07/26/2021     Dysphagia, oropharyngeal phase 06/14/2018    Mild per video swallow study. To use swallowing techniques     Episode of transient neurologic symptoms 04/26/2022     Essential hypertension, benign 11/11/2016     Fatigue 04/26/2022     Heart murmur  05/10/2011     Hx of cardiac pacemaker      Iliac artery aneurysm (H) 10/12/2022     Malignant melanoma (H)      Melanoma (H)      Migraine equivalent 11/10/2015     Mitral valve prolapse      Non-rheumatic mitral regurgitation 08/09/2018     Other forms of migraine, without mention of intractable migraine without mention of status migrainosus      Paroxysmal A-fib (H)      Premature ventricular contraction      Presbycusis of both ears 04/26/2022     PVC (premature ventricular contraction) 05/10/2011     Sick sinus syndrome (H) 05/03/2016    s/p PPM implant 7/2/14     Squamous cell carcinoma      Syncope      Weakness of both upper extremities 04/26/2022     Weakness of both upper extremities 04/26/2022       CC Referred Self, MD  No address on file on close of this encounter.     Attestation with edits by Crispin Ryan MD at 11/11/2024  3:31 PM:  I have personally examined this patient and agree with thePA's documentation and plan of care. I have reviewed and amended the PA's note. The documentation accurately reflects my clinical observations, diagnoses, treatment and follow-up plans. I was present for key portions of the procedure(s).       Crispin Ryan MD  Dermatology Staff      Again, thank you for allowing me to participate in the care of your patient.        Sincerely,        Crispin Ryan MD

## 2024-11-11 NOTE — PROGRESS NOTES
[facility] Dermatology Note  Encounter Date: Nov 11, 2024  Office Visit     Reviewed patients past medical history and pertinent chart review prior to patients visit today.     Dermatology Problem List:  # Neoplasm of uncertain behaviorx3: right shin, right forehead, and left postauricular sulcus, s/p shave biopsy 11/11/2024   # Aks, s/p cryo  # ISK, s/p cryo  # Melanoma in situ, periumbilical, s/p re-excision, 2007  # Non melanoma skin cancer  - SCCIS, right mid helix, s/p Mohs, 2013  - SCCIS, right lateral neck, ts/p ED and C, 2019  # Dysplastic nevi, left mid back-2011, right upper abdomen and left cental abdomen- 2009, right lower abdomen-2007    ____________________________________________    Assessment & Plan:     #Lentigines  #Actinic skin damage  - Reviewed the compounding benefits of incremental changes to sun protective clothing behaviors including increased frequency of sunscreen and sun protective clothing like broad brimmed hats and longsleeved UPF containing clothing    # Neoplasm of uncertain behavior x3:  left postauricular sulcus, right forehead, and right shin.  DDx includes BCC. Shave biopsy today.  Photograph obtained.  Procedure Note: Biopsy by shave technique  The risks and benefits of the procedure were described to the patient. These include but are not limited to bleeding, infection, scar, incomplete removal, and non-diagnostic biopsy. Verbal informed consent was obtained. The above site(s) was cleansed with an alcohol pad and injected with 1% lidocaine with epinephrine. Once anesthesia was obtained, a biopsy(ies) was performed with Gilette blade. The tissue(s) was placed in a labeled container(s) with formalin and sent to pathology. Hemostasis was achieved with aluminum chloride. Vaseline and a bandage were applied to the wound(s). The patient tolerated the procedure well and was given post biopsy care instructions.    As patient is heading to [place] in the next week and  will be gone for 5 months, we will treat these presumed basal cell carcinoma sites with Imiquimod 5% cream nightly for 6 weeks. Medication reviewed in detail including side effects such as redness, scaling, and swelling. We will plan to re-evaluate these sites once he returns from [place].    # Actinic keratoses x14  Actinic keratoses are pre-cancerous skin growths caused by sun exposure. Treatment is recommended and medically indicated. Treated with cryotherapy as outlined below.     Procedures performed:   - Cryotherapy procedure note, location(s): See physical exam. After verbal consent and discussion of risks and benefits including, but not limited to, dyspigmentation/scar, blister, and pain, 14 lesion(s) was(were) treated with 1-2 mm freeze border for 1-2 cycles with liquid nitrogen. Post cryotherapy instructions were provided.     # Inflamed Seborrheic Keratoses x2, bilateral anterior neck   -The benign nature of the skin lesion(s) was discussed with the patient. The patient requested treatment intervention due to finding the lesions to be bothersome. Discussed cryotherapy treatment with patient. Patient elects to pursue cryotherapy today. After verbal consent and discussion of risks and benefits including but no limited to dyspigmentation/scar, blister, and pain, 2 was(were) treated with 1-2mm freeze border for 2 cycles with liquid nitrogen. Post cryotherapy instructions were provided.     Follow up: After patient returns from [place] sometime in April of 2025.     All risks, benefits and alternatives were discussed with patient.  Patient is in agreement and understands the assessment and plan.  All questions were answered.  Noni Valdivia PA-C  [facility] Dermatology  _______________________________________    CC: Derm Problem (Lesion behind the L ear - have been there for 1 yr, non healing, bleeds per pt )    HPI:  Mr. Charles Nogueira is a(n) 84 year old male who presents today as a return patient for  evaluation of a skin lesion involving the left postauricular sulcus and right shin. He is accompanied by his wife, Paula. He was last seen at [facility] dermatology on 9/23/2013.  The lesions on his right shin and left postauricular sulcus have been present for at least 1 year or greater.  They have grown in size and do occasionally bleed.    He has a personal history of melanoma in situ, nonmelanoma skin cancer, and dysplastic nevus.    Patient is otherwise feeling well, without additional skin concerns.    Physical Exam:  SKIN: Focused examination of face, neck, right shin, and right wrist only was performed per patient request.  - right preauricular cheek x5, left preauricular cheek x3, right dorsal wrist x3, left upper antihelix, left superior helix, and left mid helix, pink macule(s) with overlying adherent scale consistent with an actinic keratosis   -bilateral anterior neck, waxy, stuck on tan to brown papules  -right shin, 0.9 cm pink, pearly papule(s) with arborizing telangectasias  -right forehead, 0.5 cm skin colored, shiny, pearly papule with arborizing telangectasia's  -left postauricular sulcus, 1.0 cm erythematous pearly papule with hemorrhagic crust  - No other lesions of concern on areas examined.                   Medications:  Current Outpatient Medications   Medication Sig Dispense Refill    atorvastatin (LIPITOR) 20 MG tablet Take 1 tablet (20 mg) by mouth daily. 90 tablet 3    levETIRAcetam (KEPPRA) 500 MG tablet Take 500 mg by mouth 2 times daily      losartan (COZAAR) 100 MG tablet Take 1 tablet (100 mg) by mouth daily. 90 tablet 3    metoprolol succinate ER (TOPROL XL) 50 MG 24 hr tablet Take 1 tablet (50 mg) by mouth daily. 90 tablet 3    Multiple Vitamins-Minerals (ZINC PO)       Turmeric 500 MG CAPS       warfarin ANTICOAGULANT (COUMADIN) 2.5 MG tablet Take 2.5 mg every Friday; Take 1.25 mg all other days. Or as directed by INR clinic. 50 tablet 1    warfarin ANTICOAGULANT (COUMADIN) 2.5  MG tablet 2.5mg Monday and 1.25mg all other days per INR clinic 60 tablet 1     No current facility-administered medications for this visit.      Past Medical History:   Patient Active Problem List   Diagnosis    Impotence of organic origin    Actinic keratosis    Viral warts    Malignant melanoma of skin of trunk, except scrotum (H)    CARDIOVASCULAR SCREENING; LDL GOAL LESS THAN 130    Pacemaker    PVC (premature ventricular contraction)    Heart murmur    SCC (squamous cell carcinoma), face    Syncope    Mitral valve disease    Migraine equivalent    Sick sinus syndrome (H)    Essential hypertension    Cardiac pacemaker in situ    Nonrheumatic mitral valve regurgitation    Paroxysmal atrial fibrillation (H)    Paroxysmal ventricular tachycardia (H)    Chronic diastolic congestive heart failure (H)    Cervicalgia    Episode of transient neurologic symptoms    Presbycusis of both ears    Iliac artery aneurysm (H)    Abnormal cardiovascular stress test    Status post coronary angiogram    Nonsustained ventricular tachycardia (H)    Abnormal findings on diagnostic imaging of heart and coronary circulation     Past Medical History:   Diagnosis Date    Acute ischemic heart disease (H) 08/24/2018    Allergic rhinitis, cause unspecified     Basal cell carcinoma     Cardiac pacemaker in situ 08/14/2017    Cervicalgia 04/26/2022    Chronic diastolic congestive heart failure (H) 07/26/2021    Dysphagia, oropharyngeal phase 06/14/2018    Mild per video swallow study. To use swallowing techniques    Episode of transient neurologic symptoms 04/26/2022    Essential hypertension, benign 11/11/2016    Fatigue 04/26/2022    Heart murmur 05/10/2011    Hx of cardiac pacemaker     Iliac artery aneurysm (H) 10/12/2022    Malignant melanoma (H)     Melanoma (H)     Migraine equivalent 11/10/2015    Mitral valve prolapse     Non-rheumatic mitral regurgitation 08/09/2018    Other forms of migraine, without mention of intractable migraine  without mention of status migrainosus     Paroxysmal A-fib (H)     Premature ventricular contraction     Presbycusis of both ears 04/26/2022    PVC (premature ventricular contraction) 05/10/2011    Sick sinus syndrome (H) 05/03/2016    s/p PPM implant 7/2/14    Squamous cell carcinoma     Syncope     Weakness of both upper extremities 04/26/2022    Weakness of both upper extremities 04/26/2022       CC Referred Self, MD  No address on file on close of this encounter.

## 2024-11-12 ENCOUNTER — TELEPHONE (OUTPATIENT)
Dept: DERMATOLOGY | Facility: CLINIC | Age: 84
End: 2024-11-12
Payer: COMMERCIAL

## 2024-11-12 NOTE — TELEPHONE ENCOUNTER
Prior Authorization Specialty Medication Request    Medication/Dose: Imiquimod 5% cre   Diagnosis and ICD code (if different than what is on RX):    New/renewal/insurance change PA/secondary ins. PA:  Previously Tried and Failed:      Important Lab Values:   Rationale:     Insurance   Primary:   Insurance ID:      Secondary (if applicable):  Insurance ID:      Pharmacy Information (if different than what is on RX)  Name:    Phone:    Fax:    Clinic Information  Preferred routing pool for dept communication:

## 2024-11-13 ENCOUNTER — TELEPHONE (OUTPATIENT)
Dept: DERMATOLOGY | Facility: CLINIC | Age: 84
End: 2024-11-13
Payer: COMMERCIAL

## 2024-11-13 LAB
PATH REPORT.COMMENTS IMP SPEC: NORMAL
PATH REPORT.COMMENTS IMP SPEC: NORMAL
PATH REPORT.FINAL DX SPEC: NORMAL
PATH REPORT.GROSS SPEC: NORMAL
PATH REPORT.MICROSCOPIC SPEC OTHER STN: NORMAL
PATH REPORT.RELEVANT HX SPEC: NORMAL

## 2024-11-13 NOTE — TELEPHONE ENCOUNTER
----- Message from Crispin Ryan sent at 11/13/2024  3:33 PM CST -----  Please let pt know and refer to mohs for all 3     Final Diagnosis   A(1). Skin, right shin, shave:  - Basal cell carcinoma, superficial and nodular types - (see description)     B(2). Skin, right forehead, shave:  - Basal cell carcinoma, superficial and nodular types - (see description)     C(3). Skin, left postauricular sulcus, shave:  -  Basal cell carcinoma, infiltrating type - (see description)

## 2024-11-14 ENCOUNTER — DOCUMENTATION ONLY (OUTPATIENT)
Dept: ANTICOAGULATION | Facility: CLINIC | Age: 84
End: 2024-11-14

## 2024-11-14 ENCOUNTER — ANTICOAGULATION THERAPY VISIT (OUTPATIENT)
Dept: ANTICOAGULATION | Facility: CLINIC | Age: 84
End: 2024-11-14

## 2024-11-14 ENCOUNTER — LAB (OUTPATIENT)
Dept: LAB | Facility: CLINIC | Age: 84
End: 2024-11-14
Payer: COMMERCIAL

## 2024-11-14 DIAGNOSIS — I48.0 PAROXYSMAL ATRIAL FIBRILLATION (H): ICD-10-CM

## 2024-11-14 DIAGNOSIS — I05.9 MITRAL VALVE DISEASE: ICD-10-CM

## 2024-11-14 DIAGNOSIS — I49.5 SICK SINUS SYNDROME (H): Primary | ICD-10-CM

## 2024-11-14 LAB — INR BLD: 1.9 (ref 0.9–1.1)

## 2024-11-14 PROCEDURE — 36416 COLLJ CAPILLARY BLOOD SPEC: CPT

## 2024-11-14 PROCEDURE — 85610 PROTHROMBIN TIME: CPT

## 2024-11-14 NOTE — TELEPHONE ENCOUNTER
Patient Contact    Attempt # 1    Was call answered?  No    Left message on answering machine for patient to call back.    Marianela BROOKE RN  Dermatology   [phone]

## 2024-11-14 NOTE — PROGRESS NOTES
ANTICOAGULATION  MANAGEMENT- Travel planning    Charles Nogueira reports upcoming travel plans to [facility].    Departure date: this week  Anticipated return date: spring (usually ~April)  Alternate contact information (if applicable): n/a      INR monitoring plan:     [facility] to monitor and dose while traveling. Walks in at the hospital for INR check. Faxed results to be routed to [facility] HEART INR NURSE. In previous years, the hospital has been unable to fax results to [facility] as they do not have a fax machine. Results are emailed to patient's wife and historically she has sent a screenshot of the result via [employer]. She requests that [facility] send her a message the day patient is due for an INR and she will reply with the results once received.     Anticoagulation calendar updated with date of next INR    Instructed to call the Anticoauglation Clinic for any changes, questions or concerns [phone]    ?   Mary Arguelles RN

## 2024-11-14 NOTE — PROGRESS NOTES
ANTICOAGULATION MANAGEMENT     Charles Nogueira 84 year old male is on warfarin with subtherapeutic INR result. (Goal INR 2.0-3.0)    Recent labs: (last 7 days)     11/14/24  0901   INR 1.9*       ASSESSMENT     Warfarin Lab Questionnaire    Warfarin Doses Last 7 Days      11/14/2024     8:55 AM   Dose in Tablet or Mg   TAB or MG? milligram (mg)     Pt Rptd Dose SUNDAY MONDAY TUESDAY WED THURS FRIDAY SATURDAY 11/14/2024   8:55 AM 1.25 2.5 1.25 1.25 1.25 1.25 1.25   **verified dosing with wife, above dosing is incorrect and patient has been taking maintenance dose as instructed**        11/14/2024   Warfarin Lab Questionnaire   Missed doses within past 14 days? No   Changes in diet or alcohol within past 14 days? No   Medication changes since last result? No   Injuries or illness since last result? No   New shortness of breath, severe headaches or sudden changes in vision since last result? No   Abnormal bleeding since last result? Yes   If yes, please explain: basal cell removed-- done on Monday   Upcoming surgery, procedure? No   Best number to call with results? [phone]        Previous result: Therapeutic last 2(+) visits  Additional findings: leaving for [place] soon, will be gone until springtime-- hospital where patient checks INR does not have a fax machine so wife will reply to [employer] message from [facility] with screenshot of result.       PLAN     Recommended plan for no diet, medication or health factor changes affecting INR     Dosing Instructions: Continue your current warfarin dose with next INR in 3 weeks       Summary  As of 11/14/2024      Full warfarin instructions:  2.5 mg every Fri; 1.25 mg all other days   Next INR check:  12/5/2024               Telephone call with wifePaula who verbalizes understanding and agrees to plan    Patient will be using lab in [place] until spring.    Education provided: Symptom monitoring: monitoring for bleeding signs and symptoms, monitoring for clotting signs and  symptoms, monitoring for stroke signs and symptoms, when to seek medical attention/emergency care, if you hit your head or have a bad fall seek emergency care, and travel related clotting risk and prevention  Contact [phone] with any changes, questions or concerns.     Plan made per [facility] anticoagulation protocol    Mary Arguelles RN  11/14/2024  Anticoagulation Clinic  [facility] for routing messages: [facility] HEART INR NURSE  [facility] patient phone line: [phone]        _______________________________________________________________________     Anticoagulation Episode Summary       Current INR goal:  2.0-3.0   TTR:  71.6% (1 y)   Target end date:  Indefinite   Send INR reminders to:  [facility] HE[facility] INR NURSE    Indications    Sick sinus syndrome (H) [I49.5]  Paroxysmal atrial fibrillation (H) [I48.0]  Mitral valve disease [I05.9]             Comments:  --             Anticoagulation Care Providers       Provider Role Specialty Phone number    Ana Dean MD Referring Cardiovascular Disease [phone]

## 2024-11-15 NOTE — TELEPHONE ENCOUNTER
Prior Authorization Approval    Medication: IMIQUIMOD 5 % EX CREA  Authorization Effective Date: 8/13/2024  Authorization Expiration Date: 3/11/2025  Approved Dose/Quantity: 48/48   Reference #:     Insurance Company: [employer] - Phone [phone] Fax [phone]  Expected CoPay: $    CoPay Card Available:      Financial Assistance Needed:   Which Pharmacy is filling the prescription:    Pharmacy Notified: Yes-paid claim at pharmacy  Patient Notified: Yes

## 2024-11-15 NOTE — TELEPHONE ENCOUNTER
Pt and wife called back and scheduled 3 appts for Mohs.    Marianela BROOKE RN  Dermatology   [phone]

## 2024-11-15 NOTE — TELEPHONE ENCOUNTER
Pt called back and discussed results and treatment. Pt and wife stated they are leaving for [place] tomorrow and Dr. Ryan prescribed Imiquimod, they will start this and have a follow up scheduled in April.    Thank you,  Marianela BROOKE RN  Dermatology   [phone]

## 2024-12-05 ENCOUNTER — MYC MEDICAL ADVICE (OUTPATIENT)
Dept: ANTICOAGULATION | Facility: CLINIC | Age: 84
End: 2024-12-05
Payer: COMMERCIAL

## 2025-01-02 ENCOUNTER — ANTICOAGULATION THERAPY VISIT (OUTPATIENT)
Dept: ANTICOAGULATION | Facility: CLINIC | Age: 85
End: 2025-01-02
Payer: COMMERCIAL

## 2025-01-02 DIAGNOSIS — I05.9 MITRAL VALVE DISEASE: ICD-10-CM

## 2025-01-02 DIAGNOSIS — I48.0 PAROXYSMAL ATRIAL FIBRILLATION (H): ICD-10-CM

## 2025-01-02 DIAGNOSIS — I49.5 SICK SINUS SYNDROME (H): Primary | ICD-10-CM

## 2025-01-02 LAB — INR (EXTERNAL): 6.8

## 2025-01-02 NOTE — PROGRESS NOTES
ANTICOAGULATION MANAGEMENT     Charles Nogueira 84 year old male is on warfarin with therapeutic INR result. (Goal INR 2.0-3.0)    Recent labs: (last 7 days)     01/02/25  0000   INR 6.8       ASSESSMENT     Source(s): Chart Review and Patient/Caregiver Call     Warfarin doses taken: Warfarin taken as instructed  Diet: No new diet changes identified-says the diet in [place] is pretty much the same as at home but the fruit and veggies are more fresh. He does not drink alcohol. No stomach issues of any kind.  Medication/supplement changes: None noted  New illness, injury, or hospitalization: No  Signs or symptoms of bleeding or clotting: No  Previous result: Therapeutic last visit; previously outside of goal range  Additional findings: Has 2.5 mg tabs on hand. Only able to decrease dose by 12.5% right now which is taking a half tab every day. If further reduction is needed, we will have to see if there if a pharmacy they can get smaller tabs at.       PLAN     Recommended plan for no diet, medication or health factor changes affecting INR     Dosing Instructions: hold 2 doses then decrease your warfarin dose (12.5% change) with next INR in 4 days (due to holiday)       Summary  As of 1/2/2025      Full warfarin instructions:  1/2: Hold; 1/3: Hold; Otherwise 1.25 mg every day   Next INR check:  1/6/2025               Telephone call with Paula who agrees to plan and repeated back plan correctly  Sent [employer] message with dosing and follow up instructions    Patient using outside facility for labs    Education provided: Please call back if any changes to your diet, medications or how you've been taking warfarin  Goal range and lab monitoring: goal range and significance of current result and Importance of following up at instructed interval  Symptom monitoring: monitoring for bleeding signs and symptoms, when to seek medical attention/emergency care, and if you hit your head or have a bad fall seek emergency care  Written  instructions provided  Contact [phone] with any changes, questions or concerns.     Plan made per [facility] anticoagulation protocol review and FYI to LEYLA CLAYTON.    Melissa Grady RN  1/2/2025  Anticoagulation Clinic  [facility] for routing messages: zoya DÍAZ HEART INR NURSE  [facility] patient phone line: [phone]        _______________________________________________________________________     Anticoagulation Episode Summary       Current INR goal:  2.0-3.0   TTR:  70.4% (1 y)   Target end date:  Indefinite   Send INR reminders to:  ADAM DÍ[facility] HEART INR NURSE    Indications    Sick sinus syndrome (H) [I49.5]  Paroxysmal atrial fibrillation (H) [I48.0]  Mitral valve disease [I05.9]             Comments:  --             Anticoagulation Care Providers       Provider Role Specialty Phone number    Ana Dean MD Referring Cardiovascular Disease [phone]

## 2025-01-06 ENCOUNTER — TELEPHONE (OUTPATIENT)
Dept: ANTICOAGULATION | Facility: CLINIC | Age: 85
End: 2025-01-06
Payer: COMMERCIAL

## 2025-01-06 ENCOUNTER — ANTICOAGULATION THERAPY VISIT (OUTPATIENT)
Dept: ANTICOAGULATION | Facility: CLINIC | Age: 85
End: 2025-01-06
Payer: COMMERCIAL

## 2025-01-06 DIAGNOSIS — I05.9 MITRAL VALVE DISEASE: ICD-10-CM

## 2025-01-06 DIAGNOSIS — I48.0 PAROXYSMAL ATRIAL FIBRILLATION (H): ICD-10-CM

## 2025-01-06 DIAGNOSIS — I49.5 SICK SINUS SYNDROME (H): Primary | ICD-10-CM

## 2025-01-06 LAB — INR (EXTERNAL): 1.3

## 2025-01-06 NOTE — PROGRESS NOTES
"ANTICOAGULATION MANAGEMENT     Charles Nogueira 84 year old male is on warfarin with subtherapeutic INR result. (Goal INR 2.0-3.0)    Recent labs: (last 7 days)     01/06/25  0000   INR 1.3       ASSESSMENT     Source(s): Chart Review and Patient/Caregiver Call     Warfarin doses taken: Held for 2 doses  recently which may be affecting INR  Diet: Did eat a \"little extra\" spinach in salads over the weekend.  Medication/supplement changes: None noted  New illness, injury, or hospitalization: No  Signs or symptoms of bleeding or clotting: No  Previous result: Supratherapeutic  Additional findings: Has 2.5 mg tabs on hand in [place].      PLAN     Recommended plan for temporary change(s) and ongoing change(s) affecting INR     Dosing Instructions: Continue your current warfarin dose with next INR in 1 week       Summary  As of 1/6/2025      Full warfarin instructions:  1.25 mg every day   Next INR check:  1/13/2025               Telephone call with Paula who verbalizes understanding and agrees to plan  Sent [employer] message with dosing and follow up instructions    Patient using outside facility for labs    Education provided: Please call back if any changes to your diet, medications or how you've been taking warfarin  Goal range and lab monitoring: goal range and significance of current result and Importance of following up at instructed interval  Symptom monitoring: monitoring for bleeding signs and symptoms and monitoring for clotting signs and symptoms  Written instructions provided  Contact [phone] with any changes, questions or concerns.     Plan made with [facility] Pharmacist Massiel Grady, VICENTA  1/6/2025  Anticoagulation Clinic  [facility] for routing messages: zoya DÍAZ HEART INR NURSE  [facility] patient phone line: [phone]        _______________________________________________________________________     Anticoagulation Episode Summary       Current INR goal:  2.0-3.0   TTR:  69.6% (1 y)   Target end " date:  Indefinite   Send INR reminders to:  ADAM [facility] HEART INR NURSE    Indications    Sick sinus syndrome (H) [I49.5]  Paroxysmal atrial fibrillation (H) [I48.0]  Mitral valve disease [I05.9]             Comments:  --             Anticoagulation Care Providers       Provider Role Specialty Phone number    Ana Dean MD Referring Cardiovascular Disease [phone]

## 2025-01-06 NOTE — TELEPHONE ENCOUNTER
Patient's wife charles left a VM on the home care line at [place] at 1:18 pm stating she was returning a call regarding INR of 1.3. please call [phone]    Yudith Key, RN  Anticoagulation Nurse - [facility] [facility], [place]

## 2025-01-13 ENCOUNTER — ANTICOAGULATION THERAPY VISIT (OUTPATIENT)
Dept: ANTICOAGULATION | Facility: CLINIC | Age: 85
End: 2025-01-13
Payer: COMMERCIAL

## 2025-01-13 ENCOUNTER — TELEPHONE (OUTPATIENT)
Dept: ANTICOAGULATION | Facility: CLINIC | Age: 85
End: 2025-01-13
Payer: COMMERCIAL

## 2025-01-13 DIAGNOSIS — I05.9 MITRAL VALVE DISEASE: ICD-10-CM

## 2025-01-13 DIAGNOSIS — I48.0 PAROXYSMAL ATRIAL FIBRILLATION (H): ICD-10-CM

## 2025-01-13 DIAGNOSIS — I49.5 SICK SINUS SYNDROME (H): Primary | ICD-10-CM

## 2025-01-13 LAB — INR (EXTERNAL): 1.8

## 2025-01-13 NOTE — PROGRESS NOTES
ANTICOAGULATION MANAGEMENT     Charles Nogueira 84 year old male is on warfarin with subtherapeutic INR result. (Goal INR 2.0-3.0)    Recent labs: (last 7 days)     01/13/25  0000   INR 1.8       ASSESSMENT     Source(s): Chart Review and Patient/Caregiver Call     Warfarin doses taken: Warfarin taken as instructed  Diet: Increased greens/vitamin K in diet; plans to resume previous intake-had some extra broccoli but will cut back to usual now.  Medication/supplement changes: None noted  New illness, injury, or hospitalization: No  Signs or symptoms of bleeding or clotting: No-bruising but not new  Previous result: Subtherapeutic after 2 day hold  Additional findings: None       PLAN     Recommended plan for temporary change(s) affecting INR     Dosing Instructions: Continue your current warfarin dose with next INR in 1 week       Summary  As of 1/13/2025      Full warfarin instructions:  1.25 mg every day   Next INR check:  1/20/2025               Telephone call with Paula who verbalizes understanding and agrees to plan  Sent [employer] message with dosing and follow up instructions    Patient using outside facility for labs    Education provided: Goal range and lab monitoring: goal range and significance of current result and Importance of following up at instructed interval  Dietary considerations: importance of consistent vitamin K intake, impact of vitamin K foods on INR, and vitamin K content of foods  Written instructions provided  Contact [phone] with any changes, questions or concerns.     Plan made per [facility] anticoagulation protocol    Melissa Grady RN  1/13/2025  Anticoagulation Clinic  [employer] for routing messages: reid MEDINA [facility] HEART INR NURSE  [facility] patient phone line: [phone]        _______________________________________________________________________     Anticoagulation Episode Summary       Current INR goal:  2.0-3.0   TTR:  69.6% (1 y)   Target end date:  Indefinite   Send INR reminders to:  ADAM  [facility] HEART INR NURSE    Indications    Sick sinus syndrome (H) [I49.5]  Paroxysmal atrial fibrillation (H) [I48.0]  Mitral valve disease [I05.9]             Comments:  --             Anticoagulation Care Providers       Provider Role Specialty Phone number    Ana Dean MD Referring Cardiovascular Disease [phone]

## 2025-01-13 NOTE — TELEPHONE ENCOUNTER
General Call      Reason for Call:  pt wife calling to report results 1.8 today     What are your questions or concerns:  see above    Date of last appointment with provider: n/a    Could we send this information to you in [employer] or would you prefer to receive a phone call?:   Patient would prefer a phone call   Okay to leave a detailed message?: Yes at Home number on file [phone]

## 2025-01-21 ENCOUNTER — ANTICOAGULATION THERAPY VISIT (OUTPATIENT)
Dept: ANTICOAGULATION | Facility: CLINIC | Age: 85
End: 2025-01-21
Payer: COMMERCIAL

## 2025-01-21 DIAGNOSIS — I48.0 PAROXYSMAL ATRIAL FIBRILLATION (H): ICD-10-CM

## 2025-01-21 DIAGNOSIS — I05.9 MITRAL VALVE DISEASE: ICD-10-CM

## 2025-01-21 DIAGNOSIS — I49.5 SICK SINUS SYNDROME (H): Primary | ICD-10-CM

## 2025-01-21 LAB
INR (EXTERNAL): 2.9
INR (EXTERNAL): 2.9

## 2025-01-21 NOTE — PROGRESS NOTES
ANTICOAGULATION MANAGEMENT     Charles Nogueira 84 year old male is on warfarin with therapeutic INR result. (Goal INR 2.0-3.0)    Recent labs: (last 7 days)     01/21/25  0800   INR 2.9       ASSESSMENT     Source(s): Chart Review and Patient/Caregiver Call     Warfarin doses taken: Warfarin taken as instructed  Diet: No new diet changes identified  Medication/supplement changes: None noted  New illness, injury, or hospitalization: No  Signs or symptoms of bleeding or clotting: No  Previous result: Subtherapeutic  Additional findings: None       PLAN     Recommended plan for no diet, medication or health factor changes affecting INR     Dosing Instructions: Continue your current warfarin dose with next INR in 1 week       Summary  As of 1/21/2025      Full warfarin instructions:  1.25 mg every day   Next INR check:  1/28/2025               Telephone call with charles who verbalizes understanding and agrees to plan.  [employer] message sent also.    Patient using outside facility for labs [place] lab    Education provided: Goal range and lab monitoring: goal range and significance of current result  Contact [phone] with any changes, questions or concerns.     Plan made per [facility] anticoagulation protocol    Jessika Yoo, RN  1/21/2025  Anticoagulation Clinic  [employer] for routing messages: zoya MEDINA [facility] HEART INR NURSE  [facility] patient phone line: [phone]        _______________________________________________________________________     Anticoagulation Episode Summary       Current INR goal:  2.0-3.0   TTR:  69.2% (1 y)   Target end date:  Indefinite   Send INR reminders to:  ADAM [facility] HEART INR NURSE    Indications    Sick sinus syndrome (H) [I49.5]  Paroxysmal atrial fibrillation (H) [I48.0]  Mitral valve disease [I05.9]             Comments:  --             Anticoagulation Care Providers       Provider Role Specialty Phone number    Ana Dean MD Referring Cardiovascular Disease [phone]

## 2025-01-28 ENCOUNTER — ANTICOAGULATION THERAPY VISIT (OUTPATIENT)
Dept: ANTICOAGULATION | Facility: CLINIC | Age: 85
End: 2025-01-28
Payer: COMMERCIAL

## 2025-01-28 ENCOUNTER — TELEPHONE (OUTPATIENT)
Dept: SCHEDULING | Facility: CLINIC | Age: 85
End: 2025-01-28
Payer: COMMERCIAL

## 2025-01-28 DIAGNOSIS — I49.5 SICK SINUS SYNDROME (H): Primary | ICD-10-CM

## 2025-01-28 DIAGNOSIS — I48.0 PAROXYSMAL ATRIAL FIBRILLATION (H): ICD-10-CM

## 2025-01-28 DIAGNOSIS — I05.9 MITRAL VALVE DISEASE: ICD-10-CM

## 2025-01-28 LAB — INR (EXTERNAL): 2.1

## 2025-01-28 NOTE — PROGRESS NOTES
ANTICOAGULATION MANAGEMENT     Charles Nogueira 84 year old male is on warfarin with therapeutic INR result. (Goal INR 2.0-3.0)    Recent labs: (last 7 days)     01/28/25  0000   INR 2.1       ASSESSMENT     Source(s): Chart Review and Patient/Caregiver Call     Warfarin doses taken: Warfarin taken as instructed  Diet: He has had no broccoli or lettuce this whole last week because it did not look very appealing at the grocery store.  Paula went grocery shopping again today and got broccoli and lettuce so he will be eating it again.  Medication/supplement changes: None noted  New illness, injury, or hospitalization: No  Signs or symptoms of bleeding or clotting: No  Previous result: Therapeutic last visit; previously outside of goal range  Additional findings: Paula is concerned about the INR dropping below goal range now that they we will be adding in greens again.  She would like to return to his previous dosing of 1 full tablet every Friday and half of a tablet all of the other days. She agreed to check his INR in a week to make sure this dosing change is not too much.       PLAN     Recommended plan for temporary change(s) and ongoing change(s) affecting INR     Dosing Instructions: Increase your warfarin dose (14.3% change) with next INR in 1 week per wife's request due to adding in vitamin k foods again.    Summary  As of 1/28/2025      Full warfarin instructions:  2.5 mg every Fri; 1.25 mg all other days   Next INR check:  2/4/2025               Telephone call with Paula who verbalizes understanding and agrees to plan  Sent [employer] message with dosing and follow up instructions    Patient using outside facility for labs    Education provided: Please call back if any changes to your diet, medications or how you've been taking warfarin  Goal range and lab monitoring: goal range and significance of current result and Importance of following up at instructed interval  Dietary considerations: impact of vitamin K  foods on INR and vitamin K content of foods  Written instructions provided  Contact [phone] with any changes, questions or concerns.     Plan made per [facility] anticoagulation protocol and request from Charles's wife to get warfarin dosing back to his previous amount.    Melissa Grady RN  1/28/2025  Anticoagulation Clinic  [facility] for routing messages: zoya MEDINA [facility] [place] [facility] HEART INR NURSE  [facility] patient phone line: [phone]        _______________________________________________________________________     Anticoagulation Episode Summary       Current INR goal:  2.0-3.0   TTR:  69.2% (1 y)   Target end date:  Indefinite   Send INR reminders to:  ADAM [facility] HEART INR NURSE    Indications    Sick sinus syndrome (H) [I49.5]  Paroxysmal atrial fibrillation (H) [I48.0]  Mitral valve disease [I05.9]             Comments:  --             Anticoagulation Care Providers       Provider Role Specialty Phone number    Ana Dean MD Referring Cardiovascular Disease [phone]

## 2025-01-28 NOTE — TELEPHONE ENCOUNTER
General Call    Contacts       Contact Date/Time Type Contact Phone/Fax    01/28/2025 02:30 PM CST Phone (Incoming) Paula Nogueira (Emergency Contact) [phone] (M)          Reason for Call:  pt wife states inr was 2.1     What are your questions or concerns:  none    Date of last appointment with provider: n/a    Could we send this information to you in [facility] or would you prefer to receive a phone call?:   Patient would prefer a phone call   Okay to leave a detailed message?: Yes at Home number on file [phone] (home)

## 2025-02-04 ENCOUNTER — TELEPHONE (OUTPATIENT)
Dept: CARDIOLOGY | Facility: CLINIC | Age: 85
End: 2025-02-04

## 2025-02-04 ENCOUNTER — ANTICOAGULATION THERAPY VISIT (OUTPATIENT)
Dept: ANTICOAGULATION | Facility: CLINIC | Age: 85
End: 2025-02-04

## 2025-02-04 ENCOUNTER — ANCILLARY PROCEDURE (OUTPATIENT)
Dept: CARDIOLOGY | Facility: CLINIC | Age: 85
End: 2025-02-04
Attending: INTERNAL MEDICINE
Payer: COMMERCIAL

## 2025-02-04 DIAGNOSIS — Z95.0 CARDIAC PACEMAKER IN SITU: ICD-10-CM

## 2025-02-04 DIAGNOSIS — I49.5 SICK SINUS SYNDROME (H): Primary | ICD-10-CM

## 2025-02-04 DIAGNOSIS — I05.9 MITRAL VALVE DISEASE: ICD-10-CM

## 2025-02-04 DIAGNOSIS — I48.0 PAROXYSMAL ATRIAL FIBRILLATION (H): ICD-10-CM

## 2025-02-04 LAB — INR (EXTERNAL): 2.1

## 2025-02-04 PROCEDURE — 93296 REM INTERROG EVL PM/IDS: CPT | Performed by: INTERNAL MEDICINE

## 2025-02-04 PROCEDURE — 93294 REM INTERROG EVL PM/LDLS PM: CPT | Performed by: INTERNAL MEDICINE

## 2025-02-04 NOTE — PROGRESS NOTES
ANTICOAGULATION MANAGEMENT     Charles Nogueira 84 year old male is on warfarin with therapeutic INR result. (Goal INR 2.0-3.0)    Recent labs: (last 7 days)     02/04/25  0000   INR 2.1       ASSESSMENT     Source(s): Chart Review  Previous INR was Therapeutic last 2(+) visits  Medication, diet, health changes since last INR chart reviewed; none identified         PLAN     Recommended plan for no diet, medication or health factor changes affecting INR     Dosing Instructions: Continue your current warfarin dose with next INR in 2 weeks       Summary  As of 2/4/2025      Full warfarin instructions:  2.5 mg every Fri; 1.25 mg all other days   Next INR check:  2/18/2025               Detailed voice message left for wifePaula with dosing instructions and follow up date.   Sent [facility] message with dosing and follow up instructions    Patient using outside facility for labs    Education provided: Please call back if any changes to your diet, medications or how you've been taking warfarin  Goal range and lab monitoring: goal range and significance of current result  Written instructions provided  Contact [phone] with any changes, questions or concerns.     Plan made per [facility] anticoagulation protocol    Melissa Grady RN  2/4/2025  Anticoagulation Clinic  [employer] for routing messages: zoya MEDINA [facility] HEART INR NURSE  [facility] patient phone line: [phone]        _______________________________________________________________________     Anticoagulation Episode Summary       Current INR goal:  2.0-3.0   TTR:  69.2% (1 y)   Target end date:  Indefinite   Send INR reminders to:  ADAM [facility] HEART INR NURSE    Indications    Sick sinus syndrome (H) [I49.5]  Paroxysmal atrial fibrillation (H) [I48.0]  Mitral valve disease [I05.9]             Comments:  --             Anticoagulation Care Providers       Provider Role Specialty Phone number    Ana Dean MD Referring Cardiovascular Disease [phone]

## 2025-02-05 LAB
MDC_IDC_LEAD_CONNECTION_STATUS: NORMAL
MDC_IDC_LEAD_CONNECTION_STATUS: NORMAL
MDC_IDC_LEAD_IMPLANT_DT: NORMAL
MDC_IDC_LEAD_IMPLANT_DT: NORMAL
MDC_IDC_LEAD_LOCATION: NORMAL
MDC_IDC_LEAD_LOCATION: NORMAL
MDC_IDC_LEAD_MFG: NORMAL
MDC_IDC_LEAD_MFG: NORMAL
MDC_IDC_LEAD_MODEL: NORMAL
MDC_IDC_LEAD_MODEL: NORMAL
MDC_IDC_LEAD_POLARITY_TYPE: NORMAL
MDC_IDC_LEAD_POLARITY_TYPE: NORMAL
MDC_IDC_LEAD_SERIAL: NORMAL
MDC_IDC_LEAD_SERIAL: NORMAL
MDC_IDC_MSMT_BATTERY_DTM: NORMAL
MDC_IDC_MSMT_BATTERY_IMPEDANCE: 1715 OHM
MDC_IDC_MSMT_BATTERY_REMAINING_LONGEVITY: 49 MO
MDC_IDC_MSMT_BATTERY_STATUS: NORMAL
MDC_IDC_MSMT_BATTERY_VOLTAGE: 2.75 V
MDC_IDC_MSMT_LEADCHNL_RA_IMPEDANCE_VALUE: 557 OHM
MDC_IDC_MSMT_LEADCHNL_RA_PACING_THRESHOLD_AMPLITUDE: 0.62 V
MDC_IDC_MSMT_LEADCHNL_RA_PACING_THRESHOLD_PULSEWIDTH: 0.4 MS
MDC_IDC_MSMT_LEADCHNL_RV_IMPEDANCE_VALUE: 505 OHM
MDC_IDC_MSMT_LEADCHNL_RV_PACING_THRESHOLD_AMPLITUDE: 1.12 V
MDC_IDC_MSMT_LEADCHNL_RV_PACING_THRESHOLD_PULSEWIDTH: 0.4 MS
MDC_IDC_PG_IMPLANT_DTM: NORMAL
MDC_IDC_PG_MFG: NORMAL
MDC_IDC_PG_MODEL: NORMAL
MDC_IDC_PG_SERIAL: NORMAL
MDC_IDC_PG_TYPE: NORMAL
MDC_IDC_SESS_CLINIC_NAME: NORMAL
MDC_IDC_SESS_DTM: NORMAL
MDC_IDC_SESS_TYPE: NORMAL
MDC_IDC_SET_BRADY_AT_MODE_SWITCH_MODE: NORMAL
MDC_IDC_SET_BRADY_AT_MODE_SWITCH_RATE: 175 {BEATS}/MIN
MDC_IDC_SET_BRADY_LOWRATE: 60 {BEATS}/MIN
MDC_IDC_SET_BRADY_MAX_SENSOR_RATE: 130 {BEATS}/MIN
MDC_IDC_SET_BRADY_MAX_TRACKING_RATE: 130 {BEATS}/MIN
MDC_IDC_SET_BRADY_MODE: NORMAL
MDC_IDC_SET_BRADY_PAV_DELAY_LOW: 350 MS
MDC_IDC_SET_BRADY_SAV_DELAY_LOW: 350 MS
MDC_IDC_SET_LEADCHNL_RA_PACING_AMPLITUDE: 1.5 V
MDC_IDC_SET_LEADCHNL_RA_PACING_CAPTURE_MODE: NORMAL
MDC_IDC_SET_LEADCHNL_RA_PACING_POLARITY: NORMAL
MDC_IDC_SET_LEADCHNL_RA_PACING_PULSEWIDTH: 0.4 MS
MDC_IDC_SET_LEADCHNL_RA_SENSING_POLARITY: NORMAL
MDC_IDC_SET_LEADCHNL_RA_SENSING_SENSITIVITY: 0.5 MV
MDC_IDC_SET_LEADCHNL_RV_PACING_AMPLITUDE: 2.25 V
MDC_IDC_SET_LEADCHNL_RV_PACING_CAPTURE_MODE: NORMAL
MDC_IDC_SET_LEADCHNL_RV_PACING_POLARITY: NORMAL
MDC_IDC_SET_LEADCHNL_RV_PACING_PULSEWIDTH: 0.4 MS
MDC_IDC_SET_LEADCHNL_RV_SENSING_POLARITY: NORMAL
MDC_IDC_SET_LEADCHNL_RV_SENSING_SENSITIVITY: 2 MV
MDC_IDC_SET_ZONE_DETECTION_INTERVAL: 333.33 MS
MDC_IDC_SET_ZONE_DETECTION_INTERVAL: 342.86 MS
MDC_IDC_SET_ZONE_STATUS: NORMAL
MDC_IDC_SET_ZONE_STATUS: NORMAL
MDC_IDC_SET_ZONE_TYPE: NORMAL
MDC_IDC_SET_ZONE_TYPE: NORMAL
MDC_IDC_SET_ZONE_VENDOR_TYPE: NORMAL
MDC_IDC_SET_ZONE_VENDOR_TYPE: NORMAL
MDC_IDC_STAT_AT_BURDEN_PERCENT: 0 %
MDC_IDC_STAT_AT_DTM_END: NORMAL
MDC_IDC_STAT_AT_DTM_START: NORMAL
MDC_IDC_STAT_AT_MODE_SW_COUNT: 0
MDC_IDC_STAT_BRADY_AP_VP_PERCENT: 3 %
MDC_IDC_STAT_BRADY_AP_VS_PERCENT: 78 %
MDC_IDC_STAT_BRADY_AS_VP_PERCENT: 0 %
MDC_IDC_STAT_BRADY_AS_VS_PERCENT: 19 %
MDC_IDC_STAT_BRADY_DTM_END: NORMAL
MDC_IDC_STAT_BRADY_DTM_START: NORMAL
MDC_IDC_STAT_EPISODE_RECENT_COUNT: 0
MDC_IDC_STAT_EPISODE_RECENT_COUNT: 0
MDC_IDC_STAT_EPISODE_RECENT_COUNT_DTM_END: NORMAL
MDC_IDC_STAT_EPISODE_RECENT_COUNT_DTM_END: NORMAL
MDC_IDC_STAT_EPISODE_RECENT_COUNT_DTM_START: NORMAL
MDC_IDC_STAT_EPISODE_RECENT_COUNT_DTM_START: NORMAL
MDC_IDC_STAT_EPISODE_TYPE: NORMAL
MDC_IDC_STAT_EPISODE_TYPE: NORMAL

## 2025-02-18 ENCOUNTER — TELEPHONE (OUTPATIENT)
Dept: SCHEDULING | Facility: CLINIC | Age: 85
End: 2025-02-18
Payer: COMMERCIAL

## 2025-02-18 ENCOUNTER — ANTICOAGULATION THERAPY VISIT (OUTPATIENT)
Dept: ANTICOAGULATION | Facility: CLINIC | Age: 85
End: 2025-02-18
Payer: COMMERCIAL

## 2025-02-18 DIAGNOSIS — I05.9 MITRAL VALVE DISEASE: ICD-10-CM

## 2025-02-18 DIAGNOSIS — I48.0 PAROXYSMAL ATRIAL FIBRILLATION (H): ICD-10-CM

## 2025-02-18 DIAGNOSIS — I49.5 SICK SINUS SYNDROME (H): Primary | ICD-10-CM

## 2025-02-18 LAB — INR (EXTERNAL): 2.9

## 2025-02-18 NOTE — PROGRESS NOTES
ANTICOAGULATION MANAGEMENT     Charles Nogueira 85 year old male is on warfarin with therapeutic INR result. (Goal INR 2.0-3.0)    Recent labs: (last 7 days)     02/18/25  0000   INR 2.9       ASSESSMENT     Source(s): Chart Review and Patient/Caregiver Call     Warfarin doses taken: Warfarin taken as instructed  Diet: No new diet changes identified  Medication/supplement changes: None noted  New illness, injury, or hospitalization: No  Signs or symptoms of bleeding or clotting: No  Previous result: Therapeutic last 2(+) visits  Additional findings: None       PLAN     Recommended plan for no diet, medication or health factor changes affecting INR     Dosing Instructions: Continue your current warfarin dose with next INR in 3 weeks       Summary  As of 2/18/2025      Full warfarin instructions:  2.5 mg every Fri; 1.25 mg all other days   Next INR check:  3/11/2025               Telephone call with Paula who verbalizes understanding and agrees to plan    Patient using outside facility for labs    Education provided: Please call back if any changes to your diet, medications or how you've been taking warfarin  Goal range and lab monitoring: goal range and significance of current result  Written instructions provided  Contact [phone] with any changes, questions or concerns.     Plan made per [facility] anticoagulation protocol    Melissa Grady RN  2/18/2025  Anticoagulation Clinic  [employer] for routing messages: zoya [facility]R NUR[facility]  [facility] patient phone line: [phone]        _______________________________________________________________________     Anticoagulation Episode Summary       Current INR goal:  2.0-3.0   TTR:  69.2% (1 y)   Target end date:  Indefinite   Send INR reminders to:  ADAM [facility] INR NURSE    Indications    Sick sinus syndrome (H) [I49.5]  Paroxysmal atrial fibrillation (H) [I48.0]  Mitral valve disease [I05.9]             Comments:  --             Anticoagulation Care Providers       Provider  Role Specialty Phone number    Ana Dean MD Referring Cardiovascular Disease [phone]

## 2025-02-18 NOTE — TELEPHONE ENCOUNTER
General Call    Contacts       Contact Date/Time Type Contact Phone/Fax    02/18/2025 03:26 PM CST Phone (Incoming) Charles Nogueira (Self) [phone] (M)          Reason for Call:  calling in with the INR result. 2.9    What are your questions or concerns:  na    Date of last appointment with provider: na    Could we send this information to you in [facility] or would you prefer to receive a phone call?:   Patient would prefer a phone call   Okay to leave a detailed message?: N/A at Cell number on file:    Telephone Information:   Mobile [phone]

## 2025-03-11 ENCOUNTER — TELEPHONE (OUTPATIENT)
Dept: SCHEDULING | Facility: CLINIC | Age: 85
End: 2025-03-11
Payer: COMMERCIAL

## 2025-03-11 ENCOUNTER — ANTICOAGULATION THERAPY VISIT (OUTPATIENT)
Dept: ANTICOAGULATION | Facility: CLINIC | Age: 85
End: 2025-03-11
Payer: COMMERCIAL

## 2025-03-11 DIAGNOSIS — I05.9 MITRAL VALVE DISEASE: ICD-10-CM

## 2025-03-11 DIAGNOSIS — I49.5 SICK SINUS SYNDROME (H): Primary | ICD-10-CM

## 2025-03-11 DIAGNOSIS — I48.0 PAROXYSMAL ATRIAL FIBRILLATION (H): ICD-10-CM

## 2025-03-11 LAB — INR (EXTERNAL): 2.5 (ref 0.9–1.1)

## 2025-03-11 NOTE — TELEPHONE ENCOUNTER
See anticoagulation encounter from today.    Mary Arguelles RN  [facility] Anticoagulation  [phone]

## 2025-03-11 NOTE — PROGRESS NOTES
ANTICOAGULATION MANAGEMENT     Charles Nogueira 85 year old male is on warfarin with therapeutic INR result. (Goal INR 2.0-3.0)    Recent labs: (last 7 days)     03/11/25  0000   INR 2.5*       ASSESSMENT     Source(s): Chart Review  Previous INR was Therapeutic last 2(+) visits  Medication, diet, health changes since last INR chart reviewed; none identified         PLAN     Recommended plan for no diet, medication or health factor changes affecting INR     Dosing Instructions: Continue your current warfarin dose with next INR in 4 weeks       Summary  As of 3/11/2025      Full warfarin instructions:  2.5 mg every Fri; 1.25 mg all other days   Next INR check:  4/8/2025               Detailed voice message left for wifePaula with dosing instructions and follow up date.   Sent [employer] message with dosing and follow up instructions    Patient using outside facility for labs    Education provided: Please call back if any changes to your diet, medications or how you've been taking warfarin  Goal range and lab monitoring: goal range and significance of current result    Plan made per [facility] anticoagulation protocol    Mary Arguelles RN  3/11/2025  Anticoagulation Clinic  [employer] for routing messages: zoya MEDINA [facility] HEART INR NURSE  [facility] patient phone line: [phone]        _______________________________________________________________________     Anticoagulation Episode Summary       Current INR goal:  2.0-3.0   TTR:  69.2% (1 y)   Target end date:  Indefinite   Send INR reminders to:  ADAM [facility] HEART INR NURSE    Indications    Sick sinus syndrome (H) [I49.5]  Paroxysmal atrial fibrillation (H) [I48.0]  Mitral valve disease [I05.9]             Comments:  --             Anticoagulation Care Providers       Provider Role Specialty Phone number    Ana Dean MD Referring Cardiovascular Disease [phone]

## 2025-03-11 NOTE — TELEPHONE ENCOUNTER
General Call    Contacts       Contact Date/Time Type Contact Phone/Fax    03/11/2025 03:53 PM CDT Phone (Incoming) Charles Nogueira (Self) [phone] (M)          Reason for Call:  calling in to report INR number (2.5 today)    What are your questions or concerns:  na    Date of last appointment with provider: na    Could we send this information to you in [employer] or would you prefer to receive a phone call?:   No preference   Okay to leave a detailed message?: Yes at Cell number on file:    Telephone Information:   Mobile [phone]

## 2025-03-25 LAB — INR (EXTERNAL): 2.8 (ref 0.9–1.1)

## 2025-04-02 ENCOUNTER — ANTICOAGULATION THERAPY VISIT (OUTPATIENT)
Dept: ANTICOAGULATION | Facility: CLINIC | Age: 85
End: 2025-04-02
Payer: COMMERCIAL

## 2025-04-02 ENCOUNTER — TELEPHONE (OUTPATIENT)
Dept: SCHEDULING | Facility: CLINIC | Age: 85
End: 2025-04-02
Payer: COMMERCIAL

## 2025-04-02 DIAGNOSIS — I48.0 PAROXYSMAL ATRIAL FIBRILLATION (H): ICD-10-CM

## 2025-04-02 DIAGNOSIS — I49.5 SICK SINUS SYNDROME (H): Primary | ICD-10-CM

## 2025-04-02 DIAGNOSIS — I05.9 MITRAL VALVE DISEASE: ICD-10-CM

## 2025-04-02 NOTE — TELEPHONE ENCOUNTER
See anticoagulation encounter from today.    Mary Arguelles RN  [facility] Anticoagulation  [phone]

## 2025-04-02 NOTE — PROGRESS NOTES
ANTICOAGULATION MANAGEMENT     Charles Nogueira 85 year old male is on warfarin with therapeutic INR result. (Goal INR 2.0-3.0)     Anticoagulation Monitoring    INR   Latest Ref Rng 0.9 - 1.1    3/25/2025  12:00 AM 2.8 ! (E)      Legend:  ! Abnormal  (E) External lab result    ASSESSMENT     Source(s): Chart Review and Patient/Caregiver Call     Warfarin doses taken: Warfarin taken as instructed  Diet: No new diet changes identified  Medication/supplement changes: None noted  New illness, injury, or hospitalization: No  Signs or symptoms of bleeding or clotting: No  Previous result: Therapeutic last 2(+) visits  Additional findings: Upcoming surgery/procedure MOHS on 3/24/25 (behind ear), 5/1/25 (forehead), and 5/8/25       PLAN     Recommended plan for no diet, medication or health factor changes affecting INR     Dosing Instructions: Continue your current warfarin dose with next INR in 3 weeks prior to MOHS    Summary  As of 4/2/2025      Full warfarin instructions:  2.5 mg every Fri; 1.25 mg all other days   Next INR check:  4/22/2025               Telephone call with wifePaula who verbalizes understanding and agrees to plan    Lab visit scheduled    Education provided: Contact [phone] with any changes, questions or concerns.     Plan made per [facility] anticoagulation protocol    Mary Arguelles RN  4/2/2025  Anticoagulation Clinic  [facility] for routing messages: zoya DÍAZ HEART INR NURSE  [facility] patient phone line: [phone]        _______________________________________________________________________     Anticoagulation Episode Summary       Current INR goal:  2.0-3.0   TTR:  68.5% (11.9 mo)   Target end date:  Indefinite   Send INR reminders to:  ADAM DÍAZ HEART INR NURSE    Indications    Sick sinus syndrome (H) [I49.5]  Paroxysmal atrial fibrillation (H) [I48.0]  Mitral valve disease [I05.9]             Comments:  --             Anticoagulation Care Providers       Provider Role Specialty Phone number     Ana Dean MD Referring Cardiovascular Disease [phone]

## 2025-04-02 NOTE — TELEPHONE ENCOUNTER
INR follow up  Wife is calling. States patient had INR done on 3-25-25 and it was 2.8  Will not schedule anything for this week. She is thinking it will be done again when they get back from [place].   Please call or message her back.

## 2025-04-15 ENCOUNTER — DOCUMENTATION ONLY (OUTPATIENT)
Dept: ANTICOAGULATION | Facility: CLINIC | Age: 85
End: 2025-04-15

## 2025-04-15 ENCOUNTER — ANTICOAGULATION THERAPY VISIT (OUTPATIENT)
Dept: ANTICOAGULATION | Facility: CLINIC | Age: 85
End: 2025-04-15

## 2025-04-15 ENCOUNTER — LAB (OUTPATIENT)
Dept: LAB | Facility: CLINIC | Age: 85
End: 2025-04-15
Payer: COMMERCIAL

## 2025-04-15 DIAGNOSIS — I49.5 SICK SINUS SYNDROME (H): Primary | ICD-10-CM

## 2025-04-15 DIAGNOSIS — I10 ESSENTIAL HYPERTENSION: Primary | ICD-10-CM

## 2025-04-15 DIAGNOSIS — I48.0 PAROXYSMAL ATRIAL FIBRILLATION (H): ICD-10-CM

## 2025-04-15 DIAGNOSIS — E78.5 HYPERLIPIDEMIA LDL GOAL <70: ICD-10-CM

## 2025-04-15 DIAGNOSIS — I50.32 CHRONIC DIASTOLIC CONGESTIVE HEART FAILURE (H): ICD-10-CM

## 2025-04-15 DIAGNOSIS — I05.9 MITRAL VALVE DISEASE: Primary | ICD-10-CM

## 2025-04-15 DIAGNOSIS — I05.9 MITRAL VALVE DISEASE: ICD-10-CM

## 2025-04-15 LAB — INR BLD: 2.2 (ref 0.9–1.1)

## 2025-04-15 PROCEDURE — 85610 PROTHROMBIN TIME: CPT

## 2025-04-15 PROCEDURE — 80061 LIPID PANEL: CPT

## 2025-04-15 PROCEDURE — 80048 BASIC METABOLIC PNL TOTAL CA: CPT

## 2025-04-15 PROCEDURE — 84460 ALANINE AMINO (ALT) (SGPT): CPT

## 2025-04-15 PROCEDURE — 36415 COLL VENOUS BLD VENIPUNCTURE: CPT

## 2025-04-15 NOTE — PROGRESS NOTES
ANTICOAGULATION MANAGEMENT     Charles Nogueira 85 year old male is on warfarin with therapeutic INR result. (Goal INR 2.0-3.0)    Recent labs: (last 7 days)     04/15/25  0954   INR 2.2*       ASSESSMENT     Source(s): Chart Review  Previous INR was Therapeutic last 2(+) visits  Medication, diet, health changes since last INR chart reviewed; none identified  Upcoming surgery/procedure MOHS on 3/24/25 (behind ear), 5/1/25 (forehead), and 5/8/25 (shin)       PLAN     Recommended plan for no diet, medication or health factor changes affecting INR     Dosing Instructions: Continue your current warfarin dose with next INR in 4 weeks       Summary  As of 4/15/2025      Full warfarin instructions:  2.5 mg every Fri; 1.25 mg all other days   Next INR check:  5/13/2025               Detailed voice message left for wifePaula with dosing instructions and follow up date.     Check at provider office visit    Education provided: Please call back if any changes to your diet, medications or how you've been taking warfarin  Goal range and lab monitoring: goal range and significance of current result    Plan made per [facility] anticoagulation protocol    Mary Arguelles, RN  4/15/2025  Anticoagulation Clinic  Petsy pool for routing messages: zoya [facility] HEART INR NURSE  [facility] patient phone line: [phone]        _______________________________________________________________________     Anticoagulation Episode Summary       Current INR goal:  2.0-3.0   TTR:  71.1% (1 y)   Target end date:  Indefinite   Send INR reminders to:  [facility] HEART INR NURSE    Indications    Sick sinus syndrome (H) [I49.5]  Paroxysmal atrial fibrillation (H) [I48.0]  Mitral valve disease [I05.9]             Comments:  --             Anticoagulation Care Providers       Provider Role Specialty Phone number    Ana Dean MD Referring Cardiovascular Disease [phone]

## 2025-04-15 NOTE — PROGRESS NOTES
ANTICOAGULATION CLINIC REFERRAL RENEWAL REQUEST       An annual renewal order is required for all patients referred to [facility]ticoagulation [facility].?  Please review and sign the pended referral order for Charles Nogueira.       ANTICOAGULATION SUMMARY      Warfarin indication(s)   Atrial Fibrillation and mitral valve disease    Mechanical heart valve present?  NO       Current goal range   INR: 2.0-3.0     Goal appropriate for indication? Goal INR 2-3, standard for indication(s) above     Time in Therapeutic Range (TTR)  (Goal > 60%) 71.1%       Office visit with referring provider's group within last year yes on 5/13/24       Mary Arguelles RN  [facility]

## 2025-04-16 LAB
ALT SERPL W P-5'-P-CCNC: 25 U/L (ref 0–70)
ANION GAP SERPL CALCULATED.3IONS-SCNC: 9 MMOL/L (ref 7–15)
BUN SERPL-MCNC: 27.3 MG/DL (ref 8–23)
CALCIUM SERPL-MCNC: 9.4 MG/DL (ref 8.8–10.4)
CHLORIDE SERPL-SCNC: 104 MMOL/L (ref 98–107)
CHOLEST SERPL-MCNC: 123 MG/DL
CREAT SERPL-MCNC: 1.32 MG/DL (ref 0.67–1.17)
EGFRCR SERPLBLD CKD-EPI 2021: 53 ML/MIN/1.73M2
FASTING STATUS PATIENT QL REPORTED: NO
FASTING STATUS PATIENT QL REPORTED: NO
GLUCOSE SERPL-MCNC: 72 MG/DL (ref 70–99)
HCO3 SERPL-SCNC: 29 MMOL/L (ref 22–29)
HDLC SERPL-MCNC: 39 MG/DL
LDLC SERPL CALC-MCNC: 62 MG/DL
NONHDLC SERPL-MCNC: 84 MG/DL
POTASSIUM SERPL-SCNC: 4.6 MMOL/L (ref 3.4–5.3)
SODIUM SERPL-SCNC: 142 MMOL/L (ref 135–145)
TRIGL SERPL-MCNC: 108 MG/DL

## 2025-04-22 ENCOUNTER — TELEPHONE (OUTPATIENT)
Dept: SCHEDULING | Facility: CLINIC | Age: 85
End: 2025-04-22
Payer: COMMERCIAL

## 2025-04-22 NOTE — TELEPHONE ENCOUNTER
pt wife charles states would like inr checked again, reason being procedure on thursday concerned about bleeding , scraped his leg bleeds immediatly, feels low platelets  would like to come in tues or wed this week,  [phone]

## 2025-04-22 NOTE — TELEPHONE ENCOUNTER
I left message for Paula to call back and schedule an INR appointment whenever works best for them.  If there is some other blood work they want done, they will need to contact another provider about putting orders in.      When Sharon calls back please schedule an INR appointment.  Transfer the call or send a message to the INR nurse if there are any other questions or concerns.      Melissa Grady RN   P [facility] p heart inr nurse pool for message

## 2025-04-23 ENCOUNTER — ANTICOAGULATION THERAPY VISIT (OUTPATIENT)
Dept: ANTICOAGULATION | Facility: CLINIC | Age: 85
End: 2025-04-23

## 2025-04-23 ENCOUNTER — LAB (OUTPATIENT)
Dept: LAB | Facility: CLINIC | Age: 85
End: 2025-04-23
Payer: COMMERCIAL

## 2025-04-23 DIAGNOSIS — I05.9 MITRAL VALVE DISEASE: ICD-10-CM

## 2025-04-23 DIAGNOSIS — I49.5 SICK SINUS SYNDROME (H): Primary | ICD-10-CM

## 2025-04-23 DIAGNOSIS — I48.0 PAROXYSMAL ATRIAL FIBRILLATION (H): ICD-10-CM

## 2025-04-23 LAB — INR BLD: 2.7 (ref 0.9–1.1)

## 2025-04-23 PROCEDURE — 36416 COLLJ CAPILLARY BLOOD SPEC: CPT

## 2025-04-23 PROCEDURE — 85610 PROTHROMBIN TIME: CPT

## 2025-04-23 NOTE — PROGRESS NOTES
Surgical Office Location:  [facility]  [address] [place] [id number]

## 2025-04-23 NOTE — PROGRESS NOTES
ANTICOAGULATION MANAGEMENT     Charles Nogueira 85 year old male is on warfarin with therapeutic INR result. (Goal INR 2.0-3.0)    Recent labs: (last 7 days)     04/23/25  0926   INR 2.7*       ASSESSMENT     Source(s): Chart Review  Previous INR was Therapeutic last 2(+) visits  Medication, diet, health changes since last INR chart reviewed; none identified  MOHS on 3/24/25 (behind ear), 5/1/25 (forehead), and 5/8/25 (shin)        PLAN     Recommended plan for no diet, medication or health factor changes affecting INR     Dosing Instructions: Continue your current warfarin dose with next INR in 4 weeks unless derm wants an INR prior to each MOHS    Summary  As of 4/23/2025      Full warfarin instructions:  2.5 mg every Fri; 1.25 mg all other days   Next INR check:  5/21/2025               Detailed voice message left for wifePaula with dosing instructions and follow up date.     Contact [phone] to schedule and with any changes, questions or concerns.     Education provided: Please call back if any changes to your diet, medications or how you've been taking warfarin  Goal range and lab monitoring: goal range and significance of current result    Plan made per [facility] anticoagulation protocol    Mary Arguelles RN  4/23/2025  Anticoagulation Clinic  [facility] for routing messages: zoya [facility] HEART INR NURSE  [facility] patient phone line: [phone]        _______________________________________________________________________     Anticoagulation Episode Summary       Current INR goal:  2.0-3.0   TTR:  73.3% (1 y)   Target end date:  Indefinite   Send INR reminders to:  [facility] HEART INR NURSE    Indications    Sick sinus syndrome (H) [I49.5]  Paroxysmal atrial fibrillation (H) [I48.0]  Mitral valve disease [I05.9]             Comments:  --             Anticoagulation Care Providers       Provider Role Specialty Phone number    Ana Dean MD Referring Cardiovascular Disease [phone]

## 2025-04-24 ENCOUNTER — OFFICE VISIT (OUTPATIENT)
Dept: DERMATOLOGY | Facility: CLINIC | Age: 85
End: 2025-04-24
Payer: COMMERCIAL

## 2025-04-24 DIAGNOSIS — L81.4 LENTIGO: ICD-10-CM

## 2025-04-24 DIAGNOSIS — D23.9 DERMAL NEVUS: Primary | ICD-10-CM

## 2025-04-24 DIAGNOSIS — C44.319 BASAL CELL CARCINOMA (BCC) OF FOREHEAD: ICD-10-CM

## 2025-04-24 DIAGNOSIS — L82.1 SEBORRHEIC KERATOSES: ICD-10-CM

## 2025-04-24 DIAGNOSIS — C44.712 BASAL CELL CARCINOMA (BCC) OF RIGHT LOWER LEG: ICD-10-CM

## 2025-04-24 DIAGNOSIS — C44.219 BASAL CELL CARCINOMA (BCC) OF LEFT EAR: ICD-10-CM

## 2025-04-24 DIAGNOSIS — D18.01 ANGIOMA OF SKIN: ICD-10-CM

## 2025-04-24 NOTE — LETTER
4/24/2025      Charles Nogueira  [address]  [facility] [address]      Dear Colleague,    Thank you for referring your patient, Charles Nogueira, to the [facility]. Please see a copy of my visit note below.    Surgical Office Location:  [facility]  [address] [place] [id number]      Charles Nogueira , a 85 year old year old male patient, I was asked to see by Dr. Ryan for basal cell carcinoma on forehead ear and leg.  Patient has no other skin complaints today.  Remainder of the HPI, Meds, PMH, Allergies, FH, and SH was reviewed in chart.      Past Medical History:   Diagnosis Date     Acute ischemic heart disease (H) 08/24/2018     Allergic rhinitis, cause unspecified      Basal cell carcinoma      Cardiac pacemaker in situ 08/14/2017     Cervicalgia 04/26/2022     Chronic diastolic congestive heart failure (H) 07/26/2021     Dysphagia, oropharyngeal phase 06/14/2018    Mild per video swallow study. 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     PVC (premature ventricular contraction) 05/10/2011     Sick sinus syndrome (H) 05/03/2016    s/p PPM implant 7/2/14     Squamous cell carcinoma      Syncope      Weakness of both upper extremities 04/26/2022     Weakness of both upper extremities 04/26/2022       Past Surgical History:   Procedure Laterality Date     APPENDECTOMY OPEN        BIOPSY OF SKIN LESION       COLONOSCOPY N/A 2015    Procedure: COLONOSCOPY;  Surgeon: David Us MD;  Location: [facility]     CV CORONARY ANGIOGRAM N/A 2023    Procedure: Coronary Angiogram;  Surgeon: Carola Reed MD;  Location:  HEART CARDIAC CATH LAB     IMPLANT PACEMAKER  2004     MOHS MICROGRAPHIC PROCEDURE          Family History   Problem Relation Age of Onset     Heart Disease Mother         MI     Heart Disease Father         MI     C.A.D. Brother         Just had angioplasty in      Colon Cancer No family hx of        Social History     Socioeconomic History     Marital status:      Spouse name: Paula     Number of children: 3     Years of education: Not on file     Highest education level: Not on file   Occupational History     Employer: RETIRED     Occupation: engineering managment     Comment: [employer]   Tobacco Use     Smoking status: Former     Current packs/day: 0.00     Average packs/day: 1 pack/day for 4.0 years (4.0 ttl pk-yrs)     Types: Cigarettes     Start date: 1963     Quit date: 1967     Years since quittin.3     Smokeless tobacco: Never   Vaping Use     Vaping status: Never Used   Substance and Sexual Activity     Alcohol use: No     Alcohol/week: 0.0 standard drinks of alcohol     Drug use: No     Sexual activity: Yes     Partners: Female   Other Topics Concern      Service Yes     Blood Transfusions No     Caffeine Concern Yes     Comment: decaff coffee     Occupational Exposure No     Hobby Hazards No     Sleep Concern No     Stress Concern No     Weight Concern No     Special Diet No     Back Care No     Exercise Yes     Comment: Excercises about 4-5 days per week     Bike Helmet Yes     Seat Belt Yes     Self-Exams No     Parent/sibling w/ CABG, MI or angioplasty before 65F 55M? Not Asked   Social History Narrative     Not on file     Social Drivers of Health     Financial Resource Strain: Low Risk  (2024)    Financial  Resource Strain      Within the past 12 months, have you or your family members you live with been unable to get utilities (heat, electricity) when it was really needed?: No   Food Insecurity: Low Risk  (5/2/2024)    Food Insecurity      Within the past 12 months, did you worry that your food would run out before you got money to buy more?: No      Within the past 12 months, did the food you bought just not last and you didn t have money to get more?: No   Transportation Needs: Low Risk  (5/2/2024)    Transportation Needs      Within the past 12 months, has lack of transportation kept you from medical appointments, getting your medicines, non-medical meetings or appointments, work, or from getting things that you need?: No   Physical Activity: Insufficiently Active (5/2/2024)    Exercise Vital Sign      Days of Exercise per Week: 2 days      Minutes of Exercise per Session: 30 min   Stress: No Stress Concern Present (5/2/2024)    [employer] [employer] - Occupational Stress Questionnaire      Feeling of Stress : Only a little   Social Connections: Unknown (5/2/2024)    Social Connection and Isolation Panel [NHANES]      Frequency of Communication with Friends and Family: Not on file      Frequency of Social Gatherings with Friends and Family: Three times a week      Attends Temple Services: Not on file      Active Member of Clubs or Organizations: Not on file      Attends Club or Organization Meetings: Not on file      Marital Status: Not on file   Interpersonal Safety: Low Risk  (5/6/2024)    Interpersonal Safety      Do you feel physically and emotionally safe where you currently live?: Yes      Within the past 12 months, have you been hit, slapped, kicked or otherwise physically hurt by someone?: No      Within the past 12 months, have you been humiliated or emotionally abused in other ways by your partner or ex-partner?: No   Housing Stability: Low Risk  (5/2/2024)    Housing Stability       Do you have housing? : Yes      Are you worried about losing your housing?: No       Outpatient Encounter Medications as of 4/24/2025   Medication Sig Dispense Refill     atorvastatin (LIPITOR) 20 MG tablet Take 1 tablet (20 mg) by mouth daily. 90 tablet 3     imiquimod (ALDARA) 5 % external cream Apply to the affected areas on the right forehead, right shin, and left postauricular area (behind left ear) at bedtime for a total of 6 weeks. 48 packet 3     levETIRAcetam (KEPPRA) 500 MG tablet Take 500 mg by mouth 2 times daily       losartan (COZAAR) 100 MG tablet Take 1 tablet (100 mg) by mouth daily. 90 tablet 3     metoprolol succinate ER (TOPROL XL) 50 MG 24 hr tablet Take 1 tablet (50 mg) by mouth daily. 90 tablet 3     Multiple Vitamins-Minerals (ZINC PO)        Turmeric 500 MG CAPS        warfarin ANTICOAGULANT (COUMADIN) 2.5 MG tablet Take 2.5 mg every Friday; Take 1.25 mg all other days. Or as directed by INR clinic. 50 tablet 1     warfarin ANTICOAGULANT (COUMADIN) 2.5 MG tablet 2.5mg Monday and 1.25mg all other days per INR clinic 60 tablet 1     No facility-administered encounter medications on file as of 4/24/2025.             Review Of Systems  Skin: As above  Eyes: negative  Ears/Nose/Throat: negative  Respiratory: No shortness of breath, dyspnea on exertion, cough, or hemoptysis  Cardiovascular: negative  Gastrointestinal: negative  Genitourinary: negative  Musculoskeletal: negative  Neurologic: negative  Psychiatric: negative  Hematologic/Lymphatic/Immunologic: negative  Endocrine: negative      O:   NAD, WDWN, Alert & Oriented, Mood & Affect wnl, Vitals stable   General appearance sergio ii   Vitals stable   Alert, oriented and in no acute distress   L post auricular sulcus 9mm pink pearly papule    Stuck on papules and brown macules on trunk and ext    Red papules on trunk   Flesh colored papules on trunk          Eyes: Conjunctivae/lids:Normal     ENT: Lips, mucosa:  normal    MSK:Normal    Cardiovascular: peripheral edema none    Pulm: Breathing Normal    Neuro/Psych: Orientation:Normal; Mood/Affect:Normal      A/P:  1. Seborrheic keratosis, lentigo, angioma, dermal nevus  2. Basal cell carcinoma forehead, leg and L pa sulcus   It was a pleasure speaking to Charles Nogueira today.  Previous clinic  notes and pertinent laboratory tests were reviewed prior to Charles Nogueira's visit.  Signs and Symptoms of skin cancer discussed with patient.  Patient encouraged to perform monthly skin exams.  UV precautions reviewed with patient.  Risks of non-melanoma skin cancer discussed with patient   Return to clinic next appt  PROCEDURE NOTE  L PA sulcus basal cell carcinoma   MOHS:   Location    The rationale for Mohs surgery was discussed with the patient and consent was obtained.  The risks and benefits as well as alternatives to therapy were discussed, in detail.  Specifically, the risks of infection, scarring, bleeding, prolonged wound healing, incomplete removal, allergy to anesthesia, nerve injury and recurrence were addressed.  Indication for Mohs was Location. Prior to the procedure, the treatment site was clearly identified and, if available, confirmed with previous photos and confirmed by the patient   All components of the Universal Protocol/PAUSE rule were completed.  The Mohs surgeon operated in two distinct and integrated capacities as the surgeon and pathologist.      The area was prepped with Betasept.  A rim of normal appearing skin was marked circumferentially around the lesion.  The area was infiltrated with local anesthesia.  The tumor was first debulked to remove all clinically apparent tumor.  An incision following the standard Mohs approach was done and the specimen was oriented,mapped and placed in 1 block(s).  Each specimen was then chromacoded and processed in the Mohs laboratory using standard Mohs technique and submitted for frozen section histology.  Frozen  section analysis showed no residual tumor but CLEAR MARGINS.      The tumor was excised using standard Mohs technique in 1 stages(s).  CLEAR MARGINS OBTAINED and Final defect size was 1.5 x 1.4 cm.     We discussed the options for wound management in full with the patient including risks/benefits/ possible outcomes.      REPAIR COMPLEX: Because of the tightness of the surrounding skin and Because of the size and full thickness nature of the defect, Because of the tightness of the surrounding skin, To maintain form and function, In order to avoid distortion, and Because of the proximity to the ear, a complex closure was planned. After LE anesthesia and prep, Burow's triangles were excised in the relaxed skin tension lines. The wound edges were widely undermined greater than width of the defect on both sides by dissection in the subcutaneous plane until adequate tissue mobility was obtained. Hemostasis was obtained. The wound edges were closed in a layered fashion using Vicryl and Fast Absorbing Plain Gut sutures. Postoperative length was 2.6 cm.   EBL minimal; complications none; wound care routine.  The patient was discharged in good condition and will return in one week for wound evaluation.        Again, thank you for allowing me to participate in the care of your patient.        Sincerely,        Keith Orellana MD    Electronically signed

## 2025-04-24 NOTE — PROGRESS NOTES
Charles Nogueira , a 85 year old year old male patient, I was asked to see by Dr. Ryan for basal cell carcinoma on forehead ear and leg.  Patient has no other skin complaints today.  Remainder of the HPI, Meds, PMH, Allergies, FH, and SH was reviewed in chart.      Past Medical History:   Diagnosis Date    Acute ischemic heart disease (H) 08/24/2018    Allergic rhinitis, cause unspecified     Basal cell carcinoma     Cardiac pacemaker in situ 08/14/2017    Cervicalgia 04/26/2022    Chronic diastolic congestive heart failure (H) 07/26/2021    Dysphagia, oropharyngeal phase 06/14/2018    Mild per video swallow study. 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    PVC (premature ventricular contraction) 05/10/2011    Sick sinus syndrome (H) 05/03/2016    s/p PPM implant 7/2/14    Squamous cell carcinoma     Syncope     Weakness of both upper extremities 04/26/2022    Weakness of both upper extremities 04/26/2022       Past Surgical History:   Procedure Laterality Date    APPENDECTOMY OPEN      BIOPSY OF SKIN LESION      COLONOSCOPY N/A 11/20/2015    Procedure: COLONOSCOPY;  Surgeon: David Us MD;  Location:  GI    CV CORONARY ANGIOGRAM N/A 6/28/2023    Procedure: Coronary Angiogram;  Surgeon: Carola Reed MD;  Location:  HEART CARDIAC CATH LAB    IMPLANT PACEMAKER  7/2004    MOHS MICROGRAPHIC PROCEDURE          Family History   Problem Relation  Age of Onset    Heart Disease Mother         MI    Heart Disease Father         MI    C.A.D. Brother         Just had angioplasty in 2006    Colon Cancer No family hx of        Social History     Socioeconomic History    Marital status:      Spouse name: Paula    Number of children: 3    Years of education: Not on file    Highest education level: Not on file   Occupational History     Employer: RETIRED    Occupation: engineering managment     Comment: [employer]   Tobacco Use    Smoking status: Former     Current packs/day: 0.00     Average packs/day: 1 pack/day for 4.0 years (4.0 ttl pk-yrs)     Types: Cigarettes     Start date: 1963     Quit date: 1967     Years since quittin.3    Smokeless tobacco: Never   Vaping Use    Vaping status: Never Used   Substance and Sexual Activity    Alcohol use: No     Alcohol/week: 0.0 standard drinks of alcohol    Drug use: No    Sexual activity: Yes     Partners: Female   Other Topics Concern     Service Yes    Blood Transfusions No    Caffeine Concern Yes     Comment: decaff coffee    Occupational Exposure No    Hobby Hazards No    Sleep Concern No    Stress Concern No    Weight Concern No    Special Diet No    Back Care No    Exercise Yes     Comment: Excercises about 4-5 days per week    Bike Helmet Yes    Seat Belt Yes    Self-Exams No    Parent/sibling w/ CABG, MI or angioplasty before 65F 55M? Not Asked   Social History Narrative    Not on file     Social Drivers of Health     Financial Resource Strain: Low Risk  (2024)    Financial Resource Strain     Within the past 12 months, have you or your family members you live with been unable to get utilities (heat, electricity) when it was really needed?: No   Food Insecurity: Low Risk  (2024)    Food Insecurity     Within the past 12 months, did you worry that your food would run out before you got money to buy more?: No     Within the past 12 months, did the food you bought just not last and  you didn t have money to get more?: No   Transportation Needs: Low Risk  (5/2/2024)    Transportation Needs     Within the past 12 months, has lack of transportation kept you from medical appointments, getting your medicines, non-medical meetings or appointments, work, or from getting things that you need?: No   Physical Activity: Insufficiently Active (5/2/2024)    Exercise Vital Sign     Days of Exercise per Week: 2 days     Minutes of Exercise per Session: 30 min   Stress: No Stress Concern Present (5/2/2024)    [employer] - Occupational Stress Questionnaire     Feeling of Stress : Only a little   Social Connections: Unknown (5/2/2024)    Social Connection and Isolation Panel [NHANES]     Frequency of Communication with Friends and Family: Not on file     Frequency of Social Gatherings with Friends and Family: Three times a week     Attends [employer] Services: Not on file     Active Member of Clubs or Organizations: Not on file     Attends Club or Organization Meetings: Not on file     Marital Status: Not on file   Interpersonal Safety: Low Risk  (5/6/2024)    Interpersonal Safety     Do you feel physically and emotionally safe where you currently live?: Yes     Within the past 12 months, have you been hit, slapped, kicked or otherwise physically hurt by someone?: No     Within the past 12 months, have you been humiliated or emotionally abused in other ways by your partner or ex-partner?: No   Housing Stability: Low Risk  (5/2/2024)    Housing Stability     Do you have housing? : Yes     Are you worried about losing your housing?: No       Outpatient Encounter Medications as of 4/24/2025   Medication Sig Dispense Refill    atorvastatin (LIPITOR) 20 MG tablet Take 1 tablet (20 mg) by mouth daily. 90 tablet 3    imiquimod (ALDARA) 5 % external cream Apply to the affected areas on the right forehead, right shin, and left postauricular area (behind left ear) at bedtime for a total of 6  weeks. 48 packet 3    levETIRAcetam (KEPPRA) 500 MG tablet Take 500 mg by mouth 2 times daily      losartan (COZAAR) 100 MG tablet Take 1 tablet (100 mg) by mouth daily. 90 tablet 3    metoprolol succinate ER (TOPROL XL) 50 MG 24 hr tablet Take 1 tablet (50 mg) by mouth daily. 90 tablet 3    Multiple Vitamins-Minerals (ZINC PO)       Turmeric 500 MG CAPS       warfarin ANTICOAGULANT (COUMADIN) 2.5 MG tablet Take 2.5 mg every Friday; Take 1.25 mg all other days. Or as directed by INR clinic. 50 tablet 1    warfarin ANTICOAGULANT (COUMADIN) 2.5 MG tablet 2.5mg Monday and 1.25mg all other days per INR clinic 60 tablet 1     No facility-administered encounter medications on file as of 4/24/2025.             Review Of Systems  Skin: As above  Eyes: negative  Ears/Nose/Throat: negative  Respiratory: No shortness of breath, dyspnea on exertion, cough, or hemoptysis  Cardiovascular: negative  Gastrointestinal: negative  Genitourinary: negative  Musculoskeletal: negative  Neurologic: negative  Psychiatric: negative  Hematologic/Lymphatic/Immunologic: negative  Endocrine: negative      O:   NAD, WDWN, Alert & Oriented, Mood & Affect wnl, Vitals stable   General appearance sergio ii   Vitals stable   Alert, oriented and in no acute distress   L post auricular sulcus 9mm pink pearly papule    Stuck on papules and brown macules on trunk and ext    Red papules on trunk   Flesh colored papules on trunk          Eyes: Conjunctivae/lids:Normal     ENT: Lips, mucosa: normal    MSK:Normal    Cardiovascular: peripheral edema none    Pulm: Breathing Normal    Neuro/Psych: Orientation:Normal; Mood/Affect:Normal      A/P:  1. Seborrheic keratosis, lentigo, angioma, dermal nevus  2. Basal cell carcinoma forehead, leg and L pa sulcus   It was a pleasure speaking to Charles Nogueira today.  Previous clinic  notes and pertinent laboratory tests were reviewed prior to Charles Nogueira's visit.  Signs and Symptoms of skin cancer discussed with  patient.  Patient encouraged to perform monthly skin exams.  UV precautions reviewed with patient.  Risks of non-melanoma skin cancer discussed with patient   Return to clinic next appt  PROCEDURE NOTE  L PA sulcus basal cell carcinoma   MOHS:   Location    The rationale for Mohs surgery was discussed with the patient and consent was obtained.  The risks and benefits as well as alternatives to therapy were discussed, in detail.  Specifically, the risks of infection, scarring, bleeding, prolonged wound healing, incomplete removal, allergy to anesthesia, nerve injury and recurrence were addressed.  Indication for Mohs was Location. Prior to the procedure, the treatment site was clearly identified and, if available, confirmed with previous photos and confirmed by the patient   All components of the Universal Protocol/PAUSE rule were completed.  The Mohs surgeon operated in two distinct and integrated capacities as the surgeon and pathologist.      The area was prepped with Betasept.  A rim of normal appearing skin was marked circumferentially around the lesion.  The area was infiltrated with local anesthesia.  The tumor was first debulked to remove all clinically apparent tumor.  An incision following the standard Mohs approach was done and the specimen was oriented,mapped and placed in 1 block(s).  Each specimen was then chromacoded and processed in the Mohs laboratory using standard Mohs technique and submitted for frozen section histology.  Frozen section analysis showed no residual tumor but CLEAR MARGINS.      The tumor was excised using standard Mohs technique in 1 stages(s).  CLEAR MARGINS OBTAINED and Final defect size was 1.5 x 1.4 cm.     We discussed the options for wound management in full with the patient including risks/benefits/ possible outcomes.      REPAIR COMPLEX: Because of the tightness of the surrounding skin and Because of the size and full thickness nature of the defect, Because of the tightness  of the surrounding skin, To maintain form and function, In order to avoid distortion, and Because of the proximity to the ear, a complex closure was planned. After LE anesthesia and prep, Burow's triangles were excised in the relaxed skin tension lines. The wound edges were widely undermined greater than width of the defect on both sides by dissection in the subcutaneous plane until adequate tissue mobility was obtained. Hemostasis was obtained. The wound edges were closed in a layered fashion using Vicryl and Fast Absorbing Plain Gut sutures. Postoperative length was 2.6 cm.   EBL minimal; complications none; wound care routine.  The patient was discharged in good condition and will return in one week for wound evaluation.

## 2025-04-24 NOTE — PATIENT INSTRUCTIONS
Sutured Wound Care     [facility]: [phone]    [facility]: [phone]    No strenuous activity for 48 hours. Resume moderate activity in 48 hours. No heavy exercising until you are seen for follow up in one week.     Take Tylenol as needed for discomfort.                         Do not drink alcoholic beverages for 48 hours.     Keep the pressure bandage in place for 24 hours. If the bandage becomes blood tinged or loose, reinforce it with gauze and tape.        (Refer to the reverse side of this page for management of bleeding).    Remove pressure bandage in 24 hours     Leave the flat bandage in place until your follow up appointment.    Keep the bandage dry. Wash around it carefully.    If the tape becomes soiled or starts to come off, reinforce it with additional paper tape.    Do not smoke for 3 weeks; smoking is detrimental to wound healing.    It is normal to have swelling and bruising around the surgical site. The bruising will fade in approximately 10-14 days. Elevate the area to reduce swelling.    Numbness, itchiness and sensitivity to temperature changes can occur after surgery and may take up to 18 months to normalize.      POSSIBLE COMPLICATIONS    BLEEDING:    Leave the bandage in place.Use tightly rolled up gauze or a cloth to apply direct pressure over the bandage for 20 minutes.  Reapply pressure for an additional 20 minutes if necessary  Call the office or go to the nearest emergency room if pressure fails to stop the bleeding.  Use additional gauze and tape to maintain pressure once the bleeding has stopped.    PAIN:    Post operative pain should slowly get better, never worse.  A severe increase in pain may indicate a problem. Call the office if this occurs.      SIGNS OF INFECTION  - If you notice any of these signs of infection, call your doctor right away: expanding redness around the wound.  - Yellow or greenish-colored pus or cloudy wound drainage.    - Red  streaking spreading from the wound.  - Increased swelling, tenderness, or pain around the wound.   - Fever.    Please remember that yellow and clear drainage from a wound can be normal and related to normal wound healing.  Isolated drainage from a wound without a combination of the above features does not indicate infection.       In case of emergency phone:Dr Orellana [phone]                 Proper skin care from [facility]:    -Eliminate harsh soaps as they strip the natural oils from the skin, often resulting in dry itchy skin ( i.e. Dial, Zest, Hayde Spring)  -Use mild soaps such as Cetaphil or Dove Sensitive Skin in the shower. You do not need to use soap on arms, legs, and trunk every time you shower unless visibly soiled.   -Avoid hot or cold showers.  -After showering, lightly dry off and apply moisturizing within 2-3 minutes. This will help trap moisture in the skin.   -Aggressive use of a moisturizer at least 1-2 times a day to the entire body (including -Vanicream, Cetaphil, Aquaphor or Cerave) and moisturize hands after every washing.  -We recommend using moisturizers that come in a tub that needs to be scooped out, not a pump. This has more of an oil base. It will hold moisture in your skin much better than a water base moisturizer. The above recommended are non-pore clogging.      Wear a sunscreen with at least SPF 30 on your face, ears, neck and V of the chest daily. Wear sunscreen on other areas of the body if those areas are exposed to the sun throughout the day. Sunscreens can contain physical and/or chemical blockers. Physical blockers are less likely to clog pores, these include zinc oxide and titanium dioxide. Reapply every two hour and after swimming.     Sunscreen examples: https://www.ewg.org/sunscreen/    UV radiation  UVA radiation remains constant throughout the day and throughout the year. It is a longer wavelength than UVB and therefore penetrates deeper into the skin  leading to immediate and delayed tanning, photoaging, and skin cancer. 70-80% of UVA and UVB radiation occurs between the hours of 10am-2pm.  UVB radiation  UVB radiation causes the most harmful effects and is more significant during the summer months. However, snow and ice can reflect UVB radiation leading to skin damage during the winter months as well. UVB radiation is responsible for tanning, burning, inflammation, delayed erythema (pinkness), pigmentation (brown spots), and skin cancer.     I recommend self monthly full body exams and yearly full body exams with a dermatology provider. If you develop a new or changing lesion please follow up for examination. Most skin cancers are pink and scaly or pink and pearly. However, we do see blue/brown/black skin cancers.  Consider the ABCDEs of melanoma when giving yourself your monthly full body exam ( don't forget the groin, buttocks, feet, toes, etc). A-asymmetry, B-borders, C-color, D-diameter, E-elevation or evolving. If you see any of these changes please follow up in clinic. If you cannot see your back I recommend purchasing a hand held mirror to use with a larger wall mirror.       Checking for Skin Cancer  You can find cancer early by checking your skin each month. There are 3 kinds of skin cancer. They are melanoma, basal cell carcinoma, and squamous cell carcinoma. Doing monthly skin checks is the best way to find new marks or skin changes. Follow the instructions below for checking your skin.   The ABCDEs of checking moles for melanoma   Check your moles or growths for signs of melanoma using ABCDE:   Asymmetry: the sides of the mole or growth don t match  Border: the edges are ragged, notched, or blurred  Color: the color within the mole or growth varies  Diameter: the mole or growth is larger than 6 mm (size of a pencil eraser)  Evolving: the size, shape, or color of the mole or growth is changing (evolving is not shown in the images below)    Checking  for other types of skin cancer  Basal cell carcinoma or squamous cell carcinoma have symptoms such as:     A spot or mole that looks different from all other marks on your skin  Changes in how an area feels, such as itching, tenderness, or pain  Changes in the skin's surface, such as oozing, bleeding, or scaliness  A sore that does not heal  New swelling or redness beyond the border of a mole    Who s at risk?  Anyone can get skin cancer. But you are at greater risk if you have:   Fair skin, light-colored hair, or light-colored eyes  Many moles or abnormal moles on your skin  A history of sunburns from sunlight or tanning beds  A family history of skin cancer  A history of exposure to radiation or chemicals  A weakened immune system  If you have had skin cancer in the past, you are at risk for recurring skin cancer.   How to check your skin  Do your monthly skin checkups in front of a full-length mirror. Check all parts of your body, including your:   Head (ears, face, neck, and scalp)  Torso (front, back, and sides)  Arms (tops, undersides, upper, and lower armpits)  Hands (palms, backs, and fingers, including under the nails)  Buttocks and genitals  Legs (front, back, and sides)  Feet (tops, soles, toes, including under the nails, and between toes)  If you have a lot of moles, take digital photos of them each month. Make sure to take photos both up close and from a distance. These can help you see if any moles change over time.   Most skin changes are not cancer. But if you see any changes in your skin, call your doctor right away. Only he or she can diagnose a problem. If you have skin cancer, seeing your doctor can be the first step toward getting the treatment that could save your life.   [employer] last reviewed this educational content on 4/1/2019 2000-2020 [employer]. [address] [place] [address], [place] [address]. All rights reserved. This information is not intended as a substitute for  professional medical care. Always follow your healthcare professional's instructions.       When should I call my doctor?  If you are worsening or not improving, please, contact us or seek urgent care as noted below.     Who should I call with questions (adults)?    [facility] [phone]  For urgent needs outside of business hours call the [facility] at [phone] and ask for the dermatology resident on call to be paged  If this is a medical emergency and you are unable to reach an ER, Call [phone]      If you need a prescription refill, please contact your pharmacy. Refills are approved or denied by our Physicians during normal business hours, Monday through Friday.  Per office policy, refills will not be granted if you have not been seen within the past year (or sooner depending on the condition).

## 2025-04-28 ENCOUNTER — OFFICE VISIT (OUTPATIENT)
Dept: DERMATOLOGY | Facility: CLINIC | Age: 85
End: 2025-04-28
Payer: COMMERCIAL

## 2025-04-28 DIAGNOSIS — Z86.006 HISTORY OF MELANOMA IN SITU: ICD-10-CM

## 2025-04-28 DIAGNOSIS — L81.4 LENTIGINES: ICD-10-CM

## 2025-04-28 DIAGNOSIS — Z86.007 HISTORY OF SQUAMOUS CELL CARCINOMA IN SITU: ICD-10-CM

## 2025-04-28 DIAGNOSIS — Z85.828 HISTORY OF BASAL CELL CARCINOMA: ICD-10-CM

## 2025-04-28 DIAGNOSIS — L57.8 ACTINIC SKIN DAMAGE: ICD-10-CM

## 2025-04-28 DIAGNOSIS — L57.0 ACTINIC KERATOSIS: Primary | ICD-10-CM

## 2025-04-28 PROCEDURE — 1126F AMNT PAIN NOTED NONE PRSNT: CPT | Performed by: STUDENT IN AN ORGANIZED HEALTH CARE EDUCATION/TRAINING PROGRAM

## 2025-04-28 PROCEDURE — 17003 DESTRUCT PREMALG LES 2-14: CPT | Mod: 79 | Performed by: STUDENT IN AN ORGANIZED HEALTH CARE EDUCATION/TRAINING PROGRAM

## 2025-04-28 PROCEDURE — 17000 DESTRUCT PREMALG LESION: CPT | Mod: 79 | Performed by: STUDENT IN AN ORGANIZED HEALTH CARE EDUCATION/TRAINING PROGRAM

## 2025-04-28 PROCEDURE — 99214 OFFICE O/P EST MOD 30 MIN: CPT | Mod: 24 | Performed by: STUDENT IN AN ORGANIZED HEALTH CARE EDUCATION/TRAINING PROGRAM

## 2025-04-28 ASSESSMENT — PAIN SCALES - GENERAL: PAINLEVEL_OUTOF10: NO PAIN (0)

## 2025-04-28 NOTE — PROGRESS NOTES
[facility] Dermatology Note    Encounter Date: Apr 28, 2025    Dermatology Problem List:    # Aks, s/p cryo  # ISK, s/p cryo  # Melanoma in situ, periumbilical, s/p re-excision, 2007  # Non melanoma skin cancer  - SCCIS, right mid helix, s/p Mohs, 2013  - SCCIS, right lateral neck, ts/p ED and C, 2019  - BCC x3 L post auricular , R shin, R forehead  # Dysplastic nevi, left mid back-2011, right upper abdomen and left cental abdomen- 2009, right lower abdomen-2007    Major PMHx  -   ______________________________________    Impression/Plan:  Charles was seen today for skin check.    Diagnoses and all orders for this visit:    Actinic keratosis  -     CO DESTRUCT PREMALIGNANT LESION, FIRST [[id number]]  -     CO DESTRUCT PREMALIGNANT LESION, 2-14 [[id number]]  -8 lesions on the face, see procedure note  - Discussed the use of Efudex twice daily, declines for now  - If still increased burden of AK's consider treating in the fall 1 week follow-up given he spends half the year in [place] and will likely continue to accumulate sun exposure    History of melanoma in situ  History of squamous cell carcinoma in situ  - No obvious evidence of recurrence at site of previous malignancy    History of basal cell carcinoma  -Left postauricular area treated and healing well  - Right chin and forehead are scheduled to be treated with Mohs on May 1 and May 8    Lentigines  Actinic skin damage  - Reviewed the compounding benefits of incremental changes to sun protective behaviors including increased frequency of sunscreen and sun protective clothing like broad brimmed hats and longsleeved UPF containing clothing        Cryotherapy procedure note: After verbal consent and discussion of risks and benefits including but no limited to dyspigmentation/scar, blister, and pain, 8 was(were) treated with 1-2mm freeze border for 2 cycles with liquid nitrogen. Post cryotherapy instructions were provided.     Follow-up in october.        Staff Involved:  Staff Only    Crispin Ryan MD   of Dermatology  Department of Dermatology  [facility]      CC:   Chief Complaint   Patient presents with    Skin Check     6 month check, keratosis       History of Present Illness:  Mr. Charles Nogueira is a 85 year old male who presents as a return patient.    Charles was last seen by me in November at that time we biopsied a spot off of the left postauricular sulcus right forehead and right shin we also froze 14 actinic keratoses.  The biopsies all returned as basal cell carcinomas he was referred to Mohs.  He was seen by our Mohs surgeon 4 days ago the postauricular lesion was excised he has follow-up appointments on May 1 and May 8 for the other 2.        Labs:      Physical exam:  Vitals: There were no vitals taken for this visit.  GEN: well developed, well-nourished, in no acute distress, in a pleasant mood.     SKIN: Brumfield phototype 1  - Full skin, which includes the head/face, both arms, chest, back, abdomen,both legs, genitalia and/or groin buttocks, digits and/or nails, was examined.  - Flat brown macules and patches in a sun exposed areas on face and extremities  - scattered brown papules on trunk and extremities   - well healed scar at site of previous excision  - gritty pink papules on face  - No other lesions of concern on areas examined.     Past Medical History:   Past Medical History:   Diagnosis Date    Acute ischemic heart disease (H) 08/24/2018    Allergic rhinitis, cause unspecified     Basal cell carcinoma     Cardiac pacemaker in situ 08/14/2017    Cervicalgia 04/26/2022    Chronic diastolic congestive heart failure (H) 07/26/2021    Dysphagia, oropharyngeal phase 06/14/2018    Mild per video swallow study. To use swallowing techniques    Episode of transient neurologic symptoms 04/26/2022    Essential hypertension, benign 11/11/2016    Fatigue 04/26/2022    Heart murmur 05/10/2011     Hx of cardiac pacemaker     Iliac artery aneurysm 10/12/2022    Malignant melanoma (H)     Melanoma (H)     Migraine equivalent 11/10/2015    Mitral valve prolapse     Non-rheumatic mitral regurgitation 08/09/2018    Other forms of migraine, without mention of intractable migraine without mention of status migrainosus     Paroxysmal A-fib (H)     Premature ventricular contraction     Presbycusis of both ears 04/26/2022    PVC (premature ventricular contraction) 05/10/2011    Sick sinus syndrome (H) 05/03/2016    s/p PPM implant 7/2/14    Squamous cell carcinoma     Syncope     Weakness of both upper extremities 04/26/2022    Weakness of both upper extremities 04/26/2022     Past Surgical History:   Procedure Laterality Date    APPENDECTOMY OPEN      BIOPSY OF SKIN LESION      COLONOSCOPY N/A 11/20/2015    Procedure: COLONOSCOPY;  Surgeon: David Us MD;  Location:  GI    CV CORONARY ANGIOGRAM N/A 6/28/2023    Procedure: Coronary Angiogram;  Surgeon: Carola Reed MD;  Location:  HEART CARDIAC CATH LAB    IMPLANT PACEMAKER  7/2004    MOHS MICROGRAPHIC PROCEDURE         Social History:   reports that he quit smoking about 58 years ago. His smoking use included cigarettes. He started smoking about 62 years ago. He has a 4 pack-year smoking history. He has never used smokeless tobacco. He reports that he does not drink alcohol and does not use drugs.    Family History:  Family History   Problem Relation Age of Onset    Heart Disease Mother         MI    Heart Disease Father         MI    C.A.D. Brother         Just had angioplasty in 2006    Colon Cancer No family hx of     Skin Cancer No family hx of        Medications:  Current Outpatient Medications   Medication Sig Dispense Refill    atorvastatin (LIPITOR) 20 MG tablet Take 1 tablet (20 mg) by mouth daily. 90 tablet 3    imiquimod (ALDARA) 5 % external cream Apply to the affected areas on the right forehead, right shin, and left postauricular  area (behind left ear) at bedtime for a total of 6 weeks. 48 packet 3    levETIRAcetam (KEPPRA) 500 MG tablet Take 500 mg by mouth 2 times daily      losartan (COZAAR) 100 MG tablet Take 1 tablet (100 mg) by mouth daily. 90 tablet 3    metoprolol succinate ER (TOPROL XL) 50 MG 24 hr tablet Take 1 tablet (50 mg) by mouth daily. 90 tablet 3    Multiple Vitamins-Minerals (ZINC PO)       Turmeric 500 MG CAPS       warfarin ANTICOAGULANT (COUMADIN) 2.5 MG tablet Take 2.5 mg every Friday; Take 1.25 mg all other days. Or as directed by INR clinic. 50 tablet 1    warfarin ANTICOAGULANT (COUMADIN) 2.5 MG tablet 2.5mg Monday and 1.25mg all other days per INR clinic 60 tablet 1     Allergies   Allergen Reactions    Sulfa Antibiotics      rash

## 2025-04-28 NOTE — LETTER
4/28/2025      Charles Nogueira  [address]  [facility] [address]      Dear Colleague,    Thank you for referring your patient, Charles Nogueira, to the [facility]. Please see a copy of my visit note below.    [facility] Dermatology Note    Encounter Date: Apr 28, 2025    Dermatology Problem List:    # Aks, s/p cryo  # ISK, s/p cryo  # Melanoma in situ, periumbilical, s/p re-excision, 2007  # Non melanoma skin cancer  - SCCIS, right mid helix, s/p Mohs, 2013  - SCCIS, right lateral neck, ts/p ED and C, 2019  - BCC x3 L post auricular , R shin, R forehead  # Dysplastic nevi, left mid back-2011, right upper abdomen and left cental abdomen- 2009, right lower abdomen-2007    Major PMHx  -   ______________________________________    Impression/Plan:  Charles was seen today for skin check.    Diagnoses and all orders for this visit:    Actinic keratosis  -     NM DESTRUCT PREMALIGNANT LESION, FIRST [[id number]]  -     NM DESTRUCT PREMALIGNANT LESION, 2-14 [[id number]]  -8 lesions on the face, see procedure note  - Discussed the use of Efudex twice daily, declines for now  - If still increased burden of AK's consider treating in the fall 1 week follow-up given he spends half the year in [place] and will likely continue to accumulate sun exposure    History of melanoma in situ  History of squamous cell carcinoma in situ  - No obvious evidence of recurrence at site of previous malignancy    History of basal cell carcinoma  -Left postauricular area treated and healing well  - Right chin and forehead are scheduled to be treated with Mohs on May 1 and May 8    Lentigines  Actinic skin damage  - Reviewed the compounding benefits of incremental changes to sun protective behaviors including increased frequency of sunscreen and sun protective clothing like broad brimmed hats and longsleeved UPF containing clothing        Cryotherapy procedure note: After verbal consent and  discussion of risks and benefits including but no limited to dyspigmentation/scar, blister, and pain, 8 was(were) treated with 1-2mm freeze border for 2 cycles with liquid nitrogen. Post cryotherapy instructions were provided.     Follow-up in october.       Staff Involved:  Staff Only    Crispin Ryan MD   of Dermatology  Department of Dermatology  [facility]      CC:   Chief Complaint   Patient presents with     Skin Check     6 month check, keratosis       History of Present Illness:  Mr. Charles Nogueira is a 85 year old male who presents as a return patient.    Charles was last seen by me in November at that time we biopsied a spot off of the left postauricular sulcus right forehead and right shin we also froze 14 actinic keratoses.  The biopsies all returned as basal cell carcinomas he was referred to Mohs.  He was seen by our Mohs surgeon 4 days ago the postauricular lesion was excised he has follow-up appointments on May 1 and May 8 for the other 2.        Labs:      Physical exam:  Vitals: There were no vitals taken for this visit.  GEN: well developed, well-nourished, in no acute distress, in a pleasant mood.     SKIN: Brumfield phototype 1  - Full skin, which includes the head/face, both arms, chest, back, abdomen,both legs, genitalia and/or groin buttocks, digits and/or nails, was examined.  - Flat brown macules and patches in a sun exposed areas on face and extremities  - scattered brown papules on trunk and extremities   - well healed scar at site of previous excision  - gritty pink papules on face  - No other lesions of concern on areas examined.     Past Medical History:   Past Medical History:   Diagnosis Date     Acute ischemic heart disease (H) 08/24/2018     Allergic rhinitis, cause unspecified      Basal cell carcinoma      Cardiac pacemaker in situ 08/14/2017     Cervicalgia 04/26/2022     Chronic diastolic congestive heart failure (H)  07/26/2021     Dysphagia, oropharyngeal phase 06/14/2018    Mild per video swallow study. 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     PVC (premature ventricular contraction) 05/10/2011     Sick sinus syndrome (H) 05/03/2016    s/p PPM implant 7/2/14     Squamous cell carcinoma      Syncope      Weakness of both upper extremities 04/26/2022     Weakness of both upper extremities 04/26/2022     Past Surgical History:   Procedure Laterality Date     APPENDECTOMY OPEN       BIOPSY OF SKIN LESION       COLONOSCOPY N/A 11/20/2015    Procedure: COLONOSCOPY;  Surgeon: David Us MD;  Location:  GI     CV CORONARY ANGIOGRAM N/A 6/28/2023    Procedure: Coronary Angiogram;  Surgeon: Carola Reed MD;  Location:  HEART CARDIAC CATH LAB     IMPLANT PACEMAKER  7/2004     MOHS MICROGRAPHIC PROCEDURE         Social History:   reports that he quit smoking about 58 years ago. His smoking use included cigarettes. He started smoking about 62 years ago. He has a 4 pack-year smoking history. He has never used smokeless tobacco. He reports that he does not drink alcohol and does not use drugs.    Family History:  Family History   Problem Relation Age of Onset     Heart Disease Mother         MI     Heart Disease Father         MI     C.A.D. Brother         Just had angioplasty in 2006     Colon Cancer No family hx of      Skin Cancer No family hx of         Medications:  Current Outpatient Medications   Medication Sig Dispense Refill     atorvastatin (LIPITOR) 20 MG tablet Take 1 tablet (20 mg) by mouth daily. 90 tablet 3     imiquimod (ALDARA) 5 % external cream Apply to the affected areas on the right forehead, right shin, and left postauricular area (behind left ear) at bedtime for a total of 6 weeks. 48 packet 3     levETIRAcetam (KEPPRA) 500 MG tablet Take 500 mg by mouth 2 times daily       losartan (COZAAR) 100 MG tablet Take 1 tablet (100 mg) by mouth daily. 90 tablet 3     metoprolol succinate ER (TOPROL XL) 50 MG 24 hr tablet Take 1 tablet (50 mg) by mouth daily. 90 tablet 3     Multiple Vitamins-Minerals (ZINC PO)        Turmeric 500 MG CAPS        warfarin ANTICOAGULANT (COUMADIN) 2.5 MG tablet Take 2.5 mg every Friday; Take 1.25 mg all other days. Or as directed by INR clinic. 50 tablet 1     warfarin ANTICOAGULANT (COUMADIN) 2.5 MG tablet 2.5mg Monday and 1.25mg all other days per INR clinic 60 tablet 1     Allergies   Allergen Reactions     Sulfa Antibiotics      rash               Again, thank you for allowing me to participate in the care of your patient.        Sincerely,        Crispin Ryan MD    Electronically signed

## 2025-04-30 NOTE — PROGRESS NOTES
Surgical Office Location:  [facility]  [address] [place] [id number]

## 2025-05-01 ENCOUNTER — OFFICE VISIT (OUTPATIENT)
Dept: DERMATOLOGY | Facility: CLINIC | Age: 85
End: 2025-05-01
Payer: COMMERCIAL

## 2025-05-01 DIAGNOSIS — C44.712 BASAL CELL CARCINOMA (BCC) OF RIGHT LOWER LEG: Primary | ICD-10-CM

## 2025-05-01 ASSESSMENT — PAIN SCALES - GENERAL: PAINLEVEL_OUTOF10: NO PAIN (0)

## 2025-05-01 NOTE — PATIENT INSTRUCTIONS
Open Wound Care     for ______________        No strenuous activity for 48 hours    Take Tylenol as needed for discomfort.                                                .         Do not drink alcoholic beverages for 48 hours.    Keep the pressure bandage in place for 24 hours. If the bandage becomes blood tinged or loose, reinforce it with gauze and tape.        (Refer to the reverse side of this page for management of bleeding).    Remove bandage in 24 hours and begin wound care as follows:     Clean area with tap water using a Q tip or gauze pad, (shower / bathe normally)  Dry wound with Q tip or gauze pad  Apply Aquaphor, Vaseline, Polysporin or Bacitracin Ointment with a Q tip  Do NOT use Neosporin Ointment *  Cover the wound with a band-aid or nonstick gauze pad and paper tape.  Repeat wound care once a day until wound is completely healed.    It is an old wives tale that a wound heals better when it is exposed to air and allowed to dry out. The wound will heal faster with a better cosmetic result if it is kept moist with ointment and covered with a bandage.  Do not let the wound dry out.      Supplies Needed:                Qtips or gauze pads                Polysporin or Bacitracin Ointment                Bandaids or nonstick gauze pads and paper tape    Wound care kits and brown paper tape are available for purchase at   the pharmacy.    BLEEDING:    Use tightly rolled up gauze or cloth to apply direct pressure over the bandage for 20   minutes.  Reapply pressure for an additional 20 minutes if necessary  Call the office or go to the nearest emergency room if pressure fails to stop the bleeding.  Use additional gauze and tape to maintain pressure once the bleeding has stopped.  Begin wound care 24 hours after surgery as directed.    SIGNS OF INFECTION   If you notice any of these signs of infection, call your doctor right away: expanding redness around the wound.  Yellow or greenish-colored pus or cloudy  wound drainage.    Red streaking spreading from the wound.  Increased swelling, tenderness, or pain around the wound.   Fever.                WOUND HEALING    One week after surgery a pink / red halo will form around the outside of the wound.   This is new skin.  The center of the wound will appear yellowish white and produce some drainage.  The pink halo will slowly migrate in toward the center of the wound until the wound is covered with new shiny pink skin.  There will be no more drainage when the wound is completely healed.  It will take six months to one year for the redness to fade.  The scar may be itchy, tight and sensitive to extreme temperatures for a year after the surgery.  Massaging the area several times a day for several minutes after the wound is completely healed will help the scar soften and normalize faster. Begin massage only after healing is complete.      In case of emergency call: Dr Orellana: [phone]    [facility]: [phone]    [facility]:[phone]

## 2025-05-01 NOTE — PROGRESS NOTES
Charles Nogueira is an extremely pleasant 85 year old year old male patient here today for evaluation and managment of basal cell carcinoma.  He would like to do shin today.  Previous site healing well.  Patient has no other skin complaints today.  Remainder of the HPI, Meds, PMH, Allergies, FH, and SH was reviewed in chart.      Past Medical History:   Diagnosis Date    Acute ischemic heart disease (H) 08/24/2018    Allergic rhinitis, cause unspecified     Basal cell carcinoma     Cardiac pacemaker in situ 08/14/2017    Cervicalgia 04/26/2022    Chronic diastolic congestive heart failure (H) 07/26/2021    Dysphagia, oropharyngeal phase 06/14/2018    Mild per video swallow study. 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    PVC (premature ventricular contraction) 05/10/2011    Sick sinus syndrome (H) 05/03/2016    s/p PPM implant 7/2/14    Squamous cell carcinoma     Syncope     Weakness of both upper extremities 04/26/2022    Weakness of both upper extremities 04/26/2022       Past Surgical History:   Procedure Laterality Date    APPENDECTOMY OPEN      BIOPSY OF SKIN LESION      COLONOSCOPY N/A 11/20/2015    Procedure: COLONOSCOPY;  Surgeon: David Us MD;  Location:  GI    CV CORONARY ANGIOGRAM N/A 6/28/2023    Procedure: Coronary Angiogram;  Surgeon: Carola Reed MD;  Location:  [facility]ARD[facility] CATH LAB    IMPLANT PACEMAKER  7/2004    MOHS  MICROGRAPHIC PROCEDURE          Family History   Problem Relation Age of Onset    Heart Disease Mother         MI    Heart Disease Father         MI    C.A.D. Brother         Just had angioplasty in 2006    Colon Cancer No family hx of     Skin Cancer No family hx of        Social History     Socioeconomic History    Marital status:      Spouse name: Paula    Number of children: 3    Years of education: Not on file    Highest education level: Not on file   Occupational History     Employer: RETIRED    Occupation: engineering managment     Comment: [employer]   Tobacco Use    Smoking status: Former     Current packs/day: 0.00     Average packs/day: 1 pack/day for 4.0 years (4.0 ttl pk-yrs)     Types: Cigarettes     Start date: 1963     Quit date: 1967     Years since quittin.3     Passive exposure: Never    Smokeless tobacco: Never   Vaping Use    Vaping status: Never Used   Substance and Sexual Activity    Alcohol use: No     Alcohol/week: 0.0 standard drinks of alcohol    Drug use: No    Sexual activity: Yes     Partners: Female   Other Topics Concern     Service Yes    Blood Transfusions No    Caffeine Concern Yes     Comment: decaff coffee    Occupational Exposure No    Hobby Hazards No    Sleep Concern No    Stress Concern No    Weight Concern No    Special Diet No    Back Care No    Exercise Yes     Comment: Excercises about 4-5 days per week    Bike Helmet Yes    Seat Belt Yes    Self-Exams No    Parent/sibling w/ CABG, MI or angioplasty before 65F 55M? Not Asked   Social History Narrative    Not on file     Social Drivers of Health     Financial Resource Strain: Low Risk  (2024)    Financial Resource Strain     Within the past 12 months, have you or your family members you live with been unable to get utilities (heat, electricity) when it was really needed?: No   Food Insecurity: Low Risk  (2024)    Food Insecurity     Within the past 12 months, did you worry that your food  would run out before you got money to buy more?: No     Within the past 12 months, did the food you bought just not last and you didn t have money to get more?: No   Transportation Needs: Low Risk  (5/2/2024)    Transportation Needs     Within the past 12 months, has lack of transportation kept you from medical appointments, getting your medicines, non-medical meetings or appointments, work, or from getting things that you need?: No   Physical Activity: Insufficiently Active (5/2/2024)    Exercise Vital Sign     Days of Exercise per Week: 2 days     Minutes of Exercise per Session: 30 min   Stress: No Stress Concern Present (5/2/2024)    [employer] - Occupational Stress Questionnaire     Feeling of Stress : Only a little   Social Connections: Unknown (5/2/2024)    Social Connection and Isolation Panel [NHANES]     Frequency of Communication with Friends and Family: Not on file     Frequency of Social Gatherings with Friends and Family: Three times a week     Attends Congregation Services: Not on file     Active Member of Clubs or Organizations: Not on file     Attends Club or Organization Meetings: Not on file     Marital Status: Not on file   Interpersonal Safety: Low Risk  (5/6/2024)    Interpersonal Safety     Do you feel physically and emotionally safe where you currently live?: Yes     Within the past 12 months, have you been hit, slapped, kicked or otherwise physically hurt by someone?: No     Within the past 12 months, have you been humiliated or emotionally abused in other ways by your partner or ex-partner?: No   Housing Stability: Low Risk  (5/2/2024)    Housing Stability     Do you have housing? : Yes     Are you worried about losing your housing?: No       Outpatient Encounter Medications as of 5/1/2025   Medication Sig Dispense Refill    atorvastatin (LIPITOR) 20 MG tablet Take 1 tablet (20 mg) by mouth daily. 90 tablet 3    imiquimod (ALDARA) 5 % external cream Apply to the  affected areas on the right forehead, right shin, and left postauricular area (behind left ear) at bedtime for a total of 6 weeks. 48 packet 3    levETIRAcetam (KEPPRA) 500 MG tablet Take 250 mg by mouth 2 times daily.      losartan (COZAAR) 100 MG tablet Take 1 tablet (100 mg) by mouth daily. 90 tablet 3    metoprolol succinate ER (TOPROL XL) 50 MG 24 hr tablet Take 1 tablet (50 mg) by mouth daily. 90 tablet 3    Multiple Vitamins-Minerals (ZINC PO)       Turmeric 500 MG CAPS       warfarin ANTICOAGULANT (COUMADIN) 2.5 MG tablet Take 2.5 mg every Friday; Take 1.25 mg all other days. Or as directed by INR clinic. 50 tablet 1    warfarin ANTICOAGULANT (COUMADIN) 2.5 MG tablet 2.5mg Monday and 1.25mg all other days per INR clinic 60 tablet 1     No facility-administered encounter medications on file as of 5/1/2025.             O:   NAD, WDWN, Alert & Oriented, Mood & Affect wnl, Vitals stable   General appearance normal   Vitals stable   Alert, oriented and in no acute distress     RR shin 1.5cm red plaque      Eyes: Conjunctivae/lids:Normal     ENT: Lips, mucosa: normal    MSK:Normal    Cardiovascular: peripheral edema none    Pulm: Breathing Normal    Neuro/Psych: Orientation:Alert and Orientedx3 ; Mood/Affect:normal       A/P:  R shin basal cell carcinoma   MOHS:   Size and Location    The rationale for Mohs surgery was discussed with the patient and consent was obtained.  The risks and benefits as well as alternatives to therapy were discussed, in detail.  Specifically, the risks of infection, scarring, bleeding, prolonged wound healing, incomplete removal, allergy to anesthesia, nerve injury and recurrence were addressed.  Indication for Mohs was Size and Location. Prior to the procedure, the treatment site was clearly identified and, if available, confirmed with previous photos and confirmed by the patient   All components of the Universal Protocol/PAUSE rule were completed.  The Mohs surgeon operated in two  distinct and integrated capacities as the surgeon and pathologist.      The area was prepped with Betasept.  A rim of normal appearing skin was marked circumferentially around the lesion.  The area was infiltrated with local anesthesia.  The tumor was first debulked to remove all clinically apparent tumor.  An incision following the standard Mohs approach was done and the specimen was oriented,mapped and placed in 1 block(s).  Each specimen was then chromacoded and processed in the Mohs laboratory using standard Mohs technique and submitted for frozen section histology.  Frozen section analysis showed no residual tumor but CLEAR MARGINS.      The tumor was excised using standard Mohs technique in 1 stages(s).  CLEAR MARGINS OBTAINED and Final defect size was 2 cm.     We discussed the options for wound management in full with the patient including risks/benefits/ possible outcomes.      REPAIR SECOND INTENT: We discussed the options for wound management in full with the patient including risks/benefits/possible outcomes. Decision made to allow the wound to heal by second intention. Cautery was used for for hemostasis. EBL minimal; complications none; wound care routine.  The patient was discharged in good condition and will return in one month or prn for wound evaluation.   It was a pleasure speaking to Charles Nogueira today.  Previous clinic notes and pertinent laboratory tests were reviewed prior to Charles Nogueira's visit.  Patient encouraged to perform monthly skin exams.  UV precautions reviewed with patient.  Risks of non-melanoma skin cancer discussed with patient   Return to clinic next appt

## 2025-05-01 NOTE — LETTER
5/1/2025      Charles Nogueira  [address]  [facility] [place] [facility] [address]      Dear Colleague,    Thank you for referring your patient, Charles Nogueira, to the [facility]. Please see a copy of my visit note below.    Surgical Office Location:  [facility]  [address] [place] [id number]      Charles Nogueira is an extremely pleasant 85 year old year old male patient here today for evaluation and managment of basal cell carcinoma.  He would like to do shin today.  Previous site healing well.  Patient has no other skin complaints today.  Remainder of the HPI, Meds, PMH, Allergies, FH, and SH was reviewed in chart.      Past Medical History:   Diagnosis Date     Acute ischemic heart disease (H) 08/24/2018     Allergic rhinitis, cause unspecified      Basal cell carcinoma      Cardiac pacemaker in situ 08/14/2017     Cervicalgia 04/26/2022     Chronic diastolic congestive heart failure (H) 07/26/2021     Dysphagia, oropharyngeal phase 06/14/2018    Mild per video swallow study. 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     PVC (premature ventricular contraction) 05/10/2011     Sick sinus syndrome (H) 05/03/2016    s/p PPM implant 7/2/14     Squamous cell carcinoma      Syncope      Weakness of both upper extremities 04/26/2022     Weakness of both upper extremities 04/26/2022       Past Surgical  History:   Procedure Laterality Date     APPENDECTOMY OPEN       BIOPSY OF SKIN LESION       COLONOSCOPY N/A 2015    Procedure: COLONOSCOPY;  Surgeon: David Us MD;  Location: [facility]     CV CORONARY ANGIOGRAM N/A 2023    Procedure: Coronary Angiogram;  Surgeon: Carola Reed MD;  Location:  HEART CARDIAC CATH LAB     IMPLANT PACEMAKER  2004     MOHS MICROGRAPHIC PROCEDURE          Family History   Problem Relation Age of Onset     Heart Disease Mother         MI     Heart Disease Father         MI     C.A.D. Brother         Just had angioplasty in      Colon Cancer No family hx of      Skin Cancer No family hx of        Social History     Socioeconomic History     Marital status:      Spouse name: Paula     Number of children: 3     Years of education: Not on file     Highest education level: Not on file   Occupational History     Employer: RETIRED     Occupation: engineering managment     Comment: [employer]   Tobacco Use     Smoking status: Former     Current packs/day: 0.00     Average packs/day: 1 pack/day for 4.0 years (4.0 ttl pk-yrs)     Types: Cigarettes     Start date: 1963     Quit date: 1967     Years since quittin.3     Passive exposure: Never     Smokeless tobacco: Never   Vaping Use     Vaping status: Never Used   Substance and Sexual Activity     Alcohol use: No     Alcohol/week: 0.0 standard drinks of alcohol     Drug use: No     Sexual activity: Yes     Partners: Female   Other Topics Concern      Service Yes     Blood Transfusions No     Caffeine Concern Yes     Comment: decaff coffee     Occupational Exposure No     Hobby Hazards No     Sleep Concern No     Stress Concern No     Weight Concern No     Special Diet No     Back Care No     Exercise Yes     Comment: Excercises about 4-5 days per week     Bike Helmet Yes     Seat Belt Yes     Self-Exams No     Parent/sibling w/ CABG, MI or angioplasty before 65F 55M? Not Asked   Social  History Narrative     Not on file     Social Drivers of Health     Financial Resource Strain: Low Risk  (5/2/2024)    Financial Resource Strain      Within the past 12 months, have you or your family members you live with been unable to get utilities (heat, electricity) when it was really needed?: No   Food Insecurity: Low Risk  (5/2/2024)    Food Insecurity      Within the past 12 months, did you worry that your food would run out before you got money to buy more?: No      Within the past 12 months, did the food you bought just not last and you didn t have money to get more?: No   Transportation Needs: Low Risk  (5/2/2024)    Transportation Needs      Within the past 12 months, has lack of transportation kept you from medical appointments, getting your medicines, non-medical meetings or appointments, work, or from getting things that you need?: No   Physical Activity: Insufficiently Active (5/2/2024)    Exercise Vital Sign      Days of Exercise per Week: 2 days      Minutes of Exercise per Session: 30 min   Stress: No Stress Concern Present (5/2/2024)    [employer] - Occupational Stress Questionnaire      Feeling of Stress : Only a little   Social Connections: Unknown (5/2/2024)    Social Connection and Isolation Panel [NHANES]      Frequency of Communication with Friends and Family: Not on file      Frequency of Social Gatherings with Friends and Family: Three times a week      Attends Anabaptist Services: Not on file      Active Member of Clubs or Organizations: Not on file      Attends Club or Organization Meetings: Not on file      Marital Status: Not on file   Interpersonal Safety: Low Risk  (5/6/2024)    Interpersonal Safety      Do you feel physically and emotionally safe where you currently live?: Yes      Within the past 12 months, have you been hit, slapped, kicked or otherwise physically hurt by someone?: No      Within the past 12 months, have you been humiliated or  emotionally abused in other ways by your partner or ex-partner?: No   Housing Stability: Low Risk  (5/2/2024)    Housing Stability      Do you have housing? : Yes      Are you worried about losing your housing?: No       Outpatient Encounter Medications as of 5/1/2025   Medication Sig Dispense Refill     atorvastatin (LIPITOR) 20 MG tablet Take 1 tablet (20 mg) by mouth daily. 90 tablet 3     imiquimod (ALDARA) 5 % external cream Apply to the affected areas on the right forehead, right shin, and left postauricular area (behind left ear) at bedtime for a total of 6 weeks. 48 packet 3     levETIRAcetam (KEPPRA) 500 MG tablet Take 250 mg by mouth 2 times daily.       losartan (COZAAR) 100 MG tablet Take 1 tablet (100 mg) by mouth daily. 90 tablet 3     metoprolol succinate ER (TOPROL XL) 50 MG 24 hr tablet Take 1 tablet (50 mg) by mouth daily. 90 tablet 3     Multiple Vitamins-Minerals (ZINC PO)        Turmeric 500 MG CAPS        warfarin ANTICOAGULANT (COUMADIN) 2.5 MG tablet Take 2.5 mg every Friday; Take 1.25 mg all other days. Or as directed by INR clinic. 50 tablet 1     warfarin ANTICOAGULANT (COUMADIN) 2.5 MG tablet 2.5mg Monday and 1.25mg all other days per INR clinic 60 tablet 1     No facility-administered encounter medications on file as of 5/1/2025.             O:   NAD, WDWN, Alert & Oriented, Mood & Affect wnl, Vitals stable   General appearance normal   Vitals stable   Alert, oriented and in no acute distress     RR shin 1.5cm red plaque      Eyes: Conjunctivae/lids:Normal     ENT: Lips, mucosa: normal    MSK:Normal    Cardiovascular: peripheral edema none    Pulm: Breathing Normal    Neuro/Psych: Orientation:Alert and Orientedx3 ; Mood/Affect:normal       A/P:  R shin basal cell carcinoma   MOHS:   Size and Location    The rationale for Mohs surgery was discussed with the patient and consent was obtained.  The risks and benefits as well as alternatives to therapy were discussed, in detail.   Specifically, the risks of infection, scarring, bleeding, prolonged wound healing, incomplete removal, allergy to anesthesia, nerve injury and recurrence were addressed.  Indication for Mohs was Size and Location. Prior to the procedure, the treatment site was clearly identified and, if available, confirmed with previous photos and confirmed by the patient   All components of the Universal Protocol/PAUSE rule were completed.  The Mohs surgeon operated in two distinct and integrated capacities as the surgeon and pathologist.      The area was prepped with Betasept.  A rim of normal appearing skin was marked circumferentially around the lesion.  The area was infiltrated with local anesthesia.  The tumor was first debulked to remove all clinically apparent tumor.  An incision following the standard Mohs approach was done and the specimen was oriented,mapped and placed in 1 block(s).  Each specimen was then chromacoded and processed in the Mohs laboratory using standard Mohs technique and submitted for frozen section histology.  Frozen section analysis showed no residual tumor but CLEAR MARGINS.      The tumor was excised using standard Mohs technique in 1 stages(s).  CLEAR MARGINS OBTAINED and Final defect size was 2 cm.     We discussed the options for wound management in full with the patient including risks/benefits/ possible outcomes.      REPAIR SECOND INTENT: We discussed the options for wound management in full with the patient including risks/benefits/possible outcomes. Decision made to allow the wound to heal by second intention. Cautery was used for for hemostasis. EBL minimal; complications none; wound care routine.  The patient was discharged in good condition and will return in one month or prn for wound evaluation.   It was a pleasure speaking to Charles Nogueira today.  Previous clinic notes and pertinent laboratory tests were reviewed prior to Charles Nogueira's visit.  Patient encouraged to perform  monthly skin exams.  UV precautions reviewed with patient.  Risks of non-melanoma skin cancer discussed with patient   Return to clinic next appt        Again, thank you for allowing me to participate in the care of your patient.        Sincerely,        Keith Orellana MD    Electronically signed

## 2025-05-03 SDOH — HEALTH STABILITY: PHYSICAL HEALTH
ON AVERAGE, HOW MANY DAYS PER WEEK DO YOU ENGAGE IN MODERATE TO STRENUOUS EXERCISE (LIKE A BRISK WALK)?: PATIENT DECLINED

## 2025-05-03 ASSESSMENT — SOCIAL DETERMINANTS OF HEALTH (SDOH): HOW OFTEN DO YOU GET TOGETHER WITH FRIENDS OR RELATIVES?: TWICE A WEEK

## 2025-05-06 ENCOUNTER — HOSPITAL ENCOUNTER (OUTPATIENT)
Dept: CARDIOLOGY | Facility: CLINIC | Age: 85
Discharge: HOME | End: 2025-05-06
Attending: INTERNAL MEDICINE | Admitting: INTERNAL MEDICINE
Payer: COMMERCIAL

## 2025-05-06 DIAGNOSIS — I25.10 ATHEROSCLEROSIS OF NATIVE CORONARY ARTERY OF NATIVE HEART WITHOUT ANGINA PECTORIS: ICD-10-CM

## 2025-05-06 DIAGNOSIS — E78.5 HYPERLIPIDEMIA LDL GOAL <70: ICD-10-CM

## 2025-05-06 LAB — LVEF ECHO: NORMAL

## 2025-05-06 PROCEDURE — 93306 TTE W/DOPPLER COMPLETE: CPT

## 2025-05-06 PROCEDURE — 93306 TTE W/DOPPLER COMPLETE: CPT | Mod: 26 | Performed by: INTERNAL MEDICINE

## 2025-05-06 NOTE — PROGRESS NOTES
Surgical Office Location:  [facility]  [address] [place] [id number]

## 2025-05-07 ENCOUNTER — OFFICE VISIT (OUTPATIENT)
Dept: INTERNAL MEDICINE | Facility: CLINIC | Age: 85
End: 2025-05-07
Attending: INTERNAL MEDICINE
Payer: COMMERCIAL

## 2025-05-07 VITALS
TEMPERATURE: 97.6 F | HEIGHT: 67 IN | HEART RATE: 50 BPM | BODY MASS INDEX: 23.2 KG/M2 | WEIGHT: 147.8 LBS | RESPIRATION RATE: 15 BRPM | SYSTOLIC BLOOD PRESSURE: 137 MMHG | OXYGEN SATURATION: 100 % | DIASTOLIC BLOOD PRESSURE: 71 MMHG

## 2025-05-07 DIAGNOSIS — I48.0 PAROXYSMAL ATRIAL FIBRILLATION (H): ICD-10-CM

## 2025-05-07 DIAGNOSIS — I50.32 CHRONIC DIASTOLIC CONGESTIVE HEART FAILURE (H): ICD-10-CM

## 2025-05-07 DIAGNOSIS — Z00.00 ENCOUNTER FOR ANNUAL WELLNESS EXAM IN MEDICARE PATIENT: Primary | ICD-10-CM

## 2025-05-07 DIAGNOSIS — I10 ESSENTIAL HYPERTENSION: ICD-10-CM

## 2025-05-07 DIAGNOSIS — J31.0 CHRONIC RHINITIS: ICD-10-CM

## 2025-05-07 LAB
ALBUMIN UR-MCNC: ABNORMAL MG/DL
ANION GAP SERPL CALCULATED.3IONS-SCNC: 9 MMOL/L (ref 7–15)
APPEARANCE UR: CLEAR
BASOPHILS # BLD AUTO: 0 10E3/UL (ref 0–0.2)
BASOPHILS NFR BLD AUTO: 0 %
BILIRUB UR QL STRIP: NEGATIVE
BUN SERPL-MCNC: 28 MG/DL (ref 8–23)
CALCIUM SERPL-MCNC: 9.6 MG/DL (ref 8.8–10.4)
CHLORIDE SERPL-SCNC: 104 MMOL/L (ref 98–107)
COLOR UR AUTO: YELLOW
CREAT SERPL-MCNC: 1.4 MG/DL (ref 0.67–1.17)
EGFRCR SERPLBLD CKD-EPI 2021: 50 ML/MIN/1.73M2
EOSINOPHIL # BLD AUTO: 0.3 10E3/UL (ref 0–0.7)
EOSINOPHIL NFR BLD AUTO: 3 %
ERYTHROCYTE [DISTWIDTH] IN BLOOD BY AUTOMATED COUNT: 13.7 % (ref 10–15)
GLUCOSE SERPL-MCNC: 104 MG/DL (ref 70–99)
GLUCOSE UR STRIP-MCNC: NEGATIVE MG/DL
HCO3 SERPL-SCNC: 28 MMOL/L (ref 22–29)
HCT VFR BLD AUTO: 44.5 % (ref 40–53)
HGB BLD-MCNC: 14.6 G/DL (ref 13.3–17.7)
HGB UR QL STRIP: ABNORMAL
IMM GRANULOCYTES # BLD: 0 10E3/UL
IMM GRANULOCYTES NFR BLD: 0 %
KETONES UR STRIP-MCNC: NEGATIVE MG/DL
LEUKOCYTE ESTERASE UR QL STRIP: NEGATIVE
LYMPHOCYTES # BLD AUTO: 1.6 10E3/UL (ref 0.8–5.3)
LYMPHOCYTES NFR BLD AUTO: 19 %
MCH RBC QN AUTO: 30.5 PG (ref 26.5–33)
MCHC RBC AUTO-ENTMCNC: 32.8 G/DL (ref 31.5–36.5)
MCV RBC AUTO: 93 FL (ref 78–100)
MONOCYTES # BLD AUTO: 1.1 10E3/UL (ref 0–1.3)
MONOCYTES NFR BLD AUTO: 14 %
MUCOUS THREADS #/AREA URNS LPF: PRESENT /LPF
NEUTROPHILS # BLD AUTO: 5.5 10E3/UL (ref 1.6–8.3)
NEUTROPHILS NFR BLD AUTO: 65 %
NITRATE UR QL: NEGATIVE
PH UR STRIP: 5.5 [PH] (ref 5–7)
PLATELET # BLD AUTO: 172 10E3/UL (ref 150–450)
POTASSIUM SERPL-SCNC: 4.4 MMOL/L (ref 3.4–5.3)
RBC # BLD AUTO: 4.79 10E6/UL (ref 4.4–5.9)
RBC #/AREA URNS AUTO: ABNORMAL /HPF
SODIUM SERPL-SCNC: 141 MMOL/L (ref 135–145)
SP GR UR STRIP: 1.01 (ref 1–1.03)
SQUAMOUS #/AREA URNS AUTO: ABNORMAL /LPF
UROBILINOGEN UR STRIP-ACNC: 0.2 E.U./DL
WBC # BLD AUTO: 8.5 10E3/UL (ref 4–11)
WBC #/AREA URNS AUTO: ABNORMAL /HPF

## 2025-05-07 RX ORDER — LAMOTRIGINE 100 MG/1
1 TABLET ORAL 2 TIMES DAILY
COMMUNITY
Start: 2025-04-29

## 2025-05-07 RX ORDER — IPRATROPIUM BROMIDE 21 UG/1
2 SPRAY, METERED NASAL EVERY 12 HOURS
Qty: 30 ML | Refills: 2 | Status: SHIPPED | OUTPATIENT
Start: 2025-05-07

## 2025-05-07 ASSESSMENT — PAIN SCALES - GENERAL: PAINLEVEL_OUTOF10: NO PAIN (0)

## 2025-05-07 NOTE — PROGRESS NOTES
Preventive Care Visit  [facility]  Fer Mora MD, Internal Medicine  May 7, 2025      Assessment & Plan     Encounter for annual wellness exam in Medicare patient  Adult wellness plan reviewed.  - Basic metabolic panel  (Ca, Cl, CO2, Creat, Gluc, K, Na, BUN); Future  - CBC with platelets and differential; Future  - UA Macroscopic with reflex to Microscopic and Culture - Lab Collect; Future    Chronic diastolic congestive heart failure (H)  Patient will continue metoprolol, statin, and losartan as currently prescribed.  Followed by cardiology.     Essential hypertension  Blood pressure is currently under good control. He will continue his losartan 100 mg daily as prescribed by his cardiologist.    Paroxysmal atrial fibrillation (H)  Chronic condition. Managed by cardiologist. He will continue his metoprolol and his warfarin as currently prescribed.     Chronic rhinitis  Patient will continue his udes of ipratropium bromide 0.03% nasal spray for management of his chronic issues with rhinitis.  - ipratropium (ATROVENT) 0.03 % nasal spray; Spray 2 sprays into both nostrils every 12 hours.    Patient has been advised of split billing requirements and indicates understanding: Yes        Counseling  Appropriate preventive services were addressed with this patient via screening, questionnaire, or discussion as appropriate for fall prevention, nutrition, physical activity, Tobacco-use cessation, social engagement, weight loss and cognition.  Checklist reviewing preventive services available has been given to the patient.  Reviewed patient's diet, addressing concerns and/or questions.   Addressed any concerns about safety while driving.  The patient was provided with written information regarding signs of hearing loss.       Follow-up    Follow-up Visit   Expected date:  May 07, 2026 (Approximate)      Follow Up Appointment Details:     Follow-up with whom?: PCP    Follow-Up for what?:  Medicare Wellness    Any Additional Chronic Condition Management?: Hypertension    Welcome or Annual?: Annual Wellness    How?: In Person                 Do Soto is a 85 year old, presenting for the following:  Wellness Visit        5/7/2025     9:13 AM   Additional Questions   Roomed by hope r   Accompanied by self         5/7/2025     9:13 AM   Patient Reported Additional Medications   Patient reports taking the following new medications unknown         Patient is an 84-year-old  male who presents to the clinic for his annual wellness check.  He has no acute concerns or complaints at this time.  Patient reports a stable appetite.  He is stooling and voiding without issue.  Patient sleeps well at night.  Patient did previously have fasting labs performed for his cardiologist.   Patient does have a history of hypertension, and he currently takes losartan 100 mg daily for management of his blood pressure.  Patient does not check his blood pressure outside the clinic setting.  He does have a complicated cardiac history that includes CHF, atrial fibrillation, and mitral valve disease.  His warfarin and metoprolol prescription and are being managed by his cardiologist. He is on chronic anticoagulation as part of his regimen for management of his atrial fibrillation.  Patient is tolerating all of his medications without issue.        Advance Care Planning    Document on file is a Health Care Directive or POLST.        5/3/2025   General Health   How would you rate your overall physical health? Good   Feel stress (tense, anxious, or unable to sleep) Not at all         5/3/2025   Nutrition   Diet: Regular (no restrictions)         5/3/2025   Exercise   Days per week of moderate/strenous exercise Patient declined         5/3/2025   Social Factors   Frequency of gathering with friends or relatives Twice a week   Worry food won't last until get money to buy more No   Food not last or not have enough money  for food? No   Do you have housing? (Housing is defined as stable permanent housing and does not include staying outside in a car, in a tent, in an abandoned building, in an overnight shelter, or couch-surfing.) Yes   Are you worried about losing your housing? No   Lack of transportation? No   Unable to get utilities (heat,electricity)? No         5/7/2025   Fall Risk   Gait Speed Test (Document in seconds) 2.98   Gait Speed Test Interpretation Less than or equal to 5.00 seconds - PASS          5/3/2025   Activities of Daily Living- Home Safety   Needs help with the following daily activites None of the above   Safety concerns in the home None of the above         5/3/2025   Dental   Dentist two times every year? Yes         5/3/2025   Hearing Screening   Hearing concerns? (!) I FEEL THAT PEOPLE ARE MUMBLING OR NOT SPEAKING CLEARLY.    (!) I NEED TO ASK PEOPLE TO SPEAK UP OR REPEAT THEMSELVES.    (!) IT'S HARD TO FOLLOW A CONVERSATION IN A NOISY RESTAURANT OR CROWDED ROOM.    (!) TROUBLE UNDERSTANDING SOFT OR WHISPERED SPEECH.       Multiple values from one day are sorted in reverse-chronological order         5/3/2025   Driving Risk Screening   Patient/family members have concerns about driving (!) YES          5/3/2025   General Alertness/Fatigue Screening   Have you been more tired than usual lately? No         5/3/2025   Urinary Incontinence Screening   Bothered by leaking urine in past 6 months No         Today's PHQ-2 Score:       5/6/2025    11:28 AM   PHQ-2 ( 1999 [employer])   Q1: Little interest or pleasure in doing things 0   Q2: Feeling down, depressed or hopeless 0   PHQ-2 Score 0    Q1: Little interest or pleasure in doing things Not at all   Q2: Feeling down, depressed or hopeless Not at all   PHQ-2 Score 0       Patient-reported           5/3/2025   Substance Use   Alcohol more than 3/day or more than 7/wk No   Do you have a current opioid prescription? No   How severe/bad is pain from 1 to 10? 0/10 (No  Pain)   Do you use any other substances recreationally? No     Social History     Tobacco Use    Smoking status: Former     Current packs/day: 0.00     Average packs/day: 1 pack/day for 4.0 years (4.0 ttl pk-yrs)     Types: Cigarettes     Start date: 1963     Quit date: 1967     Years since quittin.3     Passive exposure: Never    Smokeless tobacco: Never   Vaping Use    Vaping status: Never Used   Substance Use Topics    Alcohol use: No     Alcohol/week: 0.0 standard drinks of alcohol    Drug use: No         Reviewed and updated as needed this visit by Provider                    Lab work is in process  Labs reviewed in EPIC  Current providers sharing in care for this patient include:  Patient Care Team:  No Ref-Primary, Physician as PCP - General  Fer Moar MD as Assigned PCP  Ana Dean MD as Assigned Heart and Vascular Provider  Crispin Ryan MD as MD (Dermatology)  Rodney Campbell PA-C as Physician Assistant (Cardiovascular Disease)  Crispin Ryan MD as Assigned Dermatology Provider    The following health maintenance items are reviewed in Epic and correct as of today:  Health Maintenance   Topic Date Due    MIGRAINE ACTION PLAN  Never done    ZOSTER IMMUNIZATION (1 of 2) Never done    RSV VACCINE (1 - 1-dose 75+ series) Never done    HF ACTION PLAN  2025    ANNUAL REVIEW OF HM ORDERS  2025    MEDICARE ANNUAL WELLNESS VISIT  2025    CBC  2025    BMP  10/15/2025    COLORECTAL CANCER SCREENING  2025    ALT  04/15/2026    LIPID  04/15/2026    CREATININE  04/15/2026    FALL RISK ASSESSMENT  2026    ADVANCE CARE PLANNING  2029    DTAP/TDAP/TD IMMUNIZATION (7 - Td or Tdap) 2034    TSH W/FREE T4 REFLEX  Completed    PHQ-2 (once per calendar year)  Completed    INFLUENZA VACCINE  Completed    Pneumococcal Vaccine: 50+ Years  Completed    HPV IMMUNIZATION  Aged Out    MENINGITIS IMMUNIZATION  Aged Out    COVID-19 Vaccine   "Discontinued         Review of Systems  CONSTITUTIONAL: NEGATIVE for fever, chills, change in weight  INTEGUMENTARY/SKIN: NEGATIVE for worrisome rashes, moles or lesions  ENT/MOUTH:Positive for chronic rhinitis.  RESP: NEGATIVE for significant cough or SOB  CV: NEGATIVE for chest pain, palpitations or peripheral edema  GI: NEGATIVE for nausea, abdominal pain, heartburn, or change in bowel habits  : NEGATIVE for frequency, dysuria, or hematuria  MUSCULOSKELETAL: NEGATIVE for significant arthralgias or myalgia  NEURO: NEGATIVE for weakness, dizziness or paresthesias     Objective    Exam  /71 (BP Location: Right arm, Patient Position: Sitting, Cuff Size: Adult Regular)   Pulse 50   Temp 97.6  F (36.4  C) (Oral)   Resp 15   Ht 1.702 m (5' 7\")   Wt 67 kg (147 lb 12.8 oz)   SpO2 100%   BMI 23.15 kg/m     Estimated body mass index is 23.15 kg/m  as calculated from the following:    Height as of this encounter: 1.702 m (5' 7\").    Weight as of this encounter: 67 kg (147 lb 12.8 oz).    Physical Exam  Vitals reviewed.   Constitutional:       Appearance: Normal appearance.   HENT:      Head: Normocephalic and atraumatic.      Right Ear: Tympanic membrane, ear canal and external ear normal.      Left Ear: Tympanic membrane, ear canal and external ear normal.      Mouth/Throat:      Mouth: Mucous membranes are moist.      Pharynx: Oropharynx is clear.   Eyes:      Extraocular Movements: Extraocular movements intact.      Conjunctiva/sclera: Conjunctivae normal.      Pupils: Pupils are equal, round, and reactive to light.   Cardiovascular:      Rate and Rhythm: Normal rate. Rhythm irregular.      Pulses: Normal pulses.      Heart sounds: Normal heart sounds.   Pulmonary:      Effort: Pulmonary effort is normal.      Breath sounds: Normal breath sounds.   Musculoskeletal:      Cervical back: Normal range of motion and neck supple.   Skin:     General: Skin is warm and dry.      Capillary Refill: Capillary refill " takes less than 2 seconds.   Neurological:      Mental Status: He is alert and oriented to person, place, and time. Mental status is at baseline.               5/7/2025   Mini Cog   Clock Draw Score 2 Normal   3 Item Recall 2 objects recalled   Mini Cog Total Score 4         Signed Electronically by: Fer Mora MD

## 2025-05-08 ENCOUNTER — OFFICE VISIT (OUTPATIENT)
Dept: DERMATOLOGY | Facility: CLINIC | Age: 85
End: 2025-05-08
Payer: COMMERCIAL

## 2025-05-08 DIAGNOSIS — L82.1 SEBORRHEIC KERATOSES: ICD-10-CM

## 2025-05-08 DIAGNOSIS — Z85.828 HISTORY OF SKIN CANCER: ICD-10-CM

## 2025-05-08 DIAGNOSIS — C44.319 BASAL CELL CARCINOMA (BCC) OF FOREHEAD: ICD-10-CM

## 2025-05-08 DIAGNOSIS — L81.4 LENTIGO: ICD-10-CM

## 2025-05-08 DIAGNOSIS — D23.9 DERMAL NEVUS: Primary | ICD-10-CM

## 2025-05-08 DIAGNOSIS — D18.01 ANGIOMA OF SKIN: ICD-10-CM

## 2025-05-08 NOTE — LETTER
5/8/2025      Charles Nogueira  [address]  [facility] [address]      Dear Colleague,    Thank you for referring your patient, Charles Nogueira, to the [facility]. Please see a copy of my visit note below.    Surgical Office Location:  [facility]  [address] [place] [id number]      Charles Nogueira is an extremely pleasant 85 year old year old male patient here today for evaluation and managment of basal cell carcinoma on right forehead.  HE notes spots on chest and leg today.  Patient has no other skin complaints today.  Remainder of the HPI, Meds, PMH, Allergies, FH, and SH was reviewed in chart.      Past Medical History:   Diagnosis Date     Acute ischemic heart disease (H) 08/24/2018     Allergic rhinitis, cause unspecified      Basal cell carcinoma      Cardiac pacemaker in situ 08/14/2017     Cervicalgia 04/26/2022     Chronic diastolic congestive heart failure (H) 07/26/2021     Dysphagia, oropharyngeal phase 06/14/2018    Mild per video swallow study. 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     PVC (premature ventricular contraction) 05/10/2011     Sick sinus syndrome (H) 05/03/2016    s/p PPM implant 7/2/14     Squamous cell carcinoma      Syncope      Weakness of both upper extremities 04/26/2022     Weakness of both upper extremities 04/26/2022       Past Surgical  History:   Procedure Laterality Date     APPENDECTOMY OPEN       BIOPSY OF SKIN LESION       COLONOSCOPY N/A 2015    Procedure: COLONOSCOPY;  Surgeon: David Us MD;  Location: [facility]     CV CORONARY ANGIOGRAM N/A 2023    Procedure: Coronary Angiogram;  Surgeon: Carola Reed MD;  Location:  HEART CARDIAC CATH LAB     IMPLANT PACEMAKER  2004     MOHS MICROGRAPHIC PROCEDURE          Family History   Problem Relation Age of Onset     Heart Disease Mother         MI     Heart Disease Father         MI     C.A.D. Brother         Just had angioplasty in      Colon Cancer No family hx of      Skin Cancer No family hx of        Social History     Socioeconomic History     Marital status:      Spouse name: Paula     Number of children: 3     Years of education: Not on file     Highest education level: Not on file   Occupational History     Employer: RETIRED     Occupation: engineering managment     Comment: [employer]   Tobacco Use     Smoking status: Former     Current packs/day: 0.00     Average packs/day: 1 pack/day for 4.0 years (4.0 ttl pk-yrs)     Types: Cigarettes     Start date: 1963     Quit date: 1967     Years since quittin.3     Passive exposure: Never     Smokeless tobacco: Never   Vaping Use     Vaping status: Never Used   Substance and Sexual Activity     Alcohol use: No     Alcohol/week: 0.0 standard drinks of alcohol     Drug use: No     Sexual activity: Yes     Partners: Female   Other Topics Concern      Service Yes     Blood Transfusions No     Caffeine Concern Yes     Comment: decaff coffee     Occupational Exposure No     Hobby Hazards No     Sleep Concern No     Stress Concern No     Weight Concern No     Special Diet No     Back Care No     Exercise Yes     Comment: Excercises about 4-5 days per week     Bike Helmet Yes     Seat Belt Yes     Self-Exams No     Parent/sibling w/ CABG, MI or angioplasty before 65F 55M? Not Asked   Social  History Narrative     Not on file     Social Drivers of Health     Financial Resource Strain: Low Risk  (5/3/2025)    Financial Resource Strain      Within the past 12 months, have you or your family members you live with been unable to get utilities (heat, electricity) when it was really needed?: No   Food Insecurity: Low Risk  (5/3/2025)    Food Insecurity      Within the past 12 months, did you worry that your food would run out before you got money to buy more?: No      Within the past 12 months, did the food you bought just not last and you didn t have money to get more?: No   Transportation Needs: Low Risk  (5/3/2025)    Transportation Needs      Within the past 12 months, has lack of transportation kept you from medical appointments, getting your medicines, non-medical meetings or appointments, work, or from getting things that you need?: No   Physical Activity: Unknown (5/3/2025)    Exercise Vital Sign      Days of Exercise per Week: Patient declined      Minutes of Exercise per Session: Not on file   Stress: No Stress Concern Present (5/3/2025)    [place] [employer] - Occupational Stress Questionnaire      Feeling of Stress : Not at all   Social Connections: Unknown (5/3/2025)    Social Connection and Isolation Panel [NHANES]      Frequency of Communication with Friends and Family: Not on file      Frequency of Social Gatherings with Friends and Family: Twice a week      Attends Yarsanism Services: Not on file      Active Member of Clubs or Organizations: Not on file      Attends Club or Organization Meetings: Not on file      Marital Status: Not on file   Interpersonal Safety: Low Risk  (5/7/2025)    Interpersonal Safety      Do you feel physically and emotionally safe where you currently live?: Yes      Within the past 12 months, have you been hit, slapped, kicked or otherwise physically hurt by someone?: No      Within the past 12 months, have you been humiliated or emotionally  abused in other ways by your partner or ex-partner?: No   Housing Stability: Low Risk  (5/3/2025)    Housing Stability      Do you have housing? : Yes      Are you worried about losing your housing?: No       Outpatient Encounter Medications as of 5/8/2025   Medication Sig Dispense Refill     atorvastatin (LIPITOR) 20 MG tablet Take 1 tablet (20 mg) by mouth daily. 90 tablet 3     imiquimod (ALDARA) 5 % external cream Apply to the affected areas on the right forehead, right shin, and left postauricular area (behind left ear) at bedtime for a total of 6 weeks. 48 packet 3     ipratropium (ATROVENT) 0.03 % nasal spray Spray 2 sprays into both nostrils every 12 hours. 30 mL 2     lamoTRIgine (LAMICTAL) 100 MG tablet Take 1 tablet by mouth 2 times daily.       levETIRAcetam (KEPPRA) 500 MG tablet Take 250 mg by mouth 2 times daily.       losartan (COZAAR) 100 MG tablet Take 1 tablet (100 mg) by mouth daily. 90 tablet 3     metoprolol succinate ER (TOPROL XL) 50 MG 24 hr tablet Take 1 tablet (50 mg) by mouth daily. 90 tablet 3     Multiple Vitamins-Minerals (ZINC PO)        Turmeric 500 MG CAPS        warfarin ANTICOAGULANT (COUMADIN) 2.5 MG tablet Take 2.5 mg every Friday; Take 1.25 mg all other days. Or as directed by INR clinic. 50 tablet 1     warfarin ANTICOAGULANT (COUMADIN) 2.5 MG tablet 2.5mg Monday and 1.25mg all other days per INR clinic 60 tablet 1     No facility-administered encounter medications on file as of 5/8/2025.             O:   NAD, WDWN, Alert & Oriented, Mood & Affect wnl, Vitals stable   General appearance normal   Vitals stable   Alert, oriented and in no acute distress     R forehead 6mm scaly papule    Stuck on papules and brown macules on trunk and ext   Red papules on trunk  Flesh colored papules on trunk         Eyes: Conjunctivae/lids:Normal     ENT: Lips, mucosa: normal    MSK:Normal    Cardiovascular: peripheral edema none    Pulm: Breathing Normal    Neuro/Psych: Orientation:Alert and  Orientedx3 ; Mood/Affect:normal       A/P:  1. Seborrheic keratosis, lentigo, angioma, dermal nevus, hx of non-melanoma skin cancer   2. R forehead basal cell carcinoma   He declines sutures   It was a pleasure speaking to Charles Nogueira today.  Previous clinic notes and pertinent laboratory tests were reviewed prior to Charles CANTU Nogueira's visit.  Signs and Symptoms of skin cancer discussed with patient.  Patient encouraged to perform monthly skin exams.  UV precautions reviewed with patient.  Risks of non-melanoma skin cancer discussed with patient   Return to clinic next appt  PROCEDURE NOTE  R forehead basal cell carcinoma   MOHS:   Location    The rationale for Mohs surgery was discussed with the patient and consent was obtained.  The risks and benefits as well as alternatives to therapy were discussed, in detail.  Specifically, the risks of infection, scarring, bleeding, prolonged wound healing, incomplete removal, allergy to anesthesia, nerve injury and recurrence were addressed.  Indication for Mohs was Location. Prior to the procedure, the treatment site was clearly identified and, if available, confirmed with previous photos and confirmed by the patient   All components of the Universal Protocol/PAUSE rule were completed.  The Mohs surgeon operated in two distinct and integrated capacities as the surgeon and pathologist.      The area was prepped with Betasept.  A rim of normal appearing skin was marked circumferentially around the lesion.  The area was infiltrated with local anesthesia.  The tumor was first debulked to remove all clinically apparent tumor.  An incision following the standard Mohs approach was done and the specimen was oriented,mapped and placed in 2 block(s).  Each specimen was then chromacoded and processed in the Mohs laboratory using standard Mohs technique and submitted for frozen section histology.  Frozen section analysis showed  residual tumor but CLEAR MARGINS.    1st  stage:Orthokeratosis of epidermis with a proliferation of nests of basaloid cells, with peripheral palisading and a haphazard arrangement in the center extending into the dermis, forming nodules.  The tumor cells have hyperchromatic nuclei. Poor cytoplasm and intercellular bridging.      The tumor was excised using standard Mohs technique in 2 stages(s).  CLEAR MARGINS OBTAINED and Final defect size was 1.1 x 1.3 cm.     We discussed the options for wound management in full with the patient including risks/benefits/ possible outcomes.      REPAIR SECOND INTENT: We discussed the options for wound management in full with the patient including risks/benefits/possible outcomes. Decision made to allow the wound to heal by second intention. Cautery was used for for hemostasis. EBL minimal; complications none; wound care routine.  The patient was discharged in good condition and will return in one month or prn for wound evaluation.        Again, thank you for allowing me to participate in the care of your patient.        Sincerely,        Keith Orellana MD    Electronically signed

## 2025-05-08 NOTE — PATIENT INSTRUCTIONS
Open Wound Care     for ______________        No strenuous activity for 48 hours    Take Tylenol as needed for discomfort.                                                .         Do not drink alcoholic beverages for 48 hours.    Keep the pressure bandage in place for 24 hours. If the bandage becomes blood tinged or loose, reinforce it with gauze and tape.        (Refer to the reverse side of this page for management of bleeding).    Remove bandage in 24 hours and begin wound care as follows:     Clean area with tap water using a Q tip or gauze pad, (shower / bathe normally)  Dry wound with Q tip or gauze pad  Apply Aquaphor, Vaseline, Polysporin or Bacitracin Ointment with a Q tip  Do NOT use Neosporin Ointment *  Cover the wound with a band-aid or nonstick gauze pad and paper tape.  Repeat wound care once a day until wound is completely healed.    It is an old wives tale that a wound heals better when it is exposed to air and allowed to dry out. The wound will heal faster with a better cosmetic result if it is kept moist with ointment and covered with a bandage.  Do not let the wound dry out.      Supplies Needed:                Qtips or gauze pads                Polysporin or Bacitracin Ointment                Bandaids or nonstick gauze pads and paper tape    Wound care kits and brown paper tape are available for purchase at   the pharmacy.    BLEEDING:    Use tightly rolled up gauze or cloth to apply direct pressure over the bandage for 20   minutes.  Reapply pressure for an additional 20 minutes if necessary  Call the office or go to the nearest emergency room if pressure fails to stop the bleeding.  Use additional gauze and tape to maintain pressure once the bleeding has stopped.  Begin wound care 24 hours after surgery as directed.                  WOUND HEALING    One week after surgery a pink / red halo will form around the outside of the wound.   This is new skin.  The center of the wound will appear  yellowish white and produce some drainage.  The pink halo will slowly migrate in toward the center of the wound until the wound is covered with new shiny pink skin.  There will be no more drainage when the wound is completely healed.  It will take six months to one year for the redness to fade.  The scar may be itchy, tight and sensitive to extreme temperatures for a year after the surgery.  Massaging the area several times a day for several minutes after the wound is completely healed will help the scar soften and normalize faster. Begin massage only after healing is complete.      In case of emergency call: Dr Orellana: [phone]    [facility]: [phone]    [facility]:[phone]

## 2025-05-08 NOTE — PROGRESS NOTES
Charles Nogueira is an extremely pleasant 85 year old year old male patient here today for evaluation and managment of basal cell carcinoma on right forehead.  HE notes spots on chest and leg today.  Patient has no other skin complaints today.  Remainder of the HPI, Meds, PMH, Allergies, FH, and SH was reviewed in chart.      Past Medical History:   Diagnosis Date    Acute ischemic heart disease (H) 08/24/2018    Allergic rhinitis, cause unspecified     Basal cell carcinoma     Cardiac pacemaker in situ 08/14/2017    Cervicalgia 04/26/2022    Chronic diastolic congestive heart failure (H) 07/26/2021    Dysphagia, oropharyngeal phase 06/14/2018    Mild per video swallow study. 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    PVC (premature ventricular contraction) 05/10/2011    Sick sinus syndrome (H) 05/03/2016    s/p PPM implant 7/2/14    Squamous cell carcinoma     Syncope     Weakness of both upper extremities 04/26/2022    Weakness of both upper extremities 04/26/2022       Past Surgical History:   Procedure Laterality Date    APPENDECTOMY OPEN      BIOPSY OF SKIN LESION      COLONOSCOPY N/A 11/20/2015    Procedure: COLONOSCOPY;  Surgeon: David Us MD;  Location:  GI    CV CORONARY ANGIOGRAM N/A 6/28/2023    Procedure: Coronary Angiogram;  Surgeon: Carola Reed MD;  Location:  HEART CARDIAC CATH LAB    IMPLANT PACEMAKER  7/2004    MOHS  MICROGRAPHIC PROCEDURE          Family History   Problem Relation Age of Onset    Heart Disease Mother         MI    Heart Disease Father         MI    C.A.D. Brother         Just had angioplasty in 2006    Colon Cancer No family hx of     Skin Cancer No family hx of        Social History     Socioeconomic History    Marital status:      Spouse name: Paula    Number of children: 3    Years of education: Not on file    Highest education level: Not on file   Occupational History     Employer: RETIRED    Occupation: engineering managment     Comment: [employer]   Tobacco Use    Smoking status: Former     Current packs/day: 0.00     Average packs/day: 1 pack/day for 4.0 years (4.0 ttl pk-yrs)     Types: Cigarettes     Start date: 1963     Quit date: 1967     Years since quittin.3     Passive exposure: Never    Smokeless tobacco: Never   Vaping Use    Vaping status: Never Used   Substance and Sexual Activity    Alcohol use: No     Alcohol/week: 0.0 standard drinks of alcohol    Drug use: No    Sexual activity: Yes     Partners: Female   Other Topics Concern     Service Yes    Blood Transfusions No    Caffeine Concern Yes     Comment: decaff coffee    Occupational Exposure No    Hobby Hazards No    Sleep Concern No    Stress Concern No    Weight Concern No    Special Diet No    Back Care No    Exercise Yes     Comment: Excercises about 4-5 days per week    Bike Helmet Yes    Seat Belt Yes    Self-Exams No    Parent/sibling w/ CABG, MI or angioplasty before 65F 55M? Not Asked   Social History Narrative    Not on file     Social Drivers of Health     Financial Resource Strain: Low Risk  (5/3/2025)    Financial Resource Strain     Within the past 12 months, have you or your family members you live with been unable to get utilities (heat, electricity) when it was really needed?: No   Food Insecurity: Low Risk  (5/3/2025)    Food Insecurity     Within the past 12 months, did you worry that your food  would run out before you got money to buy more?: No     Within the past 12 months, did the food you bought just not last and you didn t have money to get more?: No   Transportation Needs: Low Risk  (5/3/2025)    Transportation Needs     Within the past 12 months, has lack of transportation kept you from medical appointments, getting your medicines, non-medical meetings or appointments, work, or from getting things that you need?: No   Physical Activity: Unknown (5/3/2025)    Exercise Vital Sign     Days of Exercise per Week: Patient declined     Minutes of Exercise per Session: Not on file   Stress: No Stress Concern Present (5/3/2025)    [employer] - Occupational Stress Questionnaire     Feeling of Stress : Not at all   Social Connections: Unknown (5/3/2025)    Social Connection and Isolation Panel [NHANES]     Frequency of Communication with Friends and Family: Not on file     Frequency of Social Gatherings with Friends and Family: Twice a week     Attends [employer] Services: Not on file     Active Member of Clubs or Organizations: Not on file     Attends Club or Organization Meetings: Not on file     Marital Status: Not on file   Interpersonal Safety: Low Risk  (5/7/2025)    Interpersonal Safety     Do you feel physically and emotionally safe where you currently live?: Yes     Within the past 12 months, have you been hit, slapped, kicked or otherwise physically hurt by someone?: No     Within the past 12 months, have you been humiliated or emotionally abused in other ways by your partner or ex-partner?: No   Housing Stability: Low Risk  (5/3/2025)    Housing Stability     Do you have housing? : Yes     Are you worried about losing your housing?: No       Outpatient Encounter Medications as of 5/8/2025   Medication Sig Dispense Refill    atorvastatin (LIPITOR) 20 MG tablet Take 1 tablet (20 mg) by mouth daily. 90 tablet 3    imiquimod (ALDARA) 5 % external cream Apply to the affected  areas on the right forehead, right shin, and left postauricular area (behind left ear) at bedtime for a total of 6 weeks. 48 packet 3    ipratropium (ATROVENT) 0.03 % nasal spray Spray 2 sprays into both nostrils every 12 hours. 30 mL 2    lamoTRIgine (LAMICTAL) 100 MG tablet Take 1 tablet by mouth 2 times daily.      levETIRAcetam (KEPPRA) 500 MG tablet Take 250 mg by mouth 2 times daily.      losartan (COZAAR) 100 MG tablet Take 1 tablet (100 mg) by mouth daily. 90 tablet 3    metoprolol succinate ER (TOPROL XL) 50 MG 24 hr tablet Take 1 tablet (50 mg) by mouth daily. 90 tablet 3    Multiple Vitamins-Minerals (ZINC PO)       Turmeric 500 MG CAPS       warfarin ANTICOAGULANT (COUMADIN) 2.5 MG tablet Take 2.5 mg every Friday; Take 1.25 mg all other days. Or as directed by INR clinic. 50 tablet 1    warfarin ANTICOAGULANT (COUMADIN) 2.5 MG tablet 2.5mg Monday and 1.25mg all other days per INR clinic 60 tablet 1     No facility-administered encounter medications on file as of 5/8/2025.             O:   NAD, WDWN, Alert & Oriented, Mood & Affect wnl, Vitals stable   General appearance normal   Vitals stable   Alert, oriented and in no acute distress     R forehead 6mm scaly papule    Stuck on papules and brown macules on trunk and ext   Red papules on trunk  Flesh colored papules on trunk         Eyes: Conjunctivae/lids:Normal     ENT: Lips, mucosa: normal    MSK:Normal    Cardiovascular: peripheral edema none    Pulm: Breathing Normal    Neuro/Psych: Orientation:Alert and Orientedx3 ; Mood/Affect:normal       A/P:  1. Seborrheic keratosis, lentigo, angioma, dermal nevus, hx of non-melanoma skin cancer   2. R forehead basal cell carcinoma   He declines sutures   It was a pleasure speaking to Charles Nogueira today.  Previous clinic notes and pertinent laboratory tests were reviewed prior to Charles Nogueira's visit.  Signs and Symptoms of skin cancer discussed with patient.  Patient encouraged to perform monthly skin  exams.  UV precautions reviewed with patient.  Risks of non-melanoma skin cancer discussed with patient   Return to clinic next appt  PROCEDURE NOTE  R forehead basal cell carcinoma   MOHS:   Location    The rationale for Mohs surgery was discussed with the patient and consent was obtained.  The risks and benefits as well as alternatives to therapy were discussed, in detail.  Specifically, the risks of infection, scarring, bleeding, prolonged wound healing, incomplete removal, allergy to anesthesia, nerve injury and recurrence were addressed.  Indication for Mohs was Location. Prior to the procedure, the treatment site was clearly identified and, if available, confirmed with previous photos and confirmed by the patient   All components of the Universal Protocol/PAUSE rule were completed.  The Mohs surgeon operated in two distinct and integrated capacities as the surgeon and pathologist.      The area was prepped with Betasept.  A rim of normal appearing skin was marked circumferentially around the lesion.  The area was infiltrated with local anesthesia.  The tumor was first debulked to remove all clinically apparent tumor.  An incision following the standard Mohs approach was done and the specimen was oriented,mapped and placed in 2 block(s).  Each specimen was then chromacoded and processed in the Mohs laboratory using standard Mohs technique and submitted for frozen section histology.  Frozen section analysis showed  residual tumor but CLEAR MARGINS.    1st stage:Orthokeratosis of epidermis with a proliferation of nests of basaloid cells, with peripheral palisading and a haphazard arrangement in the center extending into the dermis, forming nodules.  The tumor cells have hyperchromatic nuclei. Poor cytoplasm and intercellular bridging.      The tumor was excised using standard Mohs technique in 2 stages(s).  CLEAR MARGINS OBTAINED and Final defect size was 1.1 x 1.3 cm.     We discussed the options for wound  management in full with the patient including risks/benefits/ possible outcomes.      REPAIR SECOND INTENT: We discussed the options for wound management in full with the patient including risks/benefits/possible outcomes. Decision made to allow the wound to heal by second intention. Cautery was used for for hemostasis. EBL minimal; complications none; wound care routine.  The patient was discharged in good condition and will return in one month or prn for wound evaluation.

## 2025-05-12 ENCOUNTER — RESULTS FOLLOW-UP (OUTPATIENT)
Dept: INTERNAL MEDICINE | Facility: CLINIC | Age: 85
End: 2025-05-12

## 2025-05-13 ENCOUNTER — TELEPHONE (OUTPATIENT)
Dept: CARDIOLOGY | Facility: CLINIC | Age: 85
End: 2025-05-13

## 2025-05-13 ENCOUNTER — OFFICE VISIT (OUTPATIENT)
Dept: CARDIOLOGY | Facility: CLINIC | Age: 85
End: 2025-05-13
Payer: COMMERCIAL

## 2025-05-13 VITALS
DIASTOLIC BLOOD PRESSURE: 56 MMHG | HEIGHT: 67 IN | WEIGHT: 150 LBS | BODY MASS INDEX: 23.54 KG/M2 | HEART RATE: 56 BPM | SYSTOLIC BLOOD PRESSURE: 124 MMHG

## 2025-05-13 DIAGNOSIS — I25.10 ATHEROSCLEROSIS OF NATIVE CORONARY ARTERY OF NATIVE HEART WITHOUT ANGINA PECTORIS: ICD-10-CM

## 2025-05-13 DIAGNOSIS — E78.5 HYPERLIPIDEMIA LDL GOAL <70: ICD-10-CM

## 2025-05-13 DIAGNOSIS — I34.0 NONRHEUMATIC MITRAL VALVE REGURGITATION: Primary | ICD-10-CM

## 2025-05-13 DIAGNOSIS — I50.20 SYSTOLIC HEART FAILURE, UNSPECIFIED HF CHRONICITY (H): ICD-10-CM

## 2025-05-13 NOTE — PROGRESS NOTES
Primary Cardiologist: Dr. 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    Charles returns to clinic today with his spouse stating he is doing well.  He denies chest discomfort, shortness of breath, peripheral edema, orthopnea, abdominal distention, palpitations, lightheadedness, or bleeding issues.  They spent about 5 months a year down in [place].  While he is down there he goes for walks daily.  He has been compliant with his medications.  He was seen by neurology recently and was recommended to undergo MRI but they would like to know if his pacemaker is compatible with this.    Assessment and Plan:  Charles Nogueira is a very pleasant 85-year-old male with past medical history notable for mild to moderate mitral regurgitation now in the moderately severe range, nonobstructive CAD per recent coronary angiogram, hypertension, hyperlipidemia, PAF, s/p PPM, and CKD stage II.    We reviewed his echocardiogram which shows slight decline in LVEF as well as worsening in his mitral regurgitation.  He remains on high dose of afterload reducing agent in the form of losartan.  He is also on metoprolol 50 mg daily.  His resting pulse has been on the lower side and there was some attempt to reduce the dose in the past also  to help improve his daytime fatigue but they wanted to continue with the current dose due to his history of PVCs.  He remains on warfarin with no bleeding issues.  We briefly  reviewed different ways of treating mitral valve regurgitation procedurally.  We discussed MitraClip procedure.  I will forward his echocardiogram results to his primary cardiologist, Dr. Dean, for further recommendations.  He remains completely asymptomatic.  We will send a message to pacemaker clinic for comments on his pacemaker's MRI compatibility.    46 minutes spent on the date of the encounter with chart review, patient visit, care coordination, and documentation.    The longitudinal plan of care for the diagnose(s)/condition(s) as documented were addressed during this visit. Due to the added complexity in care, I will continue to support Charles Nogueira  in the subsequent management and with ongoing continuity of care.     This note was completed in part using Dragon voice recognition software. Although reviewed after completion, some word and grammatical errors may occur.    Orders this Visit:  No orders of the defined types were placed in this encounter.    No orders of the defined types were placed in this encounter.    There are no discontinued medications.      Encounter Diagnoses   Name Primary?    Hyperlipidemia LDL goal <70     Atherosclerosis of native coronary artery of native heart without angina pectoris        CURRENT MEDICATIONS:  Current Outpatient Medications   Medication Sig Dispense Refill    atorvastatin (LIPITOR) 20 MG tablet Take 1 tablet (20 mg) by mouth daily. 90 tablet 3    lamoTRIgine (LAMICTAL) 100 MG tablet Take 1 tablet by mouth 2 times daily.      levETIRAcetam (KEPPRA) 500 MG tablet Take 250 mg by mouth 2 times daily.      losartan (COZAAR) 100 MG tablet Take 1 tablet (100 mg) by mouth daily. 90 tablet 3    metoprolol succinate ER (TOPROL XL) 50 MG 24 hr tablet Take 1 tablet (50 mg) by mouth daily. 90 tablet 3    Multiple Vitamins-Minerals (ZINC PO)       Turmeric 500 MG CAPS       warfarin ANTICOAGULANT (COUMADIN) 2.5 MG tablet Take 2.5 mg every Friday; Take 1.25 mg all  other days. Or as directed by INR clinic. 50 tablet 1    warfarin ANTICOAGULANT (COUMADIN) 2.5 MG tablet 2.5mg Monday and 1.25mg all other days per INR clinic 60 tablet 1    imiquimod (ALDARA) 5 % external cream Apply to the affected areas on the right forehead, right shin, and left postauricular area (behind left ear) at bedtime for a total of 6 weeks. 48 packet 3    ipratropium (ATROVENT) 0.03 % nasal spray Spray 2 sprays into both nostrils every 12 hours. 30 mL 2       ALLERGIES     Allergies   Allergen Reactions    Sulfa Antibiotics      rash       PAST MEDICAL HISTORY:  Past Medical History:   Diagnosis Date    Acute ischemic heart disease (H) 08/24/2018    Allergic rhinitis, cause unspecified     Basal cell carcinoma     Cardiac pacemaker in situ 08/14/2017    Cervicalgia 04/26/2022    Chronic diastolic congestive heart failure (H) 07/26/2021    Dysphagia, oropharyngeal phase 06/14/2018    Mild per video swallow study. 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    PVC (premature ventricular contraction) 05/10/2011    Sick sinus syndrome (H) 05/03/2016    s/p PPM implant 7/2/14    Squamous cell carcinoma     Syncope     Weakness of both upper extremities 04/26/2022    Weakness of both upper extremities 04/26/2022       PAST SURGICAL HISTORY:  Past Surgical History:   Procedure Laterality Date    APPENDECTOMY OPEN      BIOPSY OF SKIN LESION      COLONOSCOPY N/A  2015    Procedure: COLONOSCOPY;  Surgeon: David Us MD;  Location:  GI    CV CORONARY ANGIOGRAM N/A 2023    Procedure: Coronary Angiogram;  Surgeon: Carola Reed MD;  Location:  HEART CARDIAC CATH LAB    IMPLANT PACEMAKER  2004    MOHS MICROGRAPHIC PROCEDURE         FAMILY HISTORY:  Family History   Problem Relation Age of Onset    Heart Disease Mother         MI    Heart Disease Father         MI    C.A.D. Brother         Just had angioplasty in     Colon Cancer No family hx of     Skin Cancer No family hx of        SOCIAL HISTORY:  Social History     Socioeconomic History    Marital status:      Spouse name: Paula    Number of children: 3    Years of education: None    Highest education level: None   Occupational History     Employer: RETIRED    Occupation: engineering managment     Comment: [employer]   Tobacco Use    Smoking status: Former     Current packs/day: 0.00     Average packs/day: 1 pack/day for 4.0 years (4.0 ttl pk-yrs)     Types: Cigarettes     Start date: 1963     Quit date: 1967     Years since quittin.4     Passive exposure: Never    Smokeless tobacco: Never   Vaping Use    Vaping status: Never Used   Substance and Sexual Activity    Alcohol use: No     Alcohol/week: 0.0 standard drinks of alcohol    Drug use: No    Sexual activity: Yes     Partners: Female   Other Topics Concern     Service Yes    Blood Transfusions No    Caffeine Concern Yes     Comment: decaff coffee    Occupational Exposure No    Hobby Hazards No    Sleep Concern No    Stress Concern No    Weight Concern No    Special Diet No    Back Care No    Exercise Yes     Comment: Excercises about 4-5 days per week    Bike Helmet Yes    Seat Belt Yes    Self-Exams No     Social Drivers of Health     Financial Resource Strain: Low Risk  (5/3/2025)    Financial Resource Strain     Within the past 12 months, have you or your family members you live with been unable to get  utilities (heat, electricity) when it was really needed?: No   Food Insecurity: Low Risk  (5/3/2025)    Food Insecurity     Within the past 12 months, did you worry that your food would run out before you got money to buy more?: No     Within the past 12 months, did the food you bought just not last and you didn t have money to get more?: No   Transportation Needs: Low Risk  (5/3/2025)    Transportation Needs     Within the past 12 months, has lack of transportation kept you from medical appointments, getting your medicines, non-medical meetings or appointments, work, or from getting things that you need?: No   Physical Activity: Unknown (5/3/2025)    Exercise Vital Sign     Days of Exercise per Week: Patient declined   Stress: No Stress Concern Present (5/3/2025)    [employer] - Occupational Stress Questionnaire     Feeling of Stress : Not at all   Social Connections: Unknown (5/3/2025)    Social Connection and Isolation Panel [NHANES]     Frequency of Social Gatherings with Friends and Family: Twice a week   Interpersonal Safety: Low Risk  (5/7/2025)    Interpersonal Safety     Do you feel physically and emotionally safe where you currently live?: Yes     Within the past 12 months, have you been hit, slapped, kicked or otherwise physically hurt by someone?: No     Within the past 12 months, have you been humiliated or emotionally abused in other ways by your partner or ex-partner?: No   Housing Stability: Low Risk  (5/3/2025)    Housing Stability     Do you have housing? : Yes     Are you worried about losing your housing?: No       Review of Systems:  Skin:        Eyes:       ENT:       Respiratory:  Negative    Cardiovascular:  Negative    Gastroenterology:      Genitourinary:       Musculoskeletal:       Neurologic:       Psychiatric:       Heme/Lymph/Imm:       Endocrine:         Physical Exam:  Vitals: /56 (BP Location: Right arm, Patient Position: Sitting, Cuff Size: Adult  "Regular)   Pulse 56   Ht 1.702 m (5' 7\")   Wt 68 kg (150 lb)   BMI 23.49 kg/m       GEN:  NAD  NECK: No JVD  C/V:  Regular rate and rhythm with holosystolic murmur rated at 2/6.  RESP: Clear to auscultation bilaterally without wheezing, rales, or rhonchi.  GI: Abdomen soft, nontender, nondistended. No HSM appreciated.   EXTREM: No right LE edema.  Trace left LE edema.  NEURO: Alert and oriented, cooperative. No obvious focal deficits.   PSYCH: Normal affect.  SKIN: Warm and dry.       Recent Lab Results:  LIPID RESULTS:  Lab Results   Component Value Date    CHOL 123 04/15/2025    CHOL 179 12/17/2020    HDL 39 (L) 04/15/2025    HDL 39 (L) 12/17/2020    LDL 62 04/15/2025     (H) 12/17/2020    TRIG 108 04/15/2025    TRIG 97 12/17/2020    CHOLHDLRATIO 4.5 11/10/2015       LIVER ENZYME RESULTS:  Lab Results   Component Value Date    AST 34 04/22/2024    AST 24 12/17/2020    ALT 25 04/15/2025    ALT 26 12/17/2020       CBC RESULTS:  Lab Results   Component Value Date    WBC 8.5 05/07/2025    WBC 9.7 09/21/2018    RBC 4.79 05/07/2025    RBC 5.19 09/21/2018    HGB 14.6 05/07/2025    HGB 15.4 09/21/2018    HCT 44.5 05/07/2025    HCT 47.7 09/21/2018    MCV 93 05/07/2025    MCV 92 09/21/2018    MCH 30.5 05/07/2025    MCH 29.7 09/21/2018    MCHC 32.8 05/07/2025    MCHC 32.3 09/21/2018    RDW 13.7 05/07/2025    RDW 13.5 09/21/2018     05/07/2025     09/21/2018       BMP RESULTS:  Lab Results   Component Value Date     05/07/2025     12/17/2020    POTASSIUM 4.4 05/07/2025    POTASSIUM 4.2 05/12/2022    POTASSIUM 3.8 12/17/2020    CHLORIDE 104 05/07/2025    CHLORIDE 108 05/12/2022    CHLORIDE 107 12/17/2020    CO2 28 05/07/2025    CO2 29 05/12/2022    CO2 32 12/17/2020    ANIONGAP 9 05/07/2025    ANIONGAP 2 (L) 05/12/2022    ANIONGAP <1 (L) 12/17/2020     (H) 05/07/2025    GLC 93 05/12/2022     (H) 12/17/2020    BUN 28.0 (H) 05/07/2025    BUN 27 05/12/2022    BUN 19 12/17/2020    " "CR 1.40 (H) 05/07/2025    CR 1.00 12/17/2020    GFRESTIMATED 50 (L) 05/07/2025    GFRESTIMATED >60 09/27/2022    GFRESTIMATED 71 12/17/2020    GFRESTBLACK 83 12/17/2020    MANUELA 9.6 05/07/2025    MANUELA 9.0 12/17/2020        A1C RESULTS:  No results found for: \"A1C\"    INR RESULTS:  Lab Results   Component Value Date    INR 2.7 (H) 04/23/2025    INR 2.2 (H) 04/15/2025    INR 2.8 (A) 03/25/2025    INR 2.5 (A) 03/11/2025             Rodney Campbell PA-C  May 13, 2025     "

## 2025-05-13 NOTE — PATIENT INSTRUCTIONS
Today's Plan:   1) I will have Dr. Dean look at the echo results and make recommendations.      If you have questions or concerns please call my nurse team at [phone]    Scheduling phone number: [phone]  Reminder: Please bring in all current medications, over the counter supplements and vitamin bottles to your next appointment.    It was a pleasure seeing you today!

## 2025-05-13 NOTE — TELEPHONE ENCOUNTER
----- Message from Rodney Campbell sent at 5/13/2025 10:13 AM CDT -----  Please see message below from Dr. Dean and update patient. Let's set him up for a visit with Dr. Dean in 3-4 mo and repeat TTE in 6 months with a visit with me. Thanks, Rodney  ----- Message -----  From: Ana Dean MD  Sent: 5/13/2025  10:09 AM CDT  To: Rodney Campbell PA-C; [facility]    If he is truly asymptomatic and he favors a less invasive monitoring strategy for now then I would say we can set him up for echo in 6 months with an interim clinic visit with me in 3-4 months with low threshold to do OTTO and consider repeating coronary angiography.Thanks Shahla Stevens MD on 5/13/2025 at 10:09 AM  ----- Message -----  From: Rodney Campbell PA-C  Sent: 5/13/2025   9:41 AM CDT  To: Ana Dean MD    Hi Dr. Dean,I met Charles today in clinic with repeat echocardiogram.  His study shows slight worsening in LVEF as well as mitral regurgitation.  He is on maximum dose of losartan.  He has no exertional symptoms whatsoever and appears to be in euvolemic state.  She will continue to monitor this or consider repeat echocardiogram sooner or referral to structural for percutaneous options?Thanks,Rodney

## 2025-05-13 NOTE — TELEPHONE ENCOUNTER
----- Message from Rodney Campbell sent at 5/13/2025  9:22 AM CDT -----  Hello! This patient's neurology team would like to get MRI study done but they want to know if his pacemaker is compatible. Can we look into this and update patient, please?Thanks, Rodney      Pt has a Medtronic Adapta PPM. Adaptas are not MRI conditional. It is possible that pt may be able to have a non-conditional MRI at the [place] location.     Called pt, spoke with his wife Paula (okay per CTC). I let her know that his device is not MRI conditional, so he cannot have an MRI at [facility], but they may be able to do it at the [place]. She stated understanding.

## 2025-05-13 NOTE — TELEPHONE ENCOUNTER
I left a message for pt to call back to review recommendations from Rodney Campbell PA-C. ESletteboe RN May 13, 2025, 4:43 PM

## 2025-05-13 NOTE — LETTER
5/13/2025    Physician No Ref-Primary  No address on file    RE: Charles Nogueira       Dear Colleague,     I had the pleasure of seeing Charles Nogueira in the [facility].  Primary Cardiologist: Dr. 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    Charles returns to clinic today with his spouse stating he is doing well.  He denies chest discomfort, shortness of breath, peripheral edema, orthopnea, abdominal distention, palpitations, lightheadedness, or bleeding issues.  They spent about 5 months a year down in [place].  While he is down there he goes for walks daily.  He has been compliant with his medications.  He was seen by neurology recently and was recommended to undergo MRI but they would like to know if his pacemaker is compatible with this.    Assessment and Plan:  Charles Nogueira is a very pleasant 85-year-old male with past medical history notable for mild to moderate mitral regurgitation now in the moderately severe range, nonobstructive CAD per recent coronary angiogram, hypertension, hyperlipidemia, PAF, s/p PPM, and CKD stage II.    We reviewed his echocardiogram which shows slight decline in LVEF as well as worsening in his mitral regurgitation.  He remains on high dose of afterload reducing agent in the form of losartan.  He is also on metoprolol 50 mg daily.  His resting pulse has been on the lower side and there was some attempt to reduce the dose in  the past also  to help improve his daytime fatigue but they wanted to continue with the current dose due to his history of PVCs.  He remains on warfarin with no bleeding issues.  We briefly reviewed different ways of treating mitral valve regurgitation procedurally.  We discussed MitraClip procedure.  I will forward his echocardiogram results to his primary cardiologist, Dr. Dean, for further recommendations.  He remains completely asymptomatic.  We will send a message to pacemaker clinic for comments on his pacemaker's MRI compatibility.    46 minutes spent on the date of the encounter with chart review, patient visit, care coordination, and documentation.    The longitudinal plan of care for the diagnose(s)/condition(s) as documented were addressed during this visit. Due to the added complexity in care, I will continue to support Charles Nogueira  in the subsequent management and with ongoing continuity of care.     This note was completed in part using Dragon voice recognition software. Although reviewed after completion, some word and grammatical errors may occur.    Orders this Visit:  No orders of the defined types were placed in this encounter.    No orders of the defined types were placed in this encounter.    There are no discontinued medications.      Encounter Diagnoses   Name Primary?     Hyperlipidemia LDL goal <70      Atherosclerosis of native coronary artery of native heart without angina pectoris        CURRENT MEDICATIONS:  Current Outpatient Medications   Medication Sig Dispense Refill     atorvastatin (LIPITOR) 20 MG tablet Take 1 tablet (20 mg) by mouth daily. 90 tablet 3     lamoTRIgine (LAMICTAL) 100 MG tablet Take 1 tablet by mouth 2 times daily.       levETIRAcetam (KEPPRA) 500 MG tablet Take 250 mg by mouth 2 times daily.       losartan (COZAAR) 100 MG tablet Take 1 tablet (100 mg) by mouth daily. 90 tablet 3     metoprolol succinate ER (TOPROL XL) 50 MG 24 hr tablet Take 1 tablet (50  mg) by mouth daily. 90 tablet 3     Multiple Vitamins-Minerals (ZINC PO)        Turmeric 500 MG CAPS        warfarin ANTICOAGULANT (COUMADIN) 2.5 MG tablet Take 2.5 mg every Friday; Take 1.25 mg all other days. Or as directed by INR clinic. 50 tablet 1     warfarin ANTICOAGULANT (COUMADIN) 2.5 MG tablet 2.5mg Monday and 1.25mg all other days per INR clinic 60 tablet 1     imiquimod (ALDARA) 5 % external cream Apply to the affected areas on the right forehead, right shin, and left postauricular area (behind left ear) at bedtime for a total of 6 weeks. 48 packet 3     ipratropium (ATROVENT) 0.03 % nasal spray Spray 2 sprays into both nostrils every 12 hours. 30 mL 2       ALLERGIES     Allergies   Allergen Reactions     Sulfa Antibiotics      rash       PAST MEDICAL HISTORY:  Past Medical History:   Diagnosis Date     Acute ischemic heart disease (H) 08/24/2018     Allergic rhinitis, cause unspecified      Basal cell carcinoma      Cardiac pacemaker in situ 08/14/2017     Cervicalgia 04/26/2022     Chronic diastolic congestive heart failure (H) 07/26/2021     Dysphagia, oropharyngeal phase 06/14/2018    Mild per video swallow study. 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     PVC (premature ventricular contraction) 05/10/2011     Sick sinus syndrome (H) 05/03/2016    s/p PPM implant 7/2/14     Squamous cell carcinoma      Syncope      Weakness of  both upper extremities 2022     Weakness of both upper extremities 2022       PAST SURGICAL HISTORY:  Past Surgical History:   Procedure Laterality Date     APPENDECTOMY OPEN       BIOPSY OF SKIN LESION       COLONOSCOPY N/A 2015    Procedure: COLONOSCOPY;  Surgeon: David Us MD;  Location:  GI     CV CORONARY ANGIOGRAM N/A 2023    Procedure: Coronary Angiogram;  Surgeon: Carola Reed MD;  Location:  HEART CARDIAC CATH LAB     IMPLANT PACEMAKER  2004     MOHS MICROGRAPHIC PROCEDURE         FAMILY HISTORY:  Family History   Problem Relation Age of Onset     Heart Disease Mother         MI     Heart Disease Father         MI     C.A.D. Brother         Just had angioplasty in      Colon Cancer No family hx of      Skin Cancer No family hx of        SOCIAL HISTORY:  Social History     Socioeconomic History     Marital status:      Spouse name: Paula     Number of children: 3     Years of education: None     Highest education level: None   Occupational History     Employer: RETIRED     Occupation: engineering managment     Comment: [employer]   Tobacco Use     Smoking status: Former     Current packs/day: 0.00     Average packs/day: 1 pack/day for 4.0 years (4.0 ttl pk-yrs)     Types: Cigarettes     Start date: 1963     Quit date: 1967     Years since quittin.4     Passive exposure: Never     Smokeless tobacco: Never   Vaping Use     Vaping status: Never Used   Substance and Sexual Activity     Alcohol use: No     Alcohol/week: 0.0 standard drinks of alcohol     Drug use: No     Sexual activity: Yes     Partners: Female   Other Topics Concern      Service Yes     Blood Transfusions No     Caffeine Concern Yes     Comment: decaff coffee     Occupational Exposure No     Hobby Hazards No     Sleep Concern No     Stress Concern No     Weight Concern No     Special Diet No     Back Care No     Exercise Yes     Comment: Excercises about 4-5 days per  week     Bike Helmet Yes     Seat Belt Yes     Self-Exams No     Social Drivers of Health     Financial Resource Strain: Low Risk  (5/3/2025)    Financial Resource Strain      Within the past 12 months, have you or your family members you live with been unable to get utilities (heat, electricity) when it was really needed?: No   Food Insecurity: Low Risk  (5/3/2025)    Food Insecurity      Within the past 12 months, did you worry that your food would run out before you got money to buy more?: No      Within the past 12 months, did the food you bought just not last and you didn t have money to get more?: No   Transportation Needs: Low Risk  (5/3/2025)    Transportation Needs      Within the past 12 months, has lack of transportation kept you from medical appointments, getting your medicines, non-medical meetings or appointments, work, or from getting things that you need?: No   Physical Activity: Unknown (5/3/2025)    Exercise Vital Sign      Days of Exercise per Week: Patient declined   Stress: No Stress Concern Present (5/3/2025)    [place] [employer] - Occupational Stress Questionnaire      Feeling of Stress : Not at all   Social Connections: Unknown (5/3/2025)    Social Connection and Isolation Panel [NHANES]      Frequency of Social Gatherings with Friends and Family: Twice a week   Interpersonal Safety: Low Risk  (5/7/2025)    Interpersonal Safety      Do you feel physically and emotionally safe where you currently live?: Yes      Within the past 12 months, have you been hit, slapped, kicked or otherwise physically hurt by someone?: No      Within the past 12 months, have you been humiliated or emotionally abused in other ways by your partner or ex-partner?: No   Housing Stability: Low Risk  (5/3/2025)    Housing Stability      Do you have housing? : Yes      Are you worried about losing your housing?: No       Review of Systems:  Skin:        Eyes:       ENT:       Respiratory:  Negative   "  Cardiovascular:  Negative    Gastroenterology:      Genitourinary:       Musculoskeletal:       Neurologic:       Psychiatric:       Heme/Lymph/Imm:       Endocrine:         Physical Exam:  Vitals: /56 (BP Location: Right arm, Patient Position: Sitting, Cuff Size: Adult Regular)   Pulse 56   Ht 1.702 m (5' 7\")   Wt 68 kg (150 lb)   BMI 23.49 kg/m       GEN:  NAD  NECK: No JVD  C/V:  Regular rate and rhythm with holosystolic murmur rated at 2/6.  RESP: Clear to auscultation bilaterally without wheezing, rales, or rhonchi.  GI: Abdomen soft, nontender, nondistended. No HSM appreciated.   EXTREM: No right LE edema.  Trace left LE edema.  NEURO: Alert and oriented, cooperative. No obvious focal deficits.   PSYCH: Normal affect.  SKIN: Warm and dry.       Recent Lab Results:  LIPID RESULTS:  Lab Results   Component Value Date    CHOL 123 04/15/2025    CHOL 179 12/17/2020    HDL 39 (L) 04/15/2025    HDL 39 (L) 12/17/2020    LDL 62 04/15/2025     (H) 12/17/2020    TRIG 108 04/15/2025    TRIG 97 12/17/2020    CHOLHDLRATIO 4.5 11/10/2015       LIVER ENZYME RESULTS:  Lab Results   Component Value Date    AST 34 04/22/2024    AST 24 12/17/2020    ALT 25 04/15/2025    ALT 26 12/17/2020       CBC RESULTS:  Lab Results   Component Value Date    WBC 8.5 05/07/2025    WBC 9.7 09/21/2018    RBC 4.79 05/07/2025    RBC 5.19 09/21/2018    HGB 14.6 05/07/2025    HGB 15.4 09/21/2018    HCT 44.5 05/07/2025    HCT 47.7 09/21/2018    MCV 93 05/07/2025    MCV 92 09/21/2018    MCH 30.5 05/07/2025    MCH 29.7 09/21/2018    MCHC 32.8 05/07/2025    MCHC 32.3 09/21/2018    RDW 13.7 05/07/2025    RDW 13.5 09/21/2018     05/07/2025     09/21/2018       BMP RESULTS:  Lab Results   Component Value Date     05/07/2025     12/17/2020    POTASSIUM 4.4 05/07/2025    POTASSIUM 4.2 05/12/2022    POTASSIUM 3.8 12/17/2020    CHLORIDE 104 05/07/2025    CHLORIDE 108 05/12/2022    CHLORIDE 107 12/17/2020    CO2 28 " "05/07/2025    CO2 29 05/12/2022    CO2 32 12/17/2020    ANIONGAP 9 05/07/2025    ANIONGAP 2 (L) 05/12/2022    ANIONGAP <1 (L) 12/17/2020     (H) 05/07/2025    GLC 93 05/12/2022     (H) 12/17/2020    BUN 28.0 (H) 05/07/2025    BUN 27 05/12/2022    BUN 19 12/17/2020    CR 1.40 (H) 05/07/2025    CR 1.00 12/17/2020    GFRESTIMATED 50 (L) 05/07/2025    GFRESTIMATED >60 09/27/2022    GFRESTIMATED 71 12/17/2020    GFRESTBLACK 83 12/17/2020    MANUELA 9.6 05/07/2025    MANUELA 9.0 12/17/2020        A1C RESULTS:  No results found for: \"A1C\"    INR RESULTS:  Lab Results   Component Value Date    INR 2.7 (H) 04/23/2025    INR 2.2 (H) 04/15/2025    INR 2.8 (A) 03/25/2025    INR 2.5 (A) 03/11/2025             Rodney Campbell PA-C  May 13, 2025       Thank you for allowing me to participate in the care of your patient.      Sincerely,     Rodney Campbell PA-C     [facility]  cc:   Ana Dean MD  [id number] [id number]      "

## 2025-05-14 ENCOUNTER — ANCILLARY PROCEDURE (OUTPATIENT)
Dept: CARDIOLOGY | Facility: CLINIC | Age: 85
End: 2025-05-14
Attending: INTERNAL MEDICINE
Payer: COMMERCIAL

## 2025-05-14 DIAGNOSIS — Z95.0 CARDIAC PACEMAKER IN SITU: ICD-10-CM

## 2025-05-14 LAB
MDC_IDC_LEAD_CONNECTION_STATUS: NORMAL
MDC_IDC_LEAD_CONNECTION_STATUS: NORMAL
MDC_IDC_LEAD_IMPLANT_DT: NORMAL
MDC_IDC_LEAD_IMPLANT_DT: NORMAL
MDC_IDC_LEAD_LOCATION: NORMAL
MDC_IDC_LEAD_LOCATION: NORMAL
MDC_IDC_LEAD_MFG: NORMAL
MDC_IDC_LEAD_MFG: NORMAL
MDC_IDC_LEAD_MODEL: NORMAL
MDC_IDC_LEAD_MODEL: NORMAL
MDC_IDC_LEAD_POLARITY_TYPE: NORMAL
MDC_IDC_LEAD_POLARITY_TYPE: NORMAL
MDC_IDC_LEAD_SERIAL: NORMAL
MDC_IDC_LEAD_SERIAL: NORMAL
MDC_IDC_MSMT_BATTERY_DTM: NORMAL
MDC_IDC_MSMT_BATTERY_IMPEDANCE: 1827 OHM
MDC_IDC_MSMT_BATTERY_REMAINING_LONGEVITY: 44 MO
MDC_IDC_MSMT_BATTERY_STATUS: NORMAL
MDC_IDC_MSMT_BATTERY_VOLTAGE: 2.76 V
MDC_IDC_MSMT_LEADCHNL_RA_IMPEDANCE_VALUE: 547 OHM
MDC_IDC_MSMT_LEADCHNL_RA_PACING_THRESHOLD_AMPLITUDE: 0.75 V
MDC_IDC_MSMT_LEADCHNL_RA_PACING_THRESHOLD_PULSEWIDTH: 0.4 MS
MDC_IDC_MSMT_LEADCHNL_RV_IMPEDANCE_VALUE: 512 OHM
MDC_IDC_MSMT_LEADCHNL_RV_PACING_THRESHOLD_AMPLITUDE: 1.5 V
MDC_IDC_MSMT_LEADCHNL_RV_PACING_THRESHOLD_PULSEWIDTH: 0.4 MS
MDC_IDC_PG_IMPLANT_DTM: NORMAL
MDC_IDC_PG_MFG: NORMAL
MDC_IDC_PG_MODEL: NORMAL
MDC_IDC_PG_SERIAL: NORMAL
MDC_IDC_PG_TYPE: NORMAL
MDC_IDC_SESS_CLINIC_NAME: NORMAL
MDC_IDC_SESS_DTM: NORMAL
MDC_IDC_SESS_TYPE: NORMAL
MDC_IDC_SET_BRADY_AT_MODE_SWITCH_MODE: NORMAL
MDC_IDC_SET_BRADY_AT_MODE_SWITCH_RATE: 175 {BEATS}/MIN
MDC_IDC_SET_BRADY_LOWRATE: 60 {BEATS}/MIN
MDC_IDC_SET_BRADY_MAX_SENSOR_RATE: 130 {BEATS}/MIN
MDC_IDC_SET_BRADY_MAX_TRACKING_RATE: 130 {BEATS}/MIN
MDC_IDC_SET_BRADY_MODE: NORMAL
MDC_IDC_SET_BRADY_PAV_DELAY_LOW: 350 MS
MDC_IDC_SET_BRADY_SAV_DELAY_LOW: 350 MS
MDC_IDC_SET_LEADCHNL_RA_PACING_AMPLITUDE: 1.5 V
MDC_IDC_SET_LEADCHNL_RA_PACING_CAPTURE_MODE: NORMAL
MDC_IDC_SET_LEADCHNL_RA_PACING_POLARITY: NORMAL
MDC_IDC_SET_LEADCHNL_RA_PACING_PULSEWIDTH: 0.4 MS
MDC_IDC_SET_LEADCHNL_RA_SENSING_POLARITY: NORMAL
MDC_IDC_SET_LEADCHNL_RA_SENSING_SENSITIVITY: 0.5 MV
MDC_IDC_SET_LEADCHNL_RV_PACING_AMPLITUDE: 3 V
MDC_IDC_SET_LEADCHNL_RV_PACING_CAPTURE_MODE: NORMAL
MDC_IDC_SET_LEADCHNL_RV_PACING_POLARITY: NORMAL
MDC_IDC_SET_LEADCHNL_RV_PACING_PULSEWIDTH: 0.4 MS
MDC_IDC_SET_LEADCHNL_RV_SENSING_POLARITY: NORMAL
MDC_IDC_SET_LEADCHNL_RV_SENSING_SENSITIVITY: 2.8 MV
MDC_IDC_SET_ZONE_DETECTION_INTERVAL: 333.33 MS
MDC_IDC_SET_ZONE_DETECTION_INTERVAL: 342.86 MS
MDC_IDC_SET_ZONE_STATUS: NORMAL
MDC_IDC_SET_ZONE_STATUS: NORMAL
MDC_IDC_SET_ZONE_TYPE: NORMAL
MDC_IDC_SET_ZONE_TYPE: NORMAL
MDC_IDC_SET_ZONE_VENDOR_TYPE: NORMAL
MDC_IDC_SET_ZONE_VENDOR_TYPE: NORMAL
MDC_IDC_STAT_AT_BURDEN_PERCENT: 0 %
MDC_IDC_STAT_AT_DTM_END: NORMAL
MDC_IDC_STAT_AT_DTM_START: NORMAL
MDC_IDC_STAT_AT_MODE_SW_COUNT: 3
MDC_IDC_STAT_BRADY_AP_VP_PERCENT: 4 %
MDC_IDC_STAT_BRADY_AP_VS_PERCENT: 74 %
MDC_IDC_STAT_BRADY_AS_VP_PERCENT: 0 %
MDC_IDC_STAT_BRADY_AS_VS_PERCENT: 22 %
MDC_IDC_STAT_BRADY_DTM_END: NORMAL
MDC_IDC_STAT_BRADY_DTM_START: NORMAL
MDC_IDC_STAT_EPISODE_RECENT_COUNT: 0
MDC_IDC_STAT_EPISODE_RECENT_COUNT: 1
MDC_IDC_STAT_EPISODE_RECENT_COUNT_DTM_END: NORMAL
MDC_IDC_STAT_EPISODE_RECENT_COUNT_DTM_END: NORMAL
MDC_IDC_STAT_EPISODE_RECENT_COUNT_DTM_START: NORMAL
MDC_IDC_STAT_EPISODE_RECENT_COUNT_DTM_START: NORMAL
MDC_IDC_STAT_EPISODE_TYPE: NORMAL
MDC_IDC_STAT_EPISODE_TYPE: NORMAL

## 2025-05-14 PROCEDURE — 93296 REM INTERROG EVL PM/IDS: CPT | Performed by: INTERNAL MEDICINE

## 2025-05-14 PROCEDURE — 93294 REM INTERROG EVL PM/LDLS PM: CPT | Performed by: INTERNAL MEDICINE

## 2025-05-15 ENCOUNTER — RESULTS FOLLOW-UP (OUTPATIENT)
Dept: CARDIOLOGY | Facility: CLINIC | Age: 85
End: 2025-05-15

## 2025-05-20 ENCOUNTER — ANTICOAGULATION THERAPY VISIT (OUTPATIENT)
Dept: ANTICOAGULATION | Facility: CLINIC | Age: 85
End: 2025-05-20

## 2025-05-20 ENCOUNTER — RESULTS FOLLOW-UP (OUTPATIENT)
Dept: ANTICOAGULATION | Facility: CLINIC | Age: 85
End: 2025-05-20

## 2025-05-20 ENCOUNTER — LAB (OUTPATIENT)
Dept: LAB | Facility: CLINIC | Age: 85
End: 2025-05-20
Payer: COMMERCIAL

## 2025-05-20 DIAGNOSIS — I05.9 MITRAL VALVE DISEASE: ICD-10-CM

## 2025-05-20 DIAGNOSIS — I49.5 SICK SINUS SYNDROME (H): Primary | ICD-10-CM

## 2025-05-20 DIAGNOSIS — I48.0 PAROXYSMAL ATRIAL FIBRILLATION (H): ICD-10-CM

## 2025-05-20 LAB — INR BLD: 1.8 (ref 0.9–1.1)

## 2025-05-20 PROCEDURE — 36416 COLLJ CAPILLARY BLOOD SPEC: CPT

## 2025-05-20 PROCEDURE — 85610 PROTHROMBIN TIME: CPT

## 2025-05-20 NOTE — PROGRESS NOTES
ANTICOAGULATION MANAGEMENT     Charles Nogueira 85 year old male is on warfarin with subtherapeutic INR result. (Goal INR 2.0-3.0)    Recent labs: (last 7 days)     05/20/25  0913   INR 1.8*       ASSESSMENT     Source(s): Chart Review and Patient/Caregiver Call     Warfarin doses taken: Warfarin taken as instructed  Diet: Increased greens/vitamin K in diet; plans to resume previous intake (spinach salad the last 3 nights)  Medication/supplement changes: None noted  New illness, injury, or hospitalization: No  Signs or symptoms of bleeding or clotting: No  Previous result: Therapeutic last 2(+) visits  Additional findings: None       PLAN     Recommended plan for temporary change(s) affecting INR     Dosing Instructions: booster dose then continue your current warfarin dose with next INR in 2 weeks       Summary  As of 5/20/2025      Full warfarin instructions:  5/20: 2.5 mg; Otherwise 2.5 mg every Fri; 1.25 mg all other days   Next INR check:  6/4/2025               Telephone call with Paula who verbalizes understanding and agrees to plan    Lab visit scheduled    Education provided: Goal range and lab monitoring: goal range and significance of current result  Contact [phone] with any changes, questions or concerns.     Plan made per [facility] anticoagulation protocol    Jessika Yoo RN  5/20/2025  Anticoagulation Clinic  [employer] for routing messages: zoya [facility] HEART INR NURSE  [facility] patient phone line: [phone]        _______________________________________________________________________     Anticoagulation Episode Summary       Current INR goal:  2.0-3.0   TTR:  79.1% (1 y)   Target end date:  Indefinite   Send INR reminders to:  [facility] HE[facility] INR NURSE    Indications    Sick sinus syndrome (H) [I49.5]  Paroxysmal atrial fibrillation (H) [I48.0]  Mitral valve disease [I05.9]             Comments:  --             Anticoagulation Care Providers       Provider Role Specialty Phone number    Dianne  MD Ana Referring Cardiovascular Disease [phone]

## 2025-06-04 ENCOUNTER — LAB (OUTPATIENT)
Dept: LAB | Facility: CLINIC | Age: 85
End: 2025-06-04
Payer: COMMERCIAL

## 2025-06-04 ENCOUNTER — ANTICOAGULATION THERAPY VISIT (OUTPATIENT)
Dept: ANTICOAGULATION | Facility: CLINIC | Age: 85
End: 2025-06-04

## 2025-06-04 DIAGNOSIS — I48.0 PAROXYSMAL ATRIAL FIBRILLATION (H): ICD-10-CM

## 2025-06-04 DIAGNOSIS — I49.5 SICK SINUS SYNDROME (H): Primary | ICD-10-CM

## 2025-06-04 DIAGNOSIS — I05.9 MITRAL VALVE DISEASE: ICD-10-CM

## 2025-06-04 LAB — INR BLD: 2 (ref 0.9–1.1)

## 2025-06-04 PROCEDURE — 85610 PROTHROMBIN TIME: CPT

## 2025-06-04 PROCEDURE — 36416 COLLJ CAPILLARY BLOOD SPEC: CPT

## 2025-06-04 NOTE — PROGRESS NOTES
ANTICOAGULATION MANAGEMENT     Charles Nogueira 85 year old male is on warfarin with therapeutic INR result. (Goal INR 2.0-3.0)    Recent labs: (last 7 days)     06/04/25  0915   INR 2.0*       ASSESSMENT     Source(s): Chart Review  Previous INR was Subtherapeutic-Multiple before then were therapeutic.  Medication, diet, health changes since last INR chart reviewed; none identified         PLAN     Recommended plan for no diet, medication or health factor changes affecting INR     Dosing Instructions: Continue your current warfarin dose with next INR in up to 5 weeks       Summary  As of 6/4/2025      Full warfarin instructions:  2.5 mg every Fri; 1.25 mg all other days   Next INR check:  7/9/2025               Detailed voice message left for Paula with dosing instructions and follow up date.   Sent [email or web address] message with dosing and follow up instructions    Contact [phone] to schedule and with any changes, questions or concerns.     Education provided: Please call back if any changes to your diet, medications or how you've been taking warfarin  Goal range and lab monitoring: goal range and significance of current result  Importance of notifying anticoagulation clinic for: changes in medications; a sooner lab recheck maybe needed  Written instructions provided  Contact [phone] with any changes, questions or concerns.     Plan made per [facility] anticoagulation protocol    Melissa Grady RN  6/4/2025  Anticoagulation Clinic  [facility] for routing messages: zoya [facility] HEART INR NURSE  [facility] patient phone line: [phone]        _______________________________________________________________________     Anticoagulation Episode Summary       Current INR goal:  2.0-3.0   TTR:  75.9% (1 y)   Target end date:  Indefinite   Send INR reminders to:  [facility] INR NURSE    Indications    Sick sinus syndrome (H) [I49.5]  Paroxysmal atrial fibrillation (H) [I48.0]  Mitral valve disease [I05.9]             Comments:  --              Anticoagulation Care Providers       Provider Role Specialty Phone number    Ana Dean MD Referring Cardiovascular Disease [phone]

## 2025-07-02 ENCOUNTER — RESULTS FOLLOW-UP (OUTPATIENT)
Dept: ANTICOAGULATION | Facility: CLINIC | Age: 85
End: 2025-07-02

## 2025-07-02 ENCOUNTER — MYC MEDICAL ADVICE (OUTPATIENT)
Dept: ANTICOAGULATION | Facility: CLINIC | Age: 85
End: 2025-07-02

## 2025-07-02 ENCOUNTER — ANTICOAGULATION THERAPY VISIT (OUTPATIENT)
Dept: ANTICOAGULATION | Facility: CLINIC | Age: 85
End: 2025-07-02

## 2025-07-02 ENCOUNTER — LAB (OUTPATIENT)
Dept: LAB | Facility: CLINIC | Age: 85
End: 2025-07-02
Payer: COMMERCIAL

## 2025-07-02 DIAGNOSIS — I48.0 PAROXYSMAL ATRIAL FIBRILLATION (H): ICD-10-CM

## 2025-07-02 DIAGNOSIS — I05.9 MITRAL VALVE DISEASE: ICD-10-CM

## 2025-07-02 DIAGNOSIS — I49.5 SICK SINUS SYNDROME (H): Primary | ICD-10-CM

## 2025-07-02 LAB — INR BLD: 1.9 (ref 0.9–1.1)

## 2025-07-02 PROCEDURE — 36416 COLLJ CAPILLARY BLOOD SPEC: CPT

## 2025-07-02 PROCEDURE — 85610 PROTHROMBIN TIME: CPT

## 2025-07-02 NOTE — PROGRESS NOTES
ANTICOAGULATION MANAGEMENT     Charles Nogueira 85 year old male is on warfarin with subtherapeutic INR result. (Goal INR 2.0-3.0)    Recent labs: (last 7 days)     07/02/25  0847   INR 1.9*       ASSESSMENT     Source(s): Chart Review and [employer] message      Warfarin doses taken: Warfarin taken as instructed  Diet: no significant changes in greens but did not deny any changes at all-- last time INR was subtherapeutic, there was an increase in greens  Medication/supplement changes: None noted  New illness, injury, or hospitalization: No  Signs or symptoms of bleeding or clotting: No  Previous result: Therapeutic last visit; previously outside of goal range  Additional findings: None       PLAN     Recommended plan for temporary change(s) affecting INR     Dosing Instructions: Continue your current warfarin dose with next INR in 2 weeks       Summary  As of 7/2/2025      Full warfarin instructions:  2.5 mg every Fri; 1.25 mg all other days   Next INR check:  7/16/2025               Sent [employer] message with dosing and follow up instructions    Contact [phone] to schedule and with any changes, questions or concerns.     Education provided: Contact [phone] with any changes, questions or concerns.     Plan made per [facility] anticoagulation protocol    Mary Arguelles RN  7/2/2025  Anticoagulation Clinic  [facility] for routing messages: zoya [facility] HEART INR NURSE  [facility] patient phone line: [phone]        _______________________________________________________________________     Anticoagulation Episode Summary       Current INR goal:  2.0-3.0   TTR:  68.2% (1 y)   Target end date:  Indefinite   Send INR reminders to:  [facility] INR NURSE    Indications    Sick sinus syndrome (H) [I49.5]  Paroxysmal atrial fibrillation (H) [I48.0]  Mitral valve disease [I05.9]             Comments:  --             Anticoagulation Care Providers       Provider Role Specialty Phone number    Ana Dean MD Referring  Cardiovascular Disease [phone]

## 2025-07-16 ENCOUNTER — ANTICOAGULATION THERAPY VISIT (OUTPATIENT)
Dept: ANTICOAGULATION | Facility: CLINIC | Age: 85
End: 2025-07-16

## 2025-07-16 ENCOUNTER — LAB (OUTPATIENT)
Dept: LAB | Facility: CLINIC | Age: 85
End: 2025-07-16
Payer: COMMERCIAL

## 2025-07-16 DIAGNOSIS — I48.0 PAROXYSMAL ATRIAL FIBRILLATION (H): ICD-10-CM

## 2025-07-16 DIAGNOSIS — I49.5 SICK SINUS SYNDROME (H): Primary | ICD-10-CM

## 2025-07-16 DIAGNOSIS — I05.9 MITRAL VALVE DISEASE: ICD-10-CM

## 2025-07-16 LAB — INR BLD: 2.3 (ref 0.9–1.1)

## 2025-07-16 PROCEDURE — 36416 COLLJ CAPILLARY BLOOD SPEC: CPT

## 2025-07-16 PROCEDURE — 85610 PROTHROMBIN TIME: CPT

## 2025-07-16 NOTE — PROGRESS NOTES
ANTICOAGULATION MANAGEMENT     Charles Nogueira 85 year old male is on warfarin with therapeutic INR result. (Goal INR 2.0-3.0)    Recent labs: (last 7 days)     07/16/25  1212   INR 2.3*       ASSESSMENT     Source(s): Chart Review and Patient/Caregiver Call     Warfarin doses taken: Warfarin taken as instructed  Diet: No new diet changes identified  Medication/supplement changes: None noted  New illness, injury, or hospitalization: No  Signs or symptoms of bleeding or clotting: No  Previous result: Therapeutic last visit; previously outside of goal range  Additional findings: None       PLAN     Recommended plan for no diet, medication or health factor changes affecting INR     Dosing Instructions: Continue your current warfarin dose with next INR in 3-4 weeks       Summary  As of 7/16/2025      Full warfarin instructions:  2.5 mg every Fri; 1.25 mg all other days   Next INR check:  8/13/2025               Telephone call with Paula who agrees to plan and repeated back plan correctly    Patient elected to schedule next visit 8/13/25    Education provided: Please call back if any changes to your diet, medications or how you've been taking warfarin  Goal range and lab monitoring: goal range and significance of current result  Contact [phone] with any changes, questions or concerns.     Plan made per [facility] anticoagulation protocol    Melissa Grady RN  7/16/2025  Anticoagulation Clinic  [facility] for routing messages: zoya D[facility] HEART INR NURSE  [facility] patient phone line: [phone]        _______________________________________________________________________     Anticoagulation Episode Summary       Current INR goal:  2.0-3.0   TTR:  70.0% (1 y)   Target end date:  Indefinite   Send INR reminders to:  [facility] INR NURSE    Indications    Sick sinus syndrome (H) [I49.5]  Paroxysmal atrial fibrillation (H) [I48.0]  Mitral valve disease [I05.9]             Comments:  --             Anticoagulation Care Providers        Provider Role Specialty Phone number    Ana Dean MD Referring Cardiovascular Disease [phone]

## 2025-07-25 ENCOUNTER — ORDERS ONLY (AUTO-RELEASED) (OUTPATIENT)
Dept: CARDIOLOGY | Facility: CLINIC | Age: 85
End: 2025-07-25

## 2025-07-25 DIAGNOSIS — I34.0 NONRHEUMATIC MITRAL VALVE REGURGITATION: ICD-10-CM

## 2025-07-25 DIAGNOSIS — I49.3 PVC'S (PREMATURE VENTRICULAR CONTRACTIONS): ICD-10-CM

## 2025-07-25 DIAGNOSIS — I50.20 SYSTOLIC HEART FAILURE, UNSPECIFIED HF CHRONICITY (H): ICD-10-CM

## 2025-07-29 ENCOUNTER — LAB (OUTPATIENT)
Dept: LAB | Facility: CLINIC | Age: 85
End: 2025-07-29
Payer: COMMERCIAL

## 2025-07-29 DIAGNOSIS — R94.6 ABNORMAL RESULTS OF THYROID FUNCTION STUDIES: ICD-10-CM

## 2025-07-29 DIAGNOSIS — G20.C IDIOPATHIC PARKINSONISM (H): Primary | ICD-10-CM

## 2025-07-29 DIAGNOSIS — Z13.29 SCREENING FOR THYROID DISORDER: ICD-10-CM

## 2025-07-29 LAB
TSH SERPL DL<=0.005 MIU/L-ACNC: 1.35 UIU/ML (ref 0.3–4.2)
VIT B12 SERPL-MCNC: 521 PG/ML (ref 232–1245)

## 2025-07-29 PROCEDURE — 36415 COLL VENOUS BLD VENIPUNCTURE: CPT

## 2025-07-29 PROCEDURE — 80175 DRUG SCREEN QUAN LAMOTRIGINE: CPT

## 2025-07-29 PROCEDURE — 82607 VITAMIN B-12: CPT

## 2025-07-29 PROCEDURE — 84443 ASSAY THYROID STIM HORMONE: CPT

## 2025-07-30 LAB — LAMOTRIGINE SERPL-MCNC: 8.7 UG/ML

## 2025-08-09 LAB — CV ZIO PRELIM RESULTS: NORMAL

## 2025-08-11 ENCOUNTER — RESULTS FOLLOW-UP (OUTPATIENT)
Dept: CARDIOLOGY | Facility: CLINIC | Age: 85
End: 2025-08-11
Payer: COMMERCIAL

## 2025-08-11 DIAGNOSIS — I49.3 PVC'S (PREMATURE VENTRICULAR CONTRACTIONS): ICD-10-CM

## 2025-08-11 DIAGNOSIS — I50.20 SYSTOLIC HEART FAILURE, UNSPECIFIED HF CHRONICITY (H): ICD-10-CM

## 2025-08-13 ENCOUNTER — ANTICOAGULATION THERAPY VISIT (OUTPATIENT)
Dept: ANTICOAGULATION | Facility: CLINIC | Age: 85
End: 2025-08-13

## 2025-08-13 ENCOUNTER — RESULTS FOLLOW-UP (OUTPATIENT)
Dept: ANTICOAGULATION | Facility: CLINIC | Age: 85
End: 2025-08-13

## 2025-08-13 ENCOUNTER — LAB (OUTPATIENT)
Dept: LAB | Facility: CLINIC | Age: 85
End: 2025-08-13
Payer: COMMERCIAL

## 2025-08-13 DIAGNOSIS — I48.0 PAROXYSMAL ATRIAL FIBRILLATION (H): ICD-10-CM

## 2025-08-13 DIAGNOSIS — I49.5 SICK SINUS SYNDROME (H): Primary | ICD-10-CM

## 2025-08-13 DIAGNOSIS — I05.9 MITRAL VALVE DISEASE: ICD-10-CM

## 2025-08-13 LAB — INR BLD: 2 (ref 0.9–1.1)

## 2025-08-13 PROCEDURE — 85610 PROTHROMBIN TIME: CPT

## 2025-08-13 PROCEDURE — 36416 COLLJ CAPILLARY BLOOD SPEC: CPT

## 2025-08-14 RX ORDER — METOPROLOL SUCCINATE 50 MG/1
50 TABLET, EXTENDED RELEASE ORAL DAILY
Qty: 90 TABLET | Refills: 3 | Status: SHIPPED | OUTPATIENT
Start: 2025-08-14

## 2025-08-25 ENCOUNTER — TELEPHONE (OUTPATIENT)
Dept: INTERNAL MEDICINE | Facility: CLINIC | Age: 85
End: 2025-08-25

## 2025-08-25 ENCOUNTER — ANTICOAGULATION THERAPY VISIT (OUTPATIENT)
Dept: ANTICOAGULATION | Facility: CLINIC | Age: 85
End: 2025-08-25

## 2025-08-25 ENCOUNTER — LAB (OUTPATIENT)
Dept: LAB | Facility: CLINIC | Age: 85
End: 2025-08-25
Payer: COMMERCIAL

## 2025-08-25 DIAGNOSIS — I49.5 SICK SINUS SYNDROME (H): Primary | ICD-10-CM

## 2025-08-25 DIAGNOSIS — I48.0 PAROXYSMAL ATRIAL FIBRILLATION (H): ICD-10-CM

## 2025-08-25 DIAGNOSIS — I05.9 MITRAL VALVE DISEASE: ICD-10-CM

## 2025-08-25 LAB — INR BLD: 2.8 (ref 0.9–1.1)

## 2025-08-25 PROCEDURE — 36416 COLLJ CAPILLARY BLOOD SPEC: CPT

## 2025-08-25 PROCEDURE — 85610 PROTHROMBIN TIME: CPT

## 2025-09-02 ENCOUNTER — HOSPITAL ENCOUNTER (OUTPATIENT)
Dept: GENERAL RADIOLOGY | Facility: CLINIC | Age: 85
Discharge: HOME | End: 2025-09-02
Attending: PSYCHIATRY & NEUROLOGY
Payer: COMMERCIAL

## 2025-09-02 ENCOUNTER — ANCILLARY PROCEDURE (OUTPATIENT)
Dept: CARDIOLOGY | Facility: CLINIC | Age: 85
End: 2025-09-02
Attending: INTERNAL MEDICINE
Payer: COMMERCIAL

## 2025-09-02 ENCOUNTER — HOSPITAL ENCOUNTER (OUTPATIENT)
Dept: MRI IMAGING | Facility: CLINIC | Age: 85
Discharge: HOME | End: 2025-09-02
Attending: PSYCHIATRY & NEUROLOGY
Payer: COMMERCIAL

## 2025-09-02 VITALS — HEART RATE: 71 BPM | OXYGEN SATURATION: 98 %

## 2025-09-02 DIAGNOSIS — Z45.02 ENCOUNTER FOR ADJUSTMENT AND MANAGEMENT OF AUTOMATIC IMPLANTABLE CARDIAC DEFIBRILLATOR: ICD-10-CM

## 2025-09-02 DIAGNOSIS — Z95.0 CARDIAC PACEMAKER IN SITU: ICD-10-CM

## 2025-09-02 DIAGNOSIS — I49.5 SICK SINUS SYNDROME (H): Primary | ICD-10-CM

## 2025-09-02 DIAGNOSIS — G20.C PARKINSONISM, UNSPECIFIED PARKINSONISM TYPE (H): ICD-10-CM

## 2025-09-02 DIAGNOSIS — I49.5 SICK SINUS SYNDROME (H): ICD-10-CM

## 2025-09-02 LAB
MDC_IDC_LEAD_CONNECTION_STATUS: NORMAL
MDC_IDC_LEAD_CONNECTION_STATUS: NORMAL
MDC_IDC_LEAD_IMPLANT_DT: NORMAL
MDC_IDC_LEAD_IMPLANT_DT: NORMAL
MDC_IDC_LEAD_LOCATION: NORMAL
MDC_IDC_LEAD_LOCATION: NORMAL
MDC_IDC_LEAD_MFG: NORMAL
MDC_IDC_LEAD_MFG: NORMAL
MDC_IDC_LEAD_MODEL: NORMAL
MDC_IDC_LEAD_MODEL: NORMAL
MDC_IDC_LEAD_POLARITY_TYPE: NORMAL
MDC_IDC_LEAD_POLARITY_TYPE: NORMAL
MDC_IDC_LEAD_SERIAL: NORMAL
MDC_IDC_LEAD_SERIAL: NORMAL
MDC_IDC_MSMT_BATTERY_DTM: NORMAL
MDC_IDC_MSMT_BATTERY_IMPEDANCE: 1978 OHM
MDC_IDC_MSMT_BATTERY_REMAINING_LONGEVITY: 43 MO
MDC_IDC_MSMT_BATTERY_STATUS: NORMAL
MDC_IDC_MSMT_BATTERY_VOLTAGE: 2.75 V
MDC_IDC_MSMT_LEADCHNL_RA_IMPEDANCE_VALUE: 593 OHM
MDC_IDC_MSMT_LEADCHNL_RA_PACING_THRESHOLD_AMPLITUDE: 0.75 V
MDC_IDC_MSMT_LEADCHNL_RA_PACING_THRESHOLD_PULSEWIDTH: 0.4 MS
MDC_IDC_MSMT_LEADCHNL_RA_SENSING_INTR_AMPL: 4 MV
MDC_IDC_MSMT_LEADCHNL_RV_IMPEDANCE_VALUE: 582 OHM
MDC_IDC_MSMT_LEADCHNL_RV_PACING_THRESHOLD_AMPLITUDE: 1.25 V
MDC_IDC_MSMT_LEADCHNL_RV_PACING_THRESHOLD_PULSEWIDTH: 0.4 MS
MDC_IDC_MSMT_LEADCHNL_RV_SENSING_INTR_AMPL: 11.2 MV
MDC_IDC_PG_IMPLANT_DTM: NORMAL
MDC_IDC_PG_MFG: NORMAL
MDC_IDC_PG_MODEL: NORMAL
MDC_IDC_PG_SERIAL: NORMAL
MDC_IDC_PG_TYPE: NORMAL
MDC_IDC_SESS_CLINIC_NAME: NORMAL
MDC_IDC_SESS_DTM: NORMAL
MDC_IDC_SESS_TYPE: NORMAL
MDC_IDC_SET_BRADY_AT_MODE_SWITCH_MODE: NORMAL
MDC_IDC_SET_BRADY_AT_MODE_SWITCH_RATE: 175 {BEATS}/MIN
MDC_IDC_SET_BRADY_LOWRATE: 60 {BEATS}/MIN
MDC_IDC_SET_BRADY_MAX_SENSOR_RATE: 130 {BEATS}/MIN
MDC_IDC_SET_BRADY_MAX_TRACKING_RATE: 130 {BEATS}/MIN
MDC_IDC_SET_BRADY_MODE: NORMAL
MDC_IDC_SET_BRADY_PAV_DELAY_LOW: 350 MS
MDC_IDC_SET_BRADY_SAV_DELAY_LOW: 350 MS
MDC_IDC_SET_LEADCHNL_RA_PACING_AMPLITUDE: 1.5 V
MDC_IDC_SET_LEADCHNL_RA_PACING_CAPTURE_MODE: NORMAL
MDC_IDC_SET_LEADCHNL_RA_PACING_POLARITY: NORMAL
MDC_IDC_SET_LEADCHNL_RA_PACING_PULSEWIDTH: 0.4 MS
MDC_IDC_SET_LEADCHNL_RA_SENSING_POLARITY: NORMAL
MDC_IDC_SET_LEADCHNL_RA_SENSING_SENSITIVITY: 0.5 MV
MDC_IDC_SET_LEADCHNL_RV_PACING_AMPLITUDE: 2.25 V
MDC_IDC_SET_LEADCHNL_RV_PACING_CAPTURE_MODE: NORMAL
MDC_IDC_SET_LEADCHNL_RV_PACING_POLARITY: NORMAL
MDC_IDC_SET_LEADCHNL_RV_PACING_PULSEWIDTH: 0.4 MS
MDC_IDC_SET_LEADCHNL_RV_SENSING_POLARITY: NORMAL
MDC_IDC_SET_LEADCHNL_RV_SENSING_SENSITIVITY: 4 MV
MDC_IDC_SET_ZONE_DETECTION_INTERVAL: 333.33 MS
MDC_IDC_SET_ZONE_DETECTION_INTERVAL: 342.86 MS
MDC_IDC_SET_ZONE_STATUS: NORMAL
MDC_IDC_SET_ZONE_STATUS: NORMAL
MDC_IDC_SET_ZONE_TYPE: NORMAL
MDC_IDC_SET_ZONE_TYPE: NORMAL
MDC_IDC_SET_ZONE_VENDOR_TYPE: NORMAL
MDC_IDC_SET_ZONE_VENDOR_TYPE: NORMAL
MDC_IDC_STAT_AT_BURDEN_PERCENT: 0 %
MDC_IDC_STAT_AT_DTM_END: NORMAL
MDC_IDC_STAT_AT_DTM_START: NORMAL
MDC_IDC_STAT_AT_MODE_SW_COUNT: 38
MDC_IDC_STAT_BRADY_AP_VP_PERCENT: 4 %
MDC_IDC_STAT_BRADY_AP_VS_PERCENT: 71 %
MDC_IDC_STAT_BRADY_AS_VP_PERCENT: 0 %
MDC_IDC_STAT_BRADY_AS_VS_PERCENT: 26 %
MDC_IDC_STAT_BRADY_DTM_END: NORMAL
MDC_IDC_STAT_BRADY_DTM_START: NORMAL
MDC_IDC_STAT_BRADY_RA_PERCENT_PACED: 75 %
MDC_IDC_STAT_BRADY_RV_PERCENT_PACED: 4 %
MDC_IDC_STAT_EPISODE_RECENT_COUNT: 0
MDC_IDC_STAT_EPISODE_RECENT_COUNT: 11
MDC_IDC_STAT_EPISODE_RECENT_COUNT_DTM_END: NORMAL
MDC_IDC_STAT_EPISODE_RECENT_COUNT_DTM_END: NORMAL
MDC_IDC_STAT_EPISODE_RECENT_COUNT_DTM_START: NORMAL
MDC_IDC_STAT_EPISODE_RECENT_COUNT_DTM_START: NORMAL
MDC_IDC_STAT_EPISODE_TYPE: NORMAL
MDC_IDC_STAT_EPISODE_TYPE: NORMAL

## 2025-09-02 PROCEDURE — 93286 PERI-PX EVAL PM/LDLS PM IP: CPT

## 2025-09-02 PROCEDURE — 71046 X-RAY EXAM CHEST 2 VIEWS: CPT | Mod: 26 | Performed by: RADIOLOGY

## 2025-09-02 PROCEDURE — 71046 X-RAY EXAM CHEST 2 VIEWS: CPT

## 2025-09-02 PROCEDURE — 70551 MRI BRAIN STEM W/O DYE: CPT | Mod: 26 | Performed by: RADIOLOGY

## 2025-09-02 PROCEDURE — 70551 MRI BRAIN STEM W/O DYE: CPT

## (undated) DEVICE — CATH ANGIO JUDKINS R4 6FRX100CM INFINITI 534621T

## (undated) DEVICE — CATH DIAG 4FR AR 1 MOD 538441

## (undated) DEVICE — TOTE ANGIO CORP PC15AT SAN32CC83O

## (undated) DEVICE — MANIFOLD KIT ANGIO AUTOMATED 014613

## (undated) DEVICE — INTRO GLIDESHEATH SLENDER 6FR 10X45CM 60-1060

## (undated) DEVICE — DEFIB PRO-PADZ LVP LQD GEL ADULT 8900-2105-01

## (undated) DEVICE — CATH LAUNCHER 6FR JL 3.5 LA6JL35

## (undated) DEVICE — WIRE GUIDE 0.035"X260CM SAFE-T-J EXCHANGE G00517

## (undated) DEVICE — CATH LAUNCHER 6FR JL 4.0 LA6JL40

## (undated) DEVICE — SLEEVE TR BAND RADIAL COMPRESSION DEVICE 24CM TRB24-REG

## (undated) DEVICE — KIT HAND CONTROL ANGIOTOUCH ACIST 65CM AT-P65

## (undated) DEVICE — CATH ANGIO INFINITI AR2 MOD 4FRX100CM 538443

## (undated) DEVICE — CATH ANGIO JUDKINS JL4 6FRX100CM INFINITI 534620T

## (undated) RX ORDER — HEPARIN SODIUM 200 [USP'U]/100ML
INJECTION, SOLUTION INTRAVENOUS
Status: DISPENSED
Start: 2023-06-28

## (undated) RX ORDER — LIDOCAINE HYDROCHLORIDE 10 MG/ML
INJECTION, SOLUTION EPIDURAL; INFILTRATION; INTRACAUDAL; PERINEURAL
Status: DISPENSED
Start: 2023-06-28

## (undated) RX ORDER — FENTANYL CITRATE 50 UG/ML
INJECTION, SOLUTION INTRAMUSCULAR; INTRAVENOUS
Status: DISPENSED
Start: 2023-06-28

## (undated) RX ORDER — NITROGLYCERIN 5 MG/ML
VIAL (ML) INTRAVENOUS
Status: DISPENSED
Start: 2023-06-28

## (undated) RX ORDER — HEPARIN SODIUM 1000 [USP'U]/ML
INJECTION, SOLUTION INTRAVENOUS; SUBCUTANEOUS
Status: DISPENSED
Start: 2023-06-28